# Patient Record
Sex: MALE | Race: BLACK OR AFRICAN AMERICAN | NOT HISPANIC OR LATINO | Employment: OTHER | URBAN - METROPOLITAN AREA
[De-identification: names, ages, dates, MRNs, and addresses within clinical notes are randomized per-mention and may not be internally consistent; named-entity substitution may affect disease eponyms.]

---

## 2017-08-15 ENCOUNTER — TELEPHONE (OUTPATIENT)
Dept: FAMILY MEDICINE | Facility: CLINIC | Age: 62
End: 2017-08-15

## 2017-08-15 NOTE — TELEPHONE ENCOUNTER
----- Message from Yassine Castañeda sent at 8/15/2017 10:38 AM CDT -----  Contact: Pt's wife, Terrance Crowe  Good morning,     Pt's wife is requesting referrals for and Oncologist and Podiatrist at [facility] for the patient.  Best number to call is [phone].    Thanks,     Yassine

## 2017-09-25 ENCOUNTER — OFFICE VISIT (OUTPATIENT)
Dept: FAMILY MEDICINE | Facility: CLINIC | Age: 62
End: 2017-09-25
Payer: MEDICARE

## 2017-09-25 VITALS
DIASTOLIC BLOOD PRESSURE: 80 MMHG | OXYGEN SATURATION: 97 % | SYSTOLIC BLOOD PRESSURE: 118 MMHG | HEART RATE: 73 BPM | HEIGHT: 68 IN | BODY MASS INDEX: 28.1 KG/M2 | WEIGHT: 185.44 LBS

## 2017-09-25 DIAGNOSIS — C34.91 MALIGNANT NEOPLASM OF RIGHT LUNG, UNSPECIFIED PART OF LUNG: ICD-10-CM

## 2017-09-25 DIAGNOSIS — Z79.4 TYPE 2 DIABETES MELLITUS WITHOUT COMPLICATION, WITH LONG-TERM CURRENT USE OF INSULIN: ICD-10-CM

## 2017-09-25 DIAGNOSIS — Z11.59 NEED FOR HEPATITIS C SCREENING TEST: ICD-10-CM

## 2017-09-25 DIAGNOSIS — Z12.11 SCREENING FOR MALIGNANT NEOPLASM OF COLON: ICD-10-CM

## 2017-09-25 DIAGNOSIS — Z00.00 ENCOUNTER FOR PREVENTIVE HEALTH EXAMINATION: Primary | ICD-10-CM

## 2017-09-25 DIAGNOSIS — F41.9 ANXIETY: ICD-10-CM

## 2017-09-25 DIAGNOSIS — D69.6 THROMBOCYTOPENIA: ICD-10-CM

## 2017-09-25 DIAGNOSIS — E11.9 TYPE 2 DIABETES MELLITUS WITHOUT COMPLICATION, WITH LONG-TERM CURRENT USE OF INSULIN: ICD-10-CM

## 2017-09-25 DIAGNOSIS — J44.9 CHRONIC OBSTRUCTIVE PULMONARY DISEASE, UNSPECIFIED COPD TYPE: ICD-10-CM

## 2017-09-25 PROCEDURE — 99999 PR PBB SHADOW E&M-EST. PATIENT-LVL IV: CPT | Mod: PBBFAC,,, | Performed by: NURSE PRACTITIONER

## 2017-09-25 PROCEDURE — G0439 PPPS, SUBSEQ VISIT: HCPCS | Mod: S$GLB,,, | Performed by: NURSE PRACTITIONER

## 2017-09-25 NOTE — PROGRESS NOTES
"Hernan Engel presented for a  Medicare AWV and comprehensive Health Risk Assessment today. The following components were reviewed and updated:    · Medical history  · Family History  · Social history  · Allergies and Current Medications  · Health Risk Assessment  · Health Maintenance  · Care Team     ** See Completed Assessments for Annual Wellness Visit within the encounter summary.**       The following assessments were completed:  · Living Situation  · CAGE  · Depression Screening  · Timed Get Up and Go  · Whisper Test  · Cognitive Function Screening  · Nutrition Screening  · ADL Screening  · PAQ Screening    Vitals:    09/25/17 1115   BP: 118/80   Pulse: 73   SpO2: 97%   Weight: 84.1 kg (185 lb 6.5 oz)   Height: 5' 8" (1.727 m)     Body mass index is 28.19 kg/m².  Physical Exam   Constitutional: He is oriented to person, place, and time.   Cardiovascular: Normal rate, regular rhythm and normal heart sounds.    Pulses:       Dorsalis pedis pulses are 2+ on the right side, and 2+ on the left side.   Pulmonary/Chest: Effort normal and breath sounds normal.   Musculoskeletal: Normal range of motion.        Right foot: There is normal range of motion and no deformity.        Left foot: There is normal range of motion and no deformity.   Feet:   Right Foot:   Protective Sensation: 8 sites tested. 8 sites sensed.   Skin Integrity: Negative for ulcer, blister, skin breakdown, erythema, warmth, callus or dry skin.   Left Foot:   Protective Sensation: 8 sites tested. 8 sites sensed.   Skin Integrity: Negative for ulcer, blister, skin breakdown, erythema, warmth, callus or dry skin.   Neurological: He is alert and oriented to person, place, and time.   Skin: Skin is warm. Capillary refill takes 2 to 3 seconds.   Vitals reviewed.        Diagnoses and health risks identified today and associated recommendations/orders:    1. Encounter for preventive health examination  Education provided about preventive health examinations " and procedures; addressed and discussed patient's health concerns. Additionally, reviewed medical record for risk factors and documented the results during this encounter.    2. Chronic obstructive pulmonary disease, unspecified COPD type  Advised of continued compliance with directives, diet, and lifestyle modifications as recommended.    3. Malignant neoplasm of right lung, unspecified part of lung  Stable, he has an appointment with his PCP Friday, Sept 29, 2017.  Additionally, he reports having a chest CT while hospitalized at [facility]; he completed a release of information for us to obtain the results.     4. Type 2 diabetes mellitus without complication, with long-term current use of insulin  Education provided about diabetes, management of blood glucose with diet and activities, monitoring for worsening effects of diabetes.  Reviewed most recent Ha1c and informed patient of complications associated with uncontrolled diabetes.   - Diabetic Eye Screening Photo; Future  - Hemoglobin A1c; Future    5. Thrombocytopenia  Stable, needs updated labs.  He has a scheduled visit with PCP.     6. Anxiety  Stable, managed with clonazepam and stress relieving activities. Continue as advised regarding dietary and lifestyle modifications.     7. Screening for malignant neoplasm of colon  - Case request GI: COLONOSCOPY    8. Need for hepatitis C screening test  - Hepatitis C antibody; Future    Reviewed health maintenance with patient, educated about recommended examinations, procedures (labs & images), and immunizations.     Provided Hernan with a 5-10 year written screening schedule and personal prevention plan. Recommendations were developed using the USPSTF age appropriate recommendations. Education, counseling, and referrals were provided as needed. After Visit Summary printed and given to patient which includes a list of additional screenings\tests needed.    Return in about 1 year (around 9/25/2018) for risk  assessment .    Ricky Patricio Jr, NP

## 2017-09-25 NOTE — PATIENT INSTRUCTIONS
Counseling and Referral of Other Preventative  (Italic type indicates deductible and co-insurance are waived)    Patient Name: Hernan Engel  Today's Date: 9/25/2017      SERVICE LIMITATIONS RECOMMENDATION    Vaccines    · Pneumococcal (once after 65)    · Influenza (annually)    · Hepatitis B (if medium/high risk)    · Prevnar 13      Hepatitis B medium/high risk factors:       - End-stage renal disease       - Hemophiliacs who received Factor VII or         IX concentrates       - Clients of institutions for the mentally             retarded       - Persons who live in the same house as          a HepB carrier       - Homosexual men       - Illicit injectable drug abusers     Pneumococcal: discussed with patient     Influenza: Recommended to patient.        Hepatitis B: will follow up with PCP.      Prevnar 13: discussed with patient      Prostate cancer screening (annually to age 75)     Prostate specific antigen (PSA) Shared decision making with Provider. Sometimes a co-pay may be required if the patient decides to have this test. The USPSTF no longer recommends prostate cancer screening routinely in medicine:     Colorectal cancer screening (to age 75)    · Fecal occult blood test (annual)  · Flexible sigmoidoscopy (5y)  · Screening colonoscopy (10y)  · Barium enema   Recommended to patient.       Diabetes self-management training (no USPSTF recommendations)  Requires referral by treating physician for patient with diabetes or renal disease. 10 hours of initial DSMT sessions of no less than 30 minutes each in a continuous 12-month period. 2 hours of follow-up DSMT in subsequent years.  discussed with patient      Glaucoma screening (no USPSTF recommendation)  Diabetes mellitus, family history   , age 50 or over    American, age 65 or over  Recommend follow up with eye care professional regularly    Medical nutrition therapy for diabetes or renal disease (no recommended schedule)   Requires referral by treating physician for patient with diabetes or renal disease or kidney transplant within the past 3 years.  Can be provided in same year as diabetes self-management training (DSMT), and CMS recommends medical nutrition therapy take place after DSMT. Up to 3 hours for initial year and 2 hours in subsequent years.  discussed with patient      Cardiovascular screening blood tests (every 5 years)  · Fasting lipid panel  Order as a panel if possible  ordered     Diabetes screening tests (at least every 3 years, Medicare covers annually or at 6-month intervals for prediabetic patients)  · Fasting blood sugar (FBS) or glucose tolerance test (GTT)  Patient must be diagnosed with one of the following:       - Hypertension       - Dyslipidemia       - Obesity (BMI 30kg/m2)       - Previous elevated impaired FBS or GTT       ... or any two of the following:       - Overweight (BMI 25 but <30)       - Family history of diabetes       - Age 65 or older       - History of gestational diabetes or birth of baby weighing more than 9 pounds  ordered     Abdominal aortic aneurysm screening (once)  · Sonogram   Limited to patients who meet one of the following criteria:       - Men who are 65-75 years old and have smoked more than 100 cigarette in their lifetime       - Anyone with a family history of abdominal aortic aneurysm       - Anyone recommended for screening by the USPSTF  per PCP and patient with clinical risk factors     HIV screening (annually for increased risk patients)  · HIV-1 and HIV-2 by EIA, or MELITON, rapid antibody test or oral mucosa transudate  Patients must be at increased risk for HIV infection per USPSTF guidelines or pregnant. Tests covered annually for patient at increased risk or as requested by the patient. Pregnant patients may receive up to 3 tests during pregnancy.  no clinical risk factors      Smoking cessation counseling (up to 8 sessions per year)  Patients must be asymptomatic  of tobacco-related conditions to receive as a preventative service.  Non-smoker    Subsequent annual wellness visit  At least 12 months since last AWV  Return in one year     The following information is provided to all patients.  This information is to help you find resources for any of the problems found today that may be affecting your health:                Living healthy guide: www.[facility].[place].[facility]      Understanding Diabetes: www.diabetes.org      Eating healthy: www.cdc.gov/healthyweight      CDC home safety checklist: www.cdc.gov/steadi/patient.html      Agency on Aging: www.goea.[place].[facility]      Alcoholics anonymous (AA): www.aa.org      Physical Activity: www.ady.nih.gov/rf7njky      Tobacco use: www.quitwithusla.org

## 2017-09-26 ENCOUNTER — LAB VISIT (OUTPATIENT)
Dept: LAB | Facility: HOSPITAL | Age: 62
End: 2017-09-26
Attending: NURSE PRACTITIONER
Payer: MEDICARE

## 2017-09-26 DIAGNOSIS — E11.9 TYPE 2 DIABETES MELLITUS WITHOUT COMPLICATION, WITH LONG-TERM CURRENT USE OF INSULIN: ICD-10-CM

## 2017-09-26 DIAGNOSIS — Z79.4 TYPE 2 DIABETES MELLITUS WITHOUT COMPLICATION, WITH LONG-TERM CURRENT USE OF INSULIN: ICD-10-CM

## 2017-09-26 LAB
ESTIMATED AVG GLUCOSE: 100 MG/DL
HBA1C MFR BLD HPLC: 5.1 %

## 2017-09-26 PROCEDURE — 36415 COLL VENOUS BLD VENIPUNCTURE: CPT | Mod: PO

## 2017-09-26 PROCEDURE — 83036 HEMOGLOBIN GLYCOSYLATED A1C: CPT

## 2017-09-29 ENCOUNTER — OFFICE VISIT (OUTPATIENT)
Dept: FAMILY MEDICINE | Facility: CLINIC | Age: 62
End: 2017-09-29
Attending: FAMILY MEDICINE
Payer: MEDICARE

## 2017-09-29 ENCOUNTER — LAB VISIT (OUTPATIENT)
Dept: LAB | Facility: HOSPITAL | Age: 62
End: 2017-09-29
Attending: FAMILY MEDICINE
Payer: MEDICARE

## 2017-09-29 VITALS
BODY MASS INDEX: 28.03 KG/M2 | HEART RATE: 55 BPM | DIASTOLIC BLOOD PRESSURE: 77 MMHG | SYSTOLIC BLOOD PRESSURE: 123 MMHG | WEIGHT: 184.94 LBS | HEIGHT: 68 IN | OXYGEN SATURATION: 95 %

## 2017-09-29 DIAGNOSIS — Z00.00 ROUTINE GENERAL MEDICAL EXAMINATION AT A HEALTH CARE FACILITY: ICD-10-CM

## 2017-09-29 DIAGNOSIS — Z79.4 TYPE 2 DIABETES MELLITUS WITHOUT COMPLICATION, WITH LONG-TERM CURRENT USE OF INSULIN: ICD-10-CM

## 2017-09-29 DIAGNOSIS — C34.91 MALIGNANT NEOPLASM OF RIGHT LUNG, UNSPECIFIED PART OF LUNG: ICD-10-CM

## 2017-09-29 DIAGNOSIS — Z11.59 NEED FOR HEPATITIS C SCREENING TEST: ICD-10-CM

## 2017-09-29 DIAGNOSIS — D69.6 THROMBOCYTOPENIA: ICD-10-CM

## 2017-09-29 DIAGNOSIS — E11.9 TYPE 2 DIABETES MELLITUS WITHOUT COMPLICATION, WITH LONG-TERM CURRENT USE OF INSULIN: ICD-10-CM

## 2017-09-29 DIAGNOSIS — Z00.00 ROUTINE GENERAL MEDICAL EXAMINATION AT A HEALTH CARE FACILITY: Primary | ICD-10-CM

## 2017-09-29 DIAGNOSIS — Z12.5 SPECIAL SCREENING FOR MALIGNANT NEOPLASM OF PROSTATE: ICD-10-CM

## 2017-09-29 DIAGNOSIS — Z12.11 SCREEN FOR COLON CANCER: ICD-10-CM

## 2017-09-29 DIAGNOSIS — Z23 IMMUNIZATION DUE: ICD-10-CM

## 2017-09-29 DIAGNOSIS — J44.9 CHRONIC OBSTRUCTIVE PULMONARY DISEASE, UNSPECIFIED COPD TYPE: ICD-10-CM

## 2017-09-29 LAB
ALBUMIN SERPL BCP-MCNC: 4.3 G/DL
ALP SERPL-CCNC: 91 U/L
ALT SERPL W/O P-5'-P-CCNC: 22 U/L
ANION GAP SERPL CALC-SCNC: 8 MMOL/L
AST SERPL-CCNC: 19 U/L
BASOPHILS # BLD AUTO: 0.03 K/UL
BASOPHILS NFR BLD: 0.7 %
BILIRUB SERPL-MCNC: 1.5 MG/DL
BUN SERPL-MCNC: 10 MG/DL
CALCIUM SERPL-MCNC: 10.1 MG/DL
CHLORIDE SERPL-SCNC: 106 MMOL/L
CHOLEST SERPL-MCNC: 186 MG/DL
CHOLEST/HDLC SERPL: 3.7 {RATIO}
CO2 SERPL-SCNC: 27 MMOL/L
COMPLEXED PSA SERPL-MCNC: 0.89 NG/ML
CREAT SERPL-MCNC: 1 MG/DL
DIFFERENTIAL METHOD: NORMAL
EOSINOPHIL # BLD AUTO: 0.1 K/UL
EOSINOPHIL NFR BLD: 1.7 %
ERYTHROCYTE [DISTWIDTH] IN BLOOD BY AUTOMATED COUNT: 13.2 %
EST. GFR  (AFRICAN AMERICAN): >60 ML/MIN/1.73 M^2
EST. GFR  (NON AFRICAN AMERICAN): >60 ML/MIN/1.73 M^2
GLUCOSE SERPL-MCNC: 119 MG/DL
HCT VFR BLD AUTO: 42.6 %
HDLC SERPL-MCNC: 50 MG/DL
HDLC SERPL: 26.9 %
HGB BLD-MCNC: 14.5 G/DL
LDLC SERPL CALC-MCNC: 119 MG/DL
LYMPHOCYTES # BLD AUTO: 1.9 K/UL
LYMPHOCYTES NFR BLD: 46 %
MCH RBC QN AUTO: 28.3 PG
MCHC RBC AUTO-ENTMCNC: 34 G/DL
MCV RBC AUTO: 83 FL
MONOCYTES # BLD AUTO: 0.4 K/UL
MONOCYTES NFR BLD: 8.8 %
NEUTROPHILS # BLD AUTO: 1.8 K/UL
NEUTROPHILS NFR BLD: 42.8 %
NONHDLC SERPL-MCNC: 136 MG/DL
PLATELET # BLD AUTO: 229 K/UL
PMV BLD AUTO: 9.5 FL
POTASSIUM SERPL-SCNC: 4.8 MMOL/L
PROT SERPL-MCNC: 8 G/DL
RBC # BLD AUTO: 5.13 M/UL
SODIUM SERPL-SCNC: 141 MMOL/L
TRIGL SERPL-MCNC: 85 MG/DL
WBC # BLD AUTO: 4.09 K/UL

## 2017-09-29 PROCEDURE — 99386 PREV VISIT NEW AGE 40-64: CPT | Mod: S$GLB,,, | Performed by: FAMILY MEDICINE

## 2017-09-29 PROCEDURE — 36415 COLL VENOUS BLD VENIPUNCTURE: CPT

## 2017-09-29 PROCEDURE — 85025 COMPLETE CBC W/AUTO DIFF WBC: CPT

## 2017-09-29 PROCEDURE — 80053 COMPREHEN METABOLIC PANEL: CPT

## 2017-09-29 PROCEDURE — 99999 PR PBB SHADOW E&M-EST. PATIENT-LVL IV: CPT | Mod: PBBFAC,,, | Performed by: FAMILY MEDICINE

## 2017-09-29 PROCEDURE — 84153 ASSAY OF PSA TOTAL: CPT

## 2017-09-29 PROCEDURE — 86803 HEPATITIS C AB TEST: CPT

## 2017-09-29 PROCEDURE — 80061 LIPID PANEL: CPT

## 2017-09-29 NOTE — PROGRESS NOTES
Subjective:       Patient ID: Hernan Engel is a 62 y.o. male.    Chief Complaint: Annual Exam    62 yr old pleasant black male with right lung cancer now is remission, DM II, thrombocytopenia, presents today for his annual wellness check and lab work. No new complaints today.    Lung cancer right - had treatment and it is in remission - he followed [facility] all through this time - he is due for CT chest for surveillance - no complaints       DM II - controlled - diet control and very sparingly he takes insulin when he thinks his sugar is little high -   HGBA1C                   5.1                 09/26/2017          - not on ACE/ARB  Foot n eye exam due      History as below - reviewed         Diabetes   He presents for his follow-up diabetic visit. He has type 2 diabetes mellitus. His disease course has been stable. There are no hypoglycemic associated symptoms. Pertinent negatives for hypoglycemia include no dizziness, nervousness/anxiousness or speech difficulty. There are no diabetic associated symptoms. Pertinent negatives for diabetes include no chest pain, no polyuria and no weakness. There are no hypoglycemic complications. Symptoms are stable. There are no diabetic complications. Risk factors for coronary artery disease include diabetes mellitus and male sex. Current diabetic treatment includes diet. He is compliant with treatment all of the time. He is following a diabetic diet. Meal planning includes avoidance of concentrated sweets. He rarely participates in exercise. There is no change in his home blood glucose trend. An ACE inhibitor/angiotensin II receptor blocker is not being taken. He does not see a podiatrist.Eye exam is not current.     Review of Systems   Constitutional: Negative.  Negative for activity change, diaphoresis and unexpected weight change.   HENT: Negative.  Negative for congestion, ear pain, mouth sores, rhinorrhea and voice change.    Eyes: Negative.  Negative for pain, discharge  and visual disturbance.   Respiratory: Negative.  Negative for apnea, cough and wheezing.    Cardiovascular: Negative.  Negative for chest pain and palpitations.   Gastrointestinal: Negative.  Negative for abdominal distention, anal bleeding, diarrhea and vomiting.   Endocrine: Negative.  Negative for cold intolerance and polyuria.   Genitourinary: Negative.  Negative for decreased urine volume, difficulty urinating, discharge, frequency and scrotal swelling.   Musculoskeletal: Negative.  Negative for back pain, myalgias and neck stiffness.   Skin: Negative.  Negative for color change and rash.   Allergic/Immunologic: Negative.  Negative for environmental allergies and immunocompromised state.   Neurological: Negative.  Negative for dizziness, speech difficulty, weakness and light-headedness.   Hematological: Negative.    Psychiatric/Behavioral: Negative.  Negative for agitation, dysphoric mood and suicidal ideas. The patient is not nervous/anxious.        Active Ambulatory Problems     Diagnosis Date Noted    ED (erectile dysfunction) 01/16/2014    Colon polyp 01/16/2014    Family history of colon cancer 01/16/2014    Abnormal CT scan of lung 01/21/2014    Lung mass 02/25/2014    Primary lung adenocarcinoma 03/17/2014    Status post lobectomy of lung 04/15/2014    Anxiety 05/13/2014    Depression 05/13/2014    COPD (chronic obstructive pulmonary disease) 05/13/2014    Lung cancer 07/16/2014    Hyperkalemia 09/01/2014    DMII (diabetes mellitus, type 2) 09/01/2014    Normocytic anemia 09/03/2014    Thrombocytopenia 09/04/2014     Resolved Ambulatory Problems     Diagnosis Date Noted    Well adult health check 01/16/2014    Chronic cough 01/16/2014    Melena 01/16/2014    Prostate cancer screening 01/16/2014    Hematochezia 01/16/2014    Rectal bleed 01/20/2014    DKA (diabetic ketoacidoses) 09/01/2014    DE (acute kidney injury) 09/01/2014    Troponin I above reference range 09/01/2014     Head trauma 09/01/2014     Past Medical History:   Diagnosis Date    Anemia     Anxiety     COPD (chronic obstructive pulmonary disease)     Depression     Disorder of kidney and ureter     Hearing loss in left ear 34 years    Lung cancer     T2DM (type 2 diabetes mellitus) 2014    Type 2 diabetes mellitus      Past Surgical History:   Procedure Laterality Date    COLONOSCOPY      LUNG REMOVAL, PARTIAL       Family History   Problem Relation Age of Onset    Cancer Father      colon cancer    Colon cancer Father     Colon cancer Sister     Cancer Sister      colon or lung cancer      Social History     Social History    Marital status:      Spouse name: N/A    Number of children: N/A    Years of education: N/A     Occupational History     [employer]     Social History Main Topics    Smoking status: Former Smoker     Packs/day: 1.00     Years: 30.00     Quit date: 1/15/2014    Smokeless tobacco: Never Used    Alcohol use 3.4 oz/week     4 Cans of beer, 2 Standard drinks or equivalent per week      Comment: socially    Drug use: No    Sexual activity: Not Currently     Partners: Female     Birth control/ protection: None     Other Topics Concern    Not on file     Social History Narrative    No narrative on file     Review of patient's allergies indicates:   Allergen Reactions    Adhesive Itching, Rash and Other (See Comments)     Blister     Current Outpatient Prescriptions on File Prior to Visit   Medication Sig Dispense Refill    clonazepam (KLONOPIN) 0.5 MG tablet Take 0.5 tablets (0.25 mg total) by mouth daily as needed for Anxiety. 10 tablet 0    insulin NPH-insulin regular, 70/30, (NOVOLIN 70/30) 100 unit/mL (70-30) injection Inject 25U into skin AM. Inject 15U into skin PM. 1 vial 0    oxycodone (ROXICODONE) 5 MG immediate release tablet Take 1 tablet (5 mg total) by mouth every 4 (four) hours as needed. 61 tablet 0    polyethylene glycol (GOLYTELY,NULYTELY)  236-22.74-6.74 gram suspension       sildenafil (VIAGRA) 100 MG tablet Take 1 tablet (100 mg total) by mouth daily as needed for Erectile Dysfunction. 10 tablet 12    tiotropium (SPIRIVA) 18 mcg inhalation capsule Inhale 18 mcg into the lungs once daily.      zolpidem (AMBIEN) 5 MG Tab Take 1 tablet (5 mg total) by mouth nightly as needed. 30 tablet 0    citalopram (CELEXA) 10 MG tablet Take 1 tablet (10 mg total) by mouth once daily. 30 tablet 2    fluticasone-salmeterol 500-50 mcg/dose (ADVAIR DISKUS) 500-50 mcg/dose DsDv diskus inhaler Inhale 1 puff into the lungs 2 (two) times daily. 60 each 2    metformin (GLUCOPHAGE) 500 MG tablet Take 1 tablet (500 mg total) by mouth 2 (two) times daily with meals. 60 tablet 11     No current facility-administered medications on file prior to visit.        Objective:       Vitals:    09/29/17 0817   BP: 123/77   Pulse: (!) 55       Physical Exam   Constitutional: He is oriented to person, place, and time. He appears well-developed and well-nourished.   HENT:   Head: Normocephalic and atraumatic.   Right Ear: External ear normal.   Left Ear: External ear normal.   Nose: Nose normal.   Mouth/Throat: Oropharynx is clear and moist. No oropharyngeal exudate.   Eyes: Conjunctivae and EOM are normal. Pupils are equal, round, and reactive to light. Right eye exhibits no discharge. Left eye exhibits no discharge. No scleral icterus.   Neck: Normal range of motion. Neck supple. No JVD present. No tracheal deviation present. No thyromegaly present.   Cardiovascular: Normal rate, regular rhythm, normal heart sounds and intact distal pulses.  Exam reveals no gallop and no friction rub.    No murmur heard.  Pulmonary/Chest: Effort normal and breath sounds normal. No stridor. No respiratory distress. He has no wheezes. He has no rales. He exhibits no tenderness.   Abdominal: Soft. Bowel sounds are normal. He exhibits no distension and no mass. There is no tenderness. There is no  rebound and no guarding. No hernia.   Musculoskeletal: Normal range of motion. He exhibits no edema or tenderness.   Lymphadenopathy:     He has no cervical adenopathy.   Neurological: He is alert and oriented to person, place, and time. He has normal reflexes. He displays normal reflexes. No cranial nerve deficit. He exhibits normal muscle tone. Coordination normal.   Skin: Skin is warm and dry. No rash noted. No erythema. No pallor.   Psychiatric: He has a normal mood and affect. His behavior is normal. Judgment and thought content normal.       Assessment:       1. Routine general medical examination at a health care facility    2. Type 2 diabetes mellitus without complication, with long-term current use of insulin    3. Malignant neoplasm of right lung, unspecified part of lung    4. Thrombocytopenia    5. Chronic obstructive pulmonary disease, unspecified COPD type    6. Screen for colon cancer    7. Need for hepatitis C screening test    8. Immunization due    9. Special screening for malignant neoplasm of prostate        Plan:       Hernan was seen today for annual exam.    Diagnoses and all orders for this visit:    Routine general medical examination at a health care facility  -     CBC auto differential; Future  -     Comprehensive metabolic panel; Future  -     Lipid panel; Future    Type 2 diabetes mellitus without complication, with long-term current use of insulin  -     CBC auto differential; Future  -     Comprehensive metabolic panel; Future  -     Lipid panel; Future  -     Microalbumin/creatinine urine ratio; Future  -     Urinalysis; Future  -     Ambulatory referral to Optometry  -     Ambulatory referral to Podiatry    Malignant neoplasm of right lung, unspecified part of lung  -     CT Chest Without Contrast; Future    Thrombocytopenia  -     CBC auto differential; Future    Chronic obstructive pulmonary disease, unspecified COPD type    Screen for colon cancer  -     Case request GI:  COLONOSCOPY    Need for hepatitis C screening test  -     Hepatitis C antibody; Future    Immunization due    Special screening for malignant neoplasm of prostate  -     PSA, Screening; Future      Wellness check  -normal exam  -labs    COPD  -stable    Lung cancer remission  -CT for surveillance    Spent adequate time in obtaining history and explaining differentials    40 minutes spent during this visit of which greater than 50% devoted to face-face counseling and coordination of care regarding diagnosis and management plan    Return in about 6 months (around 3/29/2018), or if symptoms worsen or fail to improve.

## 2017-10-02 ENCOUNTER — HOSPITAL ENCOUNTER (OUTPATIENT)
Dept: RADIOLOGY | Facility: HOSPITAL | Age: 62
Discharge: HOME | End: 2017-10-02
Attending: FAMILY MEDICINE
Payer: MEDICARE

## 2017-10-02 ENCOUNTER — TELEPHONE (OUTPATIENT)
Dept: FAMILY MEDICINE | Facility: CLINIC | Age: 62
End: 2017-10-02

## 2017-10-02 DIAGNOSIS — C34.91 MALIGNANT NEOPLASM OF RIGHT LUNG, UNSPECIFIED PART OF LUNG: ICD-10-CM

## 2017-10-02 LAB — HCV AB SERPL QL IA: NEGATIVE

## 2017-10-02 PROCEDURE — 71250 CT THORAX DX C-: CPT | Mod: TC

## 2017-10-02 PROCEDURE — 71250 CT THORAX DX C-: CPT | Mod: 26,,, | Performed by: RADIOLOGY

## 2017-10-03 ENCOUNTER — TELEPHONE (OUTPATIENT)
Dept: FAMILY MEDICINE | Facility: CLINIC | Age: 62
End: 2017-10-03

## 2017-10-03 NOTE — TELEPHONE ENCOUNTER
----- Message from Katia Mendez sent at 10/2/2017  4:55 PM CDT -----  Contact: [phone]/self  Patient called in returning your call. Please advise.

## 2017-10-03 NOTE — TELEPHONE ENCOUNTER
----- Message from Vicky Dover sent at 10/3/2017  1:52 PM CDT -----  Contact: [phone]/ self   Patient called in returning your call. Please advise

## 2017-10-04 ENCOUNTER — TELEPHONE (OUTPATIENT)
Dept: FAMILY MEDICINE | Facility: CLINIC | Age: 62
End: 2017-10-04

## 2017-10-04 NOTE — TELEPHONE ENCOUNTER
----- Message from Christina Orosco sent at 10/4/2017  4:00 PM CDT -----  Contact: [phone]/ Ms Toro   Patient called in returning your call. Please advise.

## 2017-10-04 NOTE — TELEPHONE ENCOUNTER
----- Message from Lillian Briscoe sent at 10/4/2017  1:28 PM CDT -----  Contact: Ms Engel  Patient returning missed call to nurse   Patient need test results.   Please call Ms Engel at  [phone].

## 2017-10-11 ENCOUNTER — OFFICE VISIT (OUTPATIENT)
Dept: PODIATRY | Facility: CLINIC | Age: 62
End: 2017-10-11
Attending: FAMILY MEDICINE
Payer: MEDICARE

## 2017-10-11 VITALS
WEIGHT: 184 LBS | HEART RATE: 69 BPM | BODY MASS INDEX: 27.89 KG/M2 | HEIGHT: 68 IN | SYSTOLIC BLOOD PRESSURE: 108 MMHG | DIASTOLIC BLOOD PRESSURE: 72 MMHG

## 2017-10-11 DIAGNOSIS — E11.9 ENCOUNTER FOR COMPREHENSIVE DIABETIC FOOT EXAMINATION, TYPE 2 DIABETES MELLITUS: Primary | ICD-10-CM

## 2017-10-11 PROCEDURE — 99999 PR PBB SHADOW E&M-EST. PATIENT-LVL III: CPT | Mod: PBBFAC,,, | Performed by: PODIATRIST

## 2017-10-11 PROCEDURE — 99499 UNLISTED E&M SERVICE: CPT | Mod: S$GLB,,, | Performed by: PODIATRIST

## 2017-10-11 PROCEDURE — 99202 OFFICE O/P NEW SF 15 MIN: CPT | Mod: S$GLB,,, | Performed by: PODIATRIST

## 2017-10-11 RX ORDER — GABAPENTIN 300 MG/1
300 CAPSULE ORAL
COMMUNITY
Start: 2014-10-21 | End: 2017-12-06

## 2017-10-11 RX ORDER — CALCIUM CITRATE/VITAMIN D3 200MG-6.25
TABLET ORAL
COMMUNITY
Start: 2017-08-10 | End: 2019-02-01 | Stop reason: SDUPTHER

## 2017-10-11 RX ORDER — SYRINGE,SAFETY WITH NEEDLE,1ML 25GX1"
1 SYRINGE (EA) MISCELLANEOUS
COMMUNITY
Start: 2014-10-14 | End: 2023-09-28

## 2017-10-11 RX ORDER — ALBUTEROL SULFATE 90 UG/1
2 AEROSOL, METERED RESPIRATORY (INHALATION)
COMMUNITY
Start: 2015-02-24 | End: 2017-12-06 | Stop reason: SDUPTHER

## 2017-10-18 NOTE — PROGRESS NOTES
Subjective:      Patient ID: Hernan Engel is a 62 y.o. male.    Chief Complaint: Diabetic Foot Exam    Hernan is a 62 y.o. male who presents to the clinic upon referral from Dr. Patel  for evaluation and treatment of diabetic feet. Hernan has a past medical history of Anemia; Anxiety; COPD (chronic obstructive pulmonary disease); Depression; Disorder of kidney and ureter; Hearing loss in left ear (34 years); Lung cancer; T2DM (type 2 diabetes mellitus) (2014); and Type 2 diabetes mellitus. Patient relates no major problem with feet. Denies history of lower extremity wounds, infections and/or injury.    PCP: Jose Patel MD        Hemoglobin A1C   Date Value Ref Range Status   09/26/2017 5.1 4.0 - 5.6 % Final     Comment:     According to ADA guidelines, hemoglobin A1c <7.0% represents  optimal control in non-pregnant diabetic patients. Different  metrics may apply to specific patient populations.   Standards of Medical Care in Diabetes-2016.  For the purpose of screening for the presence of diabetes:  <5.7%     Consistent with the absence of diabetes  5.7-6.4%  Consistent with increasing risk for diabetes   (prediabetes)  >or=6.5%  Consistent with diabetes  Currently, no consensus exists for use of hemoglobin A1c  for diagnosis of diabetes for children.  This Hemoglobin A1c assay has significant interference with fetal   hemoglobin   (HbF). The results are invalid for patients with abnormal amounts of   HbF,   including those with known Hereditary Persistence   of Fetal Hemoglobin. Heterozygous hemoglobin variants (HbAS, HbAC,   HbAD, HbAE, HbA2) do not significantly interfere with this assay;   however, presence of multiple variants in a sample may impact the %   interference.     09/02/2014 14.3 (H) 4.5 - 6.2 % Final   09/01/2014 14.4 (H) 4.5 - 6.2 % Final           Review of Systems   Constitution: Negative for chills, fever, weakness, malaise/fatigue and night sweats.   Cardiovascular: Negative for chest pain,  leg swelling, orthopnea and palpitations.   Respiratory: Negative for cough, shortness of breath and wheezing.    Skin: Negative for color change, itching, poor wound healing and rash.   Musculoskeletal: Negative for arthritis, gout, joint pain, joint swelling, muscle weakness and myalgias.   Gastrointestinal: Negative for abdominal pain, constipation and nausea.   Neurological: Positive for paresthesias. Negative for disturbances in coordination, dizziness, focal weakness, numbness and tremors.           Objective:      Physical Exam   Constitutional: He is oriented to person, place, and time. Vital signs are normal. He appears well-developed. He is cooperative. No distress.   Cardiovascular: Intact distal pulses.    Pulses:       Dorsalis pedis pulses are 2+ on the right side, and 2+ on the left side.        Posterior tibial pulses are 1+ on the right side, and 1+ on the left side.   Musculoskeletal:        Right ankle: Normal.        Left ankle: Normal.        Right foot: There is normal range of motion, no bony tenderness, normal capillary refill, no crepitus and no deformity.        Left foot: There is normal range of motion, no bony tenderness, normal capillary refill, no crepitus and no deformity.   Feet:   Right Foot:   Protective Sensation: 10 sites tested. 10 sites sensed.   Skin Integrity: Negative for ulcer, blister, skin breakdown or erythema.   Left Foot:   Protective Sensation: 10 sites tested. 10 sites sensed.   Skin Integrity: Negative for ulcer, blister, skin breakdown or erythema.   Neurological: He is alert and oriented to person, place, and time. He has normal strength. No sensory deficit. He exhibits normal muscle tone. Gait normal.   Reflex Scores:       Achilles reflexes are 2+ on the right side and 2+ on the left side.  Negative Tinels sign and Alexandria's click, bilat.   Skin: Skin is intact. Capillary refill takes 2 to 3 seconds. No abrasion, no ecchymosis, no lesion and no rash noted. No  erythema. Nails show no clubbing.                  Assessment:       Encounter Diagnosis   Name Primary?    Encounter for comprehensive diabetic foot examination, type 2 diabetes mellitus Yes         Plan:       Hernan was seen today for diabetic foot exam.    Diagnoses and all orders for this visit:    Encounter for comprehensive diabetic foot examination, type 2 diabetes mellitus      I counseled the patient on his conditions, their implications and medical management.        - Shoe inspection. Diabetic Foot Education. Patient reminded of the importance of good nutrition and blood sugar control to help prevent podiatric complications of diabetes. Patient instructed on proper foot hygeine. We discussed wearing proper shoe gear, daily foot inspections, never walking without protective shoe gear.

## 2017-11-09 ENCOUNTER — TELEPHONE (OUTPATIENT)
Dept: GASTROENTEROLOGY | Facility: CLINIC | Age: 62
End: 2017-11-09

## 2017-11-09 NOTE — TELEPHONE ENCOUNTER
Referral was sent from Dr. Patel to schedule an Colonoscopy, left an voicemail for patient to call the office back in regards to scheduling procedure.

## 2017-12-06 ENCOUNTER — OFFICE VISIT (OUTPATIENT)
Dept: FAMILY MEDICINE | Facility: CLINIC | Age: 62
End: 2017-12-06
Attending: FAMILY MEDICINE
Payer: MEDICARE

## 2017-12-06 VITALS
BODY MASS INDEX: 28.83 KG/M2 | HEART RATE: 68 BPM | OXYGEN SATURATION: 96 % | WEIGHT: 190.25 LBS | DIASTOLIC BLOOD PRESSURE: 65 MMHG | SYSTOLIC BLOOD PRESSURE: 100 MMHG | HEIGHT: 68 IN

## 2017-12-06 DIAGNOSIS — E78.5 DYSLIPIDEMIA ASSOCIATED WITH TYPE 2 DIABETES MELLITUS: ICD-10-CM

## 2017-12-06 DIAGNOSIS — E11.9 TYPE 2 DIABETES MELLITUS WITHOUT COMPLICATION, WITH LONG-TERM CURRENT USE OF INSULIN: Primary | ICD-10-CM

## 2017-12-06 DIAGNOSIS — F32.A DEPRESSION, UNSPECIFIED DEPRESSION TYPE: ICD-10-CM

## 2017-12-06 DIAGNOSIS — K21.9 GASTROESOPHAGEAL REFLUX DISEASE, ESOPHAGITIS PRESENCE NOT SPECIFIED: ICD-10-CM

## 2017-12-06 DIAGNOSIS — J44.9 CHRONIC OBSTRUCTIVE PULMONARY DISEASE, UNSPECIFIED COPD TYPE: ICD-10-CM

## 2017-12-06 DIAGNOSIS — I70.0 AORTIC ATHEROSCLEROSIS: ICD-10-CM

## 2017-12-06 DIAGNOSIS — C34.91 MALIGNANT NEOPLASM OF RIGHT LUNG, UNSPECIFIED PART OF LUNG: ICD-10-CM

## 2017-12-06 DIAGNOSIS — F41.9 ANXIETY: ICD-10-CM

## 2017-12-06 DIAGNOSIS — Z79.4 TYPE 2 DIABETES MELLITUS WITHOUT COMPLICATION, WITH LONG-TERM CURRENT USE OF INSULIN: Primary | ICD-10-CM

## 2017-12-06 DIAGNOSIS — C34.91 PRIMARY ADENOCARCINOMA OF RIGHT LUNG: ICD-10-CM

## 2017-12-06 DIAGNOSIS — Z12.11 COLON CANCER SCREENING: ICD-10-CM

## 2017-12-06 DIAGNOSIS — G47.00 INSOMNIA, UNSPECIFIED TYPE: ICD-10-CM

## 2017-12-06 DIAGNOSIS — J43.1 PANLOBULAR EMPHYSEMA: ICD-10-CM

## 2017-12-06 DIAGNOSIS — E11.69 DYSLIPIDEMIA ASSOCIATED WITH TYPE 2 DIABETES MELLITUS: ICD-10-CM

## 2017-12-06 PROCEDURE — 99214 OFFICE O/P EST MOD 30 MIN: CPT | Mod: S$GLB,,, | Performed by: FAMILY MEDICINE

## 2017-12-06 PROCEDURE — 99499 UNLISTED E&M SERVICE: CPT | Mod: S$GLB,,, | Performed by: FAMILY MEDICINE

## 2017-12-06 PROCEDURE — 99999 PR PBB SHADOW E&M-EST. PATIENT-LVL III: CPT | Mod: PBBFAC,,, | Performed by: FAMILY MEDICINE

## 2017-12-06 RX ORDER — FLUTICASONE PROPIONATE AND SALMETEROL 500; 50 UG/1; UG/1
1 POWDER RESPIRATORY (INHALATION) 2 TIMES DAILY
Qty: 180 EACH | Refills: 3 | Status: SHIPPED | OUTPATIENT
Start: 2017-12-06 | End: 2019-10-04 | Stop reason: SDUPTHER

## 2017-12-06 RX ORDER — TIOTROPIUM BROMIDE 18 UG/1
18 CAPSULE ORAL; RESPIRATORY (INHALATION) DAILY
Qty: 90 CAPSULE | Refills: 10 | Status: SHIPPED | OUTPATIENT
Start: 2017-12-06 | End: 2020-03-02

## 2017-12-06 RX ORDER — TRAZODONE HYDROCHLORIDE 50 MG/1
50 TABLET ORAL NIGHTLY
Qty: 90 TABLET | Refills: 3 | Status: SHIPPED | OUTPATIENT
Start: 2017-12-06 | End: 2018-03-06

## 2017-12-06 RX ORDER — ATORVASTATIN CALCIUM 10 MG/1
10 TABLET, FILM COATED ORAL DAILY
Qty: 90 TABLET | Refills: 3 | Status: SHIPPED | OUTPATIENT
Start: 2017-12-06 | End: 2019-03-06 | Stop reason: SDUPTHER

## 2017-12-06 RX ORDER — ALBUTEROL SULFATE 90 UG/1
2 AEROSOL, METERED RESPIRATORY (INHALATION) EVERY 6 HOURS PRN
Qty: 18 G | Refills: 10 | Status: SHIPPED | OUTPATIENT
Start: 2017-12-06 | End: 2019-02-01 | Stop reason: SDUPTHER

## 2017-12-06 RX ORDER — CITALOPRAM 10 MG/1
10 TABLET ORAL DAILY
Qty: 90 TABLET | Refills: 3 | Status: SHIPPED | OUTPATIENT
Start: 2017-12-06 | End: 2018-03-06

## 2017-12-06 NOTE — PROGRESS NOTES
Subjective:       Patient ID: Hernan Engel is a 62 y.o. male.    Chief Complaint: Results (CT ) and Diabetic Eye Exam    62 yr old pleasant black male with right lung cancer now is remission, HLD, DM II,COPD,  thrombocytopenia, presents today for his regular check and lab work review. No new complaints today.    Lung cancer right - had treatment and it is in remission - he followed [facility] all through this time - he is due for CT chest for surveillance - no complaints       DM II - controlled - diet control - HGBA1C                   5.1                 09/26/2017                - not on ACE/ARB  Foot n eye exam UTD      HLD - diet control - LDL not at goal -       Depression/anxiety - uncontrolled - need med refills      Insomnia - uncontrolled - want to try something else since ambien not helping       COPD - uncontrolled - due for med refills -           History as below - reviewed         Diabetes   He presents for his follow-up diabetic visit. He has type 2 diabetes mellitus. His disease course has been stable. There are no hypoglycemic associated symptoms. Pertinent negatives for hypoglycemia include no dizziness, nervousness/anxiousness or speech difficulty. There are no diabetic associated symptoms. Pertinent negatives for diabetes include no chest pain, no polyuria and no weakness. There are no hypoglycemic complications. Symptoms are stable. There are no diabetic complications. Risk factors for coronary artery disease include diabetes mellitus and male sex. Current diabetic treatment includes diet. He is compliant with treatment all of the time. He is following a diabetic diet. Meal planning includes avoidance of concentrated sweets. He rarely participates in exercise. There is no change in his home blood glucose trend. An ACE inhibitor/angiotensin II receptor blocker is not being taken. He does not see a podiatrist.Eye exam is not current.   Hyperlipidemia   Pertinent negatives include no chest pain or  myalgias.     Review of Systems   Constitutional: Negative.  Negative for activity change, diaphoresis and unexpected weight change.   HENT: Negative.  Negative for congestion, ear pain, mouth sores, rhinorrhea and voice change.    Eyes: Negative.  Negative for pain, discharge and visual disturbance.   Respiratory: Negative.  Negative for apnea, cough and wheezing.    Cardiovascular: Negative.  Negative for chest pain and palpitations.   Gastrointestinal: Negative.  Negative for abdominal distention, anal bleeding, diarrhea and vomiting.   Endocrine: Negative.  Negative for cold intolerance and polyuria.   Genitourinary: Negative.  Negative for decreased urine volume, difficulty urinating, discharge, frequency and scrotal swelling.   Musculoskeletal: Negative.  Negative for back pain, myalgias and neck stiffness.   Skin: Negative.  Negative for color change and rash.   Allergic/Immunologic: Negative.  Negative for environmental allergies and immunocompromised state.   Neurological: Negative.  Negative for dizziness, speech difficulty, weakness and light-headedness.   Hematological: Negative.    Psychiatric/Behavioral: Negative.  Negative for agitation, dysphoric mood and suicidal ideas. The patient is not nervous/anxious.        PMH/PSH/FH/SH/MED/ALLERGY reviewed    Objective:       Vitals:    12/06/17 0819   BP: 100/65   Pulse: 68       Physical Exam   Constitutional: He is oriented to person, place, and time. He appears well-developed and well-nourished.   HENT:   Head: Normocephalic and atraumatic.   Right Ear: External ear normal.   Left Ear: External ear normal.   Nose: Nose normal.   Mouth/Throat: Oropharynx is clear and moist. No oropharyngeal exudate.   Eyes: Conjunctivae and EOM are normal. Pupils are equal, round, and reactive to light. Right eye exhibits no discharge. Left eye exhibits no discharge. No scleral icterus.   Neck: Normal range of motion. Neck supple. No JVD present. No tracheal deviation  present. No thyromegaly present.   Cardiovascular: Normal rate, regular rhythm, normal heart sounds and intact distal pulses.  Exam reveals no gallop and no friction rub.    No murmur heard.  Pulmonary/Chest: Effort normal and breath sounds normal. No stridor. No respiratory distress. He has no wheezes. He has no rales. He exhibits no tenderness.   Abdominal: Soft. Bowel sounds are normal. He exhibits no distension and no mass. There is no tenderness. There is no rebound and no guarding. No hernia.   Musculoskeletal: Normal range of motion. He exhibits no edema or tenderness.   Lymphadenopathy:     He has no cervical adenopathy.   Neurological: He is alert and oriented to person, place, and time. He has normal reflexes. He displays normal reflexes. No cranial nerve deficit. He exhibits normal muscle tone. Coordination normal.   Skin: Skin is warm and dry. No rash noted. No erythema. No pallor.   Psychiatric: He has a normal mood and affect. His behavior is normal. Judgment and thought content normal.       Assessment:       1. Type 2 diabetes mellitus without complication, with long-term current use of insulin    2. Depression, unspecified depression type    3. Anxiety    4. Chronic obstructive pulmonary disease, unspecified COPD type    5. Primary adenocarcinoma of right lung    6. Malignant neoplasm of right lung, unspecified part of lung    7. Dyslipidemia associated with type 2 diabetes mellitus    8. Panlobular emphysema    9. Insomnia, unspecified type    10. Colon cancer screening    11. Gastroesophageal reflux disease, esophagitis presence not specified        Plan:       Hernan was seen today for results and diabetic eye exam.    Diagnoses and all orders for this visit:    Type 2 diabetes mellitus without complication, with long-term current use of insulin  -     Comprehensive metabolic panel; Future  -     CBC auto differential; Future  -     Lipid panel; Future  -     Hemoglobin A1c; Future    Depression,  unspecified depression type  -     citalopram (CELEXA) 10 MG tablet; Take 1 tablet (10 mg total) by mouth once daily.    Anxiety  -     citalopram (CELEXA) 10 MG tablet; Take 1 tablet (10 mg total) by mouth once daily.    Chronic obstructive pulmonary disease, unspecified COPD type    Primary adenocarcinoma of right lung    Malignant neoplasm of right lung, unspecified part of lung    Dyslipidemia associated with type 2 diabetes mellitus  -     atorvastatin (LIPITOR) 10 MG tablet; Take 1 tablet (10 mg total) by mouth once daily.  -     Comprehensive metabolic panel; Future  -     CBC auto differential; Future  -     Lipid panel; Future    Panlobular emphysema  -     albuterol 90 mcg/actuation inhaler; Inhale 2 puffs into the lungs every 6 (six) hours as needed for Wheezing.  -     fluticasone-salmeterol 500-50 mcg/dose (ADVAIR DISKUS) 500-50 mcg/dose DsDv diskus inhaler; Inhale 1 puff into the lungs 2 (two) times daily.  -     tiotropium (SPIRIVA) 18 mcg inhalation capsule; Inhale 1 capsule (18 mcg total) into the lungs once daily.    Insomnia, unspecified type  -     traZODone (DESYREL) 50 MG tablet; Take 1 tablet (50 mg total) by mouth every evening.    Colon cancer screening  -     Fecal Immunochemical Test (iFOBT); Future  -     Case request GI: COLONOSCOPY    Gastroesophageal reflux disease, esophagitis presence not specified  -     H. pylori antigen, stool; Future    Aortic atherosclerosis      COPD  -stable  -refilled meds    GERD  -H Pylori    DM II  -controlled    HLD  -not control  -start statin    Insomnia  -uncontrolled  -trial of trazodone    Anxiety/depression  restarted meds  -stable        Lung cancer remission  -CT for surveillance    Spent adequate time in obtaining history and explaining differentials    40 minutes spent during this visit of which greater than 50% devoted to face-face counseling and coordination of care regarding diagnosis and management plan    Return in about 3 months (around  3/6/2018), or if symptoms worsen or fail to improve.

## 2017-12-13 ENCOUNTER — LAB VISIT (OUTPATIENT)
Dept: LAB | Facility: HOSPITAL | Age: 62
End: 2017-12-13
Attending: FAMILY MEDICINE
Payer: MEDICARE

## 2017-12-13 DIAGNOSIS — Z12.11 COLON CANCER SCREENING: ICD-10-CM

## 2017-12-13 DIAGNOSIS — K21.9 GASTROESOPHAGEAL REFLUX DISEASE, ESOPHAGITIS PRESENCE NOT SPECIFIED: ICD-10-CM

## 2017-12-13 PROCEDURE — 82274 ASSAY TEST FOR BLOOD FECAL: CPT

## 2017-12-15 LAB — HEMOCCULT STL QL IA: NEGATIVE

## 2017-12-19 ENCOUNTER — OFFICE VISIT (OUTPATIENT)
Dept: OPTOMETRY | Facility: CLINIC | Age: 62
End: 2017-12-19
Payer: MEDICARE

## 2017-12-19 DIAGNOSIS — H52.7 REFRACTIVE ERROR: ICD-10-CM

## 2017-12-19 DIAGNOSIS — H25.13 NUCLEAR SCLEROSIS OF BOTH EYES: ICD-10-CM

## 2017-12-19 DIAGNOSIS — E11.9 TYPE 2 DIABETES MELLITUS WITHOUT RETINOPATHY: Primary | ICD-10-CM

## 2017-12-19 PROCEDURE — 99499 UNLISTED E&M SERVICE: CPT | Mod: S$GLB,,, | Performed by: OPTOMETRIST

## 2017-12-19 PROCEDURE — 92015 DETERMINE REFRACTIVE STATE: CPT | Mod: S$GLB,,, | Performed by: OPTOMETRIST

## 2017-12-19 PROCEDURE — 92004 COMPRE OPH EXAM NEW PT 1/>: CPT | Mod: S$GLB,,, | Performed by: OPTOMETRIST

## 2017-12-19 PROCEDURE — 99999 PR PBB SHADOW E&M-EST. PATIENT-LVL II: CPT | Mod: PBBFAC,,, | Performed by: OPTOMETRIST

## 2017-12-19 NOTE — PROGRESS NOTES
Subjective:       Patient ID: Hernan Engel is a 62 y.o. male      Chief Complaint   Patient presents with    Diabetic Eye Exam      this AM     History of Present Illness    Dls:  ? Yrs     Pt here for diabetic eye exam.   Pt c/o blurry vision at distance and near ou off/on. Pt does not wear any   glasses. Pt states no tearing no itching no burning no pain no ha's no   floaters.    Eye meds:  otc gtts ou prn    Hemoglobin A1C       Date                     Value               Ref Range           Status                09/26/2017               5.1                 4.0 - 5.6 %         Final                  09/02/2014               14.3 (H)            4.5 - 6.2 %         Final                 09/01/2014               14.4 (H)            4.5 - 6.2 %         Final            ----------     Assessment/Plan:     1. Type 2 diabetes mellitus without retinopathy  No diabetic retinopathy. Educated patient on importance of good blood sugar control, compliance with meds, and follow up care with PCP. Return in 1 year for dilated eye exam, sooner PRN.    2. Nuclear sclerosis of both eyes  Educated pt on presence of cataracts and effects on vision. No surgery at this time. Recheck in one year.    3. Refractive error  Educated patient on refractive error and discussed lens options. Can use OTC readers if preferred. Dispensed updated spectacle Rx. Educated about adaptation period to new specs.    Eyeglass Final Rx     Eyeglass Final Rx       Sphere Cylinder Axis Add    Right -0.25 +0.25 175 +2.50    Left [place]ena +0.25 130 +2.50    Type:  Bifocal    Expiration Date:  12/20/2018                  Return in about 1 year (around 12/19/2018) for Diabetic Eye Exam.

## 2017-12-19 NOTE — LETTER
December 19, 2017      Jose Patel MD  [address]  [address]  [place] [address]           [facility]  [address]  [place] [address]  Phone: [phone]  Fax: [phone]          Patient: Hernan Engel   MR Number: [id number]   YOB: 1955   Date of Visit: 12/19/2017       Dear Dr. Jose Patel:    Thank you for referring Hernan Engel to me for evaluation. Attached you will find relevant portions of my assessment and plan of care.    If you have questions, please do not hesitate to call me. I look forward to following Hernan Engel along with you.    Sincerely,    Tiffanie Francis, OD    Enclosure  CC:  No Recipients    If you would like to receive this communication electronically, please contact [email or web address] or [phone] to request more information on [employer] Link access.    For providers and/or their staff who would like to refer a patient to [facility], please contact us through our one-stop-shop provider referral line, [facility], at [phone].    If you feel you have received this communication in error or would no longer like to receive these types of communications, please e-mail [email or web address]

## 2017-12-29 ENCOUNTER — TELEPHONE (OUTPATIENT)
Dept: GASTROENTEROLOGY | Facility: CLINIC | Age: 62
End: 2017-12-29

## 2018-01-02 ENCOUNTER — TELEPHONE (OUTPATIENT)
Dept: GASTROENTEROLOGY | Facility: CLINIC | Age: 63
End: 2018-01-02

## 2018-03-02 ENCOUNTER — LAB VISIT (OUTPATIENT)
Dept: LAB | Facility: HOSPITAL | Age: 63
End: 2018-03-02
Attending: FAMILY MEDICINE
Payer: MEDICARE

## 2018-03-02 DIAGNOSIS — Z79.4 TYPE 2 DIABETES MELLITUS WITHOUT COMPLICATION, WITH LONG-TERM CURRENT USE OF INSULIN: ICD-10-CM

## 2018-03-02 DIAGNOSIS — E78.5 DYSLIPIDEMIA ASSOCIATED WITH TYPE 2 DIABETES MELLITUS: ICD-10-CM

## 2018-03-02 DIAGNOSIS — E11.9 TYPE 2 DIABETES MELLITUS WITHOUT COMPLICATION, WITH LONG-TERM CURRENT USE OF INSULIN: ICD-10-CM

## 2018-03-02 DIAGNOSIS — E11.69 DYSLIPIDEMIA ASSOCIATED WITH TYPE 2 DIABETES MELLITUS: ICD-10-CM

## 2018-03-02 LAB
ALBUMIN SERPL BCP-MCNC: 4.3 G/DL
ALP SERPL-CCNC: 82 U/L
ALT SERPL W/O P-5'-P-CCNC: 25 U/L
ANION GAP SERPL CALC-SCNC: 5 MMOL/L
AST SERPL-CCNC: 17 U/L
BASOPHILS # BLD AUTO: 0.03 K/UL
BASOPHILS NFR BLD: 0.7 %
BILIRUB SERPL-MCNC: 1.1 MG/DL
BUN SERPL-MCNC: 14 MG/DL
CALCIUM SERPL-MCNC: 9.6 MG/DL
CHLORIDE SERPL-SCNC: 105 MMOL/L
CHOLEST SERPL-MCNC: 144 MG/DL
CHOLEST/HDLC SERPL: 3.4 {RATIO}
CO2 SERPL-SCNC: 28 MMOL/L
CREAT SERPL-MCNC: 1 MG/DL
DIFFERENTIAL METHOD: ABNORMAL
EOSINOPHIL # BLD AUTO: 0.1 K/UL
EOSINOPHIL NFR BLD: 2.9 %
ERYTHROCYTE [DISTWIDTH] IN BLOOD BY AUTOMATED COUNT: 13.1 %
EST. GFR  (AFRICAN AMERICAN): >60 ML/MIN/1.73 M^2
EST. GFR  (NON AFRICAN AMERICAN): >60 ML/MIN/1.73 M^2
ESTIMATED AVG GLUCOSE: 97 MG/DL
GLUCOSE SERPL-MCNC: 100 MG/DL
HBA1C MFR BLD HPLC: 5 %
HCT VFR BLD AUTO: 38.7 %
HDLC SERPL-MCNC: 42 MG/DL
HDLC SERPL: 29.2 %
HGB BLD-MCNC: 13.1 G/DL
LDLC SERPL CALC-MCNC: 87.4 MG/DL
LYMPHOCYTES # BLD AUTO: 1.9 K/UL
LYMPHOCYTES NFR BLD: 42.5 %
MCH RBC QN AUTO: 28.1 PG
MCHC RBC AUTO-ENTMCNC: 33.9 G/DL
MCV RBC AUTO: 83 FL
MONOCYTES # BLD AUTO: 0.3 K/UL
MONOCYTES NFR BLD: 7.7 %
NEUTROPHILS # BLD AUTO: 2 K/UL
NEUTROPHILS NFR BLD: 46.2 %
NONHDLC SERPL-MCNC: 102 MG/DL
PLATELET # BLD AUTO: 253 K/UL
PMV BLD AUTO: 9.3 FL
POTASSIUM SERPL-SCNC: 3.8 MMOL/L
PROT SERPL-MCNC: 7.3 G/DL
RBC # BLD AUTO: 4.67 M/UL
SODIUM SERPL-SCNC: 138 MMOL/L
TRIGL SERPL-MCNC: 73 MG/DL
WBC # BLD AUTO: 4.42 K/UL

## 2018-03-02 PROCEDURE — 80053 COMPREHEN METABOLIC PANEL: CPT

## 2018-03-02 PROCEDURE — 83036 HEMOGLOBIN GLYCOSYLATED A1C: CPT

## 2018-03-02 PROCEDURE — 36415 COLL VENOUS BLD VENIPUNCTURE: CPT

## 2018-03-02 PROCEDURE — 85025 COMPLETE CBC W/AUTO DIFF WBC: CPT

## 2018-03-02 PROCEDURE — 80061 LIPID PANEL: CPT

## 2018-03-06 ENCOUNTER — OFFICE VISIT (OUTPATIENT)
Dept: FAMILY MEDICINE | Facility: CLINIC | Age: 63
End: 2018-03-06
Attending: FAMILY MEDICINE
Payer: MEDICARE

## 2018-03-06 ENCOUNTER — TELEPHONE (OUTPATIENT)
Dept: FAMILY MEDICINE | Facility: CLINIC | Age: 63
End: 2018-03-06

## 2018-03-06 ENCOUNTER — HOSPITAL ENCOUNTER (OUTPATIENT)
Dept: RADIOLOGY | Facility: HOSPITAL | Age: 63
Discharge: HOME | End: 2018-03-06
Attending: FAMILY MEDICINE
Payer: MEDICARE

## 2018-03-06 ENCOUNTER — DOCUMENTATION ONLY (OUTPATIENT)
Dept: FAMILY MEDICINE | Facility: CLINIC | Age: 63
End: 2018-03-06

## 2018-03-06 VITALS
WEIGHT: 184.94 LBS | TEMPERATURE: 98 F | HEART RATE: 66 BPM | OXYGEN SATURATION: 98 % | SYSTOLIC BLOOD PRESSURE: 111 MMHG | BODY MASS INDEX: 28.03 KG/M2 | DIASTOLIC BLOOD PRESSURE: 67 MMHG | HEIGHT: 68 IN

## 2018-03-06 DIAGNOSIS — K21.9 GASTROESOPHAGEAL REFLUX DISEASE, ESOPHAGITIS PRESENCE NOT SPECIFIED: ICD-10-CM

## 2018-03-06 DIAGNOSIS — E11.69 DYSLIPIDEMIA ASSOCIATED WITH TYPE 2 DIABETES MELLITUS: ICD-10-CM

## 2018-03-06 DIAGNOSIS — C34.91 MALIGNANT NEOPLASM OF RIGHT LUNG, UNSPECIFIED PART OF LUNG: ICD-10-CM

## 2018-03-06 DIAGNOSIS — E11.9 TYPE 2 DIABETES MELLITUS WITHOUT COMPLICATION, WITH LONG-TERM CURRENT USE OF INSULIN: ICD-10-CM

## 2018-03-06 DIAGNOSIS — J44.9 CHRONIC OBSTRUCTIVE PULMONARY DISEASE, UNSPECIFIED COPD TYPE: ICD-10-CM

## 2018-03-06 DIAGNOSIS — F33.9 MAJOR DEPRESSION, RECURRENT, CHRONIC: Primary | ICD-10-CM

## 2018-03-06 DIAGNOSIS — J20.8 ACUTE BACTERIAL BRONCHITIS: ICD-10-CM

## 2018-03-06 DIAGNOSIS — B96.89 ACUTE BACTERIAL BRONCHITIS: ICD-10-CM

## 2018-03-06 DIAGNOSIS — C34.91 PRIMARY ADENOCARCINOMA OF RIGHT LUNG: ICD-10-CM

## 2018-03-06 DIAGNOSIS — E78.5 DYSLIPIDEMIA ASSOCIATED WITH TYPE 2 DIABETES MELLITUS: ICD-10-CM

## 2018-03-06 DIAGNOSIS — Z90.2 STATUS POST LOBECTOMY OF LUNG: ICD-10-CM

## 2018-03-06 DIAGNOSIS — Z79.4 TYPE 2 DIABETES MELLITUS WITHOUT COMPLICATION, WITH LONG-TERM CURRENT USE OF INSULIN: ICD-10-CM

## 2018-03-06 DIAGNOSIS — F41.9 ANXIETY: ICD-10-CM

## 2018-03-06 DIAGNOSIS — I70.0 AORTIC ATHEROSCLEROSIS: ICD-10-CM

## 2018-03-06 PROCEDURE — 99214 OFFICE O/P EST MOD 30 MIN: CPT | Mod: S$GLB,,, | Performed by: FAMILY MEDICINE

## 2018-03-06 PROCEDURE — 71046 X-RAY EXAM CHEST 2 VIEWS: CPT | Mod: 26,,, | Performed by: RADIOLOGY

## 2018-03-06 PROCEDURE — 99499 UNLISTED E&M SERVICE: CPT | Mod: S$GLB,,, | Performed by: FAMILY MEDICINE

## 2018-03-06 PROCEDURE — 99999 PR PBB SHADOW E&M-EST. PATIENT-LVL III: CPT | Mod: PBBFAC,,, | Performed by: FAMILY MEDICINE

## 2018-03-06 PROCEDURE — 71046 X-RAY EXAM CHEST 2 VIEWS: CPT | Mod: TC,FY

## 2018-03-06 RX ORDER — VENLAFAXINE HYDROCHLORIDE 37.5 MG/1
37.5 CAPSULE, EXTENDED RELEASE ORAL DAILY
Qty: 30 CAPSULE | Refills: 2 | Status: SHIPPED | OUTPATIENT
Start: 2018-03-06 | End: 2018-03-06 | Stop reason: SDUPTHER

## 2018-03-06 RX ORDER — QUETIAPINE FUMARATE 50 MG/1
50 TABLET, FILM COATED ORAL NIGHTLY
Qty: 30 TABLET | Refills: 2 | Status: SHIPPED | OUTPATIENT
Start: 2018-03-06 | End: 2018-03-06 | Stop reason: SDUPTHER

## 2018-03-06 RX ORDER — VENLAFAXINE HYDROCHLORIDE 37.5 MG/1
37.5 CAPSULE, EXTENDED RELEASE ORAL DAILY
Qty: 30 CAPSULE | Refills: 2 | Status: SHIPPED | OUTPATIENT
Start: 2018-03-06 | End: 2019-04-11

## 2018-03-06 RX ORDER — DOXYCYCLINE 100 MG/1
100 CAPSULE ORAL 2 TIMES DAILY
Qty: 20 CAPSULE | Refills: 0 | Status: SHIPPED | OUTPATIENT
Start: 2018-03-06 | End: 2018-07-12

## 2018-03-06 RX ORDER — QUETIAPINE FUMARATE 50 MG/1
50 TABLET, FILM COATED ORAL NIGHTLY
Qty: 30 TABLET | Refills: 2 | Status: SHIPPED | OUTPATIENT
Start: 2018-03-06 | End: 2019-03-06

## 2018-03-06 RX ORDER — DOXYCYCLINE 100 MG/1
100 CAPSULE ORAL 2 TIMES DAILY
Qty: 20 CAPSULE | Refills: 0 | Status: SHIPPED | OUTPATIENT
Start: 2018-03-06 | End: 2018-03-06 | Stop reason: SDUPTHER

## 2018-03-06 NOTE — TELEPHONE ENCOUNTER
----- Message from Lillian Briscoe sent at 3/6/2018 10:31 AM CST -----  Contact: Ms Engel / [phone]  Patient was seen by doctor today new prescription was called into [facility] pharmacy in [place].  Patient states requested that prescription be called into [facility] in [place] on [address].

## 2018-03-06 NOTE — PROGRESS NOTES
Subjective:       Patient ID: Hernan Engel is a 62 y.o. male.    Chief Complaint: Cough; Wheezing; Results; and Medication Problem (Celexa)    62 yr old pleasant black male with right lung cancer now is remission, HLD, DM II,COPD,  thrombocytopenia, presents today for his regular check and lab work review. No new complaints today.    Lung cancer right - had treatment and it is in remission - he followed [facility] all through this time - he is due for CT chest for surveillance - no complaints       DM II - controlled - diet control - A1C                   5.0                 03/02/2018                        - not on ACE/ARB  Foot n eye exam UTD      HLD - diet control - LDLCALC                  87.4                03/02/2018                Depression/anxiety - uncontrolled - on celexa and trazodone - no SI/HI      Insomnia - uncontrolled - want to try something else since ambien not helping       COPD - uncontrolled - wheezing and mild cough x 4-5 weeks. Mild SOB and no chest pain       GERD - worried about H Pylori and want to check the status          History as below - reviewed         Diabetes   He presents for his follow-up diabetic visit. He has type 2 diabetes mellitus. His disease course has been stable. There are no hypoglycemic associated symptoms. Pertinent negatives for hypoglycemia include no dizziness, nervousness/anxiousness or speech difficulty. There are no diabetic associated symptoms. Pertinent negatives for diabetes include no chest pain, no polyuria and no weakness. There are no hypoglycemic complications. Symptoms are stable. There are no diabetic complications. Risk factors for coronary artery disease include diabetes mellitus and male sex. Current diabetic treatment includes diet. He is compliant with treatment all of the time. He is following a diabetic diet. Meal planning includes avoidance of concentrated sweets. He rarely participates in exercise. There is no change in his home blood glucose  trend. An ACE inhibitor/angiotensin II receptor blocker is not being taken. He does not see a podiatrist.Eye exam is not current.   Hyperlipidemia   Pertinent negatives include no chest pain or myalgias.     Review of Systems   Constitutional: Negative.  Negative for activity change, diaphoresis and unexpected weight change.   HENT: Negative.  Negative for congestion, ear pain, mouth sores, rhinorrhea and voice change.    Eyes: Negative.  Negative for pain, discharge and visual disturbance.   Respiratory: Positive for cough and wheezing. Negative for apnea.    Cardiovascular: Negative.  Negative for chest pain and palpitations.   Gastrointestinal: Negative.  Negative for abdominal distention, anal bleeding, diarrhea and vomiting.   Endocrine: Negative.  Negative for cold intolerance and polyuria.   Genitourinary: Negative.  Negative for decreased urine volume, difficulty urinating, discharge, frequency and scrotal swelling.   Musculoskeletal: Negative.  Negative for back pain, myalgias and neck stiffness.   Skin: Negative.  Negative for color change and rash.   Allergic/Immunologic: Negative.  Negative for environmental allergies and immunocompromised state.   Neurological: Negative.  Negative for dizziness, speech difficulty, weakness and light-headedness.   Hematological: Negative.    Psychiatric/Behavioral: Negative.  Negative for agitation, dysphoric mood and suicidal ideas. The patient is not nervous/anxious.        PMH/PSH/FH/SH/MED/ALLERGY reviewed    Objective:       Vitals:    03/06/18 0805   BP: 111/67   Pulse: 66   Temp: 98.2 °F (36.8 °C)       Physical Exam   Constitutional: He is oriented to person, place, and time. He appears well-developed and well-nourished.   HENT:   Head: Normocephalic and atraumatic.   Right Ear: External ear normal.   Left Ear: External ear normal.   Nose: Nose normal.   Mouth/Throat: Oropharynx is clear and moist. No oropharyngeal exudate.   Eyes: Conjunctivae and EOM are  normal. Pupils are equal, round, and reactive to light. Right eye exhibits no discharge. Left eye exhibits no discharge. No scleral icterus.   Neck: Normal range of motion. Neck supple. No JVD present. No tracheal deviation present. No thyromegaly present.   Cardiovascular: Normal rate, regular rhythm, normal heart sounds and intact distal pulses.  Exam reveals no gallop and no friction rub.    No murmur heard.  Pulmonary/Chest: Effort normal. No stridor. No respiratory distress. He has wheezes (B/L wheeze). He has no rales. He exhibits no tenderness.   Abdominal: Soft. Bowel sounds are normal. He exhibits no distension and no mass. There is no tenderness. There is no rebound and no guarding. No hernia.   Musculoskeletal: Normal range of motion. He exhibits no edema or tenderness.   Lymphadenopathy:     He has no cervical adenopathy.   Neurological: He is alert and oriented to person, place, and time. He has normal reflexes. He displays normal reflexes. No cranial nerve deficit. He exhibits normal muscle tone. Coordination normal.   Skin: Skin is warm and dry. No rash noted. No erythema. No pallor.   Psychiatric: He has a normal mood and affect. His behavior is normal. Judgment and thought content normal.       Assessment:       1. Major depression, recurrent, chronic    2. Primary adenocarcinoma of right lung    3. Status post lobectomy of lung    4. Malignant neoplasm of right lung, unspecified part of lung    5. Type 2 diabetes mellitus without complication, with long-term current use of insulin    6. Dyslipidemia associated with type 2 diabetes mellitus    7. Chronic obstructive pulmonary disease, unspecified COPD type    8. Aortic atherosclerosis    9. Anxiety    10. Gastroesophageal reflux disease, esophagitis presence not specified    11. Acute bacterial bronchitis        Plan:       Hernan was seen today for cough, wheezing, results and medication problem.    Diagnoses and all orders for this visit:    Major  depression, recurrent, chronic  -     Discontinue: QUEtiapine (SEROQUEL) 50 MG tablet; Take 1 tablet (50 mg total) by mouth every evening.  -     Discontinue: venlafaxine (EFFEXOR-XR) 37.5 MG 24 hr capsule; Take 1 capsule (37.5 mg total) by mouth once daily.  -     venlafaxine (EFFEXOR-XR) 37.5 MG 24 hr capsule; Take 1 capsule (37.5 mg total) by mouth once daily.  -     QUEtiapine (SEROQUEL) 50 MG tablet; Take 1 tablet (50 mg total) by mouth every evening.    Primary adenocarcinoma of right lung    Status post lobectomy of lung    Malignant neoplasm of right lung, unspecified part of lung    Type 2 diabetes mellitus without complication, with long-term current use of insulin    Dyslipidemia associated with type 2 diabetes mellitus    Chronic obstructive pulmonary disease, unspecified COPD type    Aortic atherosclerosis    Anxiety    Gastroesophageal reflux disease, esophagitis presence not specified  -     H. pylori antigen, stool; Future    Acute bacterial bronchitis  -     X-Ray Chest PA And Lateral; Future  -     Discontinue: doxycycline (MONODOX) 100 MG capsule; Take 1 capsule (100 mg total) by mouth 2 (two) times daily.  -     doxycycline (MONODOX) 100 MG capsule; Take 1 capsule (100 mg total) by mouth 2 (two) times daily.      COPD  -stable  -refilled meds    GERD  -H Pylori    DM II  -controlled    HLD  -not control  -start statin    Insomnia  -uncontrolled  -trial of seroquel    Anxiety/depression  -uncontrolled  -Dc Celexa and start Effexor    Acute bacterial bronchitis  -doxy x 10 days    Lung cancer remission  -CT for surveillance normal    Spent adequate time in obtaining history and explaining differentials    40 minutes spent during this visit of which greater than 50% devoted to face-face counseling and coordination of care regarding diagnosis and management plan    Follow-up in about 4 weeks (around 4/3/2018), or if symptoms worsen or fail to improve.

## 2018-03-13 ENCOUNTER — TELEPHONE (OUTPATIENT)
Dept: FAMILY MEDICINE | Facility: CLINIC | Age: 63
End: 2018-03-13

## 2018-03-13 DIAGNOSIS — A04.8 H. PYLORI INFECTION: Primary | ICD-10-CM

## 2018-03-13 RX ORDER — OMEPRAZOLE 20 MG/1
20 CAPSULE, DELAYED RELEASE ORAL 2 TIMES DAILY
Qty: 30 CAPSULE | Refills: 0 | Status: SHIPPED | OUTPATIENT
Start: 2018-03-13 | End: 2020-07-16 | Stop reason: SDUPTHER

## 2018-03-13 RX ORDER — CLARITHROMYCIN 500 MG/1
500 TABLET, FILM COATED ORAL EVERY 12 HOURS
Qty: 28 TABLET | Refills: 0 | Status: SHIPPED | OUTPATIENT
Start: 2018-03-13 | End: 2018-03-27

## 2018-03-13 RX ORDER — AMOXICILLIN 500 MG/1
1000 TABLET, FILM COATED ORAL EVERY 12 HOURS
Qty: 48 TABLET | Refills: 0 | Status: SHIPPED | OUTPATIENT
Start: 2018-03-13 | End: 2018-07-12

## 2018-03-13 NOTE — TELEPHONE ENCOUNTER
Please call - lab showed positive for H Pylori - need treatment so pills sent to pharmacy - take as directed - 14 day treatment

## 2018-03-14 RX ORDER — METRONIDAZOLE 500 MG/1
500 TABLET ORAL EVERY 8 HOURS
Qty: 42 TABLET | Refills: 0 | Status: SHIPPED | OUTPATIENT
Start: 2018-03-14 | End: 2018-03-28

## 2018-03-14 NOTE — TELEPHONE ENCOUNTER
Spoke to pharmacist and stated the pt's Biaxin has a drug interaction with his Atorvastatin, Celexa, Advair and Seroquel. Pls advise.

## 2018-03-14 NOTE — TELEPHONE ENCOUNTER
----- Message from Bismark Mckeon sent at 3/14/2018  9:36 AM CDT -----  Contact: Marybel from [facility]/[phone]  Pharmacy called to speak with your office about a drug interaction.  Please call and advise.    clarithromycin (BIAXIN) 500 MG tablet

## 2018-03-15 ENCOUNTER — TELEPHONE (OUTPATIENT)
Dept: FAMILY MEDICINE | Facility: CLINIC | Age: 63
End: 2018-03-15

## 2018-03-15 NOTE — TELEPHONE ENCOUNTER
----- Message from Luly Mayra sent at 3/14/2018  5:38 PM CDT -----  Contact: self, [phone]  Patient requests to speak with you regarding the stitches in his foot that need to come off. Please advise.

## 2018-05-29 ENCOUNTER — PES CALL (OUTPATIENT)
Dept: ADMINISTRATIVE | Facility: CLINIC | Age: 63
End: 2018-05-29

## 2018-06-05 DIAGNOSIS — F33.9 MAJOR DEPRESSION, RECURRENT, CHRONIC: ICD-10-CM

## 2018-06-05 RX ORDER — VENLAFAXINE HYDROCHLORIDE 37.5 MG/1
CAPSULE, EXTENDED RELEASE ORAL
Qty: 30 CAPSULE | Refills: 0 | Status: SHIPPED | OUTPATIENT
Start: 2018-06-05 | End: 2018-07-12

## 2018-06-05 RX ORDER — QUETIAPINE FUMARATE 50 MG/1
TABLET, FILM COATED ORAL
Qty: 30 TABLET | Refills: 0 | Status: SHIPPED | OUTPATIENT
Start: 2018-06-05 | End: 2018-07-12

## 2018-07-05 ENCOUNTER — PES CALL (OUTPATIENT)
Dept: ADMINISTRATIVE | Facility: CLINIC | Age: 63
End: 2018-07-05

## 2018-07-12 ENCOUNTER — OFFICE VISIT (OUTPATIENT)
Dept: FAMILY MEDICINE | Facility: CLINIC | Age: 63
End: 2018-07-12
Attending: FAMILY MEDICINE
Payer: MEDICARE

## 2018-07-12 VITALS
HEART RATE: 67 BPM | HEIGHT: 68 IN | BODY MASS INDEX: 28.17 KG/M2 | OXYGEN SATURATION: 98 % | SYSTOLIC BLOOD PRESSURE: 111 MMHG | WEIGHT: 185.88 LBS | TEMPERATURE: 99 F | DIASTOLIC BLOOD PRESSURE: 65 MMHG

## 2018-07-12 DIAGNOSIS — I70.0 AORTIC ATHEROSCLEROSIS: ICD-10-CM

## 2018-07-12 DIAGNOSIS — E11.69 DYSLIPIDEMIA ASSOCIATED WITH TYPE 2 DIABETES MELLITUS: ICD-10-CM

## 2018-07-12 DIAGNOSIS — E78.5 DYSLIPIDEMIA ASSOCIATED WITH TYPE 2 DIABETES MELLITUS: ICD-10-CM

## 2018-07-12 DIAGNOSIS — Z12.5 ENCOUNTER FOR SCREENING FOR MALIGNANT NEOPLASM OF PROSTATE: ICD-10-CM

## 2018-07-12 DIAGNOSIS — J44.9 CHRONIC OBSTRUCTIVE PULMONARY DISEASE, UNSPECIFIED COPD TYPE: ICD-10-CM

## 2018-07-12 DIAGNOSIS — C34.91 MALIGNANT NEOPLASM OF RIGHT LUNG, UNSPECIFIED PART OF LUNG: ICD-10-CM

## 2018-07-12 DIAGNOSIS — E11.9 TYPE 2 DIABETES MELLITUS WITHOUT COMPLICATION, WITH LONG-TERM CURRENT USE OF INSULIN: Primary | ICD-10-CM

## 2018-07-12 DIAGNOSIS — Z79.4 TYPE 2 DIABETES MELLITUS WITHOUT COMPLICATION, WITH LONG-TERM CURRENT USE OF INSULIN: Primary | ICD-10-CM

## 2018-07-12 DIAGNOSIS — C34.91 PRIMARY ADENOCARCINOMA OF RIGHT LUNG: ICD-10-CM

## 2018-07-12 DIAGNOSIS — F33.9 MAJOR DEPRESSION, RECURRENT, CHRONIC: ICD-10-CM

## 2018-07-12 PROCEDURE — 99999 PR PBB SHADOW E&M-EST. PATIENT-LVL III: CPT | Mod: PBBFAC,,, | Performed by: FAMILY MEDICINE

## 2018-07-12 PROCEDURE — 3044F HG A1C LEVEL LT 7.0%: CPT | Mod: CPTII,S$GLB,, | Performed by: FAMILY MEDICINE

## 2018-07-12 PROCEDURE — 99214 OFFICE O/P EST MOD 30 MIN: CPT | Mod: S$GLB,,, | Performed by: FAMILY MEDICINE

## 2018-07-12 PROCEDURE — 99499 UNLISTED E&M SERVICE: CPT | Mod: S$GLB,,, | Performed by: FAMILY MEDICINE

## 2018-07-12 PROCEDURE — 3008F BODY MASS INDEX DOCD: CPT | Mod: CPTII,S$GLB,, | Performed by: FAMILY MEDICINE

## 2018-07-12 NOTE — PROGRESS NOTES
Subjective:       Patient ID: Hernan Engel is a 62 y.o. male.    Chief Complaint: Follow-up (3 month follow up) and Medication Refill (requesting diabetic kit)    62 yr old pleasant black male with right lung cancer now is remission, HLD, DM II,COPD,  thrombocytopenia, presents today for his regular check and lab work review. No new complaints today.    Lung cancer right - had treatment and it is in remission - he followed [facility] all through this time - he is due for CT chest for surveillance - no complaints       DM II - controlled - diet control - A1C                   5.0                 03/02/2018                        - not on ACE/ARB  Foot n eye exam UTD      HLD - diet control - LDLCALC                  87.4                03/02/2018                Depression/anxiety - controlled -- no SI/HI      Insomnia - uncontrolled - want to try something else since ambien not helping       COPD - controlled -       GERD - worried about H Pylori and want to check the status          History as below - reviewed         Diabetes   He presents for his follow-up diabetic visit. He has type 2 diabetes mellitus. His disease course has been stable. There are no hypoglycemic associated symptoms. Pertinent negatives for hypoglycemia include no dizziness, nervousness/anxiousness or speech difficulty. There are no diabetic associated symptoms. Pertinent negatives for diabetes include no chest pain, no polyuria and no weakness. There are no hypoglycemic complications. Symptoms are stable. There are no diabetic complications. Risk factors for coronary artery disease include diabetes mellitus and male sex. Current diabetic treatment includes diet. He is compliant with treatment all of the time. He is following a diabetic diet. Meal planning includes avoidance of concentrated sweets. He rarely participates in exercise. There is no change in his home blood glucose trend. An ACE inhibitor/angiotensin II receptor blocker is not being  taken. He does not see a podiatrist.Eye exam is not current.   Hyperlipidemia   Pertinent negatives include no chest pain or myalgias.     Review of Systems   Constitutional: Negative.  Negative for activity change, diaphoresis and unexpected weight change.   HENT: Negative.  Negative for congestion, ear pain, mouth sores, rhinorrhea and voice change.    Eyes: Negative.  Negative for pain, discharge and visual disturbance.   Respiratory: Negative.  Negative for apnea, cough and wheezing.    Cardiovascular: Negative.  Negative for chest pain and palpitations.   Gastrointestinal: Negative.  Negative for abdominal distention, anal bleeding, diarrhea and vomiting.   Endocrine: Negative.  Negative for cold intolerance and polyuria.   Genitourinary: Negative.  Negative for decreased urine volume, difficulty urinating, discharge, frequency and scrotal swelling.   Musculoskeletal: Negative.  Negative for back pain, myalgias and neck stiffness.   Skin: Negative.  Negative for color change and rash.   Allergic/Immunologic: Negative.  Negative for environmental allergies and immunocompromised state.   Neurological: Negative.  Negative for dizziness, speech difficulty, weakness and light-headedness.   Hematological: Negative.    Psychiatric/Behavioral: Negative.  Negative for agitation, dysphoric mood and suicidal ideas. The patient is not nervous/anxious.        PMH/PSH/FH/SH/MED/ALLERGY reviewed    Objective:       Vitals:    07/12/18 1336   BP: 111/65   Pulse: 67   Temp: 98.6 °F (37 °C)       Physical Exam   Constitutional: He is oriented to person, place, and time. He appears well-developed and well-nourished.   HENT:   Head: Normocephalic and atraumatic.   Right Ear: External ear normal.   Left Ear: External ear normal.   Nose: Nose normal.   Mouth/Throat: Oropharynx is clear and moist. No oropharyngeal exudate.   Eyes: Conjunctivae and EOM are normal. Pupils are equal, round, and reactive to light. Right eye exhibits no  discharge. Left eye exhibits no discharge. No scleral icterus.   Neck: Normal range of motion. Neck supple. No JVD present. No tracheal deviation present. No thyromegaly present.   Cardiovascular: Normal rate, regular rhythm, normal heart sounds and intact distal pulses.  Exam reveals no gallop and no friction rub.    No murmur heard.  Pulmonary/Chest: Effort normal and breath sounds normal. No stridor. No respiratory distress. He has no wheezes. He has no rales. He exhibits no tenderness.   Abdominal: Soft. Bowel sounds are normal. He exhibits no distension and no mass. There is no tenderness. There is no rebound and no guarding. No hernia.   Musculoskeletal: Normal range of motion. He exhibits no edema or tenderness.   Lymphadenopathy:     He has no cervical adenopathy.   Neurological: He is alert and oriented to person, place, and time. He has normal reflexes. He displays normal reflexes. No cranial nerve deficit. He exhibits normal muscle tone. Coordination normal.   Skin: Skin is warm and dry. No rash noted. No erythema. No pallor.   Psychiatric: He has a normal mood and affect. His behavior is normal. Judgment and thought content normal.       Assessment:       1. Type 2 diabetes mellitus without complication, with long-term current use of insulin    2. Major depression, recurrent, chronic    3. Primary adenocarcinoma of right lung    4. Malignant neoplasm of right lung, unspecified part of lung    5. Dyslipidemia associated with type 2 diabetes mellitus    6. Chronic obstructive pulmonary disease, unspecified COPD type    7. Aortic atherosclerosis    8. Encounter for screening for malignant neoplasm of prostate        Plan:       Hernan was seen today for follow-up and medication refill.    Diagnoses and all orders for this visit:    Type 2 diabetes mellitus without complication, with long-term current use of insulin  -     Comprehensive metabolic panel; Future  -     Lipid panel; Future  -     Hemoglobin A1c;  Future  -     Urinalysis; Future  -     Microalbumin/creatinine urine ratio; Future    Major depression, recurrent, chronic    Primary adenocarcinoma of right lung  -     X-Ray Chest PA And Lateral; Future    Malignant neoplasm of right lung, unspecified part of lung  -     X-Ray Chest PA And Lateral; Future    Dyslipidemia associated with type 2 diabetes mellitus  -     Comprehensive metabolic panel; Future  -     Lipid panel; Future    Chronic obstructive pulmonary disease, unspecified COPD type  -     X-Ray Chest PA And Lateral; Future    Aortic atherosclerosis    Encounter for screening for malignant neoplasm of prostate  -     PSA, Screening; Future       COPD  -stable  -refilled meds    GERD  -H Pylori    DM II  -controlled    HLD  -controlled  -continue statin    Insomnia  -controlled    Anxiety/depression  -controlled      Lung cancer remission  -cxr for surveillance normal    Spent adequate time in obtaining history and explaining differentials    40 minutes spent during this visit of which greater than 50% devoted to face-face counseling and coordination of care regarding diagnosis and management plan    Follow-up in about 3 months (around 10/12/2018), or if symptoms worsen or fail to improve.

## 2018-07-18 DIAGNOSIS — F33.9 MAJOR DEPRESSION, RECURRENT, CHRONIC: ICD-10-CM

## 2018-07-19 RX ORDER — QUETIAPINE FUMARATE 50 MG/1
TABLET, FILM COATED ORAL
Qty: 30 TABLET | Refills: 0 | Status: SHIPPED | OUTPATIENT
Start: 2018-07-19 | End: 2019-04-11

## 2018-07-19 RX ORDER — VENLAFAXINE HYDROCHLORIDE 37.5 MG/1
CAPSULE, EXTENDED RELEASE ORAL
Qty: 30 CAPSULE | Refills: 0 | Status: SHIPPED | OUTPATIENT
Start: 2018-07-19 | End: 2019-02-01 | Stop reason: SDUPTHER

## 2018-10-08 ENCOUNTER — TELEPHONE (OUTPATIENT)
Dept: FAMILY MEDICINE | Facility: CLINIC | Age: 63
End: 2018-10-08

## 2018-10-08 ENCOUNTER — HOSPITAL ENCOUNTER (OUTPATIENT)
Dept: RADIOLOGY | Facility: HOSPITAL | Age: 63
Discharge: HOME | End: 2018-10-08
Attending: FAMILY MEDICINE
Payer: MEDICARE

## 2018-10-08 DIAGNOSIS — C34.91 PRIMARY ADENOCARCINOMA OF RIGHT LUNG: ICD-10-CM

## 2018-10-08 DIAGNOSIS — C34.91 MALIGNANT NEOPLASM OF RIGHT LUNG, UNSPECIFIED PART OF LUNG: ICD-10-CM

## 2018-10-08 DIAGNOSIS — J44.9 CHRONIC OBSTRUCTIVE PULMONARY DISEASE, UNSPECIFIED COPD TYPE: ICD-10-CM

## 2018-10-08 PROCEDURE — 71046 X-RAY EXAM CHEST 2 VIEWS: CPT | Mod: TC,FY

## 2018-10-08 PROCEDURE — 71046 X-RAY EXAM CHEST 2 VIEWS: CPT | Mod: 26,,, | Performed by: RADIOLOGY

## 2018-10-11 ENCOUNTER — OFFICE VISIT (OUTPATIENT)
Dept: FAMILY MEDICINE | Facility: CLINIC | Age: 63
End: 2018-10-11
Attending: FAMILY MEDICINE
Payer: MEDICARE

## 2018-10-11 VITALS
DIASTOLIC BLOOD PRESSURE: 72 MMHG | HEART RATE: 66 BPM | WEIGHT: 180.56 LBS | SYSTOLIC BLOOD PRESSURE: 115 MMHG | BODY MASS INDEX: 27.36 KG/M2 | HEIGHT: 68 IN | OXYGEN SATURATION: 97 %

## 2018-10-11 DIAGNOSIS — I70.0 AORTIC ATHEROSCLEROSIS: ICD-10-CM

## 2018-10-11 DIAGNOSIS — J44.9 CHRONIC OBSTRUCTIVE PULMONARY DISEASE, UNSPECIFIED COPD TYPE: ICD-10-CM

## 2018-10-11 DIAGNOSIS — F33.9 MAJOR DEPRESSION, RECURRENT, CHRONIC: ICD-10-CM

## 2018-10-11 DIAGNOSIS — E11.9 TYPE 2 DIABETES MELLITUS WITHOUT COMPLICATION, WITH LONG-TERM CURRENT USE OF INSULIN: Primary | ICD-10-CM

## 2018-10-11 DIAGNOSIS — C34.91 MALIGNANT NEOPLASM OF RIGHT LUNG, UNSPECIFIED PART OF LUNG: ICD-10-CM

## 2018-10-11 DIAGNOSIS — E11.69 DYSLIPIDEMIA ASSOCIATED WITH TYPE 2 DIABETES MELLITUS: ICD-10-CM

## 2018-10-11 DIAGNOSIS — Z79.4 TYPE 2 DIABETES MELLITUS WITHOUT COMPLICATION, WITH LONG-TERM CURRENT USE OF INSULIN: Primary | ICD-10-CM

## 2018-10-11 DIAGNOSIS — C34.91 PRIMARY ADENOCARCINOMA OF RIGHT LUNG: ICD-10-CM

## 2018-10-11 DIAGNOSIS — E78.5 DYSLIPIDEMIA ASSOCIATED WITH TYPE 2 DIABETES MELLITUS: ICD-10-CM

## 2018-10-11 DIAGNOSIS — Z23 IMMUNIZATION DUE: ICD-10-CM

## 2018-10-11 PROCEDURE — 99499 UNLISTED E&M SERVICE: CPT | Mod: S$GLB,,, | Performed by: FAMILY MEDICINE

## 2018-10-11 PROCEDURE — 3008F BODY MASS INDEX DOCD: CPT | Mod: CPTII,,, | Performed by: FAMILY MEDICINE

## 2018-10-11 PROCEDURE — 99999 PR PBB SHADOW E&M-EST. PATIENT-LVL III: CPT | Mod: PBBFAC,,, | Performed by: FAMILY MEDICINE

## 2018-10-11 PROCEDURE — 99213 OFFICE O/P EST LOW 20 MIN: CPT | Mod: PBBFAC,PO,25 | Performed by: FAMILY MEDICINE

## 2018-10-11 PROCEDURE — 90686 IIV4 VACC NO PRSV 0.5 ML IM: CPT | Mod: PBBFAC,PO

## 2018-10-11 PROCEDURE — 99214 OFFICE O/P EST MOD 30 MIN: CPT | Mod: 25,S$PBB,, | Performed by: FAMILY MEDICINE

## 2018-10-11 PROCEDURE — 3044F HG A1C LEVEL LT 7.0%: CPT | Mod: CPTII,,, | Performed by: FAMILY MEDICINE

## 2018-10-11 NOTE — PROGRESS NOTES
Subjective:       Patient ID: Hernan Engel is a 63 y.o. male.    Chief Complaint: Follow-up (3 month)    63 yr old pleasant black male with right lung cancer now is remission, HLD, DM II,COPD,  thrombocytopenia, presents today for his regular check and lab work review. No new complaints today.    Lung cancer right - had treatment and it is in remission - he followed [facility] all through this time - he is due for CT chest for surveillance - no complaints       DM II - controlled - diet control - A1C                   4.8                 10/08/2018                    - not on ACE/ARB  Foot n eye exam UTD      HLD - diet control - LDLCALC                  97.2                10/08/2018                        Depression/anxiety - controlled -- no SI/HI      Insomnia - uncontrolled - want to try something else since ambien not helping       COPD - controlled -       GERD - worried about H Pylori and want to check the status          History as below - reviewed         Diabetes   He presents for his follow-up diabetic visit. He has type 2 diabetes mellitus. His disease course has been stable. There are no hypoglycemic associated symptoms. Pertinent negatives for hypoglycemia include no dizziness, nervousness/anxiousness or speech difficulty. There are no diabetic associated symptoms. Pertinent negatives for diabetes include no chest pain, no polyuria and no weakness. There are no hypoglycemic complications. Symptoms are stable. There are no diabetic complications. Risk factors for coronary artery disease include diabetes mellitus and male sex. Current diabetic treatment includes diet. He is compliant with treatment all of the time. He is following a diabetic diet. Meal planning includes avoidance of concentrated sweets. He rarely participates in exercise. There is no change in his home blood glucose trend. An ACE inhibitor/angiotensin II receptor blocker is not being taken. He does not see a podiatrist.Eye exam is not  current.   Hyperlipidemia   Pertinent negatives include no chest pain or myalgias.     Review of Systems   Constitutional: Negative.  Negative for activity change, diaphoresis and unexpected weight change.   HENT: Negative.  Negative for congestion, ear pain, mouth sores, rhinorrhea and voice change.    Eyes: Negative.  Negative for pain, discharge and visual disturbance.   Respiratory: Negative.  Negative for apnea, cough and wheezing.    Cardiovascular: Negative.  Negative for chest pain and palpitations.   Gastrointestinal: Negative.  Negative for abdominal distention, anal bleeding, diarrhea and vomiting.   Endocrine: Negative.  Negative for cold intolerance and polyuria.   Genitourinary: Negative.  Negative for decreased urine volume, difficulty urinating, discharge, frequency and scrotal swelling.   Musculoskeletal: Negative.  Negative for back pain, myalgias and neck stiffness.   Skin: Negative.  Negative for color change and rash.   Allergic/Immunologic: Negative.  Negative for environmental allergies and immunocompromised state.   Neurological: Negative.  Negative for dizziness, speech difficulty, weakness and light-headedness.   Hematological: Negative.    Psychiatric/Behavioral: Negative.  Negative for agitation, dysphoric mood and suicidal ideas. The patient is not nervous/anxious.        PMH/PSH/FH/SH/MED/ALLERGY reviewed    Objective:       Vitals:    10/11/18 0850   BP: 115/72   Pulse: 66       Physical Exam   Constitutional: He is oriented to person, place, and time. He appears well-developed and well-nourished.   HENT:   Head: Normocephalic and atraumatic.   Right Ear: External ear normal.   Left Ear: External ear normal.   Nose: Nose normal.   Mouth/Throat: Oropharynx is clear and moist. No oropharyngeal exudate.   Eyes: Conjunctivae and EOM are normal. Pupils are equal, round, and reactive to light. Right eye exhibits no discharge. Left eye exhibits no discharge. No scleral icterus.   Neck: Normal  range of motion. Neck supple. No JVD present. No tracheal deviation present. No thyromegaly present.   Cardiovascular: Normal rate, regular rhythm, normal heart sounds and intact distal pulses. Exam reveals no gallop and no friction rub.   No murmur heard.  Pulmonary/Chest: Effort normal and breath sounds normal. No stridor. No respiratory distress. He has no wheezes. He has no rales. He exhibits no tenderness.   Abdominal: Soft. Bowel sounds are normal. He exhibits no distension and no mass. There is no tenderness. There is no rebound and no guarding. No hernia.   Musculoskeletal: Normal range of motion. He exhibits no edema or tenderness.   Lymphadenopathy:     He has no cervical adenopathy.   Neurological: He is alert and oriented to person, place, and time. He has normal reflexes. He displays normal reflexes. No cranial nerve deficit. He exhibits normal muscle tone. Coordination normal.   Skin: Skin is warm and dry. No rash noted. No erythema. No pallor.   Psychiatric: He has a normal mood and affect. His behavior is normal. Judgment and thought content normal.       Assessment:       1. Type 2 diabetes mellitus without complication, with long-term current use of insulin    2. Chronic obstructive pulmonary disease, unspecified COPD type    3. Major depression, recurrent, chronic    4. Malignant neoplasm of right lung, unspecified part of lung    5. Primary adenocarcinoma of right lung    6. Dyslipidemia associated with type 2 diabetes mellitus    7. Aortic atherosclerosis    8. Immunization due        Plan:       Hernan was seen today for follow-up.    Diagnoses and all orders for this visit:    Type 2 diabetes mellitus without complication, with long-term current use of insulin    Chronic obstructive pulmonary disease, unspecified COPD type    Major depression, recurrent, chronic    Malignant neoplasm of right lung, unspecified part of lung    Primary adenocarcinoma of right lung    Dyslipidemia associated with  type 2 diabetes mellitus    Aortic atherosclerosis    Immunization due  -     Influenza - Quadrivalent (3 years & older) (PF)     COPD  -stable  -refilled meds    GERD  -H Pylori    DM II  -controlled    HLD  -controlled  -continue statin    Insomnia  -controlled    Anxiety/depression  -controlled      Lung cancer remission  -cxr for surveillance normal    Spent adequate time in obtaining history and explaining differentials    40 minutes spent during this visit of which greater than 50% devoted to face-face counseling and coordination of care regarding diagnosis and management plan    Follow-up in about 6 months (around 4/11/2019), or if symptoms worsen or fail to improve.

## 2018-12-06 ENCOUNTER — OFFICE VISIT (OUTPATIENT)
Dept: URGENT CARE | Facility: CLINIC | Age: 63
End: 2018-12-06
Payer: MEDICARE

## 2018-12-06 VITALS
SYSTOLIC BLOOD PRESSURE: 107 MMHG | BODY MASS INDEX: 27.28 KG/M2 | TEMPERATURE: 98 F | WEIGHT: 180 LBS | HEART RATE: 85 BPM | HEIGHT: 68 IN | OXYGEN SATURATION: 98 % | DIASTOLIC BLOOD PRESSURE: 65 MMHG | RESPIRATION RATE: 16 BRPM

## 2018-12-06 DIAGNOSIS — J44.0 COPD WITH ACUTE LOWER RESPIRATORY INFECTION: Primary | ICD-10-CM

## 2018-12-06 DIAGNOSIS — E11.9 DIET-CONTROLLED DIABETES MELLITUS: ICD-10-CM

## 2018-12-06 DIAGNOSIS — R06.2 WHEEZE: ICD-10-CM

## 2018-12-06 PROCEDURE — 94640 AIRWAY INHALATION TREATMENT: CPT | Mod: 59,S$GLB,, | Performed by: SURGERY

## 2018-12-06 PROCEDURE — 3008F BODY MASS INDEX DOCD: CPT | Mod: CPTII,S$GLB,, | Performed by: SURGERY

## 2018-12-06 PROCEDURE — 71046 X-RAY EXAM CHEST 2 VIEWS: CPT | Mod: S$GLB,,, | Performed by: RADIOLOGY

## 2018-12-06 PROCEDURE — 96372 THER/PROPH/DIAG INJ SC/IM: CPT | Mod: 59,S$GLB,, | Performed by: SURGERY

## 2018-12-06 PROCEDURE — 99214 OFFICE O/P EST MOD 30 MIN: CPT | Mod: 25,S$GLB,, | Performed by: SURGERY

## 2018-12-06 PROCEDURE — 3044F HG A1C LEVEL LT 7.0%: CPT | Mod: CPTII,S$GLB,, | Performed by: SURGERY

## 2018-12-06 RX ORDER — NEBULIZER AND COMPRESSOR
1 EACH MISCELLANEOUS EVERY 6 HOURS PRN
Qty: 1 EACH | Refills: 0 | Status: SHIPPED | OUTPATIENT
Start: 2018-12-06 | End: 2019-12-06

## 2018-12-06 RX ORDER — IPRATROPIUM BROMIDE 42 UG/1
2 SPRAY, METERED NASAL 4 TIMES DAILY
Qty: 15 ML | Refills: 0 | Status: SHIPPED | OUTPATIENT
Start: 2018-12-06 | End: 2021-02-08 | Stop reason: SDUPTHER

## 2018-12-06 RX ORDER — PROMETHAZINE HYDROCHLORIDE AND CODEINE PHOSPHATE 6.25; 1 MG/5ML; MG/5ML
5 SOLUTION ORAL EVERY 4 HOURS PRN
Qty: 240 ML | Refills: 0 | Status: SHIPPED | OUTPATIENT
Start: 2018-12-06 | End: 2018-12-11

## 2018-12-06 RX ORDER — ALBUTEROL SULFATE 0.83 MG/ML
2.5 SOLUTION RESPIRATORY (INHALATION) ONCE
Status: COMPLETED | OUTPATIENT
Start: 2018-12-06 | End: 2018-12-06

## 2018-12-06 RX ORDER — PREDNISONE 20 MG/1
40 TABLET ORAL DAILY
Qty: 10 TABLET | Refills: 0 | Status: SHIPPED | OUTPATIENT
Start: 2018-12-07 | End: 2018-12-12

## 2018-12-06 RX ORDER — ALBUTEROL SULFATE 0.83 MG/ML
2.5 SOLUTION RESPIRATORY (INHALATION) EVERY 6 HOURS PRN
Qty: 1 BOX | Refills: 0 | Status: SHIPPED | OUTPATIENT
Start: 2018-12-06 | End: 2019-12-06

## 2018-12-06 RX ORDER — IPRATROPIUM BROMIDE 0.5 MG/2.5ML
0.5 SOLUTION RESPIRATORY (INHALATION) ONCE
Status: COMPLETED | OUTPATIENT
Start: 2018-12-06 | End: 2018-12-06

## 2018-12-06 RX ORDER — BENZONATATE 200 MG/1
200 CAPSULE ORAL 3 TIMES DAILY PRN
Qty: 21 CAPSULE | Refills: 0 | Status: SHIPPED | OUTPATIENT
Start: 2018-12-06 | End: 2018-12-13

## 2018-12-06 RX ORDER — AMOXICILLIN AND CLAVULANATE POTASSIUM 875; 125 MG/1; MG/1
1 TABLET, FILM COATED ORAL 2 TIMES DAILY
Qty: 14 TABLET | Refills: 0 | Status: SHIPPED | OUTPATIENT
Start: 2018-12-06 | End: 2018-12-13

## 2018-12-06 RX ADMIN — IPRATROPIUM BROMIDE 0.5 MG: 0.5 SOLUTION RESPIRATORY (INHALATION) at 01:12

## 2018-12-06 RX ADMIN — ALBUTEROL SULFATE 2.5 MG: 0.83 SOLUTION RESPIRATORY (INHALATION) at 01:12

## 2018-12-06 NOTE — PROGRESS NOTES
"Subjective:       Patient ID: Hernan Engel is a 63 y.o. male.    Vitals:  height is 5' 8" (1.727 m) and weight is 81.6 kg (180 lb). His temperature is 97.9 °F (36.6 °C). His blood pressure is 107/65 and his pulse is 85. His respiration is 16 and oxygen saturation is 98%.     Chief Complaint: Cough and Generalized Body Aches    Cough   This is a new problem. The current episode started in the past 7 days. The problem has been gradually worsening. The problem occurs constantly. The cough is productive of sputum. Associated symptoms include shortness of breath and wheezing. Pertinent negatives include no chills, ear pain, eye redness, fever, hemoptysis, myalgias, rash or sore throat. The symptoms are aggravated by lying down. He has tried steroid inhaler for the symptoms. The treatment provided mild relief.       Constitution: Negative for chills, sweating, fatigue and fever.   HENT: Positive for congestion. Negative for ear pain, sinus pain, sinus pressure, sore throat and voice change.         Runny nose   Neck: Negative for painful lymph nodes.   Eyes: Negative for eye redness.   Respiratory: Positive for cough, shortness of breath and wheezing. Negative for chest tightness, sputum production, bloody sputum, COPD, stridor and asthma.    Gastrointestinal: Negative for nausea and vomiting.   Musculoskeletal: Negative for muscle ache.   Skin: Negative for rash.   Allergic/Immunologic: Negative for seasonal allergies and asthma.   Hematologic/Lymphatic: Negative for swollen lymph nodes.       Objective:      Physical Exam   Constitutional: He is oriented to person, place, and time. He appears well-developed and well-nourished. He is cooperative.  Non-toxic appearance. He does not appear ill. No distress.   HENT:   Head: Normocephalic and atraumatic.   Right Ear: Hearing, tympanic membrane, external ear and ear canal normal.   Left Ear: Hearing, tympanic membrane, external ear and ear canal normal.   Nose: Nose normal. " No mucosal edema, rhinorrhea or nasal deformity. No epistaxis. Right sinus exhibits no maxillary sinus tenderness and no frontal sinus tenderness. Left sinus exhibits no maxillary sinus tenderness and no frontal sinus tenderness.   Mouth/Throat: Uvula is midline, oropharynx is clear and moist and mucous membranes are normal. No trismus in the jaw. Normal dentition. No uvula swelling. No posterior oropharyngeal erythema.   Eyes: Conjunctivae and lids are normal. No scleral icterus.   Sclera clear bilat   Neck: Trachea normal, full passive range of motion without pain and phonation normal. Neck supple.   Cardiovascular: Normal rate, regular rhythm, normal heart sounds, intact distal pulses and normal pulses.   Pulmonary/Chest: Effort normal. No respiratory distress. He has wheezes in the right upper field, the right middle field, the right lower field, the left upper field, the left middle field and the left lower field.   Improved breath sounds, less wheezing and improved excursions after nebulizer   Abdominal: Soft. Normal appearance and bowel sounds are normal. He exhibits no distension. There is no tenderness.   Musculoskeletal: Normal range of motion. He exhibits no edema or deformity.   Neurological: He is alert and oriented to person, place, and time. He exhibits normal muscle tone. Coordination normal.   Skin: Skin is warm, dry and intact. He is not diaphoretic. No pallor.   Psychiatric: He has a normal mood and affect. His speech is normal and behavior is normal. Judgment and thought content normal. Cognition and memory are normal.   Nursing note and vitals reviewed.      Assessment:       1. COPD with acute lower respiratory infection    2. Wheeze    3. Diet-controlled diabetes mellitus        Plan:         COPD with acute lower respiratory infection    Wheeze  -     ipratropium 0.02 % nebulizer solution 0.5 mg  -     albuterol nebulizer solution 2.5 mg  -     X-Ray Chest PA And Lateral; Future; Expected  date: 12/06/2018  -     methylPREDNISolone sod suc(PF) injection 125 mg    Diet-controlled diabetes mellitus    Other orders  -     nebulizer and compressor Ana; 1 Units by Misc.(Non-Drug; Combo Route) route every 6 (six) hours as needed.  Dispense: 1 each; Refill: 0  -     albuterol (PROVENTIL) 2.5 mg /3 mL (0.083 %) nebulizer solution; Take 3 mLs (2.5 mg total) by nebulization every 6 (six) hours as needed for Wheezing. Rescue  Dispense: 1 Box; Refill: 0    X-ray Chest Pa And Lateral    Result Date: 12/6/2018  EXAMINATION: XR CHEST PA AND LATERAL CLINICAL HISTORY: Wheezing TECHNIQUE: PA and lateral views of the chest were performed. COMPARISON: 10/8/2018 FINDINGS: The cardiomediastinal contour is within normal limits.  There is increased interstitial opacities in the right lateral lung base, likely scarring, unchanged.  No lung consolidation.  Mild right apical pleuroparenchymal thickening/scarring, unchanged.  No pneumothorax.  No pleural effusions.     No acute findings. Electronically signed by: Ismael Wang MD Date:    12/06/2018 Time:    14:03    Patient Instructions     Bronchitis with Wheezing (Viral or Bacterial: Adult)    Bronchitis is an infection of the air passages. It often occurs during a cold and is usually caused by a virus. Symptoms include cough with mucus (phlegm) and low-grade fever. This illness is contagious during the first few days and is spread through the air by coughing and sneezing, or by direct contact (touching the sick person and then touching your own eyes, nose, or mouth).  If there is a lot of inflammation, air flow is restricted. The air passages may also go into spasm, especially if you have asthma. This causes wheezing and difficulty breathing even in people who do not have asthma.  Bronchitis usually lasts 7 to 14 days. The wheezing should improve with treatment during the first week. An inhaler is often prescribed to relax the air passages and stop wheezing. Antibiotics will  be prescribed if your doctor thinks there is also a secondary bacterial infection.  Home care  · If symptoms are severe, rest at home for the first 2 to 3 days. When you go back to your usual activities, don't let yourself get too tired.  · Do not smoke. Also avoid being exposed to secondhand smoke.  · You may use over-the-counter medicine to control fever or pain, unless another medicine was prescribed. Note: If you have chronic liver or kidney disease or have ever had a stomach ulcer or gastrointestinal bleeding, talk with your healthcare provider before using these medicines. Also talk to your provider if you are taking medicine to prevent blood clots.) Aspirin should never be given to anyone younger than 18 years of age who is ill with a viral infection or fever. It may cause severe liver or brain damage.  · Your appetite may be poor, so a light diet is fine. Avoid dehydration by drinking 6 to 8 glasses of fluids per day (such as water, soft drinks, sports drinks, juices, tea, or soup). Extra fluids will help loosen secretions in the nose and lungs.  · Over-the-counter cough, cold, and sore-throat medicines will not shorten the length of the illness, but they may be helpful to reduce symptoms. (Note: Do not use decongestants if you have high blood pressure.)  · If you were given an inhaler, use it exactly as directed. If you need to use it more often than prescribed, your condition may be worsening. If this happens, contact your healthcare provider.  · If prescribed, finish all antibiotic medicine, even if you are feeling better after only a few days.  Follow-up care  Follow up with your healthcare provider, or as advised. If you had an X-ray or ECG (electrocardiogram), a specialist will review it. You will be notified of any new findings that may affect your care.  Note: If you are age 65 or older, or if you have a chronic lung disease or condition that affects your immune system, or you smoke, talk to your  healthcare provider about having a pneumococcal vaccinations and a yearly influenza vaccination (flu shot).  When to seek medical advice  Call your healthcare provider right away if any of these occur:  · Fever of 100.4°F (38°C) or higher  · Coughing up increasing amounts of colored sputum  · Weakness, drowsiness, headache, facial pain, ear pain, or a stiff neck  Call 911, or get immediate medical care  Contact emergency services right away if any of these occur.  · Coughing up blood  · Worsening weakness, drowsiness, headache, or stiff neck  · Increased wheezing not helped with medication, shortness of breath, or pain with breathing  Date Last Reviewed: 9/13/2015  © 5631-4556 [employer]. [address] [place] [address], [place] [address]. All rights reserved. This information is not intended as a substitute for professional medical care. Always follow your healthcare professional's instructions.

## 2018-12-06 NOTE — PATIENT INSTRUCTIONS
Bronchitis with Wheezing (Viral or Bacterial: Adult)    Bronchitis is an infection of the air passages. It often occurs during a cold and is usually caused by a virus. Symptoms include cough with mucus (phlegm) and low-grade fever. This illness is contagious during the first few days and is spread through the air by coughing and sneezing, or by direct contact (touching the sick person and then touching your own eyes, nose, or mouth).  If there is a lot of inflammation, air flow is restricted. The air passages may also go into spasm, especially if you have asthma. This causes wheezing and difficulty breathing even in people who do not have asthma.  Bronchitis usually lasts 7 to 14 days. The wheezing should improve with treatment during the first week. An inhaler is often prescribed to relax the air passages and stop wheezing. Antibiotics will be prescribed if your doctor thinks there is also a secondary bacterial infection.  Home care  · If symptoms are severe, rest at home for the first 2 to 3 days. When you go back to your usual activities, don't let yourself get too tired.  · Do not smoke. Also avoid being exposed to secondhand smoke.  · You may use over-the-counter medicine to control fever or pain, unless another medicine was prescribed. Note: If you have chronic liver or kidney disease or have ever had a stomach ulcer or gastrointestinal bleeding, talk with your healthcare provider before using these medicines. Also talk to your provider if you are taking medicine to prevent blood clots.) Aspirin should never be given to anyone younger than 18 years of age who is ill with a viral infection or fever. It may cause severe liver or brain damage.  · Your appetite may be poor, so a light diet is fine. Avoid dehydration by drinking 6 to 8 glasses of fluids per day (such as water, soft drinks, sports drinks, juices, tea, or soup). Extra fluids will help loosen secretions in the nose and lungs.  · Over-the-counter  cough, cold, and sore-throat medicines will not shorten the length of the illness, but they may be helpful to reduce symptoms. (Note: Do not use decongestants if you have high blood pressure.)  · If you were given an inhaler, use it exactly as directed. If you need to use it more often than prescribed, your condition may be worsening. If this happens, contact your healthcare provider.  · If prescribed, finish all antibiotic medicine, even if you are feeling better after only a few days.  Follow-up care  Follow up with your healthcare provider, or as advised. If you had an X-ray or ECG (electrocardiogram), a specialist will review it. You will be notified of any new findings that may affect your care.  Note: If you are age 65 or older, or if you have a chronic lung disease or condition that affects your immune system, or you smoke, talk to your healthcare provider about having a pneumococcal vaccinations and a yearly influenza vaccination (flu shot).  When to seek medical advice  Call your healthcare provider right away if any of these occur:  · Fever of 100.4°F (38°C) or higher  · Coughing up increasing amounts of colored sputum  · Weakness, drowsiness, headache, facial pain, ear pain, or a stiff neck  Call 911, or get immediate medical care  Contact emergency services right away if any of these occur.  · Coughing up blood  · Worsening weakness, drowsiness, headache, or stiff neck  · Increased wheezing not helped with medication, shortness of breath, or pain with breathing  Date Last Reviewed: 9/13/2015  © 2621-2689 [employer]. [address] [place] [address], [place] [address]. All rights reserved. This information is not intended as a substitute for professional medical care. Always follow your healthcare professional's instructions.

## 2018-12-07 ENCOUNTER — TELEPHONE (OUTPATIENT)
Dept: FAMILY MEDICINE | Facility: CLINIC | Age: 63
End: 2018-12-07

## 2018-12-07 NOTE — TELEPHONE ENCOUNTER
----- Message from Nicholas Thomas sent at 12/7/2018 11:10 AM CST -----  Contact: [phone]  Patient advised he need a prior auth for a nebulizer machine and a Dr. Gil Rendon. Please call.

## 2018-12-07 NOTE — TELEPHONE ENCOUNTER
Spoke to pt's wife and stated that pt was seen at Urgent Care and was given a Nebulizer. Insurance states that nebulizer needs a PA. Informed pt that the prescribing doctor has to do the PA. Pt also request a Ho belt that he seen on TV for back pain. Pt was informed that he needed a follow-up appt with Dr. Patel. Pt scheduled an appt with Dr. Patel.

## 2018-12-13 ENCOUNTER — OFFICE VISIT (OUTPATIENT)
Dept: FAMILY MEDICINE | Facility: CLINIC | Age: 63
End: 2018-12-13
Payer: MEDICARE

## 2018-12-13 VITALS
HEIGHT: 68 IN | SYSTOLIC BLOOD PRESSURE: 116 MMHG | HEART RATE: 86 BPM | OXYGEN SATURATION: 97 % | DIASTOLIC BLOOD PRESSURE: 80 MMHG | BODY MASS INDEX: 27.54 KG/M2 | WEIGHT: 181.69 LBS

## 2018-12-13 DIAGNOSIS — E78.5 DYSLIPIDEMIA ASSOCIATED WITH TYPE 2 DIABETES MELLITUS: ICD-10-CM

## 2018-12-13 DIAGNOSIS — E11.69 DYSLIPIDEMIA ASSOCIATED WITH TYPE 2 DIABETES MELLITUS: ICD-10-CM

## 2018-12-13 DIAGNOSIS — C34.91 PRIMARY ADENOCARCINOMA OF RIGHT LUNG: ICD-10-CM

## 2018-12-13 DIAGNOSIS — J44.9 CHRONIC OBSTRUCTIVE PULMONARY DISEASE, UNSPECIFIED COPD TYPE: ICD-10-CM

## 2018-12-13 DIAGNOSIS — Z00.00 ENCOUNTER FOR PREVENTIVE HEALTH EXAMINATION: Primary | ICD-10-CM

## 2018-12-13 DIAGNOSIS — F33.9 MAJOR DEPRESSION, RECURRENT, CHRONIC: ICD-10-CM

## 2018-12-13 DIAGNOSIS — C34.91 MALIGNANT NEOPLASM OF RIGHT LUNG, UNSPECIFIED PART OF LUNG: ICD-10-CM

## 2018-12-13 DIAGNOSIS — Z79.4 TYPE 2 DIABETES MELLITUS WITHOUT COMPLICATION, WITH LONG-TERM CURRENT USE OF INSULIN: ICD-10-CM

## 2018-12-13 DIAGNOSIS — I70.0 AORTIC ATHEROSCLEROSIS: ICD-10-CM

## 2018-12-13 DIAGNOSIS — E11.9 TYPE 2 DIABETES MELLITUS WITHOUT COMPLICATION, WITH LONG-TERM CURRENT USE OF INSULIN: ICD-10-CM

## 2018-12-13 PROCEDURE — 99999 PR PBB SHADOW E&M-EST. PATIENT-LVL V: CPT | Mod: PBBFAC,,, | Performed by: NURSE PRACTITIONER

## 2018-12-13 PROCEDURE — G0439 PPPS, SUBSEQ VISIT: HCPCS | Mod: S$GLB,,, | Performed by: NURSE PRACTITIONER

## 2018-12-13 PROCEDURE — 99499 UNLISTED E&M SERVICE: CPT | Mod: S$GLB,,, | Performed by: NURSE PRACTITIONER

## 2018-12-13 PROCEDURE — 3044F HG A1C LEVEL LT 7.0%: CPT | Mod: CPTII,S$GLB,, | Performed by: NURSE PRACTITIONER

## 2018-12-13 NOTE — PATIENT INSTRUCTIONS
Counseling and Referral of Other Preventative  (Italic type indicates deductible and co-insurance are waived)    Patient Name: Hernan Engel  Today's Date: 12/13/2018    Health Maintenance       Date Due Completion Date    High Dose Statin 09/21/1976 ---    Fecal Occult Blood Test (FOBT)/FitKit 12/13/2018 12/13/2017    Eye Exam 12/19/2018 12/19/2017    Override on 12/6/2017: Done    Pneumococcal Vaccine (Highest Risk) (2 of 3 - PCV13) 02/01/2019 (Originally 10/21/2015) 10/21/2014    Hemoglobin A1c 04/08/2019 10/8/2018    Lipid Panel 10/08/2019 10/8/2018    Urine Microalbumin 10/08/2019 10/8/2018    Foot Exam 10/11/2019 10/11/2018 (Done)    Override on 10/11/2018: Done    Override on 9/25/2017: Done    TETANUS VACCINE 12/01/2024 12/1/2014 (Done)    Override on 12/1/2014: Done        No orders of the defined types were placed in this encounter.    The following information is provided to all patients.  This information is to help you find resources for any of the problems found today that may be affecting your health:                Living healthy guide: www.[facility].[place].gov      Understanding Diabetes: www.diabetes.org      Eating healthy: www.cdc.gov/healthyweight      CDC home safety checklist: www.cdc.gov/steadi/patient.html      Agency on Aging: www.goea.[place].[facility]      Alcoholics anonymous (AA): www.aa.org      Physical Activity: www.ady.nih.gov/ho3mcjx      Tobacco use: www.quitwithusla.org

## 2018-12-14 NOTE — PROGRESS NOTES
"Hernan Engel presented for a  Medicare AWV and comprehensive Health Risk Assessment today. The following components were reviewed and updated:    · Medical history  · Family History  · Social history  · Allergies and Current Medications  · Health Risk Assessment  · Health Maintenance  · Care Team     ** See Completed Assessments for Annual Wellness Visit within the encounter summary.**       The following assessments were completed:  · Living Situation  · CAGE  · Depression Screening  · Timed Get Up and Go  · Whisper Test  · Cognitive Function Screening  ·   ·   · Nutrition Screening  · ADL Screening  · PAQ Screening    Vitals:    12/13/18 1126   BP: 116/80   BP Location: Left arm   Patient Position: Sitting   BP Method: Large (Manual)   Pulse: 86   SpO2: 97%   Weight: 82.4 kg (181 lb 10.5 oz)   Height: 5' 8" (1.727 m)     Body mass index is 27.62 kg/m².  Physical Exam   Constitutional: He is oriented to person, place, and time.   Cardiovascular: Normal rate, regular rhythm and normal heart sounds.   Pulmonary/Chest: No stridor. No respiratory distress. He has wheezes. He has no rales. He exhibits no tenderness.   Musculoskeletal: Normal range of motion.   Neurological: He is alert and oriented to person, place, and time.   Skin: Skin is warm. Capillary refill takes less than 2 seconds.   Psychiatric: He has a normal mood and affect. His behavior is normal. Thought content normal.   Vitals reviewed.        Diagnoses and health risks identified today and associated recommendations/orders:    1. Encounter for preventive health examination  Education provided about preventive health examinations and procedures; addressed and discussed patient's health concerns. Additionally, reviewed medical record for risk factors and documented the results during this encounter.    2. Chronic obstructive pulmonary disease, unspecified COPD type  Stable, managed with [place] [facility]. Continue as advised regarding dietary and lifestyle " modifications.     3. Malignant neoplasm of right lung, unspecified part of lung  Education provided, reviewed medical history associated with diagnosis.     4. Aortic atherosclerosis  Stable, asymptomatic; monitor.     5. Primary adenocarcinoma of right lung  Education provided, reviewed medical history associated with diagnosis.     6. Dyslipidemia associated with type 2 diabetes mellitus  Education provided about diabetes, management of blood glucose with diet and activities, monitoring for worsening effects of diabetes.  Reviewed most recent Ha1c and informed patient of complications associated with uncontrolled diabetes.     7. Type 2 diabetes mellitus without complication, with long-term current use of insulin  Education provided about diabetes, management of blood glucose with diet and activities, monitoring for worsening effects of diabetes.  Reviewed most recent Ha1c and informed patient of complications associated with uncontrolled diabetes.   - Ambulatory referral to Optometry    8. Major depression, recurrent, chronic   Denies homicidal or suicidal ideation, we discussed family and social dynamics, stress relieving activities. Continue as advised.     Reviewed health maintenance, educated about recommended examinations, procedures (labs & images), and immunizations.     Provided Hernan with a 5-10 year written screening schedule and personal prevention plan. Recommendations were developed using the USPSTF age appropriate recommendations. Education, counseling, and referrals were provided as needed. After Visit Summary printed and given to patient which includes a list of additional screenings\tests needed.    Follow-up in about 1 year (around 12/13/2019) for assessment.    Ricky Patricio Jr NP

## 2019-01-08 ENCOUNTER — TELEPHONE (OUTPATIENT)
Dept: FAMILY MEDICINE | Facility: CLINIC | Age: 64
End: 2019-01-08

## 2019-01-08 NOTE — TELEPHONE ENCOUNTER
Left a detailed message informing him that today's appt has been cancelled and need to be rescheduled.

## 2019-02-01 ENCOUNTER — OFFICE VISIT (OUTPATIENT)
Dept: FAMILY MEDICINE | Facility: CLINIC | Age: 64
End: 2019-02-01
Attending: FAMILY MEDICINE
Payer: MEDICARE

## 2019-02-01 VITALS
HEIGHT: 67 IN | OXYGEN SATURATION: 96 % | HEART RATE: 68 BPM | SYSTOLIC BLOOD PRESSURE: 112 MMHG | BODY MASS INDEX: 28.89 KG/M2 | WEIGHT: 184.06 LBS | DIASTOLIC BLOOD PRESSURE: 66 MMHG

## 2019-02-01 DIAGNOSIS — G89.29 CHRONIC BILATERAL LOW BACK PAIN WITHOUT SCIATICA: Primary | ICD-10-CM

## 2019-02-01 DIAGNOSIS — I70.0 AORTIC ATHEROSCLEROSIS: ICD-10-CM

## 2019-02-01 DIAGNOSIS — E11.9 TYPE 2 DIABETES MELLITUS WITHOUT COMPLICATION, WITH LONG-TERM CURRENT USE OF INSULIN: ICD-10-CM

## 2019-02-01 DIAGNOSIS — M54.50 CHRONIC BILATERAL LOW BACK PAIN WITHOUT SCIATICA: Primary | ICD-10-CM

## 2019-02-01 DIAGNOSIS — C34.91 PRIMARY ADENOCARCINOMA OF RIGHT LUNG: ICD-10-CM

## 2019-02-01 DIAGNOSIS — Z12.11 ENCOUNTER FOR FIT (FECAL IMMUNOCHEMICAL TEST) SCREENING: ICD-10-CM

## 2019-02-01 DIAGNOSIS — F33.9 MAJOR DEPRESSION, RECURRENT, CHRONIC: ICD-10-CM

## 2019-02-01 DIAGNOSIS — E11.69 DYSLIPIDEMIA ASSOCIATED WITH TYPE 2 DIABETES MELLITUS: ICD-10-CM

## 2019-02-01 DIAGNOSIS — Z79.4 TYPE 2 DIABETES MELLITUS WITHOUT COMPLICATION, WITH LONG-TERM CURRENT USE OF INSULIN: ICD-10-CM

## 2019-02-01 DIAGNOSIS — J43.1 PANLOBULAR EMPHYSEMA: ICD-10-CM

## 2019-02-01 DIAGNOSIS — J44.9 CHRONIC OBSTRUCTIVE PULMONARY DISEASE, UNSPECIFIED COPD TYPE: ICD-10-CM

## 2019-02-01 DIAGNOSIS — E78.5 DYSLIPIDEMIA ASSOCIATED WITH TYPE 2 DIABETES MELLITUS: ICD-10-CM

## 2019-02-01 DIAGNOSIS — N52.9 ERECTILE DYSFUNCTION, UNSPECIFIED ERECTILE DYSFUNCTION TYPE: ICD-10-CM

## 2019-02-01 PROCEDURE — 3008F BODY MASS INDEX DOCD: CPT | Mod: CPTII,S$GLB,, | Performed by: FAMILY MEDICINE

## 2019-02-01 PROCEDURE — 3044F HG A1C LEVEL LT 7.0%: CPT | Mod: CPTII,S$GLB,, | Performed by: FAMILY MEDICINE

## 2019-02-01 PROCEDURE — 99999 PR PBB SHADOW E&M-EST. PATIENT-LVL IV: CPT | Mod: PBBFAC,,, | Performed by: FAMILY MEDICINE

## 2019-02-01 PROCEDURE — 99214 PR OFFICE/OUTPT VISIT, EST, LEVL IV, 30-39 MIN: ICD-10-PCS | Mod: S$GLB,,, | Performed by: FAMILY MEDICINE

## 2019-02-01 PROCEDURE — 3008F PR BODY MASS INDEX (BMI) DOCUMENTED: ICD-10-PCS | Mod: CPTII,S$GLB,, | Performed by: FAMILY MEDICINE

## 2019-02-01 PROCEDURE — 99214 OFFICE O/P EST MOD 30 MIN: CPT | Mod: S$GLB,,, | Performed by: FAMILY MEDICINE

## 2019-02-01 PROCEDURE — 3044F PR MOST RECENT HEMOGLOBIN A1C LEVEL <7.0%: ICD-10-PCS | Mod: CPTII,S$GLB,, | Performed by: FAMILY MEDICINE

## 2019-02-01 PROCEDURE — 99999 PR PBB SHADOW E&M-EST. PATIENT-LVL IV: ICD-10-PCS | Mod: PBBFAC,,, | Performed by: FAMILY MEDICINE

## 2019-02-01 PROCEDURE — 99499 UNLISTED E&M SERVICE: CPT | Mod: S$GLB,,, | Performed by: FAMILY MEDICINE

## 2019-02-01 PROCEDURE — 99499 RISK ADDL DX/OHS AUDIT: ICD-10-PCS | Mod: S$GLB,,, | Performed by: FAMILY MEDICINE

## 2019-02-01 RX ORDER — ALBUTEROL SULFATE 90 UG/1
2 AEROSOL, METERED RESPIRATORY (INHALATION) EVERY 6 HOURS PRN
Qty: 18 G | Refills: 10 | Status: SHIPPED | OUTPATIENT
Start: 2019-02-01 | End: 2019-07-11 | Stop reason: SDUPTHER

## 2019-02-01 RX ORDER — CALCIUM CITRATE/VITAMIN D3 200MG-6.25
TABLET ORAL
Qty: 100 EACH | Refills: 3 | Status: SHIPPED | OUTPATIENT
Start: 2019-02-01 | End: 2019-06-13 | Stop reason: SDUPTHER
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
## 2019-02-05 ENCOUNTER — LAB VISIT (OUTPATIENT)
Dept: LAB | Facility: HOSPITAL | Age: 64
End: 2019-02-05
Attending: FAMILY MEDICINE
Payer: MEDICARE

## 2019-02-05 DIAGNOSIS — Z12.11 ENCOUNTER FOR FIT (FECAL IMMUNOCHEMICAL TEST) SCREENING: ICD-10-CM

## 2019-02-05 PROCEDURE — 82274 ASSAY TEST FOR BLOOD FECAL: CPT

## 2019-02-06 ENCOUNTER — TELEPHONE (OUTPATIENT)
Dept: FAMILY MEDICINE | Facility: CLINIC | Age: 64
End: 2019-02-06

## 2019-02-06 LAB — HEMOCCULT STL QL IA: NEGATIVE

## 2019-02-19 ENCOUNTER — IMMUNIZATION (OUTPATIENT)
Dept: PHARMACY | Facility: CLINIC | Age: 64
End: 2019-02-19
Payer: MEDICARE

## 2019-02-20 ENCOUNTER — OFFICE VISIT (OUTPATIENT)
Dept: UROLOGY | Facility: CLINIC | Age: 64
End: 2019-02-20
Payer: MEDICARE

## 2019-02-20 VITALS
SYSTOLIC BLOOD PRESSURE: 105 MMHG | BODY MASS INDEX: 28.72 KG/M2 | HEART RATE: 70 BPM | DIASTOLIC BLOOD PRESSURE: 73 MMHG | WEIGHT: 183 LBS | HEIGHT: 67 IN

## 2019-02-20 DIAGNOSIS — N40.1 BPH WITH URINARY OBSTRUCTION: ICD-10-CM

## 2019-02-20 DIAGNOSIS — N52.01 ERECTILE DYSFUNCTION DUE TO ARTERIAL INSUFFICIENCY: Primary | ICD-10-CM

## 2019-02-20 DIAGNOSIS — E78.5 DYSLIPIDEMIA ASSOCIATED WITH TYPE 2 DIABETES MELLITUS: ICD-10-CM

## 2019-02-20 DIAGNOSIS — N13.8 BPH WITH URINARY OBSTRUCTION: ICD-10-CM

## 2019-02-20 DIAGNOSIS — E11.69 TYPE 2 DIABETES MELLITUS WITH OTHER SPECIFIED COMPLICATION, WITHOUT LONG-TERM CURRENT USE OF INSULIN: ICD-10-CM

## 2019-02-20 DIAGNOSIS — E11.69 DYSLIPIDEMIA ASSOCIATED WITH TYPE 2 DIABETES MELLITUS: ICD-10-CM

## 2019-02-20 PROBLEM — E11.9 TYPE 2 DIABETES MELLITUS, WITH LONG-TERM CURRENT USE OF INSULIN: Status: ACTIVE | Noted: 2019-02-20

## 2019-02-20 PROBLEM — Z79.4 TYPE 2 DIABETES MELLITUS, WITH LONG-TERM CURRENT USE OF INSULIN: Status: ACTIVE | Noted: 2019-02-20

## 2019-02-20 PROCEDURE — 3008F PR BODY MASS INDEX (BMI) DOCUMENTED: ICD-10-PCS | Mod: CPTII,S$GLB,, | Performed by: UROLOGY

## 2019-02-20 PROCEDURE — 99204 OFFICE O/P NEW MOD 45 MIN: CPT | Mod: S$GLB,,, | Performed by: UROLOGY

## 2019-02-20 PROCEDURE — 3044F HG A1C LEVEL LT 7.0%: CPT | Mod: CPTII,S$GLB,, | Performed by: UROLOGY

## 2019-02-20 PROCEDURE — 99204 PR OFFICE/OUTPT VISIT, NEW, LEVL IV, 45-59 MIN: ICD-10-PCS | Mod: S$GLB,,, | Performed by: UROLOGY

## 2019-02-20 PROCEDURE — 99999 PR PBB SHADOW E&M-EST. PATIENT-LVL III: CPT | Mod: PBBFAC,,, | Performed by: UROLOGY

## 2019-02-20 PROCEDURE — 3044F PR MOST RECENT HEMOGLOBIN A1C LEVEL <7.0%: ICD-10-PCS | Mod: CPTII,S$GLB,, | Performed by: UROLOGY

## 2019-02-20 PROCEDURE — 99499 RISK ADDL DX/OHS AUDIT: ICD-10-PCS | Mod: S$GLB,,, | Performed by: UROLOGY

## 2019-02-20 PROCEDURE — 99999 PR PBB SHADOW E&M-EST. PATIENT-LVL III: ICD-10-PCS | Mod: PBBFAC,,, | Performed by: UROLOGY

## 2019-02-20 PROCEDURE — 3008F BODY MASS INDEX DOCD: CPT | Mod: CPTII,S$GLB,, | Performed by: UROLOGY

## 2019-02-20 PROCEDURE — 99499 UNLISTED E&M SERVICE: CPT | Mod: S$GLB,,, | Performed by: UROLOGY

## 2019-02-20 RX ORDER — SILDENAFIL 100 MG/1
100 TABLET, FILM COATED ORAL DAILY PRN
Qty: 10 TABLET | Refills: 11 | Status: SHIPPED | OUTPATIENT
Start: 2019-02-20 | End: 2019-05-21 | Stop reason: SDUPTHER

## 2019-02-20 NOTE — PATIENT INSTRUCTIONS
Oral Medications for Erectile Dysfunction  Prescription oral medications can be used for ED. They often work well. But, like all medications, they can have side effects. Also, they cant be used if a man has certain health problems or takes certain other medications. Talk with your doctor about oral ED medication. Ask whether it is right for you.  Types of Oral ED Medications  There are three types of prescription oral ED medications. Each one increases blood flow to the penis. When the penis is stimulated, an erection results. The three types are:  · Sildenafil citrate (Viagra)  · Tadalafil (Cialis)  · Vardenafil HCl (Levitra)  What Oral ED Medications Dont Do  There are some things ED medications cant do.  · They dont cure the cause of your ED. Without the medication, youll still have trouble getting an erection.  · They cant produce an erection without sexual stimulation.  · They wont increase sexual desire. They also wont solve any other sexual issues. Psychological, emotional, or relationship issues will not be fixed.  Taking Oral ED Medications Safely  · Do not combine ED medications with other treatments unless your doctor tells you to.  · Dont take ED medications more often or in larger doses than prescribed.  · Tell your doctor your health history. Mention all medications you take. This includes over-the-counter drugs, supplements, and herbs.  · Ask your doctor about whether you can drink alcohol while taking ED medication.  Possible Side Effects of Oral ED Medications  · Headache  · Facial flushing  · Runny or stuffy nose  · Indigestion  · Distortion of your color vision for a short time  · Sudden vision loss or hearing loss (rare)  Risks of Oral ED Medications   · Do not take ED medications if you take medications containing nitrates. The combination may drop your blood pressure to a dangerous level. Nitrates include nitroglycerin (a drug for angina). They are also in poppers, an inhaled  recreational drug. If youre not sure whether youre taking nitrates, ask your doctor or pharmacist.  · Medications called alpha-blockers can interact with ED medications. They can cause a sudden drop in blood pressure. Alpha-blockers are a common treatment for prostate problems. They also treat high blood pressure. Be sure your doctor knows if you take these medications.  · If youve had a heart attack or have heart disease and you have not had sex for a while, talk to your doctor. Having sex again can put extra strain on your heart. Your doctor can confirm that your heart is healthy enough for sex.  · It is rare, but some men taking ED medications have had sudden vision loss. This may be more likely if other health problems are present. These include high blood pressure and diabetes. Ask your doctor if you are at risk for this type of vision loss.  · In rare cases, an erection may last too long. This can badly damage the blood vessels in your penis. If an erection lasts longer than 4 hours, go to the emergency room right away.       © 3493-3220 [employer], [address] [place] [address], [place] [address]. All rights reserved. This information is not intended as a substitute for professional medical care. Always follow your healthcare professional's instructions.

## 2019-02-20 NOTE — PROGRESS NOTES
CHIEF COMPLAINT:    Mr. Engel is a 63 y.o. male presenting for a consultation at the request of Dr. Patel. Patient presents with ED.    PRESENTING ILLNESS:    Hernan Engel is a 63 y.o. male who c/o ED.  This has been present for > 1 year.  He has a history of lung cancer.  He's never tried adequate dosages of a PDE-5i.  He's tried and failed ICI.    He has nocturia x 1-2.  No hematuria.  No dysuria.  He's pleased with how he voids.    REVIEW OF SYSTEMS:    Hernan Engel denies headache, blurred vision, fever, nausea, vomiting, chills, abdominal pain, bleeding per rectum, cough, SOB, recent loss of consciousness, recent mental status changes, seizures, dizziness, or upper or lower extremity weakness.    MAIDA  1. 1  2. 0  3. 1  4. 2  5. 0      PATIENT HISTORY:    Past Medical History:   Diagnosis Date    Anemia     Anxiety     COPD (chronic obstructive pulmonary disease)     Depression     Disorder of kidney and ureter     Family history of colon cancer 1/16/2014    Hearing loss in left ear 34 years    Lung cancer     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    T2DM (type 2 diabetes mellitus) 2014    DKA 9/2014    Type 2 diabetes mellitus        Past Surgical History:   Procedure Laterality Date    BIOPSY, WITH CT GUIDANCE N/A 2/25/2014    Performed by Juan José Parker [facility] at [facility] OR 2ND FLR    BRONCHOSCOPY-FIBEROPTIC N/A 3/27/2014    Performed by [facility] at [facility] OR 2ND FLR    COLONOSCOPY      COLONOSCOPY N/A 6/4/2014    Performed by [facility] at [facility]    ESOPHAGOGASTRODUODENOSCOPY (EGD) N/A 6/4/2014    Performed by [facility]ida [facility] at [facility] END[facility]    LUNG REMOVAL, PARTIAL      VATS (VIDEO-ASSISTED THORACOSCOPIC SURGERY) Right 3/27/2014    Performed by Jensen [facility]endez MD at [facility] OR 2ND FLR       Family History   Problem Relation Age of Onset    Cancer Father         colon cancer    Colon cancer Father     Cataracts Father     Colon cancer Sister      Cancer Sister         colon or lung cancer     No Known Problems Mother     No Known Problems Brother     No Known Problems Sister     No Known Problems Sister     No Known Problems Maternal Aunt     No Known Problems Maternal Uncle     No Known Problems Paternal Aunt     No Known Problems Paternal Uncle     No Known Problems Maternal Grandmother     No Known Problems Maternal Grandfather     No Known Problems Paternal Grandmother     No Known Problems Paternal Grandfather     Amblyopia Neg Hx     Blindness Neg Hx     Diabetes Neg Hx     Glaucoma Neg Hx     Hypertension Neg Hx     Macular degeneration Neg Hx     Retinal detachment Neg Hx     Strabismus Neg Hx     Stroke Neg Hx     Thyroid disease Neg Hx        Social History     Socioeconomic History    Marital status:      Spouse name: Not on file    Number of children: Not on file    Years of education: Not on file    Highest education level: Not on file   Social Needs    Financial resource strain: Not on file    Food insecurity - worry: Not on file    Food insecurity - inability: Not on file    Transportation needs - medical: Not on file    Transportation needs - non-medical: Not on file   Occupational History     Employer: [employer]   Tobacco Use    Smoking status: Former Smoker     Packs/day: 1.00     Years: 30.00     Pack years: 30.00     Last attempt to quit: 1/15/2014     Years since quittin.1    Smokeless tobacco: Never Used   Substance and Sexual Activity    Alcohol use: Yes     Alcohol/week: 3.4 oz     Types: 4 Cans of beer, 2 Standard drinks or equivalent per week     Comment: socially    Drug use: No    Sexual activity: Not Currently     Partners: Female     Birth control/protection: None   Other Topics Concern    Not on file   Social History Narrative    Not on file       Allergies:  Adhesive    Medications:    Current Outpatient Medications:     albuterol (PROVENTIL) 2.5 mg /3 mL (0.083 %)  nebulizer solution, Take 3 mLs (2.5 mg total) by nebulization every 6 (six) hours as needed for Wheezing. Rescue, Disp: 1 Box, Rfl: 0    albuterol (PROVENTIL/VENTOLIN HFA) 90 mcg/actuation inhaler, Inhale 2 puffs into the lungs every 6 (six) hours as needed for Wheezing., Disp: 18 g, Rfl: 10    atorvastatin (LIPITOR) 10 MG tablet, Take 1 tablet (10 mg total) by mouth once daily., Disp: 90 tablet, Rfl: 3    fluticasone-salmeterol 500-50 mcg/dose (ADVAIR DISKUS) 500-50 mcg/dose DsDv diskus inhaler, Inhale 1 puff into the lungs 2 (two) times daily., Disp: 180 each, Rfl: 3    insulin syringe-needle U-100 1 mL 31 gauge x 5/16 Syrg, 1 each., Disp: , Rfl:     ipratropium (ATROVENT) 42 mcg (0.06 %) nasal spray, 2 sprays by Nasal route 4 (four) times daily., Disp: 15 mL, Rfl: 0    nebulizer and compressor Ana, 1 Units by Misc.(Non-Drug; Combo Route) route every 6 (six) hours as needed., Disp: 1 each, Rfl: 0    omeprazole (PRILOSEC) 20 MG capsule, Take 1 capsule (20 mg total) by mouth 2 (two) times daily., Disp: 30 capsule, Rfl: 0    pneumoc 13-vince conj-dip cr,PF, (PREVNAR 13, PF,) 0.5 mL Syrg, Inject 0.5 mLs into the muscle once. for 1 dose, Disp: 0.5 mL, Rfl: 0    QUEtiapine (SEROQUEL) 50 MG tablet, Take 1 tablet (50 mg total) by mouth every evening., Disp: 30 tablet, Rfl: 2    QUEtiapine (SEROQUEL) 50 MG tablet, TAKE 1 TABLET(50 MG) BY MOUTH EVERY EVENING, Disp: 30 tablet, Rfl: 0    sildenafil (VIAGRA) 100 MG tablet, Take 1 tablet (100 mg total) by mouth daily as needed for Erectile Dysfunction., Disp: 10 tablet, Rfl: 12    tiotropium (SPIRIVA) 18 mcg inhalation capsule, Inhale 1 capsule (18 mcg total) into the lungs once daily., Disp: 90 capsule, Rfl: 10    TRUE METRIX GLUCOSE TEST STRIP Strp, Check sugar once daily, Disp: 100 each, Rfl: 3    venlafaxine (EFFEXOR-XR) 37.5 MG 24 hr capsule, Take 1 capsule (37.5 mg total) by mouth once daily., Disp: 30 capsule, Rfl: 2    zolpidem (AMBIEN) 5 MG Tab, Take 1  tablet (5 mg total) by mouth nightly as needed., Disp: 30 tablet, Rfl: 0    PHYSICAL EXAMINATION:    The patient generally appears in good health, is appropriately interactive, and is in no apparent distress.     Eyes: anicteric sclerae, moist conjunctivae; no lid-lag; PERRLA     HENT: Atraumatic; oropharynx clear with moist mucous membranes and no mucosal ulcerations;normal hard and soft palate.  No evidence of lymphadenopathy.    Neck: Trachea midline.  No thyromegaly.    Musculoskeletal: No abnormal gait.    Skin: No lesions.    Mental: Cooperative with normal affect.  Is oriented to time, place, and person.    Neuro: Grossly intact.    Chest: Normal inspiratory effort.   No accessory muscles.  No audible wheezes.  Respirations symmetric on inspiration and expiration.    Heart: Regular rhythm.      Abdomen:  Soft, non-tender. No masses or organomegaly. Bladder is not palpable. No evidence of flank discomfort. No evidence of inguinal hernia.    Genitourinary: The penis is circumcised with no evidence of plaques or induration. The urethral meatus is normal. The testes, epididymides, and cord structures are normal in size and contour bilaterally. The scrotum is normal in size and contour.    Normal anal sphincter tone. No rectal mass.    The prostate is 30 g. Normal landmarks. Lateral sulci. Median furrow intact.  No nodularity or induration. Seminal vesicles are normal.    Extremities: No clubbing, cyanosis, or edema      LABS:      Lab Results   Component Value Date    PSA 0.67 10/08/2018    PSA 0.89 09/29/2017    PSA 0.51 01/16/2014       IMPRESSION:    Encounter Diagnoses   Name Primary?    Erectile dysfunction due to arterial insufficiency Yes    BPH with urinary obstruction     Dyslipidemia associated with type 2 diabetes mellitus     Type 2 diabetes mellitus with other specified complication, with long-term current use of insulin    Hyperlipidemia, controlled  DM, controlled      PLAN:    1. Discussed  options for his ED.  He'd like Viagra. Side effects discussed.  A new Rx was given  2. Will observe his LUTS as they don't bother him.  3. RTC 3 months to see an SHARRI.    Copy to: Amanda

## 2019-02-20 NOTE — LETTER
February 20, 2019      Jose Patel MD  [address]  [address]  [facility] [address]           [facility] - Urology 4th Floor  [address]  [place] [address]  Phone: [phone]          Patient: Hernan Engel   MR Number: [id number]   YOB: 1955   Date of Visit: 2/20/2019       Dear Dr. Jose Patel:    Thank you for referring Hernan Engel to me for evaluation. Attached you will find relevant portions of my assessment and plan of care.    If you have questions, please do not hesitate to call me. I look forward to following Hernan Engel along with you.    Sincerely,    Zan Gordon MD    Enclosure  CC:  No Recipients    If you would like to receive this communication electronically, please contact [email or web address] or [phone] to request more information on [facility] access.    For providers and/or their staff who would like to refer a patient to [facility], please contact us through our one-stop-shop provider referral line, [facility] [place], at [phone].    If you feel you have received this communication in error or would no longer like to receive these types of communications, please e-mail [email or web address]

## 2019-02-22 DIAGNOSIS — E11.9 TYPE 2 DIABETES MELLITUS WITHOUT COMPLICATION, UNSPECIFIED WHETHER LONG TERM INSULIN USE: ICD-10-CM

## 2019-03-04 ENCOUNTER — OFFICE VISIT (OUTPATIENT)
Dept: URGENT CARE | Facility: CLINIC | Age: 64
End: 2019-03-04
Payer: MEDICARE

## 2019-03-04 VITALS
RESPIRATION RATE: 18 BRPM | DIASTOLIC BLOOD PRESSURE: 74 MMHG | HEIGHT: 67 IN | OXYGEN SATURATION: 98 % | TEMPERATURE: 98 F | WEIGHT: 185 LBS | SYSTOLIC BLOOD PRESSURE: 117 MMHG | HEART RATE: 77 BPM | BODY MASS INDEX: 29.03 KG/M2

## 2019-03-04 DIAGNOSIS — R05.9 COUGH: ICD-10-CM

## 2019-03-04 DIAGNOSIS — J44.0 COPD WITH ACUTE LOWER RESPIRATORY INFECTION: Primary | ICD-10-CM

## 2019-03-04 PROCEDURE — 94640 PR INHAL RX, AIRWAY OBST/DX SPUTUM INDUCT: ICD-10-PCS | Mod: 59,S$GLB,, | Performed by: PHYSICIAN ASSISTANT

## 2019-03-04 PROCEDURE — 99214 OFFICE O/P EST MOD 30 MIN: CPT | Mod: 25,S$GLB,, | Performed by: PHYSICIAN ASSISTANT

## 2019-03-04 PROCEDURE — 99214 PR OFFICE/OUTPT VISIT, EST, LEVL IV, 30-39 MIN: ICD-10-PCS | Mod: 25,S$GLB,, | Performed by: PHYSICIAN ASSISTANT

## 2019-03-04 PROCEDURE — 71046 XR CHEST PA AND LATERAL: ICD-10-PCS | Mod: S$GLB,,, | Performed by: RADIOLOGY

## 2019-03-04 PROCEDURE — 94640 AIRWAY INHALATION TREATMENT: CPT | Mod: 59,S$GLB,, | Performed by: PHYSICIAN ASSISTANT

## 2019-03-04 PROCEDURE — 71046 X-RAY EXAM CHEST 2 VIEWS: CPT | Mod: S$GLB,,, | Performed by: RADIOLOGY

## 2019-03-04 PROCEDURE — 3008F BODY MASS INDEX DOCD: CPT | Mod: CPTII,S$GLB,, | Performed by: PHYSICIAN ASSISTANT

## 2019-03-04 PROCEDURE — 3008F PR BODY MASS INDEX (BMI) DOCUMENTED: ICD-10-PCS | Mod: CPTII,S$GLB,, | Performed by: PHYSICIAN ASSISTANT

## 2019-03-04 PROCEDURE — 96372 THER/PROPH/DIAG INJ SC/IM: CPT | Mod: 59,S$GLB,, | Performed by: PHYSICIAN ASSISTANT

## 2019-03-04 PROCEDURE — 96372 PR INJECTION,THERAP/PROPH/DIAG2ST, IM OR SUBCUT: ICD-10-PCS | Mod: 59,S$GLB,, | Performed by: PHYSICIAN ASSISTANT

## 2019-03-04 RX ORDER — ALBUTEROL SULFATE 0.83 MG/ML
2.5 SOLUTION RESPIRATORY (INHALATION)
Status: COMPLETED | OUTPATIENT
Start: 2019-03-04 | End: 2019-03-04

## 2019-03-04 RX ORDER — IPRATROPIUM BROMIDE 0.5 MG/2.5ML
0.5 SOLUTION RESPIRATORY (INHALATION)
Status: COMPLETED | OUTPATIENT
Start: 2019-03-04 | End: 2019-03-04

## 2019-03-04 RX ADMIN — ALBUTEROL SULFATE 2.5 MG: 0.83 SOLUTION RESPIRATORY (INHALATION) at 01:03

## 2019-03-04 RX ADMIN — IPRATROPIUM BROMIDE 0.5 MG: 0.5 SOLUTION RESPIRATORY (INHALATION) at 01:03

## 2019-03-04 NOTE — PATIENT INSTRUCTIONS
COPD Flare    You have had a flare-up of your COPD.  COPD, or chronic obstructive pulmonary disease, is a common lung disease. It causes your airways to become irritated and narrower. This makes it harder for you to breathe. Emphysema and chronic bronchitis are both types of COPD. This is a chronic condition, which means you always have it. Sometimes it gets worse. When this happens, it is called a flare-up.  Symptoms of COPD  People with COPD may have symptoms most of the time. In a flare-up, your symptoms get worse. These symptoms may mean you are having a flare-up:  · Shortness of breath, shallow or rapid breathing, or wheezing that gets worse  · Lung infection  · Cough that gets worse  · More mucus, thicker mucus or mucus of a different color  · Tiredness, decreased energy, or trouble doing your usual activities  · Fever  · Chest tightness  · Your symptoms dont get better even when you use your usual medicines, inhalers, and nebulizer  · Trouble talking  · You feel confused  Causes of flare-ups  Unfortunately, a flare-up can happen even though you did everything right, and you followed your doctors instructions. Some causes of flare-ups are:  · Smoking or secondhand smoke  · Colds, the flu, or respiratory infections  · Air pollution  · Sudden change in the weather  · Dust, irritating chemicals, or strong fumes  · Not taking your medicines as prescribed  Home care  Here are some things you can do at home to treat a flare-up:  · Try not to panic. This makes it harder to breathe, and keeps you from doing the right things.  · Dont smoke or be around others who are smoking.  · Try to drink more fluids than usual during a flare-up, unless your doctor has told you not to because of heart and kidney problems. More fluids can help loosen the mucus.  · Use your inhalers and nebulizer, if you have one, as you have been told to.  · If you were given antibiotics, take them until they are used up or your doctor tells you  to stop. Its important to finish the antibiotics, even though you feel better. This will make sure the infection has cleared.  · If you were given prednisone or another steroid, finish it even if you feel better.  Preventing a flare-up  Even though flare-ups happen, the best way to treat one is to prevent it before it starts. Here are some pointers:  · Dont smoke or be around others who are smoking.  · Take your medicines as you have been told.  · Talk with your doctor about getting a flu shot every year. Also find out if you need a pneumonia shot.  · If there is a weather advisory warning to stay indoors, try to stay inside when possible.  · Try to eat healthy and get plenty of sleep.  · Try to avoid things that usually set you off, like dust, chemical fumes, hairsprays, or strong perfumes.  Follow-up care  Follow up with your healthcare provider, or as advised.  If a culture was done, you will be told if your treatment needs to be changed. You can call as directed for the results.  If X-rays were done, you will be notified of any new findings that may affect your care.  Call 911  Call 911 if any of these occur:  · You have trouble breathing  · You feel confused or its difficult to wake you up  · You faint or lose consciousness  · You have a rapid heart rate  · You have new pain in your chest, arm, shoulder, neck or upper back  When to seek medical advice  Call your healthcare provider right away if any of these occur:  · Wheezing or shortness of breath gets worse  · You need to use your inhalers more often than usual without relief  · Fever of 100.4°F (38ºC) or higher, or as directed by your healthcare provider  · Coughing up lots of dark-colored or bloody mucus (sputum)  · Chest pain with each breath  · You do not start to get better within 24 hours  · Swelling of your ankles gets worse  · Dizziness or weakness  Date Last Reviewed: 9/1/2016  © 1800-4297 [employer]. [address] [facility],  [place] [address]. All rights reserved. This information is not intended as a substitute for professional medical care. Always follow your healthcare professional's instructions.

## 2019-03-04 NOTE — PROGRESS NOTES
"Subjective:       Patient ID: Hernan Engel is a 63 y.o. male.    Vitals:  height is 5' 7" (1.702 m) and weight is 83.9 kg (185 lb). His temperature is 97.7 °F (36.5 °C). His blood pressure is 117/74 and his pulse is 77. His respiration is 18 and oxygen saturation is 98%.     Chief Complaint: Wheezing    Patient is here due to wheezing, cough productive, chest discomfort      Wheezing    The current episode started in the past 7 days. Associated symptoms include coughing, a sore throat and sputum production. Pertinent negatives include no abdominal pain, chest pain, chills, diarrhea, ear pain, fever, neck pain, rash or vomiting. He has tried OTC cough suppressant for the symptoms. The treatment provided no relief.       Constitution: Negative for chills, sweating and fever.   HENT: Positive for congestion and sore throat. Negative for ear pain.    Neck: Negative for neck pain and neck stiffness.   Cardiovascular: Negative for chest pain and palpitations.   Eyes: Negative for eye pain and eye redness.   Respiratory: Positive for cough, sputum production and wheezing.    Gastrointestinal: Negative for abdominal pain, nausea, vomiting and diarrhea.   Musculoskeletal: Positive for muscle ache. Negative for trauma.   Skin: Negative for color change and rash.       Objective:      Physical Exam   Constitutional: He is oriented to person, place, and time. He appears well-developed and well-nourished. He is cooperative.  Non-toxic appearance. He does not appear ill. No distress.   HENT:   Head: Normocephalic and atraumatic.   Right Ear: Hearing, tympanic membrane, external ear and ear canal normal.   Left Ear: Hearing, tympanic membrane, external ear and ear canal normal.   Nose: Nose normal. No mucosal edema, rhinorrhea or nasal deformity. No epistaxis. Right sinus exhibits no maxillary sinus tenderness and no frontal sinus tenderness. Left sinus exhibits no maxillary sinus tenderness and no frontal sinus tenderness. "   Mouth/Throat: Uvula is midline, oropharynx is clear and moist and mucous membranes are normal. No trismus in the jaw. Normal dentition. No uvula swelling. No posterior oropharyngeal erythema.   Eyes: Conjunctivae and lids are normal. No scleral icterus.   Sclera clear bilat   Neck: Trachea normal, full passive range of motion without pain and phonation normal. Neck supple.   Cardiovascular: Normal rate, regular rhythm, normal heart sounds, intact distal pulses and normal pulses.   Pulmonary/Chest: Effort normal. No respiratory distress. He has wheezes in the right upper field, the right middle field, the right lower field, the left upper field, the left middle field and the left lower field.   Abdominal: Soft. Normal appearance and bowel sounds are normal. He exhibits no distension. There is no tenderness.   Musculoskeletal: Normal range of motion. He exhibits no edema or deformity.   Neurological: He is alert and oriented to person, place, and time. He exhibits normal muscle tone. Coordination normal.   Skin: Skin is warm, dry and intact. He is not diaphoretic. No pallor.   Psychiatric: He has a normal mood and affect. His speech is normal and behavior is normal. Judgment and thought content normal. Cognition and memory are normal.   Nursing note and vitals reviewed.        X-ray Chest Pa And Lateral    Result Date: 3/4/2019  EXAMINATION: XR CHEST PA AND LATERAL CLINICAL HISTORY: productive cough; Cough TECHNIQUE: PA and lateral views of the chest were performed. COMPARISON: 12/06/2018. FINDINGS: The heart is not enlarged.  Superior mediastinal structures are unremarkable.  Pulmonary vasculature is within normal limits.  There is parenchymal scarring within the right lung as before.  There is no evidence for pneumothorax or pleural effusions.  Bony structures appear intact.     Chronic parenchymal scarring within the right lung, stable. Electronically signed by: Bryan oTrres MD Date:    03/04/2019  Time:    13:22    Assessment:       1. COPD with acute lower respiratory infection    2. Cough        Plan:       Following nebulizer treatment patient's wheezing resolved.  He is afebrile with no signs of pneumonia.  He will be discharged in stable condition and was informed to follow up with his primary care doctor should his cough and wheezing persist or he develops fevers.      COPD with acute lower respiratory infection  -     methylPREDNISolone sod suc(PF) injection 125 mg    Cough  -     X-Ray Chest PA And Lateral; Future; Expected date: 03/04/2019  -     albuterol nebulizer solution 2.5 mg  -     ipratropium 0.02 % nebulizer solution 0.5 mg      Patient Instructions     COPD Flare    You have had a flare-up of your COPD.  COPD, or chronic obstructive pulmonary disease, is a common lung disease. It causes your airways to become irritated and narrower. This makes it harder for you to breathe. Emphysema and chronic bronchitis are both types of COPD. This is a chronic condition, which means you always have it. Sometimes it gets worse. When this happens, it is called a flare-up.  Symptoms of COPD  People with COPD may have symptoms most of the time. In a flare-up, your symptoms get worse. These symptoms may mean you are having a flare-up:  · Shortness of breath, shallow or rapid breathing, or wheezing that gets worse  · Lung infection  · Cough that gets worse  · More mucus, thicker mucus or mucus of a different color  · Tiredness, decreased energy, or trouble doing your usual activities  · Fever  · Chest tightness  · Your symptoms dont get better even when you use your usual medicines, inhalers, and nebulizer  · Trouble talking  · You feel confused  Causes of flare-ups  Unfortunately, a flare-up can happen even though you did everything right, and you followed your doctors instructions. Some causes of flare-ups are:  · Smoking or secondhand smoke  · Colds, the flu, or respiratory infections  · Air  pollution  · Sudden change in the weather  · Dust, irritating chemicals, or strong fumes  · Not taking your medicines as prescribed  Home care  Here are some things you can do at home to treat a flare-up:  · Try not to panic. This makes it harder to breathe, and keeps you from doing the right things.  · Dont smoke or be around others who are smoking.  · Try to drink more fluids than usual during a flare-up, unless your doctor has told you not to because of heart and kidney problems. More fluids can help loosen the mucus.  · Use your inhalers and nebulizer, if you have one, as you have been told to.  · If you were given antibiotics, take them until they are used up or your doctor tells you to stop. Its important to finish the antibiotics, even though you feel better. This will make sure the infection has cleared.  · If you were given prednisone or another steroid, finish it even if you feel better.  Preventing a flare-up  Even though flare-ups happen, the best way to treat one is to prevent it before it starts. Here are some pointers:  · Dont smoke or be around others who are smoking.  · Take your medicines as you have been told.  · Talk with your doctor about getting a flu shot every year. Also find out if you need a pneumonia shot.  · If there is a weather advisory warning to stay indoors, try to stay inside when possible.  · Try to eat healthy and get plenty of sleep.  · Try to avoid things that usually set you off, like dust, chemical fumes, hairsprays, or strong perfumes.  Follow-up care  Follow up with your healthcare provider, or as advised.  If a culture was done, you will be told if your treatment needs to be changed. You can call as directed for the results.  If X-rays were done, you will be notified of any new findings that may affect your care.  Call 911  Call 911 if any of these occur:  · You have trouble breathing  · You feel confused or its difficult to wake you up  · You faint or lose  consciousness  · You have a rapid heart rate  · You have new pain in your chest, arm, shoulder, neck or upper back  When to seek medical advice  Call your healthcare provider right away if any of these occur:  · Wheezing or shortness of breath gets worse  · You need to use your inhalers more often than usual without relief  · Fever of 100.4°F (38ºC) or higher, or as directed by your healthcare provider  · Coughing up lots of dark-colored or bloody mucus (sputum)  · Chest pain with each breath  · You do not start to get better within 24 hours  · Swelling of your ankles gets worse  · Dizziness or weakness  Date Last Reviewed: 9/1/2016  © 2795-4402 [employer]. [address] [place] [address], [place] [address]. All rights reserved. This information is not intended as a substitute for professional medical care. Always follow your healthcare professional's instructions.

## 2019-03-06 ENCOUNTER — TELEPHONE (OUTPATIENT)
Dept: FAMILY MEDICINE | Facility: CLINIC | Age: 64
End: 2019-03-06

## 2019-03-06 DIAGNOSIS — Z79.4 TYPE 2 DIABETES MELLITUS WITHOUT COMPLICATION, WITH LONG-TERM CURRENT USE OF INSULIN: Primary | ICD-10-CM

## 2019-03-06 DIAGNOSIS — E78.5 DYSLIPIDEMIA ASSOCIATED WITH TYPE 2 DIABETES MELLITUS: ICD-10-CM

## 2019-03-06 DIAGNOSIS — E11.69 DYSLIPIDEMIA ASSOCIATED WITH TYPE 2 DIABETES MELLITUS: ICD-10-CM

## 2019-03-06 DIAGNOSIS — E11.9 TYPE 2 DIABETES MELLITUS WITHOUT COMPLICATION, WITH LONG-TERM CURRENT USE OF INSULIN: Primary | ICD-10-CM

## 2019-03-06 RX ORDER — ATORVASTATIN CALCIUM 10 MG/1
10 TABLET, FILM COATED ORAL DAILY
Qty: 90 TABLET | Refills: 3 | Status: SHIPPED | OUTPATIENT
Start: 2019-03-06 | End: 2020-01-30 | Stop reason: SDUPTHER

## 2019-03-06 RX ORDER — METFORMIN HYDROCHLORIDE 500 MG/1
500 TABLET, EXTENDED RELEASE ORAL
Qty: 90 TABLET | Refills: 3 | Status: SHIPPED | OUTPATIENT
Start: 2019-03-06 | End: 2020-06-10

## 2019-03-06 NOTE — TELEPHONE ENCOUNTER
----- Message from Irma George MA sent at 3/6/2019  2:25 PM CST -----      ----- Message -----  From: Ada Garcia  Sent: 3/6/2019   2:21 PM  To: Amanda Garcia Staff    Patient's wife, Terrance, called.   No. [phone]     Patient's blood sugar was 183 today.   Please call.

## 2019-03-08 ENCOUNTER — TELEPHONE (OUTPATIENT)
Dept: FAMILY MEDICINE | Facility: CLINIC | Age: 64
End: 2019-03-08

## 2019-03-08 NOTE — TELEPHONE ENCOUNTER
----- Message from Kely Lorenz sent at 3/8/2019 11:40 AM CST -----  Contact: Wife  Wife is returning a call to Staff.    She can be reached at [phone], or [phone].    Thank you.

## 2019-03-15 ENCOUNTER — TELEPHONE (OUTPATIENT)
Dept: FAMILY MEDICINE | Facility: CLINIC | Age: 64
End: 2019-03-15

## 2019-03-15 DIAGNOSIS — Z12.11 COLON CANCER SCREENING: Primary | ICD-10-CM

## 2019-03-18 ENCOUNTER — TELEPHONE (OUTPATIENT)
Dept: FAMILY MEDICINE | Facility: CLINIC | Age: 64
End: 2019-03-18

## 2019-03-18 RX ORDER — VALACYCLOVIR HYDROCHLORIDE 1 G/1
2000 TABLET, FILM COATED ORAL 2 TIMES DAILY
Qty: 4 TABLET | Refills: 0 | Status: SHIPPED | OUTPATIENT
Start: 2019-03-18 | End: 2020-06-10

## 2019-03-18 NOTE — TELEPHONE ENCOUNTER
Pt states that he has cold sores and requesting Valtrex medication that was prescribed to his wife. Pls advise.

## 2019-03-18 NOTE — TELEPHONE ENCOUNTER
----- Message from Yesi Santiago sent at 3/18/2019  9:14 AM CDT -----  Contact: [phone]/self  Patient requesting to have a medication called in for cold sores. He is requesting the same medication that was prescribed for his wife. Perla Altamirano. Please advise.

## 2019-04-11 ENCOUNTER — LAB VISIT (OUTPATIENT)
Dept: LAB | Facility: HOSPITAL | Age: 64
End: 2019-04-11
Attending: FAMILY MEDICINE
Payer: MEDICARE

## 2019-04-11 ENCOUNTER — OFFICE VISIT (OUTPATIENT)
Dept: FAMILY MEDICINE | Facility: CLINIC | Age: 64
End: 2019-04-11
Attending: FAMILY MEDICINE
Payer: MEDICARE

## 2019-04-11 VITALS
DIASTOLIC BLOOD PRESSURE: 86 MMHG | HEART RATE: 60 BPM | OXYGEN SATURATION: 99 % | HEIGHT: 67 IN | WEIGHT: 185.44 LBS | SYSTOLIC BLOOD PRESSURE: 128 MMHG | BODY MASS INDEX: 29.1 KG/M2

## 2019-04-11 DIAGNOSIS — R30.0 DYSURIA: ICD-10-CM

## 2019-04-11 DIAGNOSIS — E78.5 DYSLIPIDEMIA ASSOCIATED WITH TYPE 2 DIABETES MELLITUS: ICD-10-CM

## 2019-04-11 DIAGNOSIS — Z11.3 SCREEN FOR STD (SEXUALLY TRANSMITTED DISEASE): ICD-10-CM

## 2019-04-11 DIAGNOSIS — E11.69 DYSLIPIDEMIA ASSOCIATED WITH TYPE 2 DIABETES MELLITUS: ICD-10-CM

## 2019-04-11 DIAGNOSIS — C34.91 MALIGNANT NEOPLASM OF RIGHT LUNG, UNSPECIFIED PART OF LUNG: ICD-10-CM

## 2019-04-11 DIAGNOSIS — Z11.4 ENCOUNTER FOR SCREENING FOR HIV: ICD-10-CM

## 2019-04-11 DIAGNOSIS — I70.0 AORTIC ATHEROSCLEROSIS: ICD-10-CM

## 2019-04-11 DIAGNOSIS — J44.9 CHRONIC OBSTRUCTIVE PULMONARY DISEASE, UNSPECIFIED COPD TYPE: ICD-10-CM

## 2019-04-11 DIAGNOSIS — E11.9 TYPE 2 DIABETES MELLITUS WITHOUT COMPLICATION, WITH LONG-TERM CURRENT USE OF INSULIN: Primary | ICD-10-CM

## 2019-04-11 DIAGNOSIS — Z79.4 TYPE 2 DIABETES MELLITUS WITHOUT COMPLICATION, WITH LONG-TERM CURRENT USE OF INSULIN: Primary | ICD-10-CM

## 2019-04-11 DIAGNOSIS — C34.91 PRIMARY ADENOCARCINOMA OF RIGHT LUNG: ICD-10-CM

## 2019-04-11 PROBLEM — F33.9 MAJOR DEPRESSION, RECURRENT, CHRONIC: Status: RESOLVED | Noted: 2018-03-06 | Resolved: 2019-04-11

## 2019-04-11 LAB
BILIRUB UR QL STRIP: NEGATIVE
CLARITY UR: CLEAR
COLOR UR: YELLOW
GLUCOSE UR QL STRIP: NEGATIVE
HGB UR QL STRIP: NEGATIVE
KETONES UR QL STRIP: NEGATIVE
LEUKOCYTE ESTERASE UR QL STRIP: NEGATIVE
NITRITE UR QL STRIP: NEGATIVE
PH UR STRIP: 7 [PH] (ref 5–8)
PROT UR QL STRIP: NEGATIVE
SP GR UR STRIP: <=1.005 (ref 1–1.03)
URN SPEC COLLECT METH UR: ABNORMAL
UROBILINOGEN UR STRIP-ACNC: NEGATIVE EU/DL

## 2019-04-11 PROCEDURE — 3008F PR BODY MASS INDEX (BMI) DOCUMENTED: ICD-10-PCS | Mod: CPTII,S$GLB,, | Performed by: FAMILY MEDICINE

## 2019-04-11 PROCEDURE — 3044F HG A1C LEVEL LT 7.0%: CPT | Mod: CPTII,S$GLB,, | Performed by: FAMILY MEDICINE

## 2019-04-11 PROCEDURE — 3008F BODY MASS INDEX DOCD: CPT | Mod: CPTII,S$GLB,, | Performed by: FAMILY MEDICINE

## 2019-04-11 PROCEDURE — 99499 RISK ADDL DX/OHS AUDIT: ICD-10-PCS | Mod: S$GLB,,, | Performed by: FAMILY MEDICINE

## 2019-04-11 PROCEDURE — 87491 CHLMYD TRACH DNA AMP PROBE: CPT

## 2019-04-11 PROCEDURE — 81003 URINALYSIS AUTO W/O SCOPE: CPT

## 2019-04-11 PROCEDURE — 99214 OFFICE O/P EST MOD 30 MIN: CPT | Mod: S$GLB,,, | Performed by: FAMILY MEDICINE

## 2019-04-11 PROCEDURE — 99999 PR PBB SHADOW E&M-EST. PATIENT-LVL IV: CPT | Mod: PBBFAC,,, | Performed by: FAMILY MEDICINE

## 2019-04-11 PROCEDURE — 99499 UNLISTED E&M SERVICE: CPT | Mod: S$GLB,,, | Performed by: FAMILY MEDICINE

## 2019-04-11 PROCEDURE — 3044F PR MOST RECENT HEMOGLOBIN A1C LEVEL <7.0%: ICD-10-PCS | Mod: CPTII,S$GLB,, | Performed by: FAMILY MEDICINE

## 2019-04-11 PROCEDURE — 99214 PR OFFICE/OUTPT VISIT, EST, LEVL IV, 30-39 MIN: ICD-10-PCS | Mod: S$GLB,,, | Performed by: FAMILY MEDICINE

## 2019-04-11 PROCEDURE — 99999 PR PBB SHADOW E&M-EST. PATIENT-LVL IV: ICD-10-PCS | Mod: PBBFAC,,, | Performed by: FAMILY MEDICINE

## 2019-04-11 NOTE — PROGRESS NOTES
Subjective:       Patient ID: Hernan Engel is a 63 y.o. male.    Chief Complaint: Follow-up    63 yr old pleasant black male with right lung cancer now is remission, HLD, DM II,COPD,  thrombocytopenia, presents today for his regular check and lab work review. No complaints today..    Lung cancer right - had treatment and it is in remission - he followed [facility] all through this time - he is due for CT chest for surveillance - no complaints       DM II - controlled - diet control - A1C                   4.8                 10/08/2018                    - not on ACE/ARB  Foot n eye exam UTD      HLD - diet control - LDLCALC                  97.2                10/08/2018                        Depression/anxiety - controlled -- no SI/HI      Insomnia - uncontrolled - want to try something else since ambien not helping       COPD - controlled -       GERD - worried about H Pylori and want to check the status          History as below - reviewed       Diabetes   He presents for his follow-up diabetic visit. He has type 2 diabetes mellitus. His disease course has been stable. There are no hypoglycemic associated symptoms. Pertinent negatives for hypoglycemia include no dizziness, nervousness/anxiousness or speech difficulty. There are no diabetic associated symptoms. Pertinent negatives for diabetes include no chest pain, no polyuria and no weakness. There are no hypoglycemic complications. Symptoms are stable. There are no diabetic complications. Risk factors for coronary artery disease include diabetes mellitus and male sex. Current diabetic treatment includes diet. He is compliant with treatment all of the time. He is following a diabetic diet. Meal planning includes avoidance of concentrated sweets. He rarely participates in exercise. There is no change in his home blood glucose trend. An ACE inhibitor/angiotensin II receptor blocker is not being taken. He does not see a podiatrist.Eye exam is not current.    Hyperlipidemia   Associated symptoms include myalgias. Pertinent negatives include no chest pain.     Review of Systems   Constitutional: Negative.  Negative for activity change, diaphoresis and unexpected weight change.   HENT: Negative.  Negative for congestion, ear pain, mouth sores, rhinorrhea and voice change.    Eyes: Negative.  Negative for pain, discharge and visual disturbance.   Respiratory: Negative.  Negative for apnea, cough and wheezing.    Cardiovascular: Negative.  Negative for chest pain and palpitations.   Gastrointestinal: Negative.  Negative for abdominal distention, anal bleeding, diarrhea and vomiting.   Endocrine: Negative.  Negative for cold intolerance and polyuria.   Genitourinary: Negative.  Negative for decreased urine volume, difficulty urinating, discharge, frequency and scrotal swelling.   Musculoskeletal: Positive for myalgias. Negative for back pain and neck stiffness.   Skin: Negative.  Negative for color change and rash.   Allergic/Immunologic: Negative.  Negative for environmental allergies and immunocompromised state.   Neurological: Negative.  Negative for dizziness, speech difficulty, weakness and light-headedness.   Hematological: Negative.    Psychiatric/Behavioral: Negative.  Negative for agitation, dysphoric mood and suicidal ideas. The patient is not nervous/anxious.        PMH/PSH/FH/SH/MED/ALLERGY reviewed    Objective:       Vitals:    04/11/19 0815   BP: 128/86   Pulse: 60       Physical Exam   Constitutional: He is oriented to person, place, and time. He appears well-developed and well-nourished.   HENT:   Head: Normocephalic and atraumatic.   Right Ear: External ear normal.   Left Ear: External ear normal.   Nose: Nose normal.   Mouth/Throat: Oropharynx is clear and moist. No oropharyngeal exudate.   Eyes: Pupils are equal, round, and reactive to light. Conjunctivae and EOM are normal. Right eye exhibits no discharge. Left eye exhibits no discharge. No scleral  icterus.   Neck: Normal range of motion. Neck supple. No JVD present. No tracheal deviation present. No thyromegaly present.   Cardiovascular: Normal rate, regular rhythm, normal heart sounds and intact distal pulses. Exam reveals no gallop and no friction rub.   No murmur heard.  Pulmonary/Chest: Effort normal and breath sounds normal. No stridor. No respiratory distress. He has no wheezes. He has no rales. He exhibits no tenderness.   Abdominal: Soft. Bowel sounds are normal. He exhibits no distension and no mass. There is no tenderness. There is no rebound and no guarding. No hernia.   Musculoskeletal: Normal range of motion. He exhibits no edema or tenderness.   Lymphadenopathy:     He has no cervical adenopathy.   Neurological: He is alert and oriented to person, place, and time. He has normal reflexes. He displays normal reflexes. No cranial nerve deficit. He exhibits normal muscle tone. Coordination normal.   Skin: Skin is warm and dry. No rash noted. No erythema. No pallor.   Psychiatric: He has a normal mood and affect. His behavior is normal. Judgment and thought content normal.       Assessment:       1. Type 2 diabetes mellitus without complication, with long-term current use of insulin    2. Chronic obstructive pulmonary disease, unspecified COPD type    3. Dyslipidemia associated with type 2 diabetes mellitus    4. Aortic atherosclerosis    5. Primary adenocarcinoma of right lung    6. Malignant neoplasm of right lung, unspecified part of lung    7. Screen for STD (sexually transmitted disease)    8. Encounter for screening for HIV     9. Dysuria         Plan:           Hernan was seen today for follow-up.    Diagnoses and all orders for this visit:    Type 2 diabetes mellitus without complication, with long-term current use of insulin  -     Comprehensive metabolic panel; Future  -     Hemoglobin A1c; Future    Chronic obstructive pulmonary disease, unspecified COPD type    Dyslipidemia associated with  type 2 diabetes mellitus  -     Comprehensive metabolic panel; Future    Aortic atherosclerosis    Primary adenocarcinoma of right lung    Malignant neoplasm of right lung, unspecified part of lung    Screen for STD (sexually transmitted disease)  -     HIV 1/2 Ag/Ab (4th Gen); Future  -     HEPATITIS PANEL, ACUTE; Future  -     RPR; Future  -     Urinalysis; Future  -     C. trachomatis/N. gonorrhoeae by AMP DNA; Future  -     Herpes simplex type 1 & 2 IgM,Herpes IgM; Future    Encounter for screening for HIV   -     HIV 1/2 Ag/Ab (4th Gen); Future    Dysuria   -     C. trachomatis/N. gonorrhoeae by AMP DNA; Future      COPD  -stable  -refilled meds    GERD  -H Pylori    DM II  -controlled    HLD  -controlled  -continue statin    Insomnia  -controlled    Anxiety/depression  -controlled      Lung cancer remission  -cxr for surveillance normal    Spent adequate time in obtaining history and explaining differentials    40 minutes spent during this visit of which greater than 50% devoted to face-face counseling and coordination of care regarding diagnosis and management plan    RTC 3 months or prn

## 2019-04-12 LAB
C TRACH DNA SPEC QL NAA+PROBE: NOT DETECTED
N GONORRHOEA DNA SPEC QL NAA+PROBE: NOT DETECTED

## 2019-04-12 NOTE — TELEPHONE ENCOUNTER
----- Message from Ada Garcia sent at 10/4/2017  8:18 AM CDT -----  Patient's wife, Terrance, returned your called.  [phone]    
Have made several attempts to return pt's call, but is unable to get through.   
0 = independent

## 2019-05-21 ENCOUNTER — OFFICE VISIT (OUTPATIENT)
Dept: UROLOGY | Facility: CLINIC | Age: 64
End: 2019-05-21
Payer: MEDICARE

## 2019-05-21 VITALS
DIASTOLIC BLOOD PRESSURE: 68 MMHG | WEIGHT: 188.25 LBS | HEART RATE: 60 BPM | BODY MASS INDEX: 29.55 KG/M2 | HEIGHT: 67 IN | SYSTOLIC BLOOD PRESSURE: 145 MMHG

## 2019-05-21 DIAGNOSIS — N52.1 TYPE 2 DIABETES MELLITUS WITH CIRCULATORY DISORDER CAUSING ERECTILE DYSFUNCTION: ICD-10-CM

## 2019-05-21 DIAGNOSIS — N13.8 BPH WITH URINARY OBSTRUCTION: Primary | ICD-10-CM

## 2019-05-21 DIAGNOSIS — E11.59 TYPE 2 DIABETES MELLITUS WITH CIRCULATORY DISORDER CAUSING ERECTILE DYSFUNCTION: ICD-10-CM

## 2019-05-21 DIAGNOSIS — N40.1 BPH WITH URINARY OBSTRUCTION: Primary | ICD-10-CM

## 2019-05-21 PROCEDURE — 99999 PR PBB SHADOW E&M-EST. PATIENT-LVL IV: CPT | Mod: PBBFAC,,, | Performed by: NURSE PRACTITIONER

## 2019-05-21 PROCEDURE — 3008F BODY MASS INDEX DOCD: CPT | Mod: CPTII,S$GLB,, | Performed by: NURSE PRACTITIONER

## 2019-05-21 PROCEDURE — 99214 OFFICE O/P EST MOD 30 MIN: CPT | Mod: S$GLB,,, | Performed by: NURSE PRACTITIONER

## 2019-05-21 PROCEDURE — 99214 PR OFFICE/OUTPT VISIT, EST, LEVL IV, 30-39 MIN: ICD-10-PCS | Mod: S$GLB,,, | Performed by: NURSE PRACTITIONER

## 2019-05-21 PROCEDURE — 3044F PR MOST RECENT HEMOGLOBIN A1C LEVEL <7.0%: ICD-10-PCS | Mod: CPTII,S$GLB,, | Performed by: NURSE PRACTITIONER

## 2019-05-21 PROCEDURE — 3044F HG A1C LEVEL LT 7.0%: CPT | Mod: CPTII,S$GLB,, | Performed by: NURSE PRACTITIONER

## 2019-05-21 PROCEDURE — 99999 PR PBB SHADOW E&M-EST. PATIENT-LVL IV: ICD-10-PCS | Mod: PBBFAC,,, | Performed by: NURSE PRACTITIONER

## 2019-05-21 PROCEDURE — 3008F PR BODY MASS INDEX (BMI) DOCUMENTED: ICD-10-PCS | Mod: CPTII,S$GLB,, | Performed by: NURSE PRACTITIONER

## 2019-05-21 RX ORDER — SILDENAFIL 100 MG/1
100 TABLET, FILM COATED ORAL DAILY PRN
Qty: 10 TABLET | Refills: 11 | Status: SHIPPED | OUTPATIENT
Start: 2019-05-21 | End: 2019-10-04 | Stop reason: SDUPTHER

## 2019-05-21 NOTE — PROGRESS NOTES
Subjective:       Patient ID: Hernan Engel is a 63 y.o. male.    Chief Complaint: Follow-up    Hernan Engel is a 63 y.o. Diabetic male who c/o ED.    He also has a history of lung cancer.    His most recent Hgb A1c was 4.8.    He was last seen in clinic with Dr. Gordon on 02/20/2019 for ED.  This has been present for > 1 year.    He's never tried adequate dosages of a PDE-5i.    He's tried and failed ICI while living in [place]. States will not try again.    Here today for 3 month f/u. He was given Rx for Generic Viagria 100mg.  Today he states working great for him.     Overall he is doing well.                              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 OR [address] [place] [address]  3/27/2014: BRONCHOSCOPY-FIBEROPTIC; N/A      Comment:  Performed by Jensen Mendez MD at [facility] OR [address] [place] [address]  No date: COLONOSCOPY  6/4/2014: COLONOSCOPY; N/A      Comment:  Performed by Zaida De La Cruz MD at [facility] ENDO  6/4/2014: ESOPHAGOGASTRODUODENOSCOPY (EGD); N/A      Comment:  Performed by Zaida De La Cruz MD at [facility] ENDO  No date: LUNG REMOVAL, PARTIAL  3/27/2014: VATS (VIDEO-ASSISTED THORACOSCOPIC SURGERY); Right      Comment:  Performed by Jensen Mendez MD at [facility] OR [address] [place] [address]    Review of patient's family history indicates:  Problem: Cancer      Relation: Father          Age of Onset: (Not Specified)          Comment: colon cancer  Problem: Colon  cancer      Relation: Father          Age of Onset: (Not Specified)  Problem: Cataracts      Relation: Father          Age of Onset: (Not Specified)  Problem: Colon cancer      Relation: Sister          Age of Onset: (Not Specified)  Problem: Cancer      Relation: Sister          Age of Onset: (Not Specified)          Comment: colon or lung cancer   Problem: No Known Problems      Relation: Mother          Age of Onset: (Not Specified)  Problem: No Known Problems      Relation: Brother          Age of Onset: (Not Specified)  Problem: No Known Problems      Relation: Sister          Age of Onset: (Not Specified)  Problem: No Known Problems      Relation: Sister          Age of Onset: (Not Specified)  Problem: No Known Problems      Relation: Maternal Aunt          Age of Onset: (Not Specified)  Problem: No Known Problems      Relation: Maternal Uncle          Age of Onset: (Not Specified)  Problem: No Known Problems      Relation: Paternal Aunt          Age of Onset: (Not Specified)  Problem: No Known Problems      Relation: Paternal Uncle          Age of Onset: (Not Specified)  Problem: No Known Problems      Relation: Maternal Grandmother          Age of Onset: (Not Specified)  Problem: No Known Problems      Relation: Maternal Grandfather          Age of Onset: (Not Specified)  Problem: No Known Problems      Relation: Paternal Grandmother          Age of Onset: (Not Specified)  Problem: No Known Problems      Relation: Paternal Grandfather          Age of Onset: (Not Specified)  Problem: Amblyopia      Relation: Neg Hx          Age of Onset: (Not Specified)  Problem: Blindness      Relation: Neg Hx          Age of Onset: (Not Specified)  Problem: Diabetes      Relation: Neg Hx          Age of Onset: (Not Specified)  Problem: Glaucoma      Relation: Neg Hx          Age of Onset: (Not Specified)  Problem: Hypertension      Relation: Neg Hx          Age of Onset: (Not Specified)  Problem: Macular degeneration       Relation: Neg Hx          Age of Onset: (Not Specified)  Problem: Retinal detachment      Relation: Neg Hx          Age of Onset: (Not Specified)  Problem: Strabismus      Relation: Neg Hx          Age of Onset: (Not Specified)  Problem: Stroke      Relation: Neg Hx          Age of Onset: (Not Specified)  Problem: Thyroid disease      Relation: Neg Hx          Age of Onset: (Not Specified)      Social History    Socioeconomic History      Marital status:       Spouse name: Not on file      Number of children: Not on file      Years of education: Not on file      Highest education level: Not on file    Occupational History        Employer: [employer]    Social Needs      Financial resource strain: Not on file      Food insecurity:        Worry: Not on file        Inability: Not on file      Transportation needs:        Medical: Not on file        Non-medical: Not on file    Tobacco Use      Smoking status: Former Smoker        Packs/day: 1.00        Years: 30.00        Pack years: 30        Quit date: 1/15/2014        Years since quittin.3      Smokeless tobacco: Never Used    Substance and Sexual Activity      Alcohol use: Yes        Alcohol/week: 3.4 oz        Types: 4 Cans of beer, 2 Standard drinks or equivalent per week        Comment: socially      Drug use: No      Sexual activity: Not Currently        Partners: Female        Birth control/protection: None    Lifestyle      Physical activity:        Days per week: Not on file        Minutes per session: Not on file      Stress: Not on file    Relationships      Social connections:        Talks on phone: Not on file        Gets together: Not on file        Attends Jew service: Not on file        Active member of club or organization: Not on file        Attends meetings of clubs or organizations: Not on file        Relationship status: Not on file    Other Topics      Concerns:        Not on file    Social History Narrative      Not on  file      Allergies:  Adhesive    Medications:  Current Outpatient Medications:   albuterol (PROVENTIL) 2.5 mg /3 mL (0.083 %) nebulizer solution, Take 3 mLs (2.5 mg total) by nebulization every 6 (six) hours as needed for Wheezing. Rescue, Disp: 1 Box, Rfl: 0  albuterol (PROVENTIL/VENTOLIN HFA) 90 mcg/actuation inhaler, Inhale 2 puffs into the lungs every 6 (six) hours as needed for Wheezing., Disp: 18 g, Rfl: 10  atorvastatin (LIPITOR) 10 MG tablet, Take 1 tablet (10 mg total) by mouth once daily., Disp: 90 tablet, Rfl: 3  fluticasone-salmeterol 500-50 mcg/dose (ADVAIR DISKUS) 500-50 mcg/dose DsDv diskus inhaler, Inhale 1 puff into the lungs 2 (two) times daily., Disp: 180 each, Rfl: 3  insulin syringe-needle U-100 1 mL 31 gauge x 5/16 Syrg, 1 each., Disp: , Rfl:    ipratropium (ATROVENT) 42 mcg (0.06 %) nasal spray, 2 sprays by Nasal route 4 (four) times daily., Disp: 15 mL, Rfl: 0  metFORMIN (GLUCOPHAGE-XR) 500 MG 24 hr tablet, Take 1 tablet (500 mg total) by mouth daily with breakfast., Disp: 90 tablet, Rfl: 3  nebulizer and compressor Ana, 1 Units by Misc.(Non-Drug; Combo Route) route every 6 (six) hours as needed., Disp: 1 each, Rfl: 0  omeprazole (PRILOSEC) 20 MG capsule, Take 1 capsule (20 mg total) by mouth 2 (two) times daily., Disp: 30 capsule, Rfl: 0  sildenafil (VIAGRA) 100 MG tablet, Take 1 tablet (100 mg total) by mouth daily as needed for Erectile Dysfunction., Disp: 10 tablet, Rfl: 11  tiotropium (SPIRIVA) 18 mcg inhalation capsule, Inhale 1 capsule (18 mcg total) into the lungs once daily., Disp: 90 capsule, Rfl: 10  TRUE METRIX GLUCOSE TEST STRIP Strp, Check sugar once daily, Disp: 100 each, Rfl: 3  valACYclovir (VALTREX) 1000 MG tablet, Take 2 tablets (2,000 mg total) by mouth 2 (two) times daily. for 1 day, Disp: 4 tablet, Rfl: 0  zolpidem (AMBIEN) 5 MG Tab, Take 1 tablet (5 mg total) by mouth nightly as needed., Disp: 30 tablet, Rfl: 0        Review of Systems   Constitutional:  Negative for activity change, appetite change, chills and fever.   HENT: Negative for facial swelling and trouble swallowing.    Eyes: Negative for visual disturbance.   Respiratory: Negative for chest tightness and shortness of breath.    Cardiovascular: Negative for chest pain and palpitations.   Gastrointestinal: Negative.  Negative for abdominal pain, constipation, diarrhea, nausea and vomiting.   Genitourinary: Negative for difficulty urinating, dysuria, flank pain, hematuria, penile pain, penile swelling, scrotal swelling and testicular pain.        He has nocturia x 1-2.     He's pleased with how he voids.     Musculoskeletal: Negative for back pain, gait problem, myalgias and neck stiffness.   Skin: Negative for rash.   Neurological: Negative for dizziness and speech difficulty.   Hematological: Does not bruise/bleed easily.   Psychiatric/Behavioral: Negative for behavioral problems.       Objective:      Physical Exam   Nursing note and vitals reviewed.  Constitutional: He is oriented to person, place, and time. Vital signs are normal. He appears well-developed and well-nourished. He is active and cooperative.  Non-toxic appearance. He does not have a sickly appearance.   HENT:   Head: Normocephalic and atraumatic.   Right Ear: External ear normal.   Left Ear: External ear normal.   Nose: Nose normal.   Mouth/Throat: Mucous membranes are normal.   Eyes: Conjunctivae and lids are normal. No scleral icterus.   Neck: Trachea normal, normal range of motion and full passive range of motion without pain. Neck supple. No JVD present. No tracheal deviation present.   Cardiovascular: Normal rate, S1 normal and S2 normal.    Pulmonary/Chest: Effort normal. No respiratory distress. He exhibits no tenderness.   Abdominal: Soft. Normal appearance and bowel sounds are normal. There is no hepatosplenomegaly. There is no tenderness. There is no CVA tenderness.   Genitourinary: Testes normal and penis normal. No penile  tenderness.   Musculoskeletal: Normal range of motion.   Neurological: He is alert and oriented to person, place, and time. He has normal strength.   Skin: Skin is warm, dry and intact.     Psychiatric: He has a normal mood and affect. His behavior is normal. Judgment and thought content normal.       Assessment:       1. BPH with urinary obstruction    2. Type 2 diabetes mellitus with circulatory disorder causing erectile dysfunction        Plan:         I spent 25 minutes with the patient of which more than half was spent in direct consultation with the patient in regards to our treatment and plan.    Education and recommendations of today's plan of care including home remedies.  We discussed his ED and contributory factors.  Discussed continuing the viagra since effective;   Can take L- Arginine 1000mg daily; discussed amino acid.  Continue good diet and exercise.  RTC one year PSA.

## 2019-05-21 NOTE — PATIENT INSTRUCTIONS
L- Arginine 1000mg daily (Nature' Bounty; green bottle)      BPH (Enlarged Prostate)  The prostate is a gland at the base of the bladder. As some men get older, the prostate may get bigger in size. This problem is called benign prostatic hyperplasia (BPH). BPH puts pressure on the urethra. This is the tube that carries urine from the bladder to the penis. It may interfere with the flow of urine. It may also keep the bladder from emptying fully.    Symptoms of BPH include trouble starting urination and feeling as though the bladder isnt emptying all the way. It also includes a weak urine stream, dribbling and leaking of urine, and frequent and urgent urination (especially at night). BPH can increase the risk of urinary infections. It can also block off urine flow completely. If this occurs, a thin tube (catheter) may be passed into the bladder to help drain urine.  If symptoms are mild, no treatment may be needed right now. If symptoms are more severe, treatment is likely needed. The goal of treatment is to improve urine flow and reduce symptoms. Treatments can include medicine and procedures. Your healthcare provider will discuss treatment options with you as needed.  Home care  The following guidelines will help you care for yourself at home:  · Urinate as soon as you feel the urge. Don't try to hold your urine.  · Don't limit your fluid intake during the day. Drink 6 to 8 glasses of water or liquids a day. This prevents bacteria from building up in the bladder.  · Avoid drinking fluids after dinner to help reduce urination during the night.  · Avoid medicines that can worsen your symptoms. These include certain cold and allergy medicines and antidepressants. Diuretics used for high blood pressure can also worsen symptoms. Talk to your doctor about the medicines you take. Other choices may work better for you.  Prostate cancer screening  BPH does not increase the risk of prostate cancer. But because prostate  cancer is a common cancer in men, screening is sometimes recommended. This may help detect the cancer in its early stages when treatment is most effective. Factors that can increase the risk of prostate cancer include being -American or having a father or brother who had prostate cancer. A high-fat diet may also increase the risk of prostate cancer. Talk to your healthcare provider to see whether you should be screened for prostate cancer.  Follow-up care  Follow up with your healthcare provider, or as advised  To learn more, go to:  · National Kidney & Urologic Diseases Information Clearinghouse  kidney.niddk.nih.gov, 599.528.8479  When to seek medical advice  Call your healthcare provider right away if any of these occur:  · Fever of 100.4°F (38.0°C) or higher, or as advised  · Unable to pass urine for 8 hours  · Increasing pressure or pain in your bladder (lower abdomen)  · Blood in the urine  · Increasing low back pain, not related to injury  · Symptoms of urinary infection (increased urge to urinate, burning when passing urine, foul-smelling urine)  Date Last Reviewed: 7/1/2016 © 2000-2017 [employer]. [address] [place] [address]. All rights reserved. This information is not intended as a substitute for professional medical care. Always follow your healthcare professional's instructions.        Understanding Erectile Dysfunction    Erectile dysfunction (ED) is a problem getting an erection firm enough or keeping it long enough for intercourse. The problem can happen to any man at any age. But health problems that can lead to ED become more common as a man ages. Up to half of men over age 40 experience ED at some point.  Causes of ED  ED can have many causes. Most are physical. Some are emotional issues. Often, a combination of causes is involved. Causes of ED may include:  · Medical conditions such as diabetes or depression  · Smoking tobacco or marijuana  · Drinking too much  alcohol  · Side effects of medications  · Injury to nerves or blood vessels  · Emotional issues such as stress or relationship problems  ED can be treated  Prescription medications for ED are available. They help many men who try them. Depending upon the cause of the ED, though, medications may not be enough. In these cases, other treatment options are available. These include erectile aids and surgery. Your health care provider can tell you more about the treatment that is right for you. And new treatments for ED are being studied. No matter what the treatment you decide on, stay in touch with your doctor. If your symptoms persist, he or she may be able to adjust your current treatment or try something new.  Date Last Reviewed: 1/1/2017  © 0861-0892 [employer]. [address] [place] [address], [place] [address]. All rights reserved. This information is not intended as a substitute for professional medical care. Always follow your healthcare professional's instructions.

## 2019-05-28 ENCOUNTER — OFFICE VISIT (OUTPATIENT)
Dept: URGENT CARE | Facility: CLINIC | Age: 64
End: 2019-05-28
Payer: MEDICARE

## 2019-05-28 ENCOUNTER — TELEPHONE (OUTPATIENT)
Dept: FAMILY MEDICINE | Facility: CLINIC | Age: 64
End: 2019-05-28

## 2019-05-28 VITALS
SYSTOLIC BLOOD PRESSURE: 143 MMHG | HEART RATE: 64 BPM | OXYGEN SATURATION: 96 % | WEIGHT: 187 LBS | DIASTOLIC BLOOD PRESSURE: 82 MMHG | HEIGHT: 67 IN | TEMPERATURE: 98 F | BODY MASS INDEX: 29.35 KG/M2

## 2019-05-28 DIAGNOSIS — L30.9 DERMATITIS: Primary | ICD-10-CM

## 2019-05-28 PROCEDURE — 99499 UNLISTED E&M SERVICE: CPT | Mod: S$GLB,,, | Performed by: PHYSICIAN ASSISTANT

## 2019-05-28 PROCEDURE — 3008F BODY MASS INDEX DOCD: CPT | Mod: CPTII,S$GLB,, | Performed by: PHYSICIAN ASSISTANT

## 2019-05-28 PROCEDURE — 99214 PR OFFICE/OUTPT VISIT, EST, LEVL IV, 30-39 MIN: ICD-10-PCS | Mod: S$GLB,,, | Performed by: PHYSICIAN ASSISTANT

## 2019-05-28 PROCEDURE — 99214 OFFICE O/P EST MOD 30 MIN: CPT | Mod: S$GLB,,, | Performed by: PHYSICIAN ASSISTANT

## 2019-05-28 PROCEDURE — 3008F PR BODY MASS INDEX (BMI) DOCUMENTED: ICD-10-PCS | Mod: CPTII,S$GLB,, | Performed by: PHYSICIAN ASSISTANT

## 2019-05-28 PROCEDURE — 99499 RISK ADDL DX/OHS AUDIT: ICD-10-PCS | Mod: S$GLB,,, | Performed by: PHYSICIAN ASSISTANT

## 2019-05-28 RX ORDER — HYDROCORTISONE 25 MG/ML
LOTION TOPICAL 2 TIMES DAILY
Qty: 59 ML | Refills: 0 | Status: SHIPPED | OUTPATIENT
Start: 2019-05-28 | End: 2023-09-28

## 2019-05-28 RX ORDER — METHYLPREDNISOLONE 4 MG/1
4 TABLET ORAL DAILY
Qty: 1 PACKAGE | Refills: 0 | Status: SHIPPED | OUTPATIENT
Start: 2019-05-28 | End: 2019-06-03

## 2019-05-28 NOTE — TELEPHONE ENCOUNTER
----- Message from Madai Naylor sent at 5/28/2019  1:10 PM CDT -----  Contact: wife/Scottie  [phone]  Patient wife requesting to speak with you concerning the patient having a head rash from the garza. Patient wife said she thinks it's a fungus  Please call back to assist at [phone]

## 2019-05-28 NOTE — TELEPHONE ENCOUNTER
Spoke to pt and states that he was told by his garza that he has fungus in his hair. Pt wanted to see Dr. Patel today, but was told that his schedule was booked. Pt will go to an Urgent Care clinic.

## 2019-05-28 NOTE — PROGRESS NOTES
"Subjective:       Patient ID: Hernan Engel is a 63 y.o. male.    Vitals:  height is 5' 7" (1.702 m) and weight is 84.8 kg (187 lb). His temperature is 97.9 °F (36.6 °C). His blood pressure is 143/82 (abnormal) and his pulse is 64. His oxygen saturation is 96%.     Chief Complaint: Rash    Pt had haircut 4 days ago and when he left he started itching all over his body.  States that he took a shower immediately afterwards but the [employer] shop was extremely dirty and he hasn't been able to stop itching. His relative states that the last time she went to the same [employer]za shop, she had a similar reaction and was given medicine and it went away.    Rash   This is a new problem. The current episode started in the past 7 days. The problem has been gradually worsening since onset. Location: entire body. The rash is characterized by itchiness. Pertinent negatives include no cough, fever or sore throat. Past treatments include antihistamine, anti-itch cream and topical steroids. The treatment provided no relief.       Constitution: Negative for chills and fever.   HENT: Negative for facial swelling and sore throat.    Neck: Negative for painful lymph nodes.   Eyes: Negative for eye itching and eyelid swelling.   Respiratory: Negative for cough.    Musculoskeletal: Negative for joint pain and joint swelling.   Skin: Positive for rash and erythema. Negative for color change, pale, wound, abrasion, laceration, lesion, skin thickening/induration, puncture wound, abscess, avulsion and hives.   Allergic/Immunologic: Negative for environmental allergies, immunocompromised state and hives.   Hematologic/Lymphatic: Negative for swollen lymph nodes.       Objective:      Physical Exam   Constitutional: He is oriented to person, place, and time. He appears well-developed and well-nourished.   HENT:   Head: Normocephalic and atraumatic. Head is without abrasion, without contusion and without laceration.   Right Ear: External ear normal. "   Left Ear: External ear normal.   Nose: Nose normal.   Mouth/Throat: Oropharynx is clear and moist.   Eyes: Pupils are equal, round, and reactive to light. Conjunctivae, EOM and lids are normal.   Neck: Trachea normal, full passive range of motion without pain and phonation normal. Neck supple.   Cardiovascular: Normal rate, regular rhythm and normal heart sounds.   Pulmonary/Chest: Effort normal and breath sounds normal. No stridor. No respiratory distress.   Musculoskeletal: Normal range of motion.   Neurological: He is alert and oriented to person, place, and time.   Skin: Skin is warm, dry and intact. Capillary refill takes less than 2 seconds. Rash noted. No abrasion, no bruising, no burn, no ecchymosis, no laceration and no lesion noted. There is erythema.   Psychiatric: He has a normal mood and affect. His speech is normal and behavior is normal. Judgment and thought content normal. Cognition and memory are normal.   Nursing note and vitals reviewed.      Assessment:       1. Dermatitis        Plan:         Dermatitis  -     methylPREDNISolone (MEDROL DOSEPACK) 4 mg tablet; Take 1 tablet (4 mg total) by mouth once daily. use as directed  (take 5 then 4 then 3 then 2 then 1) for 6 days  Dispense: 1 Package; Refill: 0  -     hydrocortisone 2.5 % lotion; Apply topically 2 (two) times daily.  Dispense: 59 mL; Refill: 0      Patient Instructions     General Discharge Instructions   If you were prescribed a narcotic or controlled medication, do not drive or operate heavy equipment or machinery while taking these medications.  If you were prescribed antibiotics, please take them to completion.  You must understand that you've received an Urgent Care treatment only and that you may be released before all your medical problems are known or treated. You, the patient, will arrange for follow up care as instructed.  Follow up with your PCP or specialty clinic as directed in the next 1-2 weeks if not improved or as  needed.  You can call [phone] to schedule an appointment with the appropriate provider.  If your condition worsens we recommend that you receive another evaluation at the emergency room immediately or contact your primary medical clinics after hours call service to discuss your concerns.  Please return here or go to the Emergency Department for any concerns or worsening of condition.      Understanding Contact Dermatitis     A cool, moist compress can help reduce itching.     Contact dermatitis is a common type of skin rash. Its caused by something that touches the skin and makes it irritated and inflamed. It can occur on skin on any part of the body, such as the face, neck, hands, arms, and legs. Contact dermatitis is not spread from person to person.  Often, the reaction of contact dermatitis occurs 1 to 2 days after contact with the offending agent.  How to say it  TIMBO-tact [id number]   What causes contact dermatitis?  Its caused by something that irritates the skin, or that creates an allergic reaction on the skin. People can get contact dermatitis from many kinds of things. These include:  · Plant oils in poison ivy, oak, and sumac  · Chemicals in household , solvents, and glue  · Chemicals in makeup, soap, laundry detergent, perfume, acne cream, and hair products  · Certain medicines, such as neomycin, bacitracin, benzocaine, and thimerosal  · Metals such as nickel, found in some jewelry and watch bands   · The sticky material on the back of bandages and tape (adhesive)  · Things that can cause tiny breaks in the skin, such as wood, fiberglass, metal tools, and plant thorns  · Rubber latex in surgical gloves and other medical supplies  Dermatitis can also be caused by the skin being damp for long periods of time. This can happen from washing your hands too often, or working with wet materials.  Symptoms of contact dermatitis  Symptoms can include skin that  is:  · Blistered  · Burning  · Cracked  · Dry  · Itchy  · Painful  · Red  · Rough, thickened, and leathery  · Swollen  · Warm  The blisters may ooze fluid and form crusts.  Treatment for contact dermatitis  Treatment is done to help relieve itching and reduce inflammation. The rash should go away in a few days to a few weeks. Treatments include:  · Cool, moist compress. Use a clean damp cloth. Put it on the area for 20 to 30 minutes, 5 to 6 times a day for the first 3 days.  · Steroid cream or ointment. You can apply this medicine several times a day on clean skin.  · Oral corticosteroid. Your healthcare provider may prescribe this medicine if you have severe skin symptoms on a large part of your body.  Your healthcare provider may give you a steroid injection instead of pills.  · Oral antihistamine. This medicine can help reduce itching.  · Colloidal oatmeal bath. Soaking in water with colloidal oatmeal can help soothe skin.  · Plain cream, lotion, or ointment. Cream, lotion, or ointment without medicine can help to soothe and protect your skin.  Living with contact dermatitis  Talk with your healthcare provider about what may have caused your contact dermatitis. Patch testing may help you figure out what caused the rash so you can avoid further contact with it. Once you learn what caused your rash, make sure to avoid that substance. If your skin comes into contact with it again, make sure to wash your skin right away. If you cant avoid the substance, wear gloves or other protective clothing before you touch it. Or use a cream, lotion, or ointment to protect your skin.  When to call your healthcare provider  Call your healthcare provider right away if you have any of these:  · Fever of 100.4°F (38°C) or higher, or as directed  · Symptoms that dont get better, or get worse  · New symptoms   Date Last Reviewed: 5/1/2016  © 0297-2635 [employer]. [address] [place] [address], [place] [address]. All rights  "reserved. This information is not intended as a substitute for professional medical care. Always follow your healthcare professional's instructions.      Contact Dermatitis  Contact dermatitis is a skin rash caused by something that touches the skin and makes it irritated and inflamed. Your skin may be red, swollen, dry, and may be cracked. Blisters may form and ooze. The rash will itch.  Contact dermatitis can form on the face and neck, backs of hands, forearms, genitals, and lower legs.  People can get contact dermatitis from lots of sources. These include:  · Plants such as poison ivy, oak, or sumac  · Chemicals in hair dyes and rinses, soaps, solvents, waxes, fingernail polish, and deodorants   · Jewelry or watchbands made of nickel  Contact dermatitis is not passed from person to person.  Talk with your healthcare provider about what may have caused the rash. A type of allergy testing called "patch testing" may be used to discover what you are allergic to. You will need to avoid the source of your rash in the future to prevent it from coming back.  Treatment is done to relieve itching and prevent the rash from coming back. The rash should go away in a few days to a few weeks.  Home care  Your healthcare provider may prescribe medicine to relieve swelling and itching. Follow all instructions when using these medicines.  General care:  · Avoid anything that heats up your skin, such as hot showers or baths, or direct sunlight. This can make itching worse.  · Apply cold compresses to soothe your sores to help relieve your symptoms. Do this for 30 minutes 3 to 4 times a day. You can make a cold compress by soaking a cloth in cold water. Squeeze out excess water. You can add colloidal oatmeal to the water to help reduce itching. For severe itching in a small area, apply an ice pack wrapped in a thin towel. Do this for 20 minutes 3 to 4 times a day.  · You can also try wet dressings. One way to do this is to wear a " wet piece of clothing under a dry one. Wear a damp shirt under a dry shirt if your upper body is affected. This can relieve itching and prevent you from scratching the affected area.  · You can also help relieve large areas of itching by taking a lukewarm bath with colloidal oatmeal added to the water.  · Use hydrocortisone cream for redness and irritation, unless another medicine was prescribed. You can also use benzocaine anesthetic cream or spray. Calamine lotion can also relieve mild symptoms.  · Use oral diphenhydramine to help reduce itching. You can buy this antihistamine at drug and grocery stores. It can make you sleepy, so use lower doses during the daytime. Or you can use loratadine. This is an antihistamine that will not make you sleepy. Do not use diphenhydramine if you have glaucoma or have trouble urinating due to an enlarged prostate.  · If a plant causes your rash, make sure to wash your skin and the clothes you were wearing when you came into contact with the plant. This is to wash away the plant oils that gave you the rash and prevent more or worse symptoms.  · Stay away from the substance or object that causes your symptoms. If you cant avoid it, wear gloves or some other type of protection.  Follow-up care  Follow up with your healthcare provider, or as advised.  When to seek medical advice  Call your healthcare provider right away if any of these occur:  · Spreading of the rash to other parts of your body  · Severe swelling of your face, eyelids, mouth, throat or tongue  · Trouble urinating due to swelling in the genital area  · Fever of 100.4°F (38°C) or higher  · Redness or swelling that gets worse  · Pain that gets worse  · Foul-smelling fluid leaking from the skin  · Yellow-brown crusts on the open blisters  Date Last Reviewed: 9/1/2016  © 3843-6318 [employer]. [address] [place] [address], [place] [address]. All rights reserved. This information is not intended as a substitute  for professional medical care. Always follow your healthcare professional's instructions.

## 2019-05-28 NOTE — PATIENT INSTRUCTIONS
General Discharge Instructions   If you were prescribed a narcotic or controlled medication, do not drive or operate heavy equipment or machinery while taking these medications.  If you were prescribed antibiotics, please take them to completion.  You must understand that you've received an Urgent Care treatment only and that you may be released before all your medical problems are known or treated. You, the patient, will arrange for follow up care as instructed.  Follow up with your PCP or specialty clinic as directed in the next 1-2 weeks if not improved or as needed.  You can call [phone] to schedule an appointment with the appropriate provider.  If your condition worsens we recommend that you receive another evaluation at the emergency room immediately or contact your primary medical clinics after hours call service to discuss your concerns.  Please return here or go to the Emergency Department for any concerns or worsening of condition.      Understanding Contact Dermatitis     A cool, moist compress can help reduce itching.     Contact dermatitis is a common type of skin rash. Its caused by something that touches the skin and makes it irritated and inflamed. It can occur on skin on any part of the body, such as the face, neck, hands, arms, and legs. Contact dermatitis is not spread from person to person.  Often, the reaction of contact dermatitis occurs 1 to 2 days after contact with the offending agent.  How to say it  TIMBO-tact [id number]-[id number]-[id number]   What causes contact dermatitis?  Its caused by something that irritates the skin, or that creates an allergic reaction on the skin. People can get contact dermatitis from many kinds of things. These include:  · Plant oils in poison ivy, oak, and sumac  · Chemicals in household , solvents, and glue  · Chemicals in makeup, soap, laundry detergent, perfume, acne cream, and hair products  · Certain medicines, such as neomycin, bacitracin, benzocaine, and  thimerosal  · Metals such as nickel, found in some jewelry and watch bands   · The sticky material on the back of bandages and tape (adhesive)  · Things that can cause tiny breaks in the skin, such as wood, fiberglass, metal tools, and plant thorns  · Rubber latex in surgical gloves and other medical supplies  Dermatitis can also be caused by the skin being damp for long periods of time. This can happen from washing your hands too often, or working with wet materials.  Symptoms of contact dermatitis  Symptoms can include skin that is:  · Blistered  · Burning  · Cracked  · Dry  · Itchy  · Painful  · Red  · Rough, thickened, and leathery  · Swollen  · Warm  The blisters may ooze fluid and form crusts.  Treatment for contact dermatitis  Treatment is done to help relieve itching and reduce inflammation. The rash should go away in a few days to a few weeks. Treatments include:  · Cool, moist compress. Use a clean damp cloth. Put it on the area for 20 to 30 minutes, 5 to 6 times a day for the first 3 days.  · Steroid cream or ointment. You can apply this medicine several times a day on clean skin.  · Oral corticosteroid. Your healthcare provider may prescribe this medicine if you have severe skin symptoms on a large part of your body.  Your healthcare provider may give you a steroid injection instead of pills.  · Oral antihistamine. This medicine can help reduce itching.  · Colloidal oatmeal bath. Soaking in water with colloidal oatmeal can help soothe skin.  · Plain cream, lotion, or ointment. Cream, lotion, or ointment without medicine can help to soothe and protect your skin.  Living with contact dermatitis  Talk with your healthcare provider about what may have caused your contact dermatitis. Patch testing may help you figure out what caused the rash so you can avoid further contact with it. Once you learn what caused your rash, make sure to avoid that substance. If your skin comes into contact with it again, make  "sure to wash your skin right away. If you cant avoid the substance, wear gloves or other protective clothing before you touch it. Or use a cream, lotion, or ointment to protect your skin.  When to call your healthcare provider  Call your healthcare provider right away if you have any of these:  · Fever of 100.4°F (38°C) or higher, or as directed  · Symptoms that dont get better, or get worse  · New symptoms   Date Last Reviewed: 5/1/2016  © 4066-8080 [employer]. [address] [place], [place] [id number]. All rights reserved. This information is not intended as a substitute for professional medical care. Always follow your healthcare professional's instructions.      Contact Dermatitis  Contact dermatitis is a skin rash caused by something that touches the skin and makes it irritated and inflamed. Your skin may be red, swollen, dry, and may be cracked. Blisters may form and ooze. The rash will itch.  Contact dermatitis can form on the face and neck, backs of hands, forearms, genitals, and lower legs.  People can get contact dermatitis from lots of sources. These include:  · Plants such as poison ivy, oak, or sumac  · Chemicals in hair dyes and rinses, soaps, solvents, waxes, fingernail polish, and deodorants   · Jewelry or watchbands made of nickel  Contact dermatitis is not passed from person to person.  Talk with your healthcare provider about what may have caused the rash. A type of allergy testing called "patch testing" may be used to discover what you are allergic to. You will need to avoid the source of your rash in the future to prevent it from coming back.  Treatment is done to relieve itching and prevent the rash from coming back. The rash should go away in a few days to a few weeks.  Home care  Your healthcare provider may prescribe medicine to relieve swelling and itching. Follow all instructions when using these medicines.  General care:  · Avoid anything that heats up your skin, such as " hot showers or baths, or direct sunlight. This can make itching worse.  · Apply cold compresses to soothe your sores to help relieve your symptoms. Do this for 30 minutes 3 to 4 times a day. You can make a cold compress by soaking a cloth in cold water. Squeeze out excess water. You can add colloidal oatmeal to the water to help reduce itching. For severe itching in a small area, apply an ice pack wrapped in a thin towel. Do this for 20 minutes 3 to 4 times a day.  · You can also try wet dressings. One way to do this is to wear a wet piece of clothing under a dry one. Wear a damp shirt under a dry shirt if your upper body is affected. This can relieve itching and prevent you from scratching the affected area.  · You can also help relieve large areas of itching by taking a lukewarm bath with colloidal oatmeal added to the water.  · Use hydrocortisone cream for redness and irritation, unless another medicine was prescribed. You can also use benzocaine anesthetic cream or spray. Calamine lotion can also relieve mild symptoms.  · Use oral diphenhydramine to help reduce itching. You can buy this antihistamine at drug and grocery stores. It can make you sleepy, so use lower doses during the daytime. Or you can use loratadine. This is an antihistamine that will not make you sleepy. Do not use diphenhydramine if you have glaucoma or have trouble urinating due to an enlarged prostate.  · If a plant causes your rash, make sure to wash your skin and the clothes you were wearing when you came into contact with the plant. This is to wash away the plant oils that gave you the rash and prevent more or worse symptoms.  · Stay away from the substance or object that causes your symptoms. If you cant avoid it, wear gloves or some other type of protection.  Follow-up care  Follow up with your healthcare provider, or as advised.  When to seek medical advice  Call your healthcare provider right away if any of these occur:  · Spreading  of the rash to other parts of your body  · Severe swelling of your face, eyelids, mouth, throat or tongue  · Trouble urinating due to swelling in the genital area  · Fever of 100.4°F (38°C) or higher  · Redness or swelling that gets worse  · Pain that gets worse  · Foul-smelling fluid leaking from the skin  · Yellow-brown crusts on the open blisters  Date Last Reviewed: 9/1/2016  © 5366-4733 [employer]. [address] [place] [address]. All rights reserved. This information is not intended as a substitute for professional medical care. Always follow your healthcare professional's instructions.

## 2019-06-04 ENCOUNTER — TELEPHONE (OUTPATIENT)
Dept: FAMILY MEDICINE | Facility: CLINIC | Age: 64
End: 2019-06-04

## 2019-06-04 NOTE — TELEPHONE ENCOUNTER
----- Message from Neema Thomas sent at 6/4/2019 11:10 AM CDT -----  Contact: Valeria Calling from [employer] [phone] ext [phone] Fax [phone]  [employer] is calling to get orders for patients diabetic supplies and meter for One touch ultra and supplies and also please fax a medical necessity form an last recent chart notes.

## 2019-06-13 DIAGNOSIS — Z79.4 TYPE 2 DIABETES MELLITUS WITHOUT COMPLICATION, WITH LONG-TERM CURRENT USE OF INSULIN: ICD-10-CM

## 2019-06-13 DIAGNOSIS — E11.9 TYPE 2 DIABETES MELLITUS WITHOUT COMPLICATION, WITH LONG-TERM CURRENT USE OF INSULIN: ICD-10-CM

## 2019-06-13 RX ORDER — CALCIUM CITRATE/VITAMIN D3 200MG-6.25
TABLET ORAL
Qty: 100 EACH | Refills: 1 | Status: SHIPPED | OUTPATIENT
Start: 2019-06-13 | End: 2021-02-08 | Stop reason: SDUPTHER

## 2019-06-13 RX ORDER — LANCETS
1 EACH MISCELLANEOUS DAILY
Qty: 100 EACH | Refills: 1 | Status: SHIPPED | OUTPATIENT
Start: 2019-06-13 | End: 2021-02-08 | Stop reason: SDUPTHER

## 2019-06-13 RX ORDER — INSULIN PUMP SYRINGE, 3 ML
EACH MISCELLANEOUS
Qty: 1 EACH | Refills: 0 | Status: SHIPPED | OUTPATIENT
Start: 2019-06-13 | End: 2023-10-02 | Stop reason: SDUPTHER

## 2019-06-13 NOTE — TELEPHONE ENCOUNTER
----- Message from Earlene FRACISCOWilbert Campos sent at 6/13/2019  4:22 PM CDT -----  Contact: Marcial [email or web address]/ [email or web address] [phone]  Marcial is requesting one touch ultra glucometer and supplies order for pt. You can fax the order with office notes.Fax: [phone] Please advise

## 2019-07-11 ENCOUNTER — TELEPHONE (OUTPATIENT)
Dept: FAMILY MEDICINE | Facility: CLINIC | Age: 64
End: 2019-07-11

## 2019-07-11 ENCOUNTER — OFFICE VISIT (OUTPATIENT)
Dept: FAMILY MEDICINE | Facility: CLINIC | Age: 64
End: 2019-07-11
Attending: FAMILY MEDICINE
Payer: MEDICARE

## 2019-07-11 ENCOUNTER — HOSPITAL ENCOUNTER (OUTPATIENT)
Dept: RADIOLOGY | Facility: HOSPITAL | Age: 64
Discharge: HOME | End: 2019-07-11
Attending: FAMILY MEDICINE
Payer: MEDICARE

## 2019-07-11 VITALS
HEIGHT: 67 IN | HEART RATE: 66 BPM | OXYGEN SATURATION: 98 % | SYSTOLIC BLOOD PRESSURE: 124 MMHG | DIASTOLIC BLOOD PRESSURE: 74 MMHG | BODY MASS INDEX: 29.27 KG/M2 | WEIGHT: 186.5 LBS

## 2019-07-11 DIAGNOSIS — N40.1 BPH WITH URINARY OBSTRUCTION: ICD-10-CM

## 2019-07-11 DIAGNOSIS — E78.5 DYSLIPIDEMIA ASSOCIATED WITH TYPE 2 DIABETES MELLITUS: ICD-10-CM

## 2019-07-11 DIAGNOSIS — J44.9 CHRONIC OBSTRUCTIVE PULMONARY DISEASE, UNSPECIFIED COPD TYPE: ICD-10-CM

## 2019-07-11 DIAGNOSIS — C34.91 MALIGNANT NEOPLASM OF RIGHT LUNG, UNSPECIFIED PART OF LUNG: ICD-10-CM

## 2019-07-11 DIAGNOSIS — E11.69 DYSLIPIDEMIA ASSOCIATED WITH TYPE 2 DIABETES MELLITUS: ICD-10-CM

## 2019-07-11 DIAGNOSIS — I70.0 AORTIC ATHEROSCLEROSIS: ICD-10-CM

## 2019-07-11 DIAGNOSIS — J43.1 PANLOBULAR EMPHYSEMA: ICD-10-CM

## 2019-07-11 DIAGNOSIS — Z79.4 TYPE 2 DIABETES MELLITUS WITHOUT COMPLICATION, WITH LONG-TERM CURRENT USE OF INSULIN: Primary | ICD-10-CM

## 2019-07-11 DIAGNOSIS — C34.91 PRIMARY ADENOCARCINOMA OF RIGHT LUNG: ICD-10-CM

## 2019-07-11 DIAGNOSIS — E11.9 TYPE 2 DIABETES MELLITUS WITHOUT COMPLICATION, WITH LONG-TERM CURRENT USE OF INSULIN: Primary | ICD-10-CM

## 2019-07-11 DIAGNOSIS — E11.9 TYPE 2 DIABETES MELLITUS WITHOUT COMPLICATION, WITHOUT LONG-TERM CURRENT USE OF INSULIN: ICD-10-CM

## 2019-07-11 DIAGNOSIS — N13.8 BPH WITH URINARY OBSTRUCTION: ICD-10-CM

## 2019-07-11 DIAGNOSIS — F41.9 ANXIETY: ICD-10-CM

## 2019-07-11 PROCEDURE — 99214 OFFICE O/P EST MOD 30 MIN: CPT | Mod: S$GLB,,, | Performed by: FAMILY MEDICINE

## 2019-07-11 PROCEDURE — 3044F HG A1C LEVEL LT 7.0%: CPT | Mod: CPTII,S$GLB,, | Performed by: FAMILY MEDICINE

## 2019-07-11 PROCEDURE — 99499 RISK ADDL DX/OHS AUDIT: ICD-10-PCS | Mod: S$GLB,,, | Performed by: FAMILY MEDICINE

## 2019-07-11 PROCEDURE — 71046 X-RAY EXAM CHEST 2 VIEWS: CPT | Mod: 26,,, | Performed by: RADIOLOGY

## 2019-07-11 PROCEDURE — 99499 UNLISTED E&M SERVICE: CPT | Mod: S$GLB,,, | Performed by: FAMILY MEDICINE

## 2019-07-11 PROCEDURE — 71046 XR CHEST PA AND LATERAL: ICD-10-PCS | Mod: 26,,, | Performed by: RADIOLOGY

## 2019-07-11 PROCEDURE — 99999 PR PBB SHADOW E&M-EST. PATIENT-LVL III: CPT | Mod: PBBFAC,,, | Performed by: FAMILY MEDICINE

## 2019-07-11 PROCEDURE — 3008F PR BODY MASS INDEX (BMI) DOCUMENTED: ICD-10-PCS | Mod: CPTII,S$GLB,, | Performed by: FAMILY MEDICINE

## 2019-07-11 PROCEDURE — 3044F PR MOST RECENT HEMOGLOBIN A1C LEVEL <7.0%: ICD-10-PCS | Mod: CPTII,S$GLB,, | Performed by: FAMILY MEDICINE

## 2019-07-11 PROCEDURE — 99214 PR OFFICE/OUTPT VISIT, EST, LEVL IV, 30-39 MIN: ICD-10-PCS | Mod: S$GLB,,, | Performed by: FAMILY MEDICINE

## 2019-07-11 PROCEDURE — 3008F BODY MASS INDEX DOCD: CPT | Mod: CPTII,S$GLB,, | Performed by: FAMILY MEDICINE

## 2019-07-11 PROCEDURE — 99999 PR PBB SHADOW E&M-EST. PATIENT-LVL III: ICD-10-PCS | Mod: PBBFAC,,, | Performed by: FAMILY MEDICINE

## 2019-07-11 PROCEDURE — 71046 X-RAY EXAM CHEST 2 VIEWS: CPT | Mod: TC,FY

## 2019-07-11 RX ORDER — ALBUTEROL SULFATE 90 UG/1
2 AEROSOL, METERED RESPIRATORY (INHALATION) EVERY 6 HOURS PRN
Qty: 18 G | Refills: 10 | Status: SHIPPED | OUTPATIENT
Start: 2019-07-11 | End: 2020-01-07 | Stop reason: SDUPTHER

## 2019-07-11 NOTE — PROGRESS NOTES
Subjective:       Patient ID: Hernan Engel is a 63 y.o. male.    Chief Complaint: No chief complaint on file.    63 yr old pleasant black male with right lung cancer now is remission, HLD, DM II,COPD,  thrombocytopenia, presents today for his regular check and lab work review. No complaints today..    Lung cancer right - had treatment and it is in remission - he followed [facility] all through this time - he is due for CT chest for surveillance - no complaints       DM II - controlled - diet control -A1C                   5.3                 04/11/2019                             - not on ACE/ARB  Foot n eye exam UTD      HLD - diet control - LDLCALC                  97.2                10/08/2018                        Depression/anxiety - controlled -- no SI/HI      Insomnia - uncontrolled - want to try something else since ambien not helping       COPD - controlled -       GERD - worried about H Pylori and want to check the status          History as below - reviewed       Diabetes   He presents for his follow-up diabetic visit. He has type 2 diabetes mellitus. His disease course has been stable. There are no hypoglycemic associated symptoms. Pertinent negatives for hypoglycemia include no dizziness, nervousness/anxiousness or speech difficulty. There are no diabetic associated symptoms. Pertinent negatives for diabetes include no chest pain, no polyuria and no weakness. There are no hypoglycemic complications. Symptoms are stable. There are no diabetic complications. Risk factors for coronary artery disease include diabetes mellitus and male sex. Current diabetic treatment includes diet. He is compliant with treatment all of the time. He is following a diabetic diet. Meal planning includes avoidance of concentrated sweets. He rarely participates in exercise. There is no change in his home blood glucose trend. An ACE inhibitor/angiotensin II receptor blocker is not being taken. He does not see a podiatrist.Eye exam is  not current.   Hyperlipidemia   Associated symptoms include myalgias. Pertinent negatives include no chest pain.     Review of Systems   Constitutional: Negative.  Negative for activity change, diaphoresis and unexpected weight change.   HENT: Negative.  Negative for congestion, ear pain, mouth sores, rhinorrhea and voice change.    Eyes: Negative.  Negative for pain, discharge and visual disturbance.   Respiratory: Negative.  Negative for apnea, cough and wheezing.    Cardiovascular: Negative.  Negative for chest pain and palpitations.   Gastrointestinal: Negative.  Negative for abdominal distention, anal bleeding, diarrhea and vomiting.   Endocrine: Negative.  Negative for cold intolerance and polyuria.   Genitourinary: Negative.  Negative for decreased urine volume, difficulty urinating, discharge, frequency and scrotal swelling.   Musculoskeletal: Positive for myalgias. Negative for back pain and neck stiffness.   Skin: Negative.  Negative for color change and rash.   Allergic/Immunologic: Negative.  Negative for environmental allergies and immunocompromised state.   Neurological: Negative.  Negative for dizziness, speech difficulty, weakness and light-headedness.   Hematological: Negative.    Psychiatric/Behavioral: Negative.  Negative for agitation, dysphoric mood and suicidal ideas. The patient is not nervous/anxious.        PMH/PSH/FH/SH/MED/ALLERGY reviewed    Objective:       Vitals:    07/11/19 0842   BP: 124/74   Pulse: 66       Physical Exam   Constitutional: He is oriented to person, place, and time. He appears well-developed and well-nourished.   HENT:   Head: Normocephalic and atraumatic.   Right Ear: External ear normal.   Left Ear: External ear normal.   Nose: Nose normal.   Mouth/Throat: Oropharynx is clear and moist. No oropharyngeal exudate.   Eyes: Pupils are equal, round, and reactive to light. Conjunctivae and EOM are normal. Right eye exhibits no discharge. Left eye exhibits no discharge. No  scleral icterus.   Neck: Normal range of motion. Neck supple. No JVD present. No tracheal deviation present. No thyromegaly present.   Cardiovascular: Normal rate, regular rhythm, normal heart sounds and intact distal pulses. Exam reveals no gallop and no friction rub.   No murmur heard.  Pulmonary/Chest: Effort normal and breath sounds normal. No stridor. No respiratory distress. He has no wheezes. He has no rales. He exhibits no tenderness.   Abdominal: Soft. Bowel sounds are normal. He exhibits no distension and no mass. There is no tenderness. There is no rebound and no guarding. No hernia.   Musculoskeletal: Normal range of motion. He exhibits no edema or tenderness.   Lymphadenopathy:     He has no cervical adenopathy.   Neurological: He is alert and oriented to person, place, and time. He has normal reflexes. He displays normal reflexes. No cranial nerve deficit. He exhibits normal muscle tone. Coordination normal.   Skin: Skin is warm and dry. No rash noted. No erythema. No pallor.   Psychiatric: He has a normal mood and affect. His behavior is normal. Judgment and thought content normal.       Assessment:       1. Type 2 diabetes mellitus without complication, with long-term current use of insulin    2. Panlobular emphysema    3. Anxiety    4. Aortic atherosclerosis    5. BPH with urinary obstruction    6. Chronic obstructive pulmonary disease, unspecified COPD type    7. Dyslipidemia associated with type 2 diabetes mellitus    8. Malignant neoplasm of right lung, unspecified part of lung    9. Type 2 diabetes mellitus without complication, without long-term current use of insulin    10. Primary adenocarcinoma of right lung        Plan:           Diagnoses and all orders for this visit:    Type 2 diabetes mellitus without complication, with long-term current use of insulin  -     CBC auto differential; Future  -     Comprehensive metabolic panel; Future  -     Hemoglobin A1c; Future  -     Urinalysis;  Future  -     Microalbumin/creatinine urine ratio; Future    Panlobular emphysema  -     albuterol (PROVENTIL/VENTOLIN HFA) 90 mcg/actuation inhaler; Inhale 2 puffs into the lungs every 6 (six) hours as needed for Wheezing.  -     X-Ray Chest PA And Lateral; Future    Anxiety    Aortic atherosclerosis    BPH with urinary obstruction    Chronic obstructive pulmonary disease, unspecified COPD type  -     X-Ray Chest PA And Lateral; Future    Dyslipidemia associated with type 2 diabetes mellitus  -     CBC auto differential; Future  -     Comprehensive metabolic panel; Future    Malignant neoplasm of right lung, unspecified part of lung    Type 2 diabetes mellitus without complication, without long-term current use of insulin  -     CBC auto differential; Future  -     Comprehensive metabolic panel; Future  -     Hemoglobin A1c; Future  -     Urinalysis; Future  -     Microalbumin/creatinine urine ratio; Future    Primary adenocarcinoma of right lung  -     X-Ray Chest PA And Lateral; Future      COPD  -stable  -refilled meds    GERD  -H Pylori    DM II  -controlled    HLD  -controlled  -continue statin    Insomnia  -controlled    Anxiety/depression  -controlled      Lung cancer remission  -cxr for surveillance normal    Spent adequate time in obtaining history and explaining differentials    40 minutes spent during this visit of which greater than 50% devoted to face-face counseling and coordination of care regarding diagnosis and management plan    RTC 3 months or prn

## 2019-09-20 ENCOUNTER — PATIENT OUTREACH (OUTPATIENT)
Dept: ADMINISTRATIVE | Facility: HOSPITAL | Age: 64
End: 2019-09-20

## 2019-09-30 ENCOUNTER — PATIENT OUTREACH (OUTPATIENT)
Dept: ADMINISTRATIVE | Facility: OTHER | Age: 64
End: 2019-09-30

## 2019-09-30 DIAGNOSIS — Z13.220 SCREENING CHOLESTEROL LEVEL: Primary | ICD-10-CM

## 2019-10-02 ENCOUNTER — CLINICAL SUPPORT (OUTPATIENT)
Dept: AUDIOLOGY | Facility: CLINIC | Age: 64
End: 2019-10-02
Payer: MEDICARE

## 2019-10-02 ENCOUNTER — OFFICE VISIT (OUTPATIENT)
Dept: OTOLARYNGOLOGY | Facility: CLINIC | Age: 64
End: 2019-10-02
Payer: MEDICARE

## 2019-10-02 VITALS — BODY MASS INDEX: 29.27 KG/M2 | HEIGHT: 67 IN | WEIGHT: 186.5 LBS

## 2019-10-02 DIAGNOSIS — H90.3 SENSORINEURAL HEARING LOSS (SNHL) OF BOTH EARS: Primary | ICD-10-CM

## 2019-10-02 DIAGNOSIS — H90.3 ASYMMETRIC SNHL (SENSORINEURAL HEARING LOSS): Primary | ICD-10-CM

## 2019-10-02 PROCEDURE — 92567 TYMPANOMETRY: CPT | Mod: S$GLB,,, | Performed by: AUDIOLOGIST

## 2019-10-02 PROCEDURE — 99204 OFFICE O/P NEW MOD 45 MIN: CPT | Mod: S$GLB,,, | Performed by: OTOLARYNGOLOGY

## 2019-10-02 PROCEDURE — 3008F BODY MASS INDEX DOCD: CPT | Mod: CPTII,S$GLB,, | Performed by: OTOLARYNGOLOGY

## 2019-10-02 PROCEDURE — 92557 PR COMPREHENSIVE HEARING TEST: ICD-10-PCS | Mod: S$GLB,,, | Performed by: AUDIOLOGIST

## 2019-10-02 PROCEDURE — 3008F PR BODY MASS INDEX (BMI) DOCUMENTED: ICD-10-PCS | Mod: CPTII,S$GLB,, | Performed by: OTOLARYNGOLOGY

## 2019-10-02 PROCEDURE — 99999 PR PBB SHADOW E&M-EST. PATIENT-LVL III: CPT | Mod: PBBFAC,,, | Performed by: OTOLARYNGOLOGY

## 2019-10-02 PROCEDURE — 99204 PR OFFICE/OUTPT VISIT, NEW, LEVL IV, 45-59 MIN: ICD-10-PCS | Mod: S$GLB,,, | Performed by: OTOLARYNGOLOGY

## 2019-10-02 PROCEDURE — 92557 COMPREHENSIVE HEARING TEST: CPT | Mod: S$GLB,,, | Performed by: AUDIOLOGIST

## 2019-10-02 PROCEDURE — 99999 PR PBB SHADOW E&M-EST. PATIENT-LVL III: ICD-10-PCS | Mod: PBBFAC,,, | Performed by: OTOLARYNGOLOGY

## 2019-10-02 PROCEDURE — 92567 PR TYMPA2METRY: ICD-10-PCS | Mod: S$GLB,,, | Performed by: AUDIOLOGIST

## 2019-10-02 NOTE — PROGRESS NOTES
Subjective:       Patient ID: Hernan Engel is a 64 y.o. male.    Chief Complaint: Hearing Loss     HPI     Hernan Engel is a 64 y.o. male presents for evaluation hearing loss.  Patient had gone to a [facility] for hearing aids and was told he needed to see a physician due to his asymmetric hearing loss.  The patient is unaware of an asymmetry in his hearing loss but reports significant hearing loss present for several years.  He has a history of noise exposure working as a .  Denies history of ear infections vertigo or prior ear procedures.    Review of Systems   Constitutional: Negative for chills, fever and unexpected weight change.   HENT: Negative for sore throat and trouble swallowing.    Eyes: Negative for pain and visual disturbance.   Respiratory: Negative for apnea and shortness of breath.    Cardiovascular: Negative for chest pain and palpitations.   Gastrointestinal: Negative for abdominal pain and nausea.   Endocrine: Negative for cold intolerance and heat intolerance.   Musculoskeletal: Negative for joint swelling and neck stiffness.   Skin: Negative for color change and rash.   Neurological: Negative for facial asymmetry and headaches.   Hematological: Negative for adenopathy. Does not bruise/bleed easily.   Psychiatric/Behavioral: Negative for agitation. The patient is not nervous/anxious.        Objective:      Physical Exam   Constitutional: He is oriented to person, place, and time. He appears well-developed and well-nourished. No distress.   HENT:   Head: Normocephalic and atraumatic.   Right Ear: Tympanic membrane, external ear and ear canal normal.   Left Ear: Tympanic membrane, external ear and ear canal normal.   Nose: Nose normal.   Mouth/Throat: Uvula is midline, oropharynx is clear and moist and mucous membranes are normal.   Thompson: Midline  Rinne: AC > BC @ 512Hz   Eyes: Pupils are equal, round, and reactive to light. Conjunctivae and EOM are normal.   Neck: Normal range of  motion. No tracheal deviation present. No thyromegaly present.   Cardiovascular: Normal rate and regular rhythm.   Pulmonary/Chest: Effort normal. No respiratory distress.   Musculoskeletal: Normal range of motion.   Lymphadenopathy:        Head (right side): No submental, no submandibular and no tonsillar adenopathy present.        Head (left side): No submental, no submandibular and no tonsillar adenopathy present.     He has no cervical adenopathy.   Neurological: He is alert and oriented to person, place, and time.   Psychiatric: He has a normal mood and affect. His behavior is normal.   Nursing note and vitals reviewed.      Data:      Audiogram tracings independently reviewed and discussed with patient moderate SNHL AD, AS with severe SNHL,  Poor WRS AU     Assessment:       1. Hearing loss, unspecified hearing loss type, unspecified laterality    2. Asymmetrical left sensorineural hearing loss        Plan:     Recommend MRI IAC to rule out AN     recommend continue use of hearing aids, if no improvement after several months of use may consider CI evaluation

## 2019-10-02 NOTE — PROGRESS NOTES
Audiologic Evaluation    Hernan Engel was seen on the above date for a hearing evaluation. Hernan Engel reports asymmetric hearing loss (worse in the left ear).  Hernan Engel has a history of work related noise exposure.    Tympanometry indicated normal middle ear pressure, tympanic membrane compliance and ear canal volume (type A tympanogram) in each ear.     Audiometric testing via insert ear phones indicated mild to moderately-severe sensorineural hearing loss for 250-8000 Hz in the right ear and a moderately-severe to profound sensorineural hearing loss for 250-6000 Hz in the left ear. Pure tone average and speech recognition threshold were in good agreement. Poor to very poor speech discrimination ability was demonstrated in quiet when novel words were presented at an amplified level in the right and left ears, respectively.      Recommendations:  1) Otologic consultation.  2) Annual audiometric testing to monitor hearing sensitivity.  3) Hearing loss consultation pending medical clearance.

## 2019-10-04 ENCOUNTER — OFFICE VISIT (OUTPATIENT)
Dept: FAMILY MEDICINE | Facility: CLINIC | Age: 64
End: 2019-10-04
Attending: FAMILY MEDICINE
Payer: MEDICARE

## 2019-10-04 VITALS
HEART RATE: 67 BPM | SYSTOLIC BLOOD PRESSURE: 126 MMHG | HEIGHT: 67 IN | DIASTOLIC BLOOD PRESSURE: 74 MMHG | BODY MASS INDEX: 29.38 KG/M2 | WEIGHT: 187.19 LBS | OXYGEN SATURATION: 98 %

## 2019-10-04 DIAGNOSIS — E11.59 TYPE 2 DIABETES MELLITUS WITH CIRCULATORY DISORDER CAUSING ERECTILE DYSFUNCTION: ICD-10-CM

## 2019-10-04 DIAGNOSIS — Z23 IMMUNIZATION DUE: ICD-10-CM

## 2019-10-04 DIAGNOSIS — N40.1 BPH WITH URINARY OBSTRUCTION: ICD-10-CM

## 2019-10-04 DIAGNOSIS — C34.91 MALIGNANT NEOPLASM OF RIGHT LUNG, UNSPECIFIED PART OF LUNG: ICD-10-CM

## 2019-10-04 DIAGNOSIS — Z79.4 TYPE 2 DIABETES MELLITUS WITHOUT COMPLICATION, WITH LONG-TERM CURRENT USE OF INSULIN: Primary | ICD-10-CM

## 2019-10-04 DIAGNOSIS — E11.69 DYSLIPIDEMIA ASSOCIATED WITH TYPE 2 DIABETES MELLITUS: ICD-10-CM

## 2019-10-04 DIAGNOSIS — F41.9 ANXIETY: ICD-10-CM

## 2019-10-04 DIAGNOSIS — I70.0 AORTIC ATHEROSCLEROSIS: ICD-10-CM

## 2019-10-04 DIAGNOSIS — J43.1 PANLOBULAR EMPHYSEMA: ICD-10-CM

## 2019-10-04 DIAGNOSIS — Z12.11 SCREEN FOR COLON CANCER: ICD-10-CM

## 2019-10-04 DIAGNOSIS — C34.91 PRIMARY ADENOCARCINOMA OF RIGHT LUNG: ICD-10-CM

## 2019-10-04 DIAGNOSIS — E11.9 TYPE 2 DIABETES MELLITUS WITHOUT COMPLICATION, WITHOUT LONG-TERM CURRENT USE OF INSULIN: ICD-10-CM

## 2019-10-04 DIAGNOSIS — E11.9 TYPE 2 DIABETES MELLITUS WITHOUT COMPLICATION, WITH LONG-TERM CURRENT USE OF INSULIN: Primary | ICD-10-CM

## 2019-10-04 DIAGNOSIS — Z12.5 ENCOUNTER FOR SCREENING FOR MALIGNANT NEOPLASM OF PROSTATE: ICD-10-CM

## 2019-10-04 DIAGNOSIS — E78.5 DYSLIPIDEMIA ASSOCIATED WITH TYPE 2 DIABETES MELLITUS: ICD-10-CM

## 2019-10-04 DIAGNOSIS — N13.8 BPH WITH URINARY OBSTRUCTION: ICD-10-CM

## 2019-10-04 DIAGNOSIS — N52.1 TYPE 2 DIABETES MELLITUS WITH CIRCULATORY DISORDER CAUSING ERECTILE DYSFUNCTION: ICD-10-CM

## 2019-10-04 DIAGNOSIS — J44.9 CHRONIC OBSTRUCTIVE PULMONARY DISEASE, UNSPECIFIED COPD TYPE: ICD-10-CM

## 2019-10-04 PROCEDURE — 90686 FLU VACCINE (QUAD) GREATER THAN OR EQUAL TO 3YO PRESERVATIVE FREE IM: ICD-10-PCS | Mod: S$GLB,,, | Performed by: FAMILY MEDICINE

## 2019-10-04 PROCEDURE — G0008 FLU VACCINE (QUAD) GREATER THAN OR EQUAL TO 3YO PRESERVATIVE FREE IM: ICD-10-PCS | Mod: S$GLB,,, | Performed by: FAMILY MEDICINE

## 2019-10-04 PROCEDURE — 3008F BODY MASS INDEX DOCD: CPT | Mod: CPTII,S$GLB,, | Performed by: FAMILY MEDICINE

## 2019-10-04 PROCEDURE — 3044F HG A1C LEVEL LT 7.0%: CPT | Mod: CPTII,S$GLB,, | Performed by: FAMILY MEDICINE

## 2019-10-04 PROCEDURE — 90686 IIV4 VACC NO PRSV 0.5 ML IM: CPT | Mod: S$GLB,,, | Performed by: FAMILY MEDICINE

## 2019-10-04 PROCEDURE — 99214 PR OFFICE/OUTPT VISIT, EST, LEVL IV, 30-39 MIN: ICD-10-PCS | Mod: 25,S$GLB,, | Performed by: FAMILY MEDICINE

## 2019-10-04 PROCEDURE — 99214 OFFICE O/P EST MOD 30 MIN: CPT | Mod: 25,S$GLB,, | Performed by: FAMILY MEDICINE

## 2019-10-04 PROCEDURE — 3044F PR MOST RECENT HEMOGLOBIN A1C LEVEL <7.0%: ICD-10-PCS | Mod: CPTII,S$GLB,, | Performed by: FAMILY MEDICINE

## 2019-10-04 PROCEDURE — 99499 RISK ADDL DX/OHS AUDIT: ICD-10-PCS | Mod: S$GLB,,, | Performed by: FAMILY MEDICINE

## 2019-10-04 PROCEDURE — 99999 PR PBB SHADOW E&M-EST. PATIENT-LVL III: CPT | Mod: PBBFAC,,, | Performed by: FAMILY MEDICINE

## 2019-10-04 PROCEDURE — 99999 PR PBB SHADOW E&M-EST. PATIENT-LVL III: ICD-10-PCS | Mod: PBBFAC,,, | Performed by: FAMILY MEDICINE

## 2019-10-04 PROCEDURE — 99499 UNLISTED E&M SERVICE: CPT | Mod: S$GLB,,, | Performed by: FAMILY MEDICINE

## 2019-10-04 PROCEDURE — 3008F PR BODY MASS INDEX (BMI) DOCUMENTED: ICD-10-PCS | Mod: CPTII,S$GLB,, | Performed by: FAMILY MEDICINE

## 2019-10-04 PROCEDURE — G0008 ADMIN INFLUENZA VIRUS VAC: HCPCS | Mod: S$GLB,,, | Performed by: FAMILY MEDICINE

## 2019-10-04 RX ORDER — FLUTICASONE PROPIONATE AND SALMETEROL 500; 50 UG/1; UG/1
1 POWDER RESPIRATORY (INHALATION) 2 TIMES DAILY
Qty: 180 EACH | Refills: 3 | Status: SHIPPED | OUTPATIENT
Start: 2019-10-04 | End: 2020-01-07 | Stop reason: SDUPTHER

## 2019-10-04 RX ORDER — SILDENAFIL 100 MG/1
100 TABLET, FILM COATED ORAL DAILY PRN
Qty: 10 TABLET | Refills: 11 | Status: SHIPPED | OUTPATIENT
Start: 2019-10-04 | End: 2020-07-16 | Stop reason: SDUPTHER

## 2019-10-04 NOTE — PROGRESS NOTES
Subjective:       Patient ID: Hernan Engel is a 64 y.o. male.    Chief Complaint: Flu Vaccine and Fatigue    64 yr old pleasant black male with right lung cancer now is remission, HLD, DM II,COPD,  thrombocytopenia, presents today for his regular check and lab work review. No complaints today..    Lung cancer right - had treatment and it is in remission - he followed [facility] all through this time - he is due for CT chest for surveillance - no complaints       DM II - controlled - diet control -A1C                   5.3                 04/11/2019                             - not on ACE/ARB  Foot n eye exam UTD      HLD - diet control - LDLCALC                  97.2                10/08/2018                        Depression/anxiety - controlled -- no SI/HI      Insomnia - uncontrolled - want to try something else since ambien not helping       COPD - controlled -       GERD - worried about H Pylori and want to check the status          History as below - reviewed       Diabetes   He presents for his follow-up diabetic visit. He has type 2 diabetes mellitus. His disease course has been stable. There are no hypoglycemic associated symptoms. Pertinent negatives for hypoglycemia include no dizziness, nervousness/anxiousness or speech difficulty. There are no diabetic associated symptoms. Pertinent negatives for diabetes include no chest pain, no polyuria and no weakness. There are no hypoglycemic complications. Symptoms are stable. There are no diabetic complications. Risk factors for coronary artery disease include diabetes mellitus and male sex. Current diabetic treatment includes diet. He is compliant with treatment all of the time. He is following a diabetic diet. Meal planning includes avoidance of concentrated sweets. He rarely participates in exercise. There is no change in his home blood glucose trend. An ACE inhibitor/angiotensin II receptor blocker is not being taken. He does not see a podiatrist.Eye exam is not  current.   Hyperlipidemia   Associated symptoms include myalgias. Pertinent negatives include no chest pain.     Review of Systems   Constitutional: Negative.  Negative for activity change, diaphoresis and unexpected weight change.   HENT: Negative.  Negative for congestion, ear pain, mouth sores, rhinorrhea and voice change.    Eyes: Negative.  Negative for pain, discharge and visual disturbance.   Respiratory: Negative.  Negative for apnea, cough and wheezing.    Cardiovascular: Negative.  Negative for chest pain and palpitations.   Gastrointestinal: Negative.  Negative for abdominal distention, anal bleeding, diarrhea and vomiting.   Endocrine: Negative.  Negative for cold intolerance and polyuria.   Genitourinary: Negative.  Negative for decreased urine volume, difficulty urinating, discharge, frequency and scrotal swelling.   Musculoskeletal: Positive for myalgias. Negative for back pain and neck stiffness.   Skin: Negative.  Negative for color change and rash.   Allergic/Immunologic: Negative.  Negative for environmental allergies and immunocompromised state.   Neurological: Negative.  Negative for dizziness, speech difficulty, weakness and light-headedness.   Hematological: Negative.    Psychiatric/Behavioral: Negative.  Negative for agitation, dysphoric mood and suicidal ideas. The patient is not nervous/anxious.        PMH/PSH/FH/SH/MED/ALLERGY reviewed]  Objective:       Vitals:    10/04/19 0957   BP: 126/74   Pulse: 67       Physical Exam   Constitutional: He is oriented to person, place, and time. He appears well-developed and well-nourished.   HENT:   Head: Normocephalic and atraumatic.   Right Ear: External ear normal.   Left Ear: External ear normal.   Nose: Nose normal.   Mouth/Throat: Oropharynx is clear and moist. No oropharyngeal exudate.   Eyes: Pupils are equal, round, and reactive to light. Conjunctivae and EOM are normal. Right eye exhibits no discharge. Left eye exhibits no discharge. No  scleral icterus.   Neck: Normal range of motion. Neck supple. No JVD present. No tracheal deviation present. No thyromegaly present.   Cardiovascular: Normal rate, regular rhythm, normal heart sounds and intact distal pulses. Exam reveals no gallop and no friction rub.   No murmur heard.  Pulmonary/Chest: Effort normal and breath sounds normal. No stridor. No respiratory distress. He has no wheezes. He has no rales. He exhibits no tenderness.   Abdominal: Soft. Bowel sounds are normal. He exhibits no distension and no mass. There is no tenderness. There is no rebound and no guarding. No hernia.   Musculoskeletal: Normal range of motion. He exhibits no edema or tenderness.   Lymphadenopathy:     He has no cervical adenopathy.   Neurological: He is alert and oriented to person, place, and time. He has normal reflexes. He displays normal reflexes. No cranial nerve deficit. He exhibits normal muscle tone. Coordination normal.   Skin: Skin is warm and dry. No rash noted. No erythema. No pallor.   Psychiatric: He has a normal mood and affect. His behavior is normal. Judgment and thought content normal.       Assessment:       1. Type 2 diabetes mellitus without complication, with long-term current use of insulin    2. Panlobular emphysema    3. BPH with urinary obstruction    4. Chronic obstructive pulmonary disease, unspecified COPD type    5. Dyslipidemia associated with type 2 diabetes mellitus    6. Anxiety    7. Aortic atherosclerosis    8. Malignant neoplasm of right lung, unspecified part of lung    9. Primary adenocarcinoma of right lung    10. Type 2 diabetes mellitus without complication, without long-term current use of insulin    11. Screen for colon cancer    12. Immunization due    13. Encounter for screening for malignant neoplasm of prostate    14. Type 2 diabetes mellitus with circulatory disorder causing erectile dysfunction        Plan:           Hernan was seen today for flu vaccine and  fatigue.    Diagnoses and all orders for this visit:    Type 2 diabetes mellitus without complication, with long-term current use of insulin  -     Lipid panel; Future  -     Hemoglobin A1c; Future  -     Comprehensive metabolic panel; Future    Panlobular emphysema  -     fluticasone-salmeterol diskus inhaler 500-50 mcg; Inhale 1 puff into the lungs 2 (two) times daily.    BPH with urinary obstruction    Chronic obstructive pulmonary disease, unspecified COPD type    Dyslipidemia associated with type 2 diabetes mellitus  -     Lipid panel; Future  -     Comprehensive metabolic panel; Future    Anxiety    Aortic atherosclerosis    Malignant neoplasm of right lung, unspecified part of lung    Primary adenocarcinoma of right lung    Type 2 diabetes mellitus without complication, without long-term current use of insulin  -     Lipid panel; Future  -     Hemoglobin A1c; Future  -     Comprehensive metabolic panel; Future    Screen for colon cancer  -     Case request GI: COLONOSCOPY    Immunization due  -     Influenza - Quadrivalent (6 months+) (PF)    Encounter for screening for malignant neoplasm of prostate  -     PSA, Screening; Future    Type 2 diabetes mellitus with circulatory disorder causing erectile dysfunction  -     sildenafil (VIAGRA) 100 MG tablet; Take 1 tablet (100 mg total) by mouth daily as needed for Erectile Dysfunction.         COPD  -stable  -refilled meds    GERD  -H Pylori    DM II  -controlled    HLD  -controlled  -continue statin    Insomnia  -controlled    Anxiety/depression  -controlled      Lung cancer remission  -cxr for surveillance normal    Spent adequate time in obtaining history and explaining differentials    40 minutes spent during this visit of which greater than 50% devoted to face-face counseling and coordination of care regarding diagnosis and management plan    RTC 3 months or prn

## 2019-10-05 ENCOUNTER — LAB VISIT (OUTPATIENT)
Dept: LAB | Facility: HOSPITAL | Age: 64
End: 2019-10-05
Attending: FAMILY MEDICINE
Payer: MEDICARE

## 2019-10-05 DIAGNOSIS — E11.9 TYPE 2 DIABETES MELLITUS WITHOUT COMPLICATION, WITHOUT LONG-TERM CURRENT USE OF INSULIN: ICD-10-CM

## 2019-10-05 DIAGNOSIS — E11.9 TYPE 2 DIABETES MELLITUS WITHOUT COMPLICATION, WITH LONG-TERM CURRENT USE OF INSULIN: ICD-10-CM

## 2019-10-05 DIAGNOSIS — E78.5 DYSLIPIDEMIA ASSOCIATED WITH TYPE 2 DIABETES MELLITUS: ICD-10-CM

## 2019-10-05 DIAGNOSIS — Z79.4 TYPE 2 DIABETES MELLITUS WITHOUT COMPLICATION, WITH LONG-TERM CURRENT USE OF INSULIN: ICD-10-CM

## 2019-10-05 DIAGNOSIS — E11.69 DYSLIPIDEMIA ASSOCIATED WITH TYPE 2 DIABETES MELLITUS: ICD-10-CM

## 2019-10-05 DIAGNOSIS — Z12.5 ENCOUNTER FOR SCREENING FOR MALIGNANT NEOPLASM OF PROSTATE: ICD-10-CM

## 2019-10-05 LAB
ALBUMIN SERPL BCP-MCNC: 4.6 G/DL (ref 3.5–5.2)
ALP SERPL-CCNC: 93 U/L (ref 55–135)
ALT SERPL W/O P-5'-P-CCNC: 21 U/L (ref 10–44)
ANION GAP SERPL CALC-SCNC: 10 MMOL/L (ref 8–16)
AST SERPL-CCNC: 19 U/L (ref 10–40)
BILIRUB SERPL-MCNC: 1.5 MG/DL (ref 0.1–1)
BUN SERPL-MCNC: 11 MG/DL (ref 8–23)
CALCIUM SERPL-MCNC: 10.3 MG/DL (ref 8.7–10.5)
CHLORIDE SERPL-SCNC: 105 MMOL/L (ref 95–110)
CHOLEST SERPL-MCNC: 199 MG/DL (ref 120–199)
CHOLEST/HDLC SERPL: 3.2 {RATIO} (ref 2–5)
CO2 SERPL-SCNC: 27 MMOL/L (ref 23–29)
COMPLEXED PSA SERPL-MCNC: 0.78 NG/ML (ref 0–4)
CREAT SERPL-MCNC: 1.1 MG/DL (ref 0.5–1.4)
EST. GFR  (AFRICAN AMERICAN): >60 ML/MIN/1.73 M^2
EST. GFR  (NON AFRICAN AMERICAN): >60 ML/MIN/1.73 M^2
ESTIMATED AVG GLUCOSE: 108 MG/DL (ref 68–131)
GLUCOSE SERPL-MCNC: 115 MG/DL (ref 70–110)
HBA1C MFR BLD HPLC: 5.4 % (ref 4–5.6)
HDLC SERPL-MCNC: 62 MG/DL (ref 40–75)
HDLC SERPL: 31.2 % (ref 20–50)
LDLC SERPL CALC-MCNC: 123 MG/DL (ref 63–159)
NONHDLC SERPL-MCNC: 137 MG/DL
POTASSIUM SERPL-SCNC: 4.4 MMOL/L (ref 3.5–5.1)
PROT SERPL-MCNC: 7.9 G/DL (ref 6–8.4)
SODIUM SERPL-SCNC: 142 MMOL/L (ref 136–145)
TRIGL SERPL-MCNC: 70 MG/DL (ref 30–150)

## 2019-10-05 PROCEDURE — 83036 HEMOGLOBIN GLYCOSYLATED A1C: CPT

## 2019-10-05 PROCEDURE — 80061 LIPID PANEL: CPT

## 2019-10-05 PROCEDURE — 36415 COLL VENOUS BLD VENIPUNCTURE: CPT

## 2019-10-05 PROCEDURE — 80053 COMPREHEN METABOLIC PANEL: CPT

## 2019-10-05 PROCEDURE — 84153 ASSAY OF PSA TOTAL: CPT

## 2019-10-08 ENCOUNTER — HOSPITAL ENCOUNTER (OUTPATIENT)
Dept: RADIOLOGY | Facility: HOSPITAL | Age: 64
Discharge: HOME | End: 2019-10-08
Attending: OTOLARYNGOLOGY
Payer: MEDICARE

## 2019-10-08 PROCEDURE — 70553 MRI BRAIN STEM W/O & W/DYE: CPT | Mod: TC

## 2019-10-08 PROCEDURE — 70553 MRI BRAIN STEM W/O & W/DYE: CPT | Mod: 26,,, | Performed by: RADIOLOGY

## 2019-10-08 PROCEDURE — 70553 MRI IAC/TEMPORAL BONES W W/O CONTRAST: ICD-10-PCS | Mod: 26,,, | Performed by: RADIOLOGY

## 2019-10-08 PROCEDURE — A9585 GADOBUTROL INJECTION: HCPCS | Performed by: OTOLARYNGOLOGY

## 2019-10-08 PROCEDURE — 25500020 PHARM REV CODE 255: Performed by: OTOLARYNGOLOGY

## 2019-10-08 RX ORDER — GADOBUTROL 604.72 MG/ML
9 INJECTION INTRAVENOUS
Status: COMPLETED | OUTPATIENT
Start: 2019-10-08 | End: 2019-10-08

## 2019-10-08 RX ADMIN — GADOBUTROL 9 ML: 604.72 INJECTION INTRAVENOUS at 03:10

## 2019-10-09 ENCOUNTER — TELEPHONE (OUTPATIENT)
Dept: FAMILY MEDICINE | Facility: CLINIC | Age: 64
End: 2019-10-09

## 2019-10-09 NOTE — TELEPHONE ENCOUNTER
----- Message from Monika Elmore sent at 10/9/2019  2:31 PM CDT -----  Contact: EVANGELINA MICHELLE [[id number]] [phone]    Patient is returning a missed call regarding paperwork he needs filled out.

## 2019-10-09 NOTE — TELEPHONE ENCOUNTER
----- Message from Jesika Salvador sent at 10/9/2019  1:43 PM CDT -----  Contact: Pt came in  Pt came in to drop off paperwork for Dr Patel to complete. The envelope he left is in the Family Practice inbox at the . Verified his number is [phone] for questions or when the paperwork is completed. Thanks!

## 2019-10-09 NOTE — TELEPHONE ENCOUNTER
----- Message from Kely Lorenz sent at 10/9/2019  3:20 PM CDT -----  Contact: Self  Pt is returning a call to Staff regarding the status of his disability paperwork.    He can be reached at [phone].    Thank you.

## 2019-10-10 ENCOUNTER — TELEPHONE (OUTPATIENT)
Dept: FAMILY MEDICINE | Facility: CLINIC | Age: 64
End: 2019-10-10

## 2019-10-10 NOTE — TELEPHONE ENCOUNTER
Spoke to pt's wife, Terrance and states that pt's paperwork from [employer] is for Total and Permanent Disability. 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 Pt coming  paperwork today.

## 2019-10-10 NOTE — TELEPHONE ENCOUNTER
----- Message from Kely Lorenz sent at 10/10/2019  3:13 PM CDT -----  Contact: Wife  Wife is calling to speak with Staff to find out if his papers are ready for .  She can be reached at [phone].    Thank you.

## 2019-10-10 NOTE — TELEPHONE ENCOUNTER
----- Message from Yesi Santiago sent at 10/10/2019  2:11 PM CDT -----  Contact: wife Darshana [phone]  Patient's spouse calling in regarding insurance paperwork. Please call and advise.

## 2019-10-14 ENCOUNTER — TELEPHONE (OUTPATIENT)
Dept: FAMILY MEDICINE | Facility: CLINIC | Age: 64
End: 2019-10-14

## 2019-10-14 NOTE — TELEPHONE ENCOUNTER
Informed pt's wife, Terrance and pt that as per [facility] Policy. Dr. Patel can not write long term disablility papers. Pt need to see a disability doctor from the insurance company. Pt's wife and pt stated fussing again regarding the paperwork and requested to speak to a supervisor. Both patient and pt's wife was told to hold on so they can speak to a supervisor, but call was hung up.

## 2019-10-14 NOTE — TELEPHONE ENCOUNTER
PLEASE INFORM PT - AS PER [facility] POLICY - I CANNOT WRITE LONG TERM DISABILITY PAPERS - HE NEED TO SEE A DISABILITY DOC FROM INSURANCE

## 2019-10-14 NOTE — TELEPHONE ENCOUNTER
----- Message from Oneyda Alvarado sent at 10/11/2019  2:45 PM CDT -----  Contact: Patient and his wife came into the office.   Patient would like for Dr. Patel to fill out disability form paper work. Patient stated that he went to [facility] and that they told him that this paper work has to be filled out by  because he is the primary care physician. Patient would like to be contacted in reference to this matter. Contact number is [phone] and [phone] per patient. I gave paper work to Nina.

## 2019-10-14 NOTE — TELEPHONE ENCOUNTER
----- Message from Oneyda Alvarado sent at 10/11/2019  2:45 PM CDT -----  Contact: Patient and his wife came into the office.   Patient would like for Dr. Patel to fill out disability form paper work. 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 Patient would like to be contacted in reference to this matter. Contact number is [phone] and [phone] per patient. I gave paper work to Nina.

## 2019-10-18 ENCOUNTER — TELEPHONE (OUTPATIENT)
Dept: GASTROENTEROLOGY | Facility: CLINIC | Age: 64
End: 2019-10-18

## 2019-10-22 DIAGNOSIS — Z12.11 SCREENING FOR COLON CANCER: Primary | ICD-10-CM

## 2019-10-22 RX ORDER — POLYETHYLENE GLYCOL 3350, SODIUM SULFATE ANHYDROUS, SODIUM BICARBONATE, SODIUM CHLORIDE, POTASSIUM CHLORIDE 236; 22.74; 6.74; 5.86; 2.97 G/4L; G/4L; G/4L; G/4L; G/4L
4 POWDER, FOR SOLUTION ORAL ONCE
Qty: 4000 ML | Refills: 0 | Status: SHIPPED | OUTPATIENT
Start: 2019-10-22 | End: 2019-10-22

## 2020-01-07 ENCOUNTER — OFFICE VISIT (OUTPATIENT)
Dept: FAMILY MEDICINE | Facility: CLINIC | Age: 65
End: 2020-01-07
Attending: FAMILY MEDICINE
Payer: MEDICARE

## 2020-01-07 ENCOUNTER — HOSPITAL ENCOUNTER (OUTPATIENT)
Dept: RADIOLOGY | Facility: HOSPITAL | Age: 65
Discharge: HOME | End: 2020-01-07
Attending: FAMILY MEDICINE
Payer: MEDICARE

## 2020-01-07 VITALS
OXYGEN SATURATION: 97 % | HEART RATE: 63 BPM | BODY MASS INDEX: 30.83 KG/M2 | HEIGHT: 67 IN | SYSTOLIC BLOOD PRESSURE: 130 MMHG | DIASTOLIC BLOOD PRESSURE: 80 MMHG | WEIGHT: 196.44 LBS

## 2020-01-07 DIAGNOSIS — E78.5 DYSLIPIDEMIA ASSOCIATED WITH TYPE 2 DIABETES MELLITUS: ICD-10-CM

## 2020-01-07 DIAGNOSIS — J43.1 PANLOBULAR EMPHYSEMA: ICD-10-CM

## 2020-01-07 DIAGNOSIS — E11.9 TYPE 2 DIABETES MELLITUS WITHOUT COMPLICATION, WITHOUT LONG-TERM CURRENT USE OF INSULIN: ICD-10-CM

## 2020-01-07 DIAGNOSIS — F41.9 ANXIETY: ICD-10-CM

## 2020-01-07 DIAGNOSIS — I70.0 AORTIC ATHEROSCLEROSIS: ICD-10-CM

## 2020-01-07 DIAGNOSIS — E11.9 TYPE 2 DIABETES MELLITUS WITHOUT COMPLICATION, WITH LONG-TERM CURRENT USE OF INSULIN: Primary | ICD-10-CM

## 2020-01-07 DIAGNOSIS — C34.91 PRIMARY ADENOCARCINOMA OF RIGHT LUNG: ICD-10-CM

## 2020-01-07 DIAGNOSIS — Z79.4 TYPE 2 DIABETES MELLITUS WITHOUT COMPLICATION, WITH LONG-TERM CURRENT USE OF INSULIN: Primary | ICD-10-CM

## 2020-01-07 DIAGNOSIS — Z12.11 SCREEN FOR COLON CANCER: ICD-10-CM

## 2020-01-07 DIAGNOSIS — E11.69 DYSLIPIDEMIA ASSOCIATED WITH TYPE 2 DIABETES MELLITUS: ICD-10-CM

## 2020-01-07 PROCEDURE — 99999 PR PBB SHADOW E&M-EST. PATIENT-LVL III: CPT | Mod: PBBFAC,HCNC,, | Performed by: FAMILY MEDICINE

## 2020-01-07 PROCEDURE — 3044F PR MOST RECENT HEMOGLOBIN A1C LEVEL <7.0%: ICD-10-PCS | Mod: HCNC,CPTII,S$GLB, | Performed by: FAMILY MEDICINE

## 2020-01-07 PROCEDURE — 71046 X-RAY EXAM CHEST 2 VIEWS: CPT | Mod: TC,HCNC,FY

## 2020-01-07 PROCEDURE — 99499 RISK ADDL DX/OHS AUDIT: ICD-10-PCS | Mod: HCNC,S$GLB,, | Performed by: FAMILY MEDICINE

## 2020-01-07 PROCEDURE — 3008F PR BODY MASS INDEX (BMI) DOCUMENTED: ICD-10-PCS | Mod: HCNC,CPTII,S$GLB, | Performed by: FAMILY MEDICINE

## 2020-01-07 PROCEDURE — 71046 XR CHEST PA AND LATERAL: ICD-10-PCS | Mod: 26,HCNC,, | Performed by: RADIOLOGY

## 2020-01-07 PROCEDURE — 71046 X-RAY EXAM CHEST 2 VIEWS: CPT | Mod: 26,HCNC,, | Performed by: RADIOLOGY

## 2020-01-07 PROCEDURE — 99499 UNLISTED E&M SERVICE: CPT | Mod: HCNC,S$GLB,, | Performed by: FAMILY MEDICINE

## 2020-01-07 PROCEDURE — 99214 PR OFFICE/OUTPT VISIT, EST, LEVL IV, 30-39 MIN: ICD-10-PCS | Mod: HCNC,S$GLB,, | Performed by: FAMILY MEDICINE

## 2020-01-07 PROCEDURE — 3044F HG A1C LEVEL LT 7.0%: CPT | Mod: HCNC,CPTII,S$GLB, | Performed by: FAMILY MEDICINE

## 2020-01-07 PROCEDURE — 99999 PR PBB SHADOW E&M-EST. PATIENT-LVL III: ICD-10-PCS | Mod: PBBFAC,HCNC,, | Performed by: FAMILY MEDICINE

## 2020-01-07 PROCEDURE — 99214 OFFICE O/P EST MOD 30 MIN: CPT | Mod: HCNC,S$GLB,, | Performed by: FAMILY MEDICINE

## 2020-01-07 PROCEDURE — 3008F BODY MASS INDEX DOCD: CPT | Mod: HCNC,CPTII,S$GLB, | Performed by: FAMILY MEDICINE

## 2020-01-07 RX ORDER — FLUTICASONE PROPIONATE AND SALMETEROL 500; 50 UG/1; UG/1
1 POWDER RESPIRATORY (INHALATION) 2 TIMES DAILY
Qty: 180 EACH | Refills: 3 | Status: SHIPPED | OUTPATIENT
Start: 2020-01-07 | End: 2020-07-16 | Stop reason: SDUPTHER

## 2020-01-07 RX ORDER — ALBUTEROL SULFATE 90 UG/1
2 AEROSOL, METERED RESPIRATORY (INHALATION) EVERY 6 HOURS PRN
Qty: 18 G | Refills: 10 | Status: SHIPPED | OUTPATIENT
Start: 2020-01-07 | End: 2020-07-16 | Stop reason: SDUPTHER

## 2020-01-10 ENCOUNTER — TELEPHONE (OUTPATIENT)
Dept: FAMILY MEDICINE | Facility: CLINIC | Age: 65
End: 2020-01-10

## 2020-01-10 NOTE — TELEPHONE ENCOUNTER
----- Message from Oneyda Alvarado sent at 1/10/2020  1:08 PM CST -----  Contact: Patient came into office  Patient would like to be notified in reference to his labs and Chest X-Ray results.

## 2020-01-10 NOTE — TELEPHONE ENCOUNTER
----- Message from Madai Naylor sent at 1/10/2020 12:38 PM CST -----  Contact: [phone]/self  Type:  Test Results    Who Called: patient   Name of Test (Lab/Mammo/Etc): labs and xray  Date of Test: 1-7-2020  Ordering Provider: Dr. Patel  Would the patient rather a call back or a response via [facility]? Callback   Best Call Back Number: [phone]  Additional Information:  none

## 2020-01-10 NOTE — TELEPHONE ENCOUNTER
Left msg for CB to inform pt that his labs and x ray r fine - sugar test is elevated compared to last time - watch sugar intake

## 2020-01-30 ENCOUNTER — OFFICE VISIT (OUTPATIENT)
Dept: FAMILY MEDICINE | Facility: CLINIC | Age: 65
End: 2020-01-30
Payer: MEDICARE

## 2020-01-30 VITALS
HEART RATE: 76 BPM | TEMPERATURE: 98 F | HEIGHT: 67 IN | BODY MASS INDEX: 30.24 KG/M2 | DIASTOLIC BLOOD PRESSURE: 80 MMHG | WEIGHT: 192.69 LBS | OXYGEN SATURATION: 96 % | SYSTOLIC BLOOD PRESSURE: 138 MMHG

## 2020-01-30 DIAGNOSIS — Z86.010 HISTORY OF COLONIC POLYPS: ICD-10-CM

## 2020-01-30 DIAGNOSIS — J44.9 CHRONIC OBSTRUCTIVE PULMONARY DISEASE, UNSPECIFIED COPD TYPE: ICD-10-CM

## 2020-01-30 DIAGNOSIS — I70.0 AORTIC ATHEROSCLEROSIS: ICD-10-CM

## 2020-01-30 DIAGNOSIS — E11.59 TYPE 2 DIABETES MELLITUS WITH CIRCULATORY DISORDER CAUSING ERECTILE DYSFUNCTION: ICD-10-CM

## 2020-01-30 DIAGNOSIS — E55.9 VITAMIN D DEFICIENCY DISEASE: ICD-10-CM

## 2020-01-30 DIAGNOSIS — N40.1 BPH WITH URINARY OBSTRUCTION: ICD-10-CM

## 2020-01-30 DIAGNOSIS — Z80.0 FAMILY HISTORY OF COLON CANCER: ICD-10-CM

## 2020-01-30 DIAGNOSIS — R91.1 LUNG NODULE: ICD-10-CM

## 2020-01-30 DIAGNOSIS — E78.5 DYSLIPIDEMIA ASSOCIATED WITH TYPE 2 DIABETES MELLITUS: ICD-10-CM

## 2020-01-30 DIAGNOSIS — Z85.118 HISTORY OF LUNG CANCER: ICD-10-CM

## 2020-01-30 DIAGNOSIS — J43.1 PANLOBULAR EMPHYSEMA: ICD-10-CM

## 2020-01-30 DIAGNOSIS — Z00.00 ROUTINE MEDICAL EXAM: Primary | ICD-10-CM

## 2020-01-30 DIAGNOSIS — E11.9 TYPE 2 DIABETES MELLITUS WITHOUT COMPLICATION, WITHOUT LONG-TERM CURRENT USE OF INSULIN: ICD-10-CM

## 2020-01-30 DIAGNOSIS — E11.69 DYSLIPIDEMIA ASSOCIATED WITH TYPE 2 DIABETES MELLITUS: ICD-10-CM

## 2020-01-30 DIAGNOSIS — N13.8 BPH WITH URINARY OBSTRUCTION: ICD-10-CM

## 2020-01-30 DIAGNOSIS — D64.9 NORMOCYTIC ANEMIA: ICD-10-CM

## 2020-01-30 DIAGNOSIS — N52.1 TYPE 2 DIABETES MELLITUS WITH CIRCULATORY DISORDER CAUSING ERECTILE DYSFUNCTION: ICD-10-CM

## 2020-01-30 PROCEDURE — 3008F BODY MASS INDEX DOCD: CPT | Mod: HCNC,CPTII,S$GLB, | Performed by: INTERNAL MEDICINE

## 2020-01-30 PROCEDURE — 99999 PR PBB SHADOW E&M-EST. PATIENT-LVL IV: ICD-10-PCS | Mod: PBBFAC,HCNC,, | Performed by: INTERNAL MEDICINE

## 2020-01-30 PROCEDURE — 3044F HG A1C LEVEL LT 7.0%: CPT | Mod: HCNC,CPTII,S$GLB, | Performed by: INTERNAL MEDICINE

## 2020-01-30 PROCEDURE — 99499 UNLISTED E&M SERVICE: CPT | Mod: HCNC,S$GLB,, | Performed by: INTERNAL MEDICINE

## 2020-01-30 PROCEDURE — 99214 OFFICE O/P EST MOD 30 MIN: CPT | Mod: 25,HCNC,S$GLB, | Performed by: INTERNAL MEDICINE

## 2020-01-30 PROCEDURE — 99396 PREV VISIT EST AGE 40-64: CPT | Mod: HCNC,S$GLB,, | Performed by: INTERNAL MEDICINE

## 2020-01-30 PROCEDURE — 3044F PR MOST RECENT HEMOGLOBIN A1C LEVEL <7.0%: ICD-10-PCS | Mod: HCNC,CPTII,S$GLB, | Performed by: INTERNAL MEDICINE

## 2020-01-30 PROCEDURE — 99999 PR PBB SHADOW E&M-EST. PATIENT-LVL IV: CPT | Mod: PBBFAC,HCNC,, | Performed by: INTERNAL MEDICINE

## 2020-01-30 PROCEDURE — 99499 RISK ADDL DX/OHS AUDIT: ICD-10-PCS | Mod: HCNC,S$GLB,, | Performed by: INTERNAL MEDICINE

## 2020-01-30 PROCEDURE — 3008F PR BODY MASS INDEX (BMI) DOCUMENTED: ICD-10-PCS | Mod: HCNC,CPTII,S$GLB, | Performed by: INTERNAL MEDICINE

## 2020-01-30 PROCEDURE — 99396 PR PREVENTIVE VISIT,EST,40-64: ICD-10-PCS | Mod: HCNC,S$GLB,, | Performed by: INTERNAL MEDICINE

## 2020-01-30 PROCEDURE — 99214 PR OFFICE/OUTPT VISIT, EST, LEVL IV, 30-39 MIN: ICD-10-PCS | Mod: 25,HCNC,S$GLB, | Performed by: INTERNAL MEDICINE

## 2020-01-30 RX ORDER — ATORVASTATIN CALCIUM 10 MG/1
10 TABLET, FILM COATED ORAL DAILY
Qty: 90 TABLET | Refills: 3 | Status: SHIPPED | OUTPATIENT
Start: 2020-01-30 | End: 2020-07-16 | Stop reason: SDUPTHER

## 2020-01-31 ENCOUNTER — TELEPHONE (OUTPATIENT)
Dept: HEMATOLOGY/ONCOLOGY | Facility: CLINIC | Age: 65
End: 2020-01-31

## 2020-02-05 ENCOUNTER — TELEPHONE (OUTPATIENT)
Dept: FAMILY MEDICINE | Facility: CLINIC | Age: 65
End: 2020-02-05

## 2020-02-12 ENCOUNTER — TELEPHONE (OUTPATIENT)
Dept: GASTROENTEROLOGY | Facility: CLINIC | Age: 65
End: 2020-02-12

## 2020-02-12 DIAGNOSIS — Z12.11 SPECIAL SCREENING FOR MALIGNANT NEOPLASMS, COLON: Primary | ICD-10-CM

## 2020-02-12 RX ORDER — POLYETHYLENE GLYCOL 3350, SODIUM SULFATE ANHYDROUS, SODIUM BICARBONATE, SODIUM CHLORIDE, POTASSIUM CHLORIDE 236; 22.74; 6.74; 5.86; 2.97 G/4L; G/4L; G/4L; G/4L; G/4L
4 POWDER, FOR SOLUTION ORAL ONCE
Qty: 4000 ML | Refills: 0 | Status: SHIPPED | OUTPATIENT
Start: 2020-02-12 | End: 2020-02-12

## 2020-02-12 NOTE — TELEPHONE ENCOUNTER
Referral was sent from Dr. Blakely to get pt scheduled for a Screening Colonoscopy.Pt stated that he would like to have the procedure done at [facility]. Order moved to [facility] depo.

## 2020-02-18 ENCOUNTER — TELEPHONE (OUTPATIENT)
Dept: FAMILY MEDICINE | Facility: CLINIC | Age: 65
End: 2020-02-18

## 2020-02-18 ENCOUNTER — TELEPHONE (OUTPATIENT)
Dept: HEMATOLOGY/ONCOLOGY | Facility: CLINIC | Age: 65
End: 2020-02-18

## 2020-02-18 NOTE — TELEPHONE ENCOUNTER
Spoke with patient's wife.  They are already scheduled with Dr. Grider.  They will keep the appointment date and location that is scheduled.  ----- Message from Ruma Calderon RN sent at 2/18/2020 11:44 AM CST -----  Contact: patient wife      ----- Message -----  From: Caro Mckeon  Sent: 2/18/2020  11:30 AM CST  To: , #    [phone]  Or  [phone]-please call above patient at either number have cancer being referred to the department by primary care doctor waiting on a call back thanks.

## 2020-02-18 NOTE — TELEPHONE ENCOUNTER
----- Message from Randy Uriostegui, Patient Care Assistant sent at 2/18/2020 11:38 AM CST -----  Contact: Terrance Engel (Spouse)  Name of Who is Calling:   Terrance Engel (Spouse)    What is the request in detail: Requesting a call back in regards of trying to schedule for cancer doctor at the main campus. Please contact to further discuss and advise      Can the clinic reply by [facility]: No    What Number to Call Back if not in [facility]:  [phone]

## 2020-03-02 ENCOUNTER — OFFICE VISIT (OUTPATIENT)
Dept: HEMATOLOGY/ONCOLOGY | Facility: CLINIC | Age: 65
End: 2020-03-02
Payer: MEDICARE

## 2020-03-02 VITALS
DIASTOLIC BLOOD PRESSURE: 74 MMHG | HEIGHT: 69 IN | SYSTOLIC BLOOD PRESSURE: 118 MMHG | OXYGEN SATURATION: 97 % | BODY MASS INDEX: 27.79 KG/M2 | HEART RATE: 75 BPM | TEMPERATURE: 98 F | WEIGHT: 187.63 LBS

## 2020-03-02 DIAGNOSIS — E11.9 TYPE 2 DIABETES MELLITUS WITHOUT COMPLICATION, WITH LONG-TERM CURRENT USE OF INSULIN: ICD-10-CM

## 2020-03-02 DIAGNOSIS — D64.9 NORMOCYTIC ANEMIA: ICD-10-CM

## 2020-03-02 DIAGNOSIS — Z79.4 TYPE 2 DIABETES MELLITUS WITHOUT COMPLICATION, WITH LONG-TERM CURRENT USE OF INSULIN: ICD-10-CM

## 2020-03-02 DIAGNOSIS — J44.9 CHRONIC OBSTRUCTIVE PULMONARY DISEASE, UNSPECIFIED COPD TYPE: ICD-10-CM

## 2020-03-02 DIAGNOSIS — Z85.118 HISTORY OF LUNG CANCER: Primary | ICD-10-CM

## 2020-03-02 DIAGNOSIS — R91.1 PULMONARY NODULE: ICD-10-CM

## 2020-03-02 DIAGNOSIS — N13.8 BPH WITH URINARY OBSTRUCTION: ICD-10-CM

## 2020-03-02 DIAGNOSIS — I70.0 AORTIC ATHEROSCLEROSIS: ICD-10-CM

## 2020-03-02 DIAGNOSIS — N40.1 BPH WITH URINARY OBSTRUCTION: ICD-10-CM

## 2020-03-02 PROCEDURE — 99204 OFFICE O/P NEW MOD 45 MIN: CPT | Mod: HCNC,S$GLB,, | Performed by: INTERNAL MEDICINE

## 2020-03-02 PROCEDURE — 99999 PR PBB SHADOW E&M-EST. PATIENT-LVL III: ICD-10-PCS | Mod: PBBFAC,HCNC,, | Performed by: INTERNAL MEDICINE

## 2020-03-02 PROCEDURE — 3044F HG A1C LEVEL LT 7.0%: CPT | Mod: HCNC,CPTII,S$GLB, | Performed by: INTERNAL MEDICINE

## 2020-03-02 PROCEDURE — 99204 PR OFFICE/OUTPT VISIT, NEW, LEVL IV, 45-59 MIN: ICD-10-PCS | Mod: HCNC,S$GLB,, | Performed by: INTERNAL MEDICINE

## 2020-03-02 PROCEDURE — 3008F PR BODY MASS INDEX (BMI) DOCUMENTED: ICD-10-PCS | Mod: HCNC,CPTII,S$GLB, | Performed by: INTERNAL MEDICINE

## 2020-03-02 PROCEDURE — 3044F PR MOST RECENT HEMOGLOBIN A1C LEVEL <7.0%: ICD-10-PCS | Mod: HCNC,CPTII,S$GLB, | Performed by: INTERNAL MEDICINE

## 2020-03-02 PROCEDURE — 99999 PR PBB SHADOW E&M-EST. PATIENT-LVL III: CPT | Mod: PBBFAC,HCNC,, | Performed by: INTERNAL MEDICINE

## 2020-03-02 PROCEDURE — 3008F BODY MASS INDEX DOCD: CPT | Mod: HCNC,CPTII,S$GLB, | Performed by: INTERNAL MEDICINE

## 2020-03-02 NOTE — PROGRESS NOTES
PATIENT: Hernan Engel  MRN: [id number]  DATE: 3/2/2020      Diagnosis:   1. History of lung cancer    2. Chronic obstructive pulmonary disease, unspecified COPD type    3. Type 2 diabetes mellitus without complication, with long-term current use of insulin    4. Normocytic anemia    5. Aortic atherosclerosis    6. BPH with urinary obstruction    7. Pulmonary nodule        Chief Complaint: Consult      Oncologic History:      Oncologic History 1/16/14  CXR   1/21/14 CT of the chest   2/25/14 IR biopsy  3/11/14 PET/CT  3/20/14 MRI brain  3/24/14 PFTs  3/27/14 RUL lobectomy    Oncologic Treatment 3/27/14 RUL lobectomy    Pathology 2/25/14 IR biopsy RUL mass - adenocarcinoma  3/27/14 RUL lobectomy - adenocarcinoma 3.8cm; no pleural invasion or extension and negative LNs at the level 4; level 10 alpha, beta and gamma; and level 11 alpha and beta          Subjective:    Initial History: Mr. Engel is a 64 y.o. male with DMII, normocytic anemia, ED, depression , COPD, BPH, aortic atherosclerosis, anxiety who presents to establish care for history of lung cancer.  The patient initially underwent a CXR on 1/16/14 under the care of his PCP Dr. Patel. CXR showed a right upper lung measuring approximately 7 x 4 cm in size.  CT of the chest done on 1/21/14 showed a cavitary right upper lobe lung mass measuring 5.2 cm with spiculated margins and thick walls.  He underwent a biopsy via IR on 2/25/14 showing adenocarcinoma.  PET/Ct was done on 3/11/14 showing hypermetabolic cavitary mass in the right upper lobe consistent with malignancy and no definite evidence for locoregional or distant metastatic spread.  MRI brain on 3/20/14 showed no evidence of CNS disease.  PFTs done on 3/24/14 showed mild obstruction.  The patient underwent partial right upper lobectomy on 3/27/14 under the care of Dr. Jenesn Mendez.  Pathology form the surgery showed a mass measuring 3.8cm; no pleural invasion or extension and negative LNs at the  level 4; level 10 alpha, beta and gamma; and level 11 alpha and beta.  The patient was then followed by Dr Blevins and then transitioned care to [facility].  He was found to have a nodule in the RML on CT chest 7/14/14 measuring 0.6cm.  The nodule was followed with periodic CTs of the chest with last one done on 10/02/17 showing no concern for recurrent or metastatic disease but did show bilateral hilar calcifications.  The patient was most [id number] followed by Dr. Ty Orta a fellow at [facility] on 2/20/17 who planned for CT scans of the chest every 6-12 months.  Currently the patient states he has occasional SOB with exertion but denies other symptoms.  The patient denies CP, abdominal pain, N/V, constipation, diarrhea.  The patient denies fever, chills, night sweats, weight loss, new lumps or bumps, easy bruising or bleeding.  He smoked 1PPD for 30 years and quit 1/15/14.  He has a sister and father with colon cancer.    Past Medical History:   Past Medical History:   Diagnosis Date    Anxiety     Aortic atherosclerosis 12/6/2017    BPH with urinary obstruction 2/20/2019    Colon polyp 1/16/2014    COPD (chronic obstructive pulmonary disease)     Depression     Disorder of kidney and ureter     ED (erectile dysfunction) 1/16/2014    Family history of colon cancer 1/16/2014    Hearing loss in left ear 34 years    Lung cancer     Normocytic anemia 9/3/2014    Seizure 2005    single event - not controlled by medication    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    T2DM (type 2 diabetes mellitus) 2014    DKA 9/2014       Past Surgical HIstory:   Past Surgical History:   Procedure Laterality Date    COLONOSCOPY      LUNG REMOVAL, PARTIAL         Family History:   Family History   Problem Relation Age of Onset    Cancer Father         colon cancer    Colon cancer Father     Cataracts Father     Colon cancer Sister     Cancer Sister         colon or lung cancer     No Known Problems Mother      No Known Problems Brother     No Known Problems Sister     No Known Problems Sister     No Known Problems Maternal Aunt     No Known Problems Maternal Uncle     No Known Problems Paternal Aunt     No Known Problems Paternal Uncle     No Known Problems Maternal Grandmother     No Known Problems Maternal Grandfather     No Known Problems Paternal Grandmother     No Known Problems Paternal Grandfather     Amblyopia Neg Hx     Blindness Neg Hx     Diabetes Neg Hx     Glaucoma Neg Hx     Hypertension Neg Hx     Macular degeneration Neg Hx     Retinal detachment Neg Hx     Strabismus Neg Hx     Stroke Neg Hx     Thyroid disease Neg Hx        Social History:  reports that he quit smoking about 6 years ago. He has a 30.00 pack-year smoking history. He has never used smokeless tobacco. He reports that he drinks about 5.7 standard drinks of alcohol per week. He reports that he does not use drugs.    Allergies:  Review of patient's allergies indicates:   Allergen Reactions    Adhesive Itching, Rash, Other (See Comments) and Dermatitis     Blister       Medications:  Current Outpatient Medications   Medication Sig Dispense Refill    albuterol (PROVENTIL/VENTOLIN HFA) 90 mcg/actuation inhaler Inhale 2 puffs into the lungs every 6 (six) hours as needed for Wheezing. 18 g 10    atorvastatin (LIPITOR) 10 MG tablet Take 1 tablet (10 mg total) by mouth once daily. 90 tablet 3    blood-glucose meter kit Use as instructed 1 each 0    fluticasone-salmeterol diskus inhaler 500-50 mcg Inhale 1 puff into the lungs 2 (two) times daily. 180 each 3    hydrocortisone 2.5 % lotion Apply topically 2 (two) times daily. 59 mL 0    insulin syringe-needle U-100 1 mL 31 gauge x 5/16 Syrg 1 each.      ipratropium (ATROVENT) 42 mcg (0.06 %) nasal spray 2 sprays by Nasal route 4 (four) times daily. 15 mL 0    metFORMIN (GLUCOPHAGE-XR) 500 MG 24 hr tablet Take 1 tablet (500 mg total) by mouth daily with breakfast. 90  "tablet 3    omeprazole (PRILOSEC) 20 MG capsule Take 1 capsule (20 mg total) by mouth 2 (two) times daily. (Patient taking differently: Take 20 mg by mouth 2 (two) times daily. ) 30 capsule 0    sildenafil (VIAGRA) 100 MG tablet Take 1 tablet (100 mg total) by mouth daily as needed for Erectile Dysfunction. 10 tablet 11    TRUE METRIX GLUCOSE TEST STRIP Strp Check sugar once daily 100 each 1    valACYclovir (VALTREX) 1000 MG tablet Take 2 tablets (2,000 mg total) by mouth 2 (two) times daily. for 1 day 4 tablet 0    lancets Misc 1 lancet by Misc.(Non-Drug; Combo Route) route once daily. 100 each 1     No current facility-administered medications for this visit.        Review of Systems   Constitutional: Negative for appetite change, chills, fatigue, fever and unexpected weight change.   HENT: Negative for sore throat and trouble swallowing.    Eyes: Negative for photophobia and visual disturbance.   Respiratory: Positive for shortness of breath (with exertion). Negative for cough and chest tightness.    Cardiovascular: Negative for chest pain, palpitations and leg swelling.   Gastrointestinal: Negative for abdominal pain, constipation, diarrhea, nausea and vomiting.   Endocrine: Negative for cold intolerance and heat intolerance.   Genitourinary: Negative for difficulty urinating, dysuria and hematuria.   Musculoskeletal: Negative for arthralgias, back pain and myalgias.   Skin: Negative for color change and rash.   Neurological: Negative for dizziness, light-headedness, numbness and headaches.   Hematological: Negative for adenopathy. Does not bruise/bleed easily.       ECOG Performance Status: 1   Objective:      Vitals:   Vitals:    03/02/20 0846   BP: 118/74   BP Location: Left arm   Patient Position: Sitting   BP Method: Medium (Automatic)   Pulse: 75   Temp: 98 °F (36.7 °C)   TempSrc: Oral   SpO2: 97%   Weight: 85.1 kg (187 lb 9.8 oz)   Height: 5' 9" (1.753 m)       Physical Exam   Constitutional: He is " oriented to person, place, and time. He appears well-developed and well-nourished. No distress.   HENT:   Head: Normocephalic and atraumatic.   Eyes: EOM are normal. Right eye exhibits no discharge. Left eye exhibits no discharge. No scleral icterus.   Cardiovascular: Normal rate, regular rhythm, normal heart sounds and intact distal pulses. Exam reveals no gallop and no friction rub.   No murmur heard.  Pulmonary/Chest: Effort normal and breath sounds normal. No respiratory distress. He has no wheezes. He has no rales. He exhibits no tenderness.   Abdominal: Soft. Bowel sounds are normal. He exhibits no distension and no mass. There is no tenderness. There is no rebound.   Musculoskeletal: Normal range of motion. He exhibits no edema or tenderness.   Lymphadenopathy:     He has no cervical adenopathy.     He has no axillary adenopathy.        Right: No supraclavicular adenopathy present.        Left: No supraclavicular adenopathy present.   Neurological: He is alert and oriented to person, place, and time. Coordination normal.   Skin: Skin is warm and dry. No rash noted. He is not diaphoretic. No erythema.   Psychiatric: He has a normal mood and affect. His behavior is normal.       Laboratory Data:  No visits with results within 1 Week(s) from this visit.   Latest known visit with results is:   Lab Visit on 01/07/2020   Component Date Value Ref Range Status    Sodium 01/07/2020 138  136 - 145 mmol/L Final    Potassium 01/07/2020 3.9  3.5 - 5.1 mmol/L Final    Chloride 01/07/2020 103  95 - 110 mmol/L Final    CO2 01/07/2020 25  23 - 29 mmol/L Final    Glucose 01/07/2020 110  70 - 110 mg/dL Final    BUN, Bld 01/07/2020 12  8 - 23 mg/dL Final    Creatinine 01/07/2020 1.0  0.5 - 1.4 mg/dL Final    Calcium 01/07/2020 9.8  8.7 - 10.5 mg/dL Final    Total Protein 01/07/2020 7.4  6.0 - 8.4 g/dL Final    Albumin 01/07/2020 4.5  3.5 - 5.2 g/dL Final    Total Bilirubin 01/07/2020 1.4* 0.1 - 1.0 mg/dL Final     Comment: For infants and newborns, interpretation of results should be based  on gestational age, weight and in agreement with clinical  observations.  Premature Infant recommended reference ranges:  Up to 24 hours.............<8.0 mg/dL  Up to 48 hours............<12.0 mg/dL  3-5 days..................<15.0 mg/dL  6-29 days.................<15.0 mg/dL      Alkaline Phosphatase 01/07/2020 90  55 - 135 U/L Final    AST 01/07/2020 25  10 - 40 U/L Final    ALT 01/07/2020 33  10 - 44 U/L Final    Anion Gap 01/07/2020 10  8 - 16 mmol/L Final    eGFR if African American 01/07/2020 >60  >60 mL/min/1.73 m^2 Final    eGFR if non African American 01/07/2020 >60  >60 mL/min/1.73 m^2 Final    Comment: Calculation used to obtain the estimated glomerular filtration  rate (eGFR) is the CKD-EPI equation.       Hemoglobin A1C 01/07/2020 6.4* 4.0 - 5.6 % Final    Comment: ADA Screening Guidelines:  5.7-6.4%  Consistent with prediabetes  >or=6.5%  Consistent with diabetes  High levels of fetal hemoglobin interfere with the HbA1C  assay. Heterozygous hemoglobin variants (HbS, HgC, etc)do  not significantly interfere with this assay.   However, presence of multiple variants may affect accuracy.      Estimated Avg Glucose 01/07/2020 137* 68 - 131 mg/dL Final    Cholesterol 01/07/2020 194  120 - 199 mg/dL Final    Comment: The National Cholesterol Education Program (NCEP) has set the  following guidelines (reference ranges) for Cholesterol:  Optimal.....................<200 mg/dL  Borderline High.............200-239 mg/dL  High........................> or = 240 mg/dL      Triglycerides 01/07/2020 101  30 - 150 mg/dL Final    Comment: The National Cholesterol Education Program (NCEP) has set the  following guidelines (reference values) for triglycerides:  Normal......................<150 mg/dL  Borderline High.............150-199 mg/dL  High........................200-499 mg/dL      HDL 01/07/2020 59  40 - 75 mg/dL Final     Comment: The National Cholesterol Education Program (NCEP) has set the  following guidelines (reference values) for HDL Cholesterol:  Low...............<40 mg/dL  Optimal...........>60 mg/dL      LDL Cholesterol 01/07/2020 114.8  63.0 - 159.0 mg/dL Final    Comment: The National Cholesterol Education Program (NCEP) has set the  following guidelines (reference values) for LDL Cholesterol:  Optimal.......................<130 mg/dL  Borderline High...............130-159 mg/dL  High..........................160-189 mg/dL  Very High.....................>190 mg/dL      Hdl/Cholesterol Ratio 01/07/2020 30.4  20.0 - 50.0 % Final    Total Cholesterol/HDL Ratio 01/07/2020 3.3  2.0 - 5.0 Final    Non-HDL Cholesterol 01/07/2020 135  mg/dL Final    Comment: Risk category and Non-HDL cholesterol goals:  Coronary heart disease (CHD)or equivalent (10-year risk of CHD >20%):  Non-HDL cholesterol goal     <130 mg/dL  Two or more CHD risk factors and 10-year risk of CHD <= 20%:  Non-HDL cholesterol goal     <160 mg/dL  0 to 1 CHD risk factor:  Non-HDL cholesterol goal     <190 mg/dL           Imaging: CT Chest 10/02/17      CT scan of chest without contrast, comparison chest x-ray and CT chest 2014.  History lung cancer, right upper lobe resection.    Few tiny cysts within liver largest right lateral anterior dome at 2:51 0.9 CM.  Few Tiny calcification posterior spleen.  Portion of upper left mid renal cyst is included.  Scattered calcified plaque aorta great vessels.    Bilateral hilar calcifications, largest left mid anterior 1 CM.    Thyroid gland normal.  Mild spondylosis dorsal spine.  No hilar mediastinal mass or adenopathy.      Tracheobronchial tree normal.  Hyperinflation remaining right lung with linear scar formation, centrilobular emphysema.  Very tiny calcific densities medial basal segment right lung.  Tiny calcification anterior segment left upper lobe.  No active infiltrate or effusion.      Assessment:       1.  History of lung cancer    2. Chronic obstructive pulmonary disease, unspecified COPD type    3. Type 2 diabetes mellitus without complication, with long-term current use of insulin    4. Normocytic anemia    5. Aortic atherosclerosis    6. BPH with urinary obstruction    7. Pulmonary nodule           Plan:     History of Lung Cancer - The patient has a history of adenocarcinoma of the lung stage stage 1B s/p RUL lobectomy 3/27/14  -Will continue yearly surveillance with CT Chest without contrast   -Pt due for repeat CT  -Will repeat CT on 3/04/20    Pulmonary Nodule - PT with a previous nodules seen on CT of the chest  -Nodules not noted on most recent CT 10/02/17  -Will repeat CT chest    COPD - pt with SOB from underlying COPD  -Pt taking daily fluticasone-salmeterol, and as needed albuterol  -Management per PCP    DMII - last A1c 6.4 on 1/07/20  -management per PCP    Anemia - pt likely with anemia of chronic kidney disease  -Will monitor    Aortic Atherosclerosis - pt on lpitor  Management per PCP    BPH - PSA not elevated on last check  -management per PCP    Advance Care Planning     Power of   I initiated the process of advance care planning today and explained the importance of this process to the patient.  I introduced the concept of advance directives to the patient, as well. Then the patient received detailed information about the importance of designating a Health Care Power of  (HCPOA). He was also instructed to communicate with this person about their wishes for future healthcare, should he become sick and lose decision-making capacity. The patient has not previously appointed a HCPOA. After our discussion, the patient has decided to complete a HCPOA and has appointed his wife Terrance Engel [phone].          Follow Up - 3/09/20 with repeat CT chest 3/04/20    Iker Grider MD  Hematology and Oncology  [facility]  Office:[phone]  Fax: [phone]

## 2020-03-02 NOTE — LETTER
March 2, 2020      Javon Blakely MD  [address]  [place] [address]           [facility]  [address] [facility] [address]   [place] [address]  Phone: [phone]          Patient: Hernan Engel   MR Number: [id number]   YOB: 1955   Date of Visit: 3/2/2020       Dear Dr. Javon Blakely:    Thank you for referring Hernan Engel to me for evaluation. Attached you will find relevant portions of my assessment and plan of care.    If you have questions, please do not hesitate to call me. I look forward to following Hernan Engel along with you.    Sincerely,    Iker Grider MD    Enclosure  CC:  No Recipients    If you would like to receive this communication electronically, please contact [email or web address] or [phone] to request more information on [employer] Link access.    For providers and/or their staff who would like to refer a patient to [facility], please contact us through our one-stop-shop provider referral line, Shalom Padilla, at [phone].    If you feel you have received this communication in error or would no longer like to receive these types of communications, please e-mail [email or web address]

## 2020-03-04 ENCOUNTER — HOSPITAL ENCOUNTER (OUTPATIENT)
Dept: RADIOLOGY | Facility: HOSPITAL | Age: 65
Discharge: HOME | End: 2020-03-04
Attending: INTERNAL MEDICINE
Payer: MEDICARE

## 2020-03-04 DIAGNOSIS — Z85.118 HISTORY OF LUNG CANCER: ICD-10-CM

## 2020-03-04 PROCEDURE — 71250 CT CHEST WITHOUT CONTRAST: ICD-10-PCS | Mod: 26,HCNC,, | Performed by: RADIOLOGY

## 2020-03-04 PROCEDURE — 71250 CT THORAX DX C-: CPT | Mod: 26,HCNC,, | Performed by: RADIOLOGY

## 2020-03-04 PROCEDURE — 71250 CT THORAX DX C-: CPT | Mod: TC,HCNC

## 2020-03-09 ENCOUNTER — OFFICE VISIT (OUTPATIENT)
Dept: HEMATOLOGY/ONCOLOGY | Facility: CLINIC | Age: 65
End: 2020-03-09
Payer: MEDICARE

## 2020-03-09 VITALS
SYSTOLIC BLOOD PRESSURE: 119 MMHG | OXYGEN SATURATION: 97 % | TEMPERATURE: 98 F | BODY MASS INDEX: 27.75 KG/M2 | HEART RATE: 81 BPM | WEIGHT: 187.38 LBS | DIASTOLIC BLOOD PRESSURE: 58 MMHG | HEIGHT: 69 IN

## 2020-03-09 DIAGNOSIS — Z85.118 HISTORY OF LUNG CANCER: Primary | ICD-10-CM

## 2020-03-09 DIAGNOSIS — E11.9 TYPE 2 DIABETES MELLITUS WITHOUT COMPLICATION, WITH LONG-TERM CURRENT USE OF INSULIN: ICD-10-CM

## 2020-03-09 DIAGNOSIS — N13.8 BPH WITH URINARY OBSTRUCTION: ICD-10-CM

## 2020-03-09 DIAGNOSIS — R91.1 PULMONARY NODULE: ICD-10-CM

## 2020-03-09 DIAGNOSIS — J44.9 CHRONIC OBSTRUCTIVE PULMONARY DISEASE, UNSPECIFIED COPD TYPE: ICD-10-CM

## 2020-03-09 DIAGNOSIS — N40.1 BPH WITH URINARY OBSTRUCTION: ICD-10-CM

## 2020-03-09 DIAGNOSIS — Z79.4 TYPE 2 DIABETES MELLITUS WITHOUT COMPLICATION, WITH LONG-TERM CURRENT USE OF INSULIN: ICD-10-CM

## 2020-03-09 DIAGNOSIS — D64.9 NORMOCYTIC ANEMIA: ICD-10-CM

## 2020-03-09 PROCEDURE — 3044F HG A1C LEVEL LT 7.0%: CPT | Mod: HCNC,CPTII,S$GLB, | Performed by: INTERNAL MEDICINE

## 2020-03-09 PROCEDURE — 99213 PR OFFICE/OUTPT VISIT, EST, LEVL III, 20-29 MIN: ICD-10-PCS | Mod: HCNC,S$GLB,, | Performed by: INTERNAL MEDICINE

## 2020-03-09 PROCEDURE — 99999 PR PBB SHADOW E&M-EST. PATIENT-LVL IV: ICD-10-PCS | Mod: PBBFAC,HCNC,, | Performed by: INTERNAL MEDICINE

## 2020-03-09 PROCEDURE — 99999 PR PBB SHADOW E&M-EST. PATIENT-LVL IV: CPT | Mod: PBBFAC,HCNC,, | Performed by: INTERNAL MEDICINE

## 2020-03-09 PROCEDURE — 99213 OFFICE O/P EST LOW 20 MIN: CPT | Mod: HCNC,S$GLB,, | Performed by: INTERNAL MEDICINE

## 2020-03-09 PROCEDURE — 3008F BODY MASS INDEX DOCD: CPT | Mod: HCNC,CPTII,S$GLB, | Performed by: INTERNAL MEDICINE

## 2020-03-09 PROCEDURE — 3008F PR BODY MASS INDEX (BMI) DOCUMENTED: ICD-10-PCS | Mod: HCNC,CPTII,S$GLB, | Performed by: INTERNAL MEDICINE

## 2020-03-09 PROCEDURE — 3044F PR MOST RECENT HEMOGLOBIN A1C LEVEL <7.0%: ICD-10-PCS | Mod: HCNC,CPTII,S$GLB, | Performed by: INTERNAL MEDICINE

## 2020-03-09 NOTE — PROGRESS NOTES
PATIENT: Hernan Engel  MRN: [id number]  DATE: 3/9/2020      Diagnosis:   1. History of lung cancer    2. Pulmonary nodule    3. Chronic obstructive pulmonary disease, unspecified COPD type    4. Type 2 diabetes mellitus without complication, with long-term current use of insulin    5. Normocytic anemia    6. BPH with urinary obstruction        Chief Complaint: Follow-up      Oncologic History:      Oncologic History 1/16/14  CXR   1/21/14 CT of the chest   2/25/14 IR biopsy  3/11/14 PET/CT  3/20/14 MRI brain  3/24/14 PFTs  3/27/14 RUL lobectomy    Oncologic Treatment 3/27/14 RUL lobectomy    Pathology 2/25/14 IR biopsy RUL mass - adenocarcinoma  3/27/14 RUL lobectomy - adenocarcinoma 3.8cm; no pleural invasion or extension and negative LNs at the level 4; level 10 alpha, beta and gamma; and level 11 alpha and beta      Oncologic History: The patient initially underwent a CXR on 1/16/14 under the care of his PCP Dr. Patel. CXR showed a right upper lung measuring approximately 7 x 4 cm in size.  CT of the chest done on 1/21/14 showed a cavitary right upper lobe lung mass measuring 5.2 cm with spiculated margins and thick walls.  He underwent a biopsy via IR on 2/25/14 showing adenocarcinoma.  PET/Ct was done on 3/11/14 showing hypermetabolic cavitary mass in the right upper lobe consistent with malignancy and no definite evidence for locoregional or distant metastatic spread.  MRI brain on 3/20/14 showed no evidence of CNS disease.  PFTs done on 3/24/14 showed mild obstruction.  The patient underwent partial right upper lobectomy on 3/27/14 under the care of Dr. Jensen Mendez.  Pathology form the surgery showed a mass measuring 3.8cm; no pleural invasion or extension and negative LNs at the level 4; level 10 alpha, beta and gamma; and level 11 alpha and beta.  The patient was then followed by Dr Blevins and then transitioned care to [facility].  He was found to have a nodule in the RML on CT chest 7/14/14 measuring  0.6cm.  The nodule was followed with periodic CTs of the chest with last one done on 10/02/17 showing no concern for recurrent or metastatic disease but did show bilateral hilar calcifications.  The patient was most recently followed by Dr. Ty Orta a fellow at [facility] on 2/20/17 who planned for CT scans of the chest every 6-12 months.    Subjective:    Interval History: Mr. Engel is a 64 y.o. male with DMII, normocytic anemia, ED, depression , COPD, BPH, aortic atherosclerosis, anxiety who presents for follow up for history of lung cancer.  Since the last clinic visit the patient underwent a CT of the chest on 3/04/20 showing postsurgical changes of right upper lobectomy with scattered curvilinear opacities throughout the right lung field that are not significantly changed a focal small ill-defined patchy ground-glass airspace opacity in the superior lingula.  Otherwise, no new or enlarging suspicious pulmonary nodular opacities appreciated.  Multiple stable subcentimeter ground-glass nodules seen in the anteromedial left upper lobe, anterior left upper lobe, pleural based ground-glass nodule in the medial segment of the right middle lobe, ground-glass nodule in the anteroinferior left upper lobe, three adjacent 3 mm left oblique fissural based ground-glass nodules, ground-glass nodule in the lateral basal segment of the left lower lobe, 3 mm ground-glass nodule in the posterior basal segment of the right lower lobe, 3 mm ground-glass nodule in the lateral basal segment of the left lower lobe.  The patient denies CP, SOB, abdominal pain, N/V, constipation, diarrhea. The patient denies fever, chills, night sweats, weight loss, new lumps or bumps, easy bruising or bleeding.    Past Medical History:   Past Medical History:   Diagnosis Date    Anxiety     Aortic atherosclerosis 12/6/2017    BPH with urinary obstruction 2/20/2019    Colon polyp 1/16/2014    COPD (chronic obstructive pulmonary  disease)     Depression     Disorder of kidney and ureter     ED (erectile dysfunction) 1/16/2014    Family history of colon cancer 1/16/2014    Hearing loss in left ear 34 years    Lung cancer     Normocytic anemia 9/3/2014    Seizure 2005    single event - not controlled by medication    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    T2DM (type 2 diabetes mellitus) 2014    DKA 9/2014       Past Surgical HIstory:   Past Surgical History:   Procedure Laterality Date    COLONOSCOPY      LUNG REMOVAL, PARTIAL         Family History:   Family History   Problem Relation Age of Onset    Cancer Father         colon cancer    Colon cancer Father     Cataracts Father     Colon cancer Sister     Cancer Sister         colon or lung cancer     No Known Problems Mother     No Known Problems Brother     No Known Problems Sister     No Known Problems Sister     No Known Problems Maternal Aunt     No Known Problems Maternal Uncle     No Known Problems Paternal Aunt     No Known Problems Paternal Uncle     No Known Problems Maternal Grandmother     No Known Problems Maternal Grandfather     No Known Problems Paternal Grandmother     No Known Problems Paternal Grandfather     Amblyopia Neg Hx     Blindness Neg Hx     Diabetes Neg Hx     Glaucoma Neg Hx     Hypertension Neg Hx     Macular degeneration Neg Hx     Retinal detachment Neg Hx     Strabismus Neg Hx     Stroke Neg Hx     Thyroid disease Neg Hx        Social History:  reports that he quit smoking about 6 years ago. He has a 30.00 pack-year smoking history. He has never used smokeless tobacco. He reports that he drinks about 5.7 standard drinks of alcohol per week. He reports that he does not use drugs.    Allergies:  Review of patient's allergies indicates:   Allergen Reactions    Adhesive Itching, Rash, Other (See Comments) and Dermatitis     Blister       Medications:  Current Outpatient Medications   Medication Sig Dispense Refill     albuterol (PROVENTIL/VENTOLIN HFA) 90 mcg/actuation inhaler Inhale 2 puffs into the lungs every 6 (six) hours as needed for Wheezing. 18 g 10    atorvastatin (LIPITOR) 10 MG tablet Take 1 tablet (10 mg total) by mouth once daily. 90 tablet 3    blood-glucose meter kit Use as instructed 1 each 0    fluticasone-salmeterol diskus inhaler 500-50 mcg Inhale 1 puff into the lungs 2 (two) times daily. 180 each 3    hydrocortisone 2.5 % lotion Apply topically 2 (two) times daily. 59 mL 0    insulin syringe-needle U-100 1 mL 31 gauge x 5/16 Syrg 1 each.      ipratropium (ATROVENT) 42 mcg (0.06 %) nasal spray 2 sprays by Nasal route 4 (four) times daily. 15 mL 0    lancets Misc 1 lancet by Misc.(Non-Drug; Combo Route) route once daily. 100 each 1    sildenafil (VIAGRA) 100 MG tablet Take 1 tablet (100 mg total) by mouth daily as needed for Erectile Dysfunction. 10 tablet 11    TRUE METRIX GLUCOSE TEST STRIP Strp Check sugar once daily 100 each 1    metFORMIN (GLUCOPHAGE-XR) 500 MG 24 hr tablet Take 1 tablet (500 mg total) by mouth daily with breakfast. 90 tablet 3    omeprazole (PRILOSEC) 20 MG capsule Take 1 capsule (20 mg total) by mouth 2 (two) times daily. (Patient taking differently: Take 20 mg by mouth 2 (two) times daily. ) 30 capsule 0    valACYclovir (VALTREX) 1000 MG tablet Take 2 tablets (2,000 mg total) by mouth 2 (two) times daily. for 1 day 4 tablet 0     No current facility-administered medications for this visit.        Review of Systems   Constitutional: Negative for chills, diaphoresis, fatigue, fever and unexpected weight change.   HENT: Negative for sore throat and trouble swallowing.    Respiratory: Negative for cough and shortness of breath.    Cardiovascular: Negative for chest pain and palpitations.   Gastrointestinal: Negative for abdominal pain, constipation, diarrhea, nausea and vomiting.   Skin: Negative for color change and rash.   Neurological: Negative for headaches.   Hematological:  "Negative for adenopathy. Does not bruise/bleed easily.       ECOG Performance Status: 1   Objective:      Vitals:   Vitals:    03/09/20 1059   BP: (!) 119/58   BP Location: Left arm   Patient Position: Sitting   BP Method: Large (Automatic)   Pulse: 81   Temp: 97.6 °F (36.4 °C)   TempSrc: Oral   SpO2: 97%   Weight: 85 kg (187 lb 6.3 oz)   Height: 5' 9" (1.753 m)       Physical Exam   Constitutional: He is oriented to person, place, and time. He appears well-developed and well-nourished. No distress.   HENT:   Head: Normocephalic and atraumatic.   Eyes: No scleral icterus.   Cardiovascular: Normal rate, regular rhythm, normal heart sounds and intact distal pulses. Exam reveals no gallop and no friction rub.   No murmur heard.  Pulmonary/Chest: Effort normal and breath sounds normal. No respiratory distress. He has no wheezes. He has no rales. He exhibits no tenderness.   Abdominal: Soft. Bowel sounds are normal. He exhibits no distension and no mass. There is no tenderness. There is no rebound.   Musculoskeletal: Normal range of motion. He exhibits no edema or tenderness.   Lymphadenopathy:     He has no cervical adenopathy.     He has no axillary adenopathy.        Right: No supraclavicular adenopathy present.        Left: No supraclavicular adenopathy present.   Neurological: He is alert and oriented to person, place, and time.   Skin: Skin is warm and dry. No rash noted. He is not diaphoretic. No erythema.   Psychiatric: He has a normal mood and affect. His behavior is normal.       Laboratory Data:  Lab Visit on 03/04/2020   Component Date Value Ref Range Status    WBC 03/04/2020 3.71* 3.90 - 12.70 K/uL Final    RBC 03/04/2020 4.71  4.60 - 6.20 M/uL Final    Hemoglobin 03/04/2020 13.3* 14.0 - 18.0 g/dL Final    Hematocrit 03/04/2020 39.4* 40.0 - 54.0 % Final    Mean Corpuscular Volume 03/04/2020 84  82 - 98 fL Final    Mean Corpuscular Hemoglobin 03/04/2020 28.2  27.0 - 31.0 pg Final    Mean Corpuscular " Hemoglobin Conc 03/04/2020 33.8  32.0 - 36.0 g/dL Final    RDW 03/04/2020 13.2  11.5 - 14.5 % Final    Platelets 03/04/2020 200  150 - 350 K/uL Final    MPV 03/04/2020 9.3  9.2 - 12.9 fL Final    Immature Granulocytes 03/04/2020 0.0  0.0 - 0.5 % Final    Gran # (ANC) 03/04/2020 1.7* 1.8 - 7.7 K/uL Final    Immature Grans (Abs) 03/04/2020 0.00  0.00 - 0.04 K/uL Final    Comment: Mild elevation in immature granulocytes is non specific and   can be seen in a variety of conditions including stress response,   acute inflammation, trauma and pregnancy. Correlation with other   laboratory and clinical findings is essential.      Lymph # 03/04/2020 1.5  1.0 - 4.8 K/uL Final    Mono # 03/04/2020 0.4  0.3 - 1.0 K/uL Final    Eos # 03/04/2020 0.1  0.0 - 0.5 K/uL Final    Baso # 03/04/2020 0.04  0.00 - 0.20 K/uL Final    nRBC 03/04/2020 0  0 /100 WBC Final    Gran% 03/04/2020 46.1  38.0 - 73.0 % Final    Lymph% 03/04/2020 39.6  18.0 - 48.0 % Final    Mono% 03/04/2020 10.5  4.0 - 15.0 % Final    Eosinophil% 03/04/2020 2.7  0.0 - 8.0 % Final    Basophil% 03/04/2020 1.1  0.0 - 1.9 % Final    Differential Method 03/04/2020 Automated   Final    Sodium 03/04/2020 138  136 - 145 mmol/L Final    Potassium 03/04/2020 4.2  3.5 - 5.1 mmol/L Final    Chloride 03/04/2020 105  95 - 110 mmol/L Final    CO2 03/04/2020 26  23 - 29 mmol/L Final    Glucose 03/04/2020 124* 70 - 110 mg/dL Final    BUN, Bld 03/04/2020 12  8 - 23 mg/dL Final    Creatinine 03/04/2020 1.0  0.5 - 1.4 mg/dL Final    Calcium 03/04/2020 9.4  8.7 - 10.5 mg/dL Final    Total Protein 03/04/2020 7.2  6.0 - 8.4 g/dL Final    Albumin 03/04/2020 4.3  3.5 - 5.2 g/dL Final    Total Bilirubin 03/04/2020 1.1* 0.1 - 1.0 mg/dL Final    Comment: For infants and newborns, interpretation of results should be based  on gestational age, weight and in agreement with clinical  observations.  Premature Infant recommended reference ranges:  Up to 24  hours.............<8.0 mg/dL  Up to 48 hours............<12.0 mg/dL  3-5 days..................<15.0 mg/dL  6-29 days.................<15.0 mg/dL      Alkaline Phosphatase 03/04/2020 86  55 - 135 U/L Final    AST 03/04/2020 22  10 - 40 U/L Final    ALT 03/04/2020 27  10 - 44 U/L Final    Anion Gap 03/04/2020 7* 8 - 16 mmol/L Final    eGFR if African American 03/04/2020 >60  >60 mL/min/1.73 m^2 Final    eGFR if non African American 03/04/2020 >60  >60 mL/min/1.73 m^2 Final    Comment: Calculation used to obtain the estimated glomerular filtration  rate (eGFR) is the CKD-EPI equation.       Bilirubin, Direct 03/04/2020 0.5* 0.1 - 0.3 mg/dL Final         Imaging: CT Chest 10/02/17      CT scan of chest without contrast, comparison chest x-ray and CT chest 2014.  History lung cancer, right upper lobe resection.    Few tiny cysts within liver largest right lateral anterior dome at 2:51 0.9 CM.  Few Tiny calcification posterior spleen.  Portion of upper left mid renal cyst is included.  Scattered calcified plaque aorta great vessels.    Bilateral hilar calcifications, largest left mid anterior 1 CM.    Thyroid gland normal.  Mild spondylosis dorsal spine.  No hilar mediastinal mass or adenopathy.      Tracheobronchial tree normal.  Hyperinflation remaining right lung with linear scar formation, centrilobular emphysema.  Very tiny calcific densities medial basal segment right lung.  Tiny calcification anterior segment left upper lobe.  No active infiltrate or effusion.      Assessment:       1. History of lung cancer    2. Pulmonary nodule    3. Chronic obstructive pulmonary disease, unspecified COPD type    4. Type 2 diabetes mellitus without complication, with long-term current use of insulin    5. Normocytic anemia    6. BPH with urinary obstruction           Plan:     History of Lung Cancer - The patient has a history of adenocarcinoma of the lung stage stage 1B s/p RUL lobectomy 3/27/14  -Repeat surveillance  scan shows multiple stable ground glass opacities about both lung in addition to a new ground glass opacity in the left ligular region  -Imaging reviewed personally with radiologist, whom stated this could be scarring  -Will repeat scan in 3 month given new ground glass opacity.    Pulmonary Nodules - Pt with a previous nodules seen on CT of the chest  -CT Chest on 3/04/20 shows multiple stable ground glass opacities with one new one in the left ligular section  -Will repeat CT scan in 3 months    COPD - pt with SOB from underlying COPD  -Pt taking daily fluticasone-salmeterol, and as needed albuterol  -Management per PCP    DMII - last A1c 6.4 on 1/07/20  -management per PCP    Anemia - pt likely with anemia of chronic kidney disease  -Hemoglobin 13.3 on 3/04/20  -Will monitor    Aortic Atherosclerosis - pt on lpitor  Management per PCP    BPH - PSA not elevated on last check  -management per PCP    Advance Care Planning     Power of   I initiated the process of advance care planning today and explained the importance of this process to the patient.  I introduced the concept of advance directives to the patient, as well. Then the patient received detailed information about the importance of designating a Health Care Power of  (HCPOA). He was also instructed to communicate with this person about their wishes for future healthcare, should he become sick and lose decision-making capacity. The patient has not previously appointed a HCPOA. After our discussion, the patient has decided to complete a HCPOA and has appointed his wife Terrance Engel [phone].          Follow Up - 3 months with CT chest to assess pulmonary opacities and labs    Iker Grider MD  Hematology and Oncology  [facility]  Office:[phone]  Fax: [phone]

## 2020-03-17 ENCOUNTER — TELEPHONE (OUTPATIENT)
Dept: FAMILY MEDICINE | Facility: CLINIC | Age: 65
End: 2020-03-17

## 2020-03-17 ENCOUNTER — NURSE TRIAGE (OUTPATIENT)
Dept: ADMINISTRATIVE | Facility: CLINIC | Age: 65
End: 2020-03-17

## 2020-03-17 NOTE — TELEPHONE ENCOUNTER
Spoke with the patient's wife and gave her the on call nurse number.  She has concerns about her  having the virus.  Patient verbalized understandings.

## 2020-03-17 NOTE — TELEPHONE ENCOUNTER
Pt's wife calling w/ pt present. She reports the pt has a cough w/ weakness. Pt reports 2 days ago he had a decent amount of blood in his stool. It has since stopped, but then he began feeling fatigued. Per protocol the pt was advised to go be evaluated at the nearest ED. Pt's wife agrees to drive him. She verbalizes understanding that if he is too weak to stand she will call 911. She was given call back instructions.        Reason for Disposition   Follows bleeding (e.g., from vomiting, rectum, vagina)    Additional Information   Negative: Severe difficulty breathing (e.g., struggling for each breath, speak in single words, bluish lips)   Negative: Sounds like a life-threatening emergency to the triager   Negative: Difficulty breathing occurs within 14 days of CORONAVIRUS EXPOSURE   Negative: Patient sounds very sick or weak to the triager   Negative: Severe difficulty breathing (e.g., struggling for each breath, speaks in single words)   Negative: Shock suspected (e.g., cold/pale/clammy skin, too weak to stand, low BP, rapid pulse)   Negative: Difficult to awaken or acting confused (e.g., disoriented, slurred speech)   Negative: Fainted > 15 minutes ago and still feels too weak or dizzy to stand   Negative: SEVERE weakness (i.e., unable to walk or barely able to walk, requires support) and new onset or worsening   Negative: Sounds like a life-threatening emergency to the triager   Negative: Difficulty breathing   Negative: Heart beating < 50 beats per minute OR > 140 beats per minute   Negative: Extra heartbeats OR irregular heart beating (i.e., 'palpitations')    Protocols used: WEAKNESS (GENERALIZED) AND FATIGUE-A-[place], CORONAVIRUS (2019-NCOV) EXPOSURE-A-[place]

## 2020-03-17 NOTE — TELEPHONE ENCOUNTER
----- Message from Monikajj Dutta sent at 3/17/2020  1:25 PM CDT -----  Contact: Wife Terrance  [phone] or [phone]  Patient called [facility] number for Coronavirus.  Patient has extreme fatigue, dry cough, and chills. They don't have a thermometer.  Patient came home from work so fatigued he couldn't take off his clothes. He went straight to sleep.  Patient has been sick since Wednesday last week.  Patient has just been told he has a possible cancer spot on lung.  Patient has had lung cancer in the past.  Patient is diabetic.   Patient is needing to be be evaluated by PCP so a request can be put in for him to be tested for Coronavirus.  Please contact patient's wife to discuss.

## 2020-03-17 NOTE — TELEPHONE ENCOUNTER
----- Message from Lillian Briscoe sent at 3/17/2020  3:08 PM CDT -----  Contact: Ms Engel [phone]  Type:Appointment Request    Name of Caller:  Ms Engel states Patient experiencing fever, chills fatigue weak   When is the first available appointment?  Symptoms:  Best Call Back Number: [phone]    Additional Information: Patient need referral for testing

## 2020-04-07 ENCOUNTER — TELEPHONE (OUTPATIENT)
Dept: FAMILY MEDICINE | Facility: CLINIC | Age: 65
End: 2020-04-07

## 2020-06-08 ENCOUNTER — HOSPITAL ENCOUNTER (OUTPATIENT)
Dept: RADIOLOGY | Facility: HOSPITAL | Age: 65
Discharge: HOME | End: 2020-06-08
Attending: INTERNAL MEDICINE
Payer: MEDICARE

## 2020-06-08 DIAGNOSIS — Z85.118 HISTORY OF LUNG CANCER: ICD-10-CM

## 2020-06-08 PROCEDURE — 71250 CT THORAX DX C-: CPT | Mod: TC,HCNC

## 2020-06-08 PROCEDURE — 71250 CT CHEST WITHOUT CONTRAST: ICD-10-PCS | Mod: 26,HCNC,, | Performed by: RADIOLOGY

## 2020-06-08 PROCEDURE — 71250 CT THORAX DX C-: CPT | Mod: 26,HCNC,, | Performed by: RADIOLOGY

## 2020-06-10 ENCOUNTER — OFFICE VISIT (OUTPATIENT)
Dept: HEMATOLOGY/ONCOLOGY | Facility: CLINIC | Age: 65
End: 2020-06-10
Payer: MEDICARE

## 2020-06-10 VITALS
HEIGHT: 69 IN | BODY MASS INDEX: 27.11 KG/M2 | OXYGEN SATURATION: 99 % | DIASTOLIC BLOOD PRESSURE: 76 MMHG | SYSTOLIC BLOOD PRESSURE: 135 MMHG | TEMPERATURE: 98 F | HEART RATE: 69 BPM | WEIGHT: 183 LBS

## 2020-06-10 DIAGNOSIS — I70.0 AORTIC ATHEROSCLEROSIS: ICD-10-CM

## 2020-06-10 DIAGNOSIS — Z79.4 TYPE 2 DIABETES MELLITUS WITHOUT COMPLICATION, WITH LONG-TERM CURRENT USE OF INSULIN: ICD-10-CM

## 2020-06-10 DIAGNOSIS — E11.9 TYPE 2 DIABETES MELLITUS WITHOUT COMPLICATION, WITH LONG-TERM CURRENT USE OF INSULIN: ICD-10-CM

## 2020-06-10 DIAGNOSIS — Z85.118 HISTORY OF LUNG CANCER: Primary | ICD-10-CM

## 2020-06-10 DIAGNOSIS — J44.9 CHRONIC OBSTRUCTIVE PULMONARY DISEASE, UNSPECIFIED COPD TYPE: ICD-10-CM

## 2020-06-10 DIAGNOSIS — D64.9 NORMOCYTIC ANEMIA: ICD-10-CM

## 2020-06-10 DIAGNOSIS — N40.1 BPH WITH URINARY OBSTRUCTION: ICD-10-CM

## 2020-06-10 DIAGNOSIS — N13.8 BPH WITH URINARY OBSTRUCTION: ICD-10-CM

## 2020-06-10 DIAGNOSIS — R91.1 PULMONARY NODULE: ICD-10-CM

## 2020-06-10 PROCEDURE — 99999 PR PBB SHADOW E&M-EST. PATIENT-LVL IV: ICD-10-PCS | Mod: PBBFAC,HCNC,, | Performed by: INTERNAL MEDICINE

## 2020-06-10 PROCEDURE — 99999 PR PBB SHADOW E&M-EST. PATIENT-LVL IV: CPT | Mod: PBBFAC,HCNC,, | Performed by: INTERNAL MEDICINE

## 2020-06-10 PROCEDURE — 3044F PR MOST RECENT HEMOGLOBIN A1C LEVEL <7.0%: ICD-10-PCS | Mod: HCNC,CPTII,S$GLB, | Performed by: INTERNAL MEDICINE

## 2020-06-10 PROCEDURE — 3008F BODY MASS INDEX DOCD: CPT | Mod: HCNC,CPTII,S$GLB, | Performed by: INTERNAL MEDICINE

## 2020-06-10 PROCEDURE — 99214 PR OFFICE/OUTPT VISIT, EST, LEVL IV, 30-39 MIN: ICD-10-PCS | Mod: HCNC,S$GLB,, | Performed by: INTERNAL MEDICINE

## 2020-06-10 PROCEDURE — 3008F PR BODY MASS INDEX (BMI) DOCUMENTED: ICD-10-PCS | Mod: HCNC,CPTII,S$GLB, | Performed by: INTERNAL MEDICINE

## 2020-06-10 PROCEDURE — 3044F HG A1C LEVEL LT 7.0%: CPT | Mod: HCNC,CPTII,S$GLB, | Performed by: INTERNAL MEDICINE

## 2020-06-10 PROCEDURE — 99214 OFFICE O/P EST MOD 30 MIN: CPT | Mod: HCNC,S$GLB,, | Performed by: INTERNAL MEDICINE

## 2020-06-10 NOTE — PROGRESS NOTES
PATIENT: Hernan Engel  MRN: [id number]  DATE: 6/10/2020      Diagnosis:   1. History of lung cancer    2. Pulmonary nodule    3. Chronic obstructive pulmonary disease, unspecified COPD type    4. Type 2 diabetes mellitus without complication, with long-term current use of insulin    5. Normocytic anemia    6. BPH with urinary obstruction    7. Aortic atherosclerosis        Chief Complaint: Follow-up (Pulmonary Nodules)      Oncologic History:      Oncologic History 1/16/14  CXR   1/21/14 CT of the chest   2/25/14 IR biopsy  3/11/14 PET/CT  3/20/14 MRI brain  3/24/14 PFTs  3/27/14 RUL lobectomy    Oncologic Treatment 3/27/14 RUL lobectomy    Pathology 2/25/14 IR biopsy RUL mass - adenocarcinoma  3/27/14 RUL lobectomy - adenocarcinoma 3.8cm; no pleural invasion or extension and negative LNs at the level 4; level 10 alpha, beta and gamma; and level 11 alpha and beta      Oncologic History: The patient initially underwent a CXR on 1/16/14 under the care of his PCP Dr. Patel. CXR showed a right upper lung measuring approximately 7 x 4 cm in size.  CT of the chest done on 1/21/14 showed a cavitary right upper lobe lung mass measuring 5.2 cm with spiculated margins and thick walls.  He underwent a biopsy via IR on 2/25/14 showing adenocarcinoma.  PET/Ct was done on 3/11/14 showing hypermetabolic cavitary mass in the right upper lobe consistent with malignancy and no definite evidence for locoregional or distant metastatic spread.  MRI brain on 3/20/14 showed no evidence of CNS disease.  PFTs done on 3/24/14 showed mild obstruction.  The patient underwent partial right upper lobectomy on 3/27/14 under the care of Dr. Jensen Mendez.  Pathology form the surgery showed a mass measuring 3.8cm; no pleural invasion or extension and negative LNs at the level 4; level 10 alpha, beta and gamma; and level 11 alpha and beta.  The patient was then followed by Dr Blevins and then transitioned care to [facility].  He was found to have a  nodule in the RML on CT chest 7/14/14 measuring 0.6cm.  The nodule was followed with periodic CTs of the chest with last one done on 10/02/17 showing no concern for recurrent or metastatic disease but did show bilateral hilar calcifications.  The patient was most recently followed by Dr. Ty Orta a fellow at [facility] on 2/20/17 who planned for CT scans of the chest every 6-12 months.  The patient underwent a CT of the chest on 3/04/20 showing postsurgical changes of right upper lobectomy with scattered curvilinear opacities throughout the right lung field that are not significantly changed a focal small ill-defined patchy ground-glass airspace opacity in the superior lingula.  Otherwise, no new or enlarging suspicious pulmonary nodular opacities appreciated.  Multiple stable subcentimeter ground-glass nodules seen in the anteromedial left upper lobe, anterior left upper lobe, pleural based ground-glass nodule in the medial segment of the right middle lobe, ground-glass nodule in the anteroinferior left upper lobe, three adjacent 3 mm left oblique fissural based ground-glass nodules, ground-glass nodule in the lateral basal segment of the left lower lobe, 3 mm ground-glass nodule in the posterior basal segment of the right lower lobe, 3 mm ground-glass nodule in the lateral basal segment of the left lower lobe.     Subjective:    Interval History: Mr. Engel is a 64 y.o. male with DMII, normocytic anemia, ED, depression , COPD, BPH, aortic atherosclerosis, anxiety who presents for follow up for history of lung cancer.  Since the last clinic visit the patient underwent a CT of the chest on 6/08/20 showing stable findings when compared to his previous study on 3/04/20.  The patient states he feels he had COVID-19 in March with decrease in his smell and taste.  Symptoms have now improved.  The patient denies CP, SOB, abdominal pain, N/V, constipation, diarrhea.  The patient denies fever, chills, night  sweats, weight loss, new lumps or bumps, easy bruising or bleeding.    Past Medical History:   Past Medical History:   Diagnosis Date    Anxiety     Aortic atherosclerosis 12/6/2017    BPH with urinary obstruction 2/20/2019    Colon polyp 1/16/2014    COPD (chronic obstructive pulmonary disease)     Depression     Disorder of kidney and ureter     ED (erectile dysfunction) 1/16/2014    Family history of colon cancer 1/16/2014    Hearing loss in left ear 34 years    Lung cancer     Normocytic anemia 9/3/2014    Seizure 2005    single event - not controlled by medication    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    T2DM (type 2 diabetes mellitus) 2014    DKA 9/2014       Past Surgical HIstory:   Past Surgical History:   Procedure Laterality Date    COLONOSCOPY      LUNG REMOVAL, PARTIAL         Family History:   Family History   Problem Relation Age of Onset    Cancer Father         colon cancer    Colon cancer Father     Cataracts Father     Colon cancer Sister     Cancer Sister         colon or lung cancer     No Known Problems Mother     No Known Problems Brother     No Known Problems Sister     No Known Problems Sister     No Known Problems Maternal Aunt     No Known Problems Maternal Uncle     No Known Problems Paternal Aunt     No Known Problems Paternal Uncle     No Known Problems Maternal Grandmother     No Known Problems Maternal Grandfather     No Known Problems Paternal Grandmother     No Known Problems Paternal Grandfather     Amblyopia Neg Hx     Blindness Neg Hx     Diabetes Neg Hx     Glaucoma Neg Hx     Hypertension Neg Hx     Macular degeneration Neg Hx     Retinal detachment Neg Hx     Strabismus Neg Hx     Stroke Neg Hx     Thyroid disease Neg Hx        Social History:  reports that he quit smoking about 6 years ago. He has a 30.00 pack-year smoking history. He has never used smokeless tobacco. He reports that he drinks about 5.7 standard drinks of  alcohol per week. He reports that he does not use drugs.    Allergies:  Review of patient's allergies indicates:   Allergen Reactions    Adhesive Itching, Rash, Other (See Comments) and Dermatitis     Blister       Medications:  Current Outpatient Medications   Medication Sig Dispense Refill    albuterol (PROVENTIL/VENTOLIN HFA) 90 mcg/actuation inhaler Inhale 2 puffs into the lungs every 6 (six) hours as needed for Wheezing. 18 g 10    atorvastatin (LIPITOR) 10 MG tablet Take 1 tablet (10 mg total) by mouth once daily. 90 tablet 3    blood-glucose meter kit Use as instructed 1 each 0    fluticasone-salmeterol diskus inhaler 500-50 mcg Inhale 1 puff into the lungs 2 (two) times daily. 180 each 3    hydrocortisone 2.5 % lotion Apply topically 2 (two) times daily. 59 mL 0    insulin syringe-needle U-100 1 mL 31 gauge x 5/16 Syrg 1 each.      ipratropium (ATROVENT) 42 mcg (0.06 %) nasal spray 2 sprays by Nasal route 4 (four) times daily. 15 mL 0    lancets Misc 1 lancet by Misc.(Non-Drug; Combo Route) route once daily. 100 each 1    omeprazole (PRILOSEC) 20 MG capsule Take 1 capsule (20 mg total) by mouth 2 (two) times daily. (Patient taking differently: Take 20 mg by mouth 2 (two) times daily. ) 30 capsule 0    sildenafil (VIAGRA) 100 MG tablet Take 1 tablet (100 mg total) by mouth daily as needed for Erectile Dysfunction. 10 tablet 11    TRUE METRIX GLUCOSE TEST STRIP Strp Check sugar once daily 100 each 1     No current facility-administered medications for this visit.        Review of Systems   Constitutional: Negative for chills, diaphoresis, fatigue, fever and unexpected weight change.   HENT: Negative for sore throat and trouble swallowing.    Respiratory: Negative for cough and shortness of breath.    Cardiovascular: Negative for chest pain and palpitations.   Gastrointestinal: Negative for abdominal pain, constipation, diarrhea, nausea and vomiting.   Skin: Negative for color change and rash.  "  Neurological: Negative for headaches.   Hematological: Negative for adenopathy. Does not bruise/bleed easily.       ECOG Performance Status: 1   Objective:      Vitals:   Vitals:    06/10/20 1014   BP: 135/76   BP Location: Left arm   Patient Position: Sitting   BP Method: Medium (Automatic)   Pulse: 69   Temp: 97.7 °F (36.5 °C)   TempSrc: Oral   SpO2: 99%   Weight: 83 kg (182 lb 15.7 oz)   Height: 5' 9" (1.753 m)       Physical Exam   Constitutional: He is oriented to person, place, and time. He appears well-developed and well-nourished. No distress.   HENT:   Head: Normocephalic and atraumatic.   Eyes: No scleral icterus.   Cardiovascular: Normal rate, regular rhythm, normal heart sounds and intact distal pulses. Exam reveals no gallop and no friction rub.   No murmur heard.  Pulmonary/Chest: Effort normal and breath sounds normal. No respiratory distress. He has no wheezes. He has no rales. He exhibits no tenderness.   Abdominal: Soft. Bowel sounds are normal. He exhibits no distension and no mass. There is no tenderness. There is no rebound.   Musculoskeletal: Normal range of motion. He exhibits no edema or tenderness.   Lymphadenopathy:     He has no cervical adenopathy.     He has no axillary adenopathy.        Right: No supraclavicular adenopathy present.        Left: No supraclavicular adenopathy present.   Neurological: He is alert and oriented to person, place, and time.   Skin: Skin is warm and dry. No rash noted. He is not diaphoretic. No erythema.   Psychiatric: He has a normal mood and affect. His behavior is normal.       Laboratory Data:  Lab Visit on 06/08/2020   Component Date Value Ref Range Status    WBC 06/08/2020 3.68* 3.90 - 12.70 K/uL Final    RBC 06/08/2020 4.69  4.60 - 6.20 M/uL Final    Hemoglobin 06/08/2020 13.1* 14.0 - 18.0 g/dL Final    Hematocrit 06/08/2020 39.6* 40.0 - 54.0 % Final    Mean Corpuscular Volume 06/08/2020 84  82 - 98 fL Final    Mean Corpuscular Hemoglobin " 06/08/2020 27.9  27.0 - 31.0 pg Final    Mean Corpuscular Hemoglobin Conc 06/08/2020 33.1  32.0 - 36.0 g/dL Final    RDW 06/08/2020 13.4  11.5 - 14.5 % Final    Platelets 06/08/2020 200  150 - 350 K/uL Final    MPV 06/08/2020 10.0  9.2 - 12.9 fL Final    Gran # (ANC) 06/08/2020 1.6* 1.8 - 7.7 K/uL Final    Comment: The ANC is based on a white cell differential from an   automated cell counter. It has not been microscopically   reviewed for the presence of abnormal cells. Clinical   correlation is required.      Immature Grans (Abs) 06/08/2020 0.01  0.00 - 0.04 K/uL Final    Comment: Mild elevation in immature granulocytes is non specific and   can be seen in a variety of conditions including stress response,   acute inflammation, trauma and pregnancy. Correlation with other   laboratory and clinical findings is essential.      Sodium 06/08/2020 140  136 - 145 mmol/L Final    Potassium 06/08/2020 4.2  3.5 - 5.1 mmol/L Final    Chloride 06/08/2020 105  95 - 110 mmol/L Final    CO2 06/08/2020 27  23 - 29 mmol/L Final    Glucose 06/08/2020 138* 70 - 110 mg/dL Final    BUN, Bld 06/08/2020 15  8 - 23 mg/dL Final    Creatinine 06/08/2020 1.0  0.5 - 1.4 mg/dL Final    Calcium 06/08/2020 9.4  8.7 - 10.5 mg/dL Final    Total Protein 06/08/2020 7.4  6.0 - 8.4 g/dL Final    Albumin 06/08/2020 4.3  3.5 - 5.2 g/dL Final    Total Bilirubin 06/08/2020 0.8  0.1 - 1.0 mg/dL Final    Comment: For infants and newborns, interpretation of results should be based  on gestational age, weight and in agreement with clinical  observations.  Premature Infant recommended reference ranges:  Up to 24 hours.............<8.0 mg/dL  Up to 48 hours............<12.0 mg/dL  3-5 days..................<15.0 mg/dL  6-29 days.................<15.0 mg/dL      Alkaline Phosphatase 06/08/2020 93  55 - 135 U/L Final    AST 06/08/2020 32  10 - 40 U/L Final    ALT 06/08/2020 36  10 - 44 U/L Final    Anion Gap 06/08/2020 8  8 - 16 mmol/L  Final    eGFR if African American 06/08/2020 >60  >60 mL/min/1.73 m^2 Final    eGFR if non African American 06/08/2020 >60  >60 mL/min/1.73 m^2 Final    Comment: Calculation used to obtain the estimated glomerular filtration  rate (eGFR) is the CKD-EPI equation.            Imaging: CT Chest 10/02/17      CT scan of chest without contrast, comparison chest x-ray and CT chest 2014.  History lung cancer, right upper lobe resection.    Few tiny cysts within liver largest right lateral anterior dome at 2:51 0.9 CM.  Few Tiny calcification posterior spleen.  Portion of upper left mid renal cyst is included.  Scattered calcified plaque aorta great vessels.    Bilateral hilar calcifications, largest left mid anterior 1 CM.    Thyroid gland normal.  Mild spondylosis dorsal spine.  No hilar mediastinal mass or adenopathy.      Tracheobronchial tree normal.  Hyperinflation remaining right lung with linear scar formation, centrilobular emphysema.  Very tiny calcific densities medial basal segment right lung.  Tiny calcification anterior segment left upper lobe.  No active infiltrate or effusion.    Ct Chest 6/08/20    There is slight prominence of the right thyroid lobe, similar when compared to the previous exam.    There is a left-sided aortic arch with 3 branch vessels.  The thoracic aorta tapers normally without significant atherosclerotic calcification.    Pulmonary arteries distribute normally.  There are 4 pulmonary veins.    Heart is normal in size.  No pericardial effusion.    There are a few calcified mediastinal and hilar lymph nodes.  No axillary, supraclavicular or axillary lymphadenopathy.    Esophagus is normal in caliber and course.    There is apparent wall thickening involving the stomach, possibly related to nondistention but incompletely evaluated on this exam.  Remaining visualized upper abdominal structures demonstrate no significant abnormalities.    Subcutaneous soft tissues are within normal  limits.    No acute osseous abnormalities.  No suspicious lytic or blastic lesions.    Trachea and proximal airways are patent.  Vocal cords are opposed, presumably from breath-holding.    There are postoperative changes of right upper lobectomy.  Residual lungs demonstrates severe emphysema.  No mass, consolidation, pneumothorax or pleural effusions.  There are persistent punctate and linear high attenuation foci within the posterior and medial basal segments of the right lower lobe possibly related to chronic post inflammatory change and unchanged when compared to the previous exam.  There are a few bilateral calcified and noncalcified solid pulmonary nodules, unchanged when compared to CTs dated 03/04/2020 and 10/02/2017 with largest nodule located within the right middle lobe and measuring 0.6 cm (series 4, image 156).  There is a 1.8 x 1.9 cm subsegmental ground-glass opacity within the lingula (series 4, image 293), similar when compared to CT dated 03/04/2020 and slightly increased in conspicuity when compared to CT dated 10/02/2017, presumably related to differences in technique.  No new pulmonary parenchymal opacities.  No bronchiectasis.        Assessment:       1. History of lung cancer    2. Pulmonary nodule    3. Chronic obstructive pulmonary disease, unspecified COPD type    4. Type 2 diabetes mellitus without complication, with long-term current use of insulin    5. Normocytic anemia    6. BPH with urinary obstruction    7. Aortic atherosclerosis           Plan:     History of Lung Cancer - The patient has a history of adenocarcinoma of the lung stage stage 1B s/p RUL lobectomy 3/27/14  -Repeat surveillance scan shows multiple stable ground glass opacities about both lung in addition to a new ground glass opacity in the left ligular region  -Scans were repeated 6/08/20 showing similar findings to the scan 3/04/20  -Imaging reviewed personally with radiologist whom stated a PET/CT at this time would  not be beneficial  -Will repeat scan in 6 months given stability    Pulmonary Nodules - Pt with a previous nodules seen on CT of the chest  -CT Chest on 3/04/20 shows multiple stable ground glass opacities with one new one in the left ligular section  -Repeat CT chest 6/08/20 showed stable findings.  Will repeat scans in 6 months    COPD - pt denies current SOB  -Pt taking daily fluticasone-salmeterol, and as needed albuterol  -Management per PCP    DMII - last A1c 6.4 on 1/07/20  -management per PCP    Anemia - pt likely with anemia of chronic kidney disease from COPD  -Hemoglobin 13.1 on 6/08/20  -Will monitor    Aortic Atherosclerosis - pt on lpitor  Management per PCP    BPH - PSA not elevated on last check  -management per PCP    Advance Care Planning     Power of   I initiated the process of advance care planning today and explained the importance of this process to the patient.  I introduced the concept of advance directives to the patient, as well. Then the patient received detailed information about the importance of designating a Health Care Power of  (HCPOA). He was also instructed to communicate with this person about their wishes for future healthcare, should he become sick and lose decision-making capacity. The patient has not previously appointed a HCPOA. After our discussion, the patient has decided to complete a HCPOA and has appointed his wife Terrance Engel [phone].          Follow Up - 6 months with CT chest to assess pulmonary opacities and labs    Iker Grider MD  Hematology and Oncology  [facility]  Office:[phone]  Fax: [phone]

## 2020-06-26 ENCOUNTER — HOSPITAL ENCOUNTER (EMERGENCY)
Facility: HOSPITAL | Age: 65
Discharge: HOME | End: 2020-06-26
Attending: EMERGENCY MEDICINE
Payer: MEDICARE

## 2020-06-26 VITALS
BODY MASS INDEX: 27.62 KG/M2 | HEART RATE: 66 BPM | HEIGHT: 67 IN | SYSTOLIC BLOOD PRESSURE: 148 MMHG | RESPIRATION RATE: 17 BRPM | WEIGHT: 176 LBS | OXYGEN SATURATION: 99 % | DIASTOLIC BLOOD PRESSURE: 75 MMHG | TEMPERATURE: 98 F

## 2020-06-26 DIAGNOSIS — N17.9 AKI (ACUTE KIDNEY INJURY): ICD-10-CM

## 2020-06-26 DIAGNOSIS — E86.0 DEHYDRATION: Primary | ICD-10-CM

## 2020-06-26 DIAGNOSIS — R25.2 CRAMPING OF HANDS: ICD-10-CM

## 2020-06-26 DIAGNOSIS — E11.9 TYPE 2 DIABETES MELLITUS WITHOUT COMPLICATION, UNSPECIFIED WHETHER LONG TERM INSULIN USE: ICD-10-CM

## 2020-06-26 LAB
ALBUMIN SERPL BCP-MCNC: 5.1 G/DL (ref 3.5–5.2)
ALP SERPL-CCNC: 87 U/L (ref 55–135)
ALT SERPL W/O P-5'-P-CCNC: 39 U/L (ref 10–44)
ANION GAP SERPL CALC-SCNC: 13 MMOL/L (ref 8–16)
AST SERPL-CCNC: 34 U/L (ref 10–40)
BACTERIA #/AREA URNS HPF: ABNORMAL /HPF
BASOPHILS # BLD AUTO: 0.04 K/UL (ref 0–0.2)
BASOPHILS NFR BLD: 0.5 % (ref 0–1.9)
BILIRUB SERPL-MCNC: 1.1 MG/DL (ref 0.1–1)
BILIRUB UR QL STRIP: NEGATIVE
BUN SERPL-MCNC: 30 MG/DL (ref 8–23)
CALCIUM SERPL-MCNC: 10.9 MG/DL (ref 8.7–10.5)
CHLORIDE SERPL-SCNC: 105 MMOL/L (ref 95–110)
CK SERPL-CCNC: 177 U/L (ref 20–200)
CLARITY UR: ABNORMAL
CO2 SERPL-SCNC: 26 MMOL/L (ref 23–29)
COLOR UR: YELLOW
CREAT SERPL-MCNC: 1.8 MG/DL (ref 0.5–1.4)
DIFFERENTIAL METHOD: ABNORMAL
EOSINOPHIL # BLD AUTO: 0 K/UL (ref 0–0.5)
EOSINOPHIL NFR BLD: 0 % (ref 0–8)
ERYTHROCYTE [DISTWIDTH] IN BLOOD BY AUTOMATED COUNT: 13.2 % (ref 11.5–14.5)
EST. GFR  (AFRICAN AMERICAN): 45 ML/MIN/1.73 M^2
EST. GFR  (NON AFRICAN AMERICAN): 39 ML/MIN/1.73 M^2
GLUCOSE SERPL-MCNC: 162 MG/DL (ref 70–110)
GLUCOSE UR QL STRIP: ABNORMAL
HCT VFR BLD AUTO: 41.9 % (ref 40–54)
HGB BLD-MCNC: 14.4 G/DL (ref 14–18)
HGB UR QL STRIP: NEGATIVE
HYALINE CASTS #/AREA URNS LPF: 2 /LPF
IMM GRANULOCYTES # BLD AUTO: 0.02 K/UL (ref 0–0.04)
IMM GRANULOCYTES NFR BLD AUTO: 0.3 % (ref 0–0.5)
KETONES UR QL STRIP: NEGATIVE
LEUKOCYTE ESTERASE UR QL STRIP: NEGATIVE
LYMPHOCYTES # BLD AUTO: 1.1 K/UL (ref 1–4.8)
LYMPHOCYTES NFR BLD: 13.8 % (ref 18–48)
MAGNESIUM SERPL-MCNC: 2.8 MG/DL (ref 1.6–2.6)
MCH RBC QN AUTO: 28.2 PG (ref 27–31)
MCHC RBC AUTO-ENTMCNC: 34.4 G/DL (ref 32–36)
MCV RBC AUTO: 82 FL (ref 82–98)
MICROSCOPIC COMMENT: ABNORMAL
MONOCYTES # BLD AUTO: 0.4 K/UL (ref 0.3–1)
MONOCYTES NFR BLD: 5.2 % (ref 4–15)
NEUTROPHILS # BLD AUTO: 6.3 K/UL (ref 1.8–7.7)
NEUTROPHILS NFR BLD: 80.2 % (ref 38–73)
NITRITE UR QL STRIP: NEGATIVE
NRBC BLD-RTO: 0 /100 WBC
PH UR STRIP: 5 [PH] (ref 5–8)
PLATELET # BLD AUTO: 206 K/UL (ref 150–350)
PMV BLD AUTO: 10.1 FL (ref 9.2–12.9)
POTASSIUM SERPL-SCNC: 4.2 MMOL/L (ref 3.5–5.1)
PROT SERPL-MCNC: 8.3 G/DL (ref 6–8.4)
PROT UR QL STRIP: ABNORMAL
RBC # BLD AUTO: 5.1 M/UL (ref 4.6–6.2)
RBC #/AREA URNS HPF: 0 /HPF (ref 0–4)
SODIUM SERPL-SCNC: 144 MMOL/L (ref 136–145)
SP GR UR STRIP: 1.02 (ref 1–1.03)
SQUAMOUS #/AREA URNS HPF: 1 /HPF
TROPONIN I SERPL DL<=0.01 NG/ML-MCNC: <0.006 NG/ML (ref 0–0.03)
URATE CRY URNS QL MICRO: ABNORMAL
URN SPEC COLLECT METH UR: ABNORMAL
UROBILINOGEN UR STRIP-ACNC: NEGATIVE EU/DL
WBC # BLD AUTO: 7.82 K/UL (ref 3.9–12.7)
WBC #/AREA URNS HPF: 0 /HPF (ref 0–5)

## 2020-06-26 PROCEDURE — 93005 ELECTROCARDIOGRAM TRACING: CPT | Mod: HCNC

## 2020-06-26 PROCEDURE — 25000003 PHARM REV CODE 250: Mod: HCNC | Performed by: NURSE PRACTITIONER

## 2020-06-26 PROCEDURE — 99284 EMERGENCY DEPT VISIT MOD MDM: CPT | Mod: 25,HCNC

## 2020-06-26 PROCEDURE — 93010 ELECTROCARDIOGRAM REPORT: CPT | Mod: HCNC,,, | Performed by: INTERNAL MEDICINE

## 2020-06-26 PROCEDURE — 80053 COMPREHEN METABOLIC PANEL: CPT | Mod: HCNC

## 2020-06-26 PROCEDURE — 85025 COMPLETE CBC W/AUTO DIFF WBC: CPT | Mod: HCNC

## 2020-06-26 PROCEDURE — 83735 ASSAY OF MAGNESIUM: CPT | Mod: HCNC

## 2020-06-26 PROCEDURE — 84484 ASSAY OF TROPONIN QUANT: CPT | Mod: HCNC

## 2020-06-26 PROCEDURE — 81000 URINALYSIS NONAUTO W/SCOPE: CPT | Mod: HCNC

## 2020-06-26 PROCEDURE — 93010 EKG 12-LEAD: ICD-10-PCS | Mod: HCNC,,, | Performed by: INTERNAL MEDICINE

## 2020-06-26 PROCEDURE — 82550 ASSAY OF CK (CPK): CPT | Mod: HCNC

## 2020-06-26 PROCEDURE — 96361 HYDRATE IV INFUSION ADD-ON: CPT | Mod: HCNC

## 2020-06-26 PROCEDURE — 25000003 PHARM REV CODE 250: Mod: HCNC | Performed by: EMERGENCY MEDICINE

## 2020-06-26 PROCEDURE — 96360 HYDRATION IV INFUSION INIT: CPT | Mod: HCNC

## 2020-06-26 RX ORDER — SODIUM CHLORIDE 9 MG/ML
1000 INJECTION, SOLUTION INTRAVENOUS
Status: COMPLETED | OUTPATIENT
Start: 2020-06-26 | End: 2020-06-26

## 2020-06-26 RX ADMIN — SODIUM CHLORIDE 1000 ML: 0.9 INJECTION, SOLUTION INTRAVENOUS at 07:06

## 2020-06-26 RX ADMIN — SODIUM CHLORIDE 1000 ML: 0.9 INJECTION, SOLUTION INTRAVENOUS at 08:06

## 2020-06-26 NOTE — ED TRIAGE NOTES
"Pt. Reports he was outside cutting grass and he stated to "cramp". Pt. States, he has muscle spasm and cramps. Pt. Denies any nausea and vomiting. Pt. Reports slight lightheadedness. Pt. Is able to verbally communicate.   "

## 2020-06-27 NOTE — DISCHARGE INSTRUCTIONS
Lab testing show some decrease in your kidney function that may be related to dehydration.  Be sure to drink adequate fluids to remain hydrated.  Avoid excessive exertion in the heat.  Schedule close follow-up with your primary physician to monitor your symptoms and to have repeat lab testing within the next week to monitor your kidney function.  Return to the ER for any new, worsening or significantly concerning symptoms.    Thank you for coming to our Emergency Department today. It is important to remember that some problems are difficult to diagnose and may not be found during your first visit. Be sure to follow up with your primary care doctor and review any labs/imaging that was performed with them. If you do not have a primary care doctor, you may contact the one listed on your discharge paperwork or you may also call the [facility] Appointment Desk at [phone] to schedule an appointment with one.     All medications may potentially have side effects and it is impossible to predict which medications may give you side effects. If you feel that you are having a negative effect of any medication you should immediately stop taking them and seek medical attention.    Return to the ER with any questions/concerns, new/concerning symptoms, worsening or failure to improve. Do not drive or make any important decisions for 24 hours if you have received any pain medications, sedatives or mood altering drugs during your ER visit.

## 2020-07-16 ENCOUNTER — LAB VISIT (OUTPATIENT)
Dept: LAB | Facility: HOSPITAL | Age: 65
End: 2020-07-16
Attending: INTERNAL MEDICINE
Payer: MEDICARE

## 2020-07-16 ENCOUNTER — CLINICAL SUPPORT (OUTPATIENT)
Dept: OPHTHALMOLOGY | Facility: CLINIC | Age: 65
End: 2020-07-16
Attending: INTERNAL MEDICINE
Payer: MEDICARE

## 2020-07-16 ENCOUNTER — OFFICE VISIT (OUTPATIENT)
Dept: FAMILY MEDICINE | Facility: CLINIC | Age: 65
End: 2020-07-16
Payer: MEDICARE

## 2020-07-16 VITALS
HEIGHT: 67 IN | TEMPERATURE: 98 F | HEART RATE: 65 BPM | OXYGEN SATURATION: 97 % | DIASTOLIC BLOOD PRESSURE: 66 MMHG | WEIGHT: 186.38 LBS | SYSTOLIC BLOOD PRESSURE: 116 MMHG | BODY MASS INDEX: 29.25 KG/M2

## 2020-07-16 DIAGNOSIS — E11.9 TYPE 2 DIABETES MELLITUS WITHOUT COMPLICATION, WITH LONG-TERM CURRENT USE OF INSULIN: ICD-10-CM

## 2020-07-16 DIAGNOSIS — D64.9 NORMOCYTIC ANEMIA: ICD-10-CM

## 2020-07-16 DIAGNOSIS — J44.9 CHRONIC OBSTRUCTIVE PULMONARY DISEASE, UNSPECIFIED COPD TYPE: ICD-10-CM

## 2020-07-16 DIAGNOSIS — K21.9 GASTROESOPHAGEAL REFLUX DISEASE, ESOPHAGITIS PRESENCE NOT SPECIFIED: ICD-10-CM

## 2020-07-16 DIAGNOSIS — N52.1 TYPE 2 DIABETES MELLITUS WITH CIRCULATORY DISORDER CAUSING ERECTILE DYSFUNCTION: ICD-10-CM

## 2020-07-16 DIAGNOSIS — E11.69 DYSLIPIDEMIA ASSOCIATED WITH TYPE 2 DIABETES MELLITUS: ICD-10-CM

## 2020-07-16 DIAGNOSIS — C34.91 MALIGNANT NEOPLASM OF RIGHT LUNG, UNSPECIFIED PART OF LUNG: ICD-10-CM

## 2020-07-16 DIAGNOSIS — A04.8 H. PYLORI INFECTION: ICD-10-CM

## 2020-07-16 DIAGNOSIS — E55.9 VITAMIN D DEFICIENCY DISEASE: ICD-10-CM

## 2020-07-16 DIAGNOSIS — N52.1 TYPE 2 DIABETES MELLITUS WITH CIRCULATORY DISORDER CAUSING ERECTILE DYSFUNCTION: Primary | ICD-10-CM

## 2020-07-16 DIAGNOSIS — N52.9 ERECTILE DYSFUNCTION, UNSPECIFIED ERECTILE DYSFUNCTION TYPE: ICD-10-CM

## 2020-07-16 DIAGNOSIS — E78.5 DYSLIPIDEMIA ASSOCIATED WITH TYPE 2 DIABETES MELLITUS: ICD-10-CM

## 2020-07-16 DIAGNOSIS — G47.00 INSOMNIA, UNSPECIFIED TYPE: ICD-10-CM

## 2020-07-16 DIAGNOSIS — E11.59 TYPE 2 DIABETES MELLITUS WITH CIRCULATORY DISORDER CAUSING ERECTILE DYSFUNCTION: Primary | ICD-10-CM

## 2020-07-16 DIAGNOSIS — E11.59 TYPE 2 DIABETES MELLITUS WITH CIRCULATORY DISORDER CAUSING ERECTILE DYSFUNCTION: ICD-10-CM

## 2020-07-16 DIAGNOSIS — J43.1 PANLOBULAR EMPHYSEMA: ICD-10-CM

## 2020-07-16 DIAGNOSIS — Z12.5 PROSTATE CANCER SCREENING: ICD-10-CM

## 2020-07-16 DIAGNOSIS — Z12.11 COLON CANCER SCREENING: ICD-10-CM

## 2020-07-16 DIAGNOSIS — N13.8 BPH WITH URINARY OBSTRUCTION: ICD-10-CM

## 2020-07-16 DIAGNOSIS — Z79.4 TYPE 2 DIABETES MELLITUS WITHOUT COMPLICATION, WITH LONG-TERM CURRENT USE OF INSULIN: ICD-10-CM

## 2020-07-16 DIAGNOSIS — E11.9 TYPE 2 DIABETES MELLITUS WITHOUT COMPLICATION, UNSPECIFIED WHETHER LONG TERM INSULIN USE: ICD-10-CM

## 2020-07-16 DIAGNOSIS — N40.1 BPH WITH URINARY OBSTRUCTION: ICD-10-CM

## 2020-07-16 LAB
ALBUMIN/CREAT UR: NORMAL UG/MG (ref 0–30)
CREAT UR-MCNC: 45 MG/DL (ref 23–375)
MICROALBUMIN UR DL<=1MG/L-MCNC: <2.5 UG/ML

## 2020-07-16 PROCEDURE — 92250 FUNDUS PHOTOGRAPHY W/I&R: CPT | Mod: 26,HCNC,S$GLB, | Performed by: OPHTHALMOLOGY

## 2020-07-16 PROCEDURE — 3008F PR BODY MASS INDEX (BMI) DOCUMENTED: ICD-10-PCS | Mod: HCNC,CPTII,S$GLB, | Performed by: NURSE PRACTITIONER

## 2020-07-16 PROCEDURE — 2022F DILAT RTA XM EVC RTNOPTHY: CPT | Mod: HCNC,S$GLB,, | Performed by: OPHTHALMOLOGY

## 2020-07-16 PROCEDURE — 99214 PR OFFICE/OUTPT VISIT, EST, LEVL IV, 30-39 MIN: ICD-10-PCS | Mod: HCNC,S$GLB,, | Performed by: NURSE PRACTITIONER

## 2020-07-16 PROCEDURE — 99999 PR PBB SHADOW E&M-EST. PATIENT-LVL III: ICD-10-PCS | Mod: PBBFAC,HCNC,, | Performed by: NURSE PRACTITIONER

## 2020-07-16 PROCEDURE — 99499 UNLISTED E&M SERVICE: CPT | Mod: S$GLB,,, | Performed by: NURSE PRACTITIONER

## 2020-07-16 PROCEDURE — 3044F HG A1C LEVEL LT 7.0%: CPT | Mod: HCNC,CPTII,S$GLB, | Performed by: NURSE PRACTITIONER

## 2020-07-16 PROCEDURE — 92250 DIABETIC EYE SCREENING PHOTO: ICD-10-PCS | Mod: 26,HCNC,S$GLB, | Performed by: OPHTHALMOLOGY

## 2020-07-16 PROCEDURE — 99214 OFFICE O/P EST MOD 30 MIN: CPT | Mod: HCNC,S$GLB,, | Performed by: NURSE PRACTITIONER

## 2020-07-16 PROCEDURE — 99499 RISK ADDL DX/OHS AUDIT: ICD-10-PCS | Mod: S$GLB,,, | Performed by: NURSE PRACTITIONER

## 2020-07-16 PROCEDURE — 99999 PR PBB SHADOW E&M-EST. PATIENT-LVL III: CPT | Mod: PBBFAC,HCNC,, | Performed by: NURSE PRACTITIONER

## 2020-07-16 PROCEDURE — 3044F PR MOST RECENT HEMOGLOBIN A1C LEVEL <7.0%: ICD-10-PCS | Mod: HCNC,CPTII,S$GLB, | Performed by: NURSE PRACTITIONER

## 2020-07-16 PROCEDURE — 82043 UR ALBUMIN QUANTITATIVE: CPT | Mod: HCNC

## 2020-07-16 PROCEDURE — 3008F BODY MASS INDEX DOCD: CPT | Mod: HCNC,CPTII,S$GLB, | Performed by: NURSE PRACTITIONER

## 2020-07-16 PROCEDURE — 2022F DIABETIC EYE SCREENING PHOTO: ICD-10-PCS | Mod: HCNC,S$GLB,, | Performed by: OPHTHALMOLOGY

## 2020-07-16 RX ORDER — OMEPRAZOLE 20 MG/1
40 CAPSULE, DELAYED RELEASE ORAL DAILY
Qty: 180 CAPSULE | Refills: 1 | Status: SHIPPED | OUTPATIENT
Start: 2020-07-16 | End: 2021-02-08 | Stop reason: SDUPTHER

## 2020-07-16 RX ORDER — SILDENAFIL 100 MG/1
100 TABLET, FILM COATED ORAL DAILY PRN
Qty: 10 TABLET | Refills: 11 | Status: CANCELLED | OUTPATIENT
Start: 2020-07-16 | End: 2021-07-16

## 2020-07-16 RX ORDER — ALBUTEROL SULFATE 90 UG/1
2 AEROSOL, METERED RESPIRATORY (INHALATION) EVERY 6 HOURS PRN
Qty: 18 G | Refills: 10 | Status: SHIPPED | OUTPATIENT
Start: 2020-07-16 | End: 2021-02-08 | Stop reason: SDUPTHER

## 2020-07-16 RX ORDER — SILDENAFIL 100 MG/1
100 TABLET, FILM COATED ORAL DAILY PRN
Qty: 10 TABLET | Refills: 11 | Status: SHIPPED | OUTPATIENT
Start: 2020-07-16 | End: 2021-02-08 | Stop reason: SDUPTHER

## 2020-07-16 RX ORDER — ATORVASTATIN CALCIUM 10 MG/1
10 TABLET, FILM COATED ORAL DAILY
Qty: 90 TABLET | Refills: 3 | Status: SHIPPED | OUTPATIENT
Start: 2020-07-16 | End: 2021-02-08 | Stop reason: SDUPTHER

## 2020-07-16 RX ORDER — FLUTICASONE PROPIONATE AND SALMETEROL 500; 50 UG/1; UG/1
1 POWDER RESPIRATORY (INHALATION) 2 TIMES DAILY
Qty: 180 EACH | Refills: 3 | Status: SHIPPED | OUTPATIENT
Start: 2020-07-16 | End: 2021-02-08 | Stop reason: SDUPTHER

## 2020-07-16 RX ORDER — TRAZODONE HYDROCHLORIDE 50 MG/1
50 TABLET ORAL NIGHTLY
Qty: 90 TABLET | Refills: 1 | Status: SHIPPED | OUTPATIENT
Start: 2020-07-16 | End: 2021-02-08 | Stop reason: SDUPTHER

## 2020-07-16 NOTE — PROGRESS NOTES
Subjective:       Patient ID: Hernan Engel is a 64 y.o. male.    Chief Complaint: Diabetes (F/U) and Hyperlipidemia (F/U)    64-year-old male presents to the clinic today for follow-up on diabetes and hyperlipidemia.  He does not check his blood sugars.  He has fair dietary habits.  He does not exercise but he is active.  He has been out of all of his medications for over a month.  He denies any cardiac chest pain, heart palpitations, shortness of breath, or swelling to lower extremities.  He denies any headaches, dizziness, or blurred vision.  He is due for photo cam and colonoscopy.  He is due for fasting lab works with urine microalbumin.    Diabetes  Pertinent negatives for hypoglycemia include no dizziness or headaches. Pertinent negatives for diabetes include no chest pain and no fatigue.   Hyperlipidemia  Pertinent negatives include no chest pain or shortness of breath.     Past Medical History:   Diagnosis Date    Anxiety     Aortic atherosclerosis 12/6/2017    BPH with urinary obstruction 2/20/2019    Colon polyp 1/16/2014    COPD (chronic obstructive pulmonary disease)     Depression     Disorder of kidney and ureter     ED (erectile dysfunction) 1/16/2014    Family history of colon cancer 1/16/2014    Hearing loss in left ear 34 years    Lung cancer     Normocytic anemia 9/3/2014    Seizure 2005    single event - not controlled by medication    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    T2DM (type 2 diabetes mellitus) 2014    DKA 9/2014     Past Surgical History:   Procedure Laterality Date    COLONOSCOPY      LUNG REMOVAL, PARTIAL        reports that he quit smoking about 6 years ago. He has a 30.00 pack-year smoking history. He has never used smokeless tobacco. He reports current alcohol use of about 5.7 standard drinks of alcohol per week. He reports that he does not use drugs.  Review of Systems   Constitutional: Negative for activity change and fatigue.   Respiratory: Negative for  cough, chest tightness, shortness of breath and wheezing.    Cardiovascular: Negative for chest pain, palpitations and leg swelling.   Gastrointestinal: Negative for abdominal pain, blood in stool, constipation, diarrhea, nausea and vomiting.   Musculoskeletal: Negative for back pain and gait problem.   Skin: Negative for color change and rash.   Neurological: Negative for dizziness, light-headedness and headaches.       Objective:      Physical Exam  Constitutional:       General: He is not in acute distress.     Appearance: He is well-developed. He is not diaphoretic.   Eyes:      General: No scleral icterus.        Right eye: No discharge.         Left eye: No discharge.      Conjunctiva/sclera: Conjunctivae normal.      Pupils: Pupils are equal, round, and reactive to light.   Neck:      Musculoskeletal: Normal range of motion and neck supple.      Vascular: No JVD.   Cardiovascular:      Rate and Rhythm: Normal rate and regular rhythm.      Heart sounds: Normal heart sounds. No murmur.   Pulmonary:      Effort: Pulmonary effort is normal. No respiratory distress.      Breath sounds: Normal breath sounds. No wheezing or rales.   Abdominal:      General: Bowel sounds are normal.      Palpations: Abdomen is soft.      Tenderness: There is no abdominal tenderness.   Musculoskeletal: Normal range of motion.      Comments: Protective Sensation (w/ 10 gram monofilament):  Right: Intact  Left: Intact    Visual Inspection:  Onychomycosis -  Bilateral    Pedal Pulses:   Right: Present  Left: Present    Posterior tibialis:   Right:Present  Left: Present     Skin:     General: Skin is warm and dry.   Neurological:      Mental Status: He is alert and oriented to person, place, and time.         Assessment:       1. Type 2 diabetes mellitus with circulatory disorder causing erectile dysfunction    2. Vitamin D deficiency disease    3. BPH with urinary obstruction    4. Dyslipidemia associated with type 2 diabetes mellitus     5. Normocytic anemia    6. Chronic obstructive pulmonary disease, unspecified COPD type    7. Panlobular emphysema    8. Malignant neoplasm of right lung, unspecified part of lung    9. Type 2 diabetes mellitus without complication, with long-term current use of insulin    10. Prostate cancer screening    11. H. pylori infection    12. Gastroesophageal reflux disease, esophagitis presence not specified    13. Erectile dysfunction, unspecified erectile dysfunction type    14. Insomnia, unspecified type    15. Colon cancer screening        Plan:         Type 2 diabetes mellitus with circulatory disorder causing erectile dysfunction  -     Hemoglobin A1C; Future; Expected date: 07/16/2020  -     Microalbumin/creatinine urine ratio; Future; Expected date: 07/16/2020    Vitamin D deficiency disease  - on no Vit D    BPH with urinary obstruction  - no on any medication at this time    Dyslipidemia associated with type 2 diabetes mellitus  -     Comprehensive metabolic panel; Future; Expected date: 07/16/2020  -     Lipid Panel; Future; Expected date: 07/16/2020  -     atorvastatin (LIPITOR) 10 MG tablet; Take 1 tablet (10 mg total) by mouth once daily.  Dispense: 90 tablet; Refill: 3  - The current medical regimen is effective;  continue present plan and medications.    Normocytic anemia  -     CBC auto differential; Future; Expected date: 07/16/2020    Chronic obstructive pulmonary disease, unspecified COPD type  - The current medical regimen is effective;  continue present plan and medications.    Panlobular emphysema  -     fluticasone-salmeterol diskus inhaler 500-50 mcg; Inhale 1 puff into the lungs 2 (two) times daily.  Dispense: 180 each; Refill: 3  -     albuterol (PROVENTIL/VENTOLIN HFA) 90 mcg/actuation inhaler; Inhale 2 puffs into the lungs every 6 (six) hours as needed for Wheezing.  Dispense: 18 g; Refill: 10  - The current medical regimen is effective;  continue present plan and medications.    Malignant  neoplasm of right lung, unspecified part of lung  - followed by Dr. Grider oncology    Type 2 diabetes mellitus without complication, with long-term current use of insulin  - diet controlled     Prostate cancer screening  -     PSA, Screening; Future; Expected date: 07/16/2020    H. pylori infection  -     omeprazole (PRILOSEC) 20 MG capsule; Take 2 capsules (40 mg total) by mouth once daily.  Dispense: 180 capsule; Refill: 1    Gastroesophageal reflux disease, esophagitis presence not specified  - The current medical regimen is effective;  continue present plan and medications.    Erectile dysfunction, unspecified erectile dysfunction type  -     sildenafiL (VIAGRA) 100 MG tablet; Take 1 tablet (100 mg total) by mouth daily as needed for Erectile Dysfunction.  Dispense: 10 tablet; Refill: 11  - The current medical regimen is effective;  continue present plan and medications.    Insomnia, unspecified type  -     traZODone (DESYREL) 50 MG tablet; Take 1 tablet (50 mg total) by mouth every evening.  Dispense: 90 tablet; Refill: 1  - start Trazodone 50 mg po every night     Colon cancer screening  -     Case request GI: COLONOSCOPY

## 2020-07-16 NOTE — PATIENT INSTRUCTIONS
Schedule fasting labs with urine   Schedule photo cam   Referral for colonoscopy  Follow up with Dr. Schaefer in 6 months

## 2020-07-16 NOTE — PROGRESS NOTES
HPI     63 Y/o here for screening for diabetic retinopathy with non-dilated   fundus photos per Dr. Blakely     Last edited by Douglas Simms MA on 7/16/2020 11:31 AM. (History)            Assessment /Plan     For exam results, see Encounter Report.    Type 2 diabetes mellitus without complication, unspecified whether long term insulin use  -     Diabetic Eye Screening Photo      Please see Dr. Hines progress note for interpretation

## 2020-07-21 ENCOUNTER — TELEPHONE (OUTPATIENT)
Dept: FAMILY MEDICINE | Facility: CLINIC | Age: 65
End: 2020-07-21

## 2020-07-22 ENCOUNTER — TELEPHONE (OUTPATIENT)
Dept: FAMILY MEDICINE | Facility: CLINIC | Age: 65
End: 2020-07-22

## 2020-07-23 ENCOUNTER — TELEPHONE (OUTPATIENT)
Dept: FAMILY MEDICINE | Facility: CLINIC | Age: 65
End: 2020-07-23

## 2020-07-23 NOTE — TELEPHONE ENCOUNTER
I spoke with the patient and explained that his kidney function has now returned to normal. You HgbA1C is good. You have mild anemia but controlled. You have no protein in your urine. Patient verbalized understanding of above.

## 2020-07-24 ENCOUNTER — TELEPHONE (OUTPATIENT)
Dept: OPHTHALMOLOGY | Facility: CLINIC | Age: 65
End: 2020-07-24

## 2020-07-24 NOTE — TELEPHONE ENCOUNTER
Called patient regarding diabetic eye screening result    lvm No Background Diabetic Retinopathy. Follow up in 1 months.

## 2020-07-29 ENCOUNTER — TELEPHONE (OUTPATIENT)
Dept: OPHTHALMOLOGY | Facility: CLINIC | Age: 65
End: 2020-07-29

## 2020-08-06 ENCOUNTER — TELEPHONE (OUTPATIENT)
Dept: OPHTHALMOLOGY | Facility: CLINIC | Age: 65
End: 2020-08-06

## 2020-08-06 NOTE — TELEPHONE ENCOUNTER
Called patient regarding diabetic eye screening results ; gave patient his results and schedule him an appointment

## 2020-09-03 ENCOUNTER — PATIENT OUTREACH (OUTPATIENT)
Dept: ADMINISTRATIVE | Facility: OTHER | Age: 65
End: 2020-09-03

## 2020-09-03 NOTE — PROGRESS NOTES
Health Maintenance Due   Topic Date Due    Colorectal Cancer Screening  02/06/2019    Influenza Vaccine (1) 08/01/2020     Updates were requested from care everywhere.  Chart was reviewed for overdue Proactive [facility] Encounters ([facility]) topics (CRS, Breast Cancer Screening, Eye exam)  Health Maintenance has been updated.  [facility] immunization registry triggered.  Immunizations were reconciled.    Case request already placed for cscope.

## 2020-09-04 ENCOUNTER — OFFICE VISIT (OUTPATIENT)
Dept: OPTOMETRY | Facility: CLINIC | Age: 65
End: 2020-09-04
Payer: MEDICARE

## 2020-09-04 DIAGNOSIS — E11.36 TYPE 2 DIABETES MELLITUS WITH DIABETIC CATARACT, WITHOUT LONG-TERM CURRENT USE OF INSULIN: Primary | ICD-10-CM

## 2020-09-04 DIAGNOSIS — H25.13 NUCLEAR SCLEROSIS OF BOTH EYES: ICD-10-CM

## 2020-09-04 DIAGNOSIS — H52.7 REFRACTIVE ERROR: ICD-10-CM

## 2020-09-04 PROCEDURE — 92015 PR REFRACTION: ICD-10-PCS | Mod: HCNC,S$GLB,, | Performed by: OPTOMETRIST

## 2020-09-04 PROCEDURE — 92014 PR EYE EXAM, EST PATIENT,COMPREHESV: ICD-10-PCS | Mod: HCNC,S$GLB,, | Performed by: OPTOMETRIST

## 2020-09-04 PROCEDURE — 99999 PR PBB SHADOW E&M-EST. PATIENT-LVL III: ICD-10-PCS | Mod: PBBFAC,HCNC,, | Performed by: OPTOMETRIST

## 2020-09-04 PROCEDURE — 99999 PR PBB SHADOW E&M-EST. PATIENT-LVL III: CPT | Mod: PBBFAC,HCNC,, | Performed by: OPTOMETRIST

## 2020-09-04 PROCEDURE — 92014 COMPRE OPH EXAM EST PT 1/>: CPT | Mod: HCNC,S$GLB,, | Performed by: OPTOMETRIST

## 2020-09-04 PROCEDURE — 92015 DETERMINE REFRACTIVE STATE: CPT | Mod: HCNC,S$GLB,, | Performed by: OPTOMETRIST

## 2020-09-04 NOTE — PROGRESS NOTES
Subjective:       Patient ID: Hernan Engel is a 64 y.o. male      Chief Complaint   Patient presents with    Concerns About Ocular Health    Diabetic Eye Exam     History of Present Illness  Dls: 12/19/17 Dr. Francis     63 y/o male presents today for diabetic eye exam.   Pt states no changes in vision.   Pt lost single vision glasses for reading.      x 2 days    No tearing  No itching   No burning  No pain  No ha's  + floaters  No flashes    Eye meds  otc gtts ou prn     Hemoglobin A1C       Date                     Value               Ref Range           Status                07/16/2020               5.9 (H)             4.0 - 5.6 %         Final             01/07/2020               6.4 (H)             4.0 - 5.6 %         Final          10/05/2019               5.4                 4.0 - 5.6 %         Final                    Assessment/Plan:     1. Type 2 diabetes mellitus with diabetic cataract, without long-term current use of insulin  No diabetic retinopathy. Discussed with pt the effects of diabetes on vision, importance of good blood sugar control, compliance with meds, and follow up care with PCP. Return in 1 year for dilated eye exam, sooner PRN.    2. Nuclear sclerosis of both eyes  Educated pt on presence of cataracts and effects on vision. No surgery at this time. Recheck in one year.    3. Refractive error  Educated patient on refractive error and discussed lens options. Dispensed updated spectacle Rx. Educated about adaptation period to new specs.    Eyeglass Final Rx     Eyeglass Final Rx       Sphere Cylinder Axis    Right +2.50 +0.25 035    Left +2.75 Sphere     Type: SVL near    Expiration Date: 9/5/2021                  Follow up in about 1 year (around 9/4/2021) for Diabetic Eye Exam.

## 2020-12-01 ENCOUNTER — TELEPHONE (OUTPATIENT)
Dept: HEMATOLOGY/ONCOLOGY | Facility: CLINIC | Age: 65
End: 2020-12-01

## 2020-12-01 NOTE — TELEPHONE ENCOUNTER
----- Message from Belen Blount sent at 12/1/2020  6:56 AM CST -----  Pt would like to be called back regarding an appt    Pt can be reached at [phone]

## 2020-12-01 NOTE — TELEPHONE ENCOUNTER
I called the patient and left him a message to call me back at [phone].     Iker Grider MD  Hematology and Oncology  [facility]  Office:[phone]  Fax: [phone]

## 2020-12-15 ENCOUNTER — HOSPITAL ENCOUNTER (OUTPATIENT)
Dept: RADIOLOGY | Facility: HOSPITAL | Age: 65
Discharge: HOME | End: 2020-12-15
Attending: INTERNAL MEDICINE
Payer: MEDICARE

## 2020-12-15 DIAGNOSIS — Z85.118 HISTORY OF LUNG CANCER: ICD-10-CM

## 2020-12-15 DIAGNOSIS — R91.1 PULMONARY NODULE: ICD-10-CM

## 2020-12-15 PROCEDURE — 71250 CT CHEST WITHOUT CONTRAST: ICD-10-PCS | Mod: 26,,, | Performed by: RADIOLOGY

## 2020-12-15 PROCEDURE — 71250 CT THORAX DX C-: CPT | Mod: TC

## 2020-12-15 PROCEDURE — 71250 CT THORAX DX C-: CPT | Mod: 26,,, | Performed by: RADIOLOGY

## 2020-12-16 NOTE — PROGRESS NOTES
PATIENT: Hernan Engel  MRN: [id number]  DATE: 12/17/2020      Diagnosis:   1. History of lung cancer    2. Pulmonary nodule    3. Chronic obstructive pulmonary disease, unspecified COPD type    4. Type 2 diabetes mellitus without complication, with long-term current use of insulin    5. Normocytic anemia    6. BPH with urinary obstruction    7. Aortic atherosclerosis        Chief Complaint: Follow-up (Pulmonary nodules)      Oncologic History:      Oncologic History 1/16/14  CXR   1/21/14 CT of the chest   2/25/14 IR biopsy  3/11/14 PET/CT  3/20/14 MRI brain  3/24/14 PFTs  3/27/14 RUL lobectomy  3/04/20 CT Chest  6/08/20 CT Chest  12/15/20 CT Chest    Oncologic Treatment 3/27/14 RUL lobectomy    Pathology 2/25/14 IR biopsy RUL mass - adenocarcinoma  3/27/14 RUL lobectomy - adenocarcinoma 3.8cm; no pleural invasion or extension and negative LNs at the level 4; level 10 alpha, beta and gamma; and level 11 alpha and beta      Oncologic History: The patient initially underwent a CXR on 1/16/14 under the care of his PCP Dr. Patel. CXR showed a right upper lung measuring approximately 7 x 4 cm in size.  CT of the chest done on 1/21/14 showed a cavitary right upper lobe lung mass measuring 5.2 cm with spiculated margins and thick walls.  He underwent a biopsy via IR on 2/25/14 showing adenocarcinoma.  PET/Ct was done on 3/11/14 showing hypermetabolic cavitary mass in the right upper lobe consistent with malignancy and no definite evidence for locoregional or distant metastatic spread.  MRI brain on 3/20/14 showed no evidence of CNS disease.  PFTs done on 3/24/14 showed mild obstruction.  The patient underwent partial right upper lobectomy on 3/27/14 under the care of Dr. Jensen Mendez.  Pathology form the surgery showed a mass measuring 3.8cm; no pleural invasion or extension and negative LNs at the level 4; level 10 alpha, beta and gamma; and level 11 alpha and beta.  The patient was then followed by Dr Blevins and  then transitioned care to [facility].  He was found to have a nodule in the RML on CT chest 7/14/14 measuring 0.6cm.  The nodule was followed with periodic CTs of the chest with last one done on 10/02/17 showing no concern for recurrent or metastatic disease but did show bilateral hilar calcifications.  The patient was most recently followed by Dr. Ty Orta a fellow at [facility] on 2/20/17 who planned for CT scans of the chest every 6-12 months.  The patient underwent a CT of the chest on 3/04/20 showing postsurgical changes of right upper lobectomy with scattered curvilinear opacities throughout the right lung field that are not significantly changed a focal small ill-defined patchy ground-glass airspace opacity in the superior lingula.  Otherwise, no new or enlarging suspicious pulmonary nodular opacities appreciated.  Multiple stable subcentimeter ground-glass nodules seen in the anteromedial left upper lobe, anterior left upper lobe, pleural based ground-glass nodule in the medial segment of the right middle lobe, ground-glass nodule in the anteroinferior left upper lobe, three adjacent 3 mm left oblique fissural based ground-glass nodules, ground-glass nodule in the lateral basal segment of the left lower lobe, 3 mm ground-glass nodule in the posterior basal segment of the right lower lobe, 3 mm ground-glass nodule in the lateral basal segment of the left lower lobe.  The patient underwent a CT of the chest on 6/08/20 showing stable findings when compared to his previous study on 3/04/20.     Subjective:    Interval History: Mr. Engel is a 65 y.o. male with DMII, normocytic anemia, ED, depression , COPD, BPH, aortic atherosclerosis, anxiety who presents for follow up for history of lung cancer.  Since the last clinic visit the patient underwent a CT of the chest on 12/15/20 showing a calcified left hilar lymph node which was unchanged, left lung calcified nodules consistent with prior granulomatous  disease, lingular ground-glass nodule measuring 2.2 x 1.9 cm which was previously 1.8 x 1.9 cm, stable right middle lobe 6 mm pulmonary nodule, stable left upper lobe 3 mm pulmonary nodule.  The patient states he feels well.  The patient denies CP, SOB, abdominal pain, N/V, constipation, diarrhea.  The patient denies fever, chills, night sweats, weight loss, new lumps or bumps, easy bruising or bleeding.    Past Medical History:   Past Medical History:   Diagnosis Date    Anxiety     Aortic atherosclerosis 12/6/2017    BPH with urinary obstruction 2/20/2019    Colon polyp 1/16/2014    COPD (chronic obstructive pulmonary disease)     Depression     Disorder of kidney and ureter     ED (erectile dysfunction) 1/16/2014    Family history of colon cancer 1/16/2014    Hearing loss in left ear 34 years    Lung cancer     Normocytic anemia 9/3/2014    Nuclear sclerosis of both eyes 9/4/2020    Seizure 2005    single event - not controlled by medication    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    T2DM (type 2 diabetes mellitus) 2014    DKA 9/2014       Past Surgical HIstory:   Past Surgical History:   Procedure Laterality Date    COLONOSCOPY      LUNG REMOVAL, PARTIAL         Family History:   Family History   Problem Relation Age of Onset    Cancer Father         colon cancer    Colon cancer Father     Cataracts Father     Colon cancer Sister     Cancer Sister         colon or lung cancer     No Known Problems Mother     No Known Problems Brother     No Known Problems Sister     No Known Problems Sister     No Known Problems Maternal Aunt     No Known Problems Maternal Uncle     No Known Problems Paternal Aunt     No Known Problems Paternal Uncle     No Known Problems Maternal Grandmother     No Known Problems Maternal Grandfather     No Known Problems Paternal Grandmother     No Known Problems Paternal Grandfather     Amblyopia Neg Hx     Blindness Neg Hx     Diabetes Neg Hx      Glaucoma Neg Hx     Hypertension Neg Hx     Macular degeneration Neg Hx     Retinal detachment Neg Hx     Strabismus Neg Hx     Stroke Neg Hx     Thyroid disease Neg Hx        Social History:  reports that he quit smoking about 6 years ago. He has a 30.00 pack-year smoking history. He has never used smokeless tobacco. He reports current alcohol use of about 5.7 standard drinks of alcohol per week. He reports that he does not use drugs.    Allergies:  Review of patient's allergies indicates:   Allergen Reactions    Adhesive Itching, Rash, Other (See Comments) and Dermatitis     Blister       Medications:  Current Outpatient Medications   Medication Sig Dispense Refill    atorvastatin (LIPITOR) 10 MG tablet Take 1 tablet (10 mg total) by mouth once daily. 90 tablet 3    fluticasone-salmeterol diskus inhaler 500-50 mcg Inhale 1 puff into the lungs 2 (two) times daily. 180 each 3    hydrocortisone 2.5 % lotion Apply topically 2 (two) times daily. 59 mL 0    insulin syringe-needle U-100 1 mL 31 gauge x 5/16 Syrg 1 each.      ipratropium (ATROVENT) 42 mcg (0.06 %) nasal spray 2 sprays by Nasal route 4 (four) times daily. 15 mL 0    lancets Misc 1 lancet by Misc.(Non-Drug; Combo Route) route once daily. 100 each 1    sildenafiL (VIAGRA) 100 MG tablet Take 1 tablet (100 mg total) by mouth daily as needed for Erectile Dysfunction. 10 tablet 11    traZODone (DESYREL) 50 MG tablet Take 1 tablet (50 mg total) by mouth every evening. 90 tablet 1    TRUE METRIX GLUCOSE TEST STRIP Strp Check sugar once daily 100 each 1    albuterol (PROVENTIL/VENTOLIN HFA) 90 mcg/actuation inhaler Inhale 2 puffs into the lungs every 6 (six) hours as needed for Wheezing. (Patient not taking: Reported on 12/17/2020) 18 g 10    blood-glucose meter kit Use as instructed 1 each 0    omeprazole (PRILOSEC) 20 MG capsule Take 2 capsules (40 mg total) by mouth once daily. 180 capsule 1     No current facility-administered medications for  "this visit.        Review of Systems   Constitutional: Negative for chills, diaphoresis, fatigue, fever and unexpected weight change.   HENT: Negative for sore throat and trouble swallowing.    Respiratory: Negative for cough and shortness of breath.    Cardiovascular: Negative for chest pain and palpitations.   Gastrointestinal: Negative for abdominal pain, constipation, diarrhea, nausea and vomiting.   Skin: Negative for color change and rash.   Neurological: Negative for headaches.   Hematological: Negative for adenopathy. Does not bruise/bleed easily.       ECOG Performance Status: 1   Objective:      Vitals:   Vitals:    12/17/20 1017   BP: 133/78   BP Location: Left arm   Patient Position: Sitting   BP Method: Large (Automatic)   Pulse: 74   Temp: 98.6 °F (37 °C)   SpO2: 99%   Weight: 82.6 kg (182 lb 1.6 oz)   Height: 5' 7" (1.702 m)       Physical Exam  Constitutional:       General: He is not in acute distress.     Appearance: He is well-developed. He is not diaphoretic.   HENT:      Head: Normocephalic and atraumatic.   Eyes:      General: No scleral icterus.  Cardiovascular:      Rate and Rhythm: Normal rate and regular rhythm.      Heart sounds: Normal heart sounds. No murmur. No friction rub. No gallop.    Pulmonary:      Effort: Pulmonary effort is normal. No respiratory distress.      Breath sounds: Normal breath sounds. No wheezing or rales.   Chest:      Chest wall: No tenderness.   Abdominal:      General: Bowel sounds are normal. There is no distension.      Palpations: Abdomen is soft. There is no mass.      Tenderness: There is no abdominal tenderness. There is no rebound.   Musculoskeletal: Normal range of motion.         General: No tenderness.   Lymphadenopathy:      Cervical: No cervical adenopathy.      Upper Body:      Right upper body: No supraclavicular adenopathy.      Left upper body: No supraclavicular adenopathy.   Skin:     General: Skin is warm and dry.      Findings: No erythema or " rash.   Neurological:      Mental Status: He is alert and oriented to person, place, and time.   Psychiatric:         Behavior: Behavior normal.         Laboratory Data:  Lab Visit on 12/15/2020   Component Date Value Ref Range Status    WBC 12/15/2020 3.95  3.90 - 12.70 K/uL Final    RBC 12/15/2020 4.90  4.60 - 6.20 M/uL Final    Hemoglobin 12/15/2020 13.8* 14.0 - 18.0 g/dL Final    Hematocrit 12/15/2020 41.4  40.0 - 54.0 % Final    MCV 12/15/2020 85  82 - 98 fL Final    MCH 12/15/2020 28.2  27.0 - 31.0 pg Final    MCHC 12/15/2020 33.3  32.0 - 36.0 g/dL Final    RDW 12/15/2020 13.1  11.5 - 14.5 % Final    Platelets 12/15/2020 219  150 - 350 K/uL Final    MPV 12/15/2020 9.6  9.2 - 12.9 fL Final    Immature Granulocytes 12/15/2020 0.3  0.0 - 0.5 % Final    Gran # (ANC) 12/15/2020 1.8  1.8 - 7.7 K/uL Final    Immature Grans (Abs) 12/15/2020 0.01  0.00 - 0.04 K/uL Final    Comment: Mild elevation in immature granulocytes is non specific and   can be seen in a variety of conditions including stress response,   acute inflammation, trauma and pregnancy. Correlation with other   laboratory and clinical findings is essential.      Lymph # 12/15/2020 1.6  1.0 - 4.8 K/uL Final    Mono # 12/15/2020 0.4  0.3 - 1.0 K/uL Final    Eos # 12/15/2020 0.1  0.0 - 0.5 K/uL Final    Baso # 12/15/2020 0.03  0.00 - 0.20 K/uL Final    nRBC 12/15/2020 0  0 /100 WBC Final    Gran % 12/15/2020 45.7  38.0 - 73.0 % Final    Lymph % 12/15/2020 40.8  18.0 - 48.0 % Final    Mono % 12/15/2020 8.9  4.0 - 15.0 % Final    Eosinophil % 12/15/2020 3.5  0.0 - 8.0 % Final    Basophil % 12/15/2020 0.8  0.0 - 1.9 % Final    Differential Method 12/15/2020 Automated   Final    Sodium 12/15/2020 140  136 - 145 mmol/L Final    Potassium 12/15/2020 4.5  3.5 - 5.1 mmol/L Final    Chloride 12/15/2020 106  95 - 110 mmol/L Final    CO2 12/15/2020 27  23 - 29 mmol/L Final    Glucose 12/15/2020 141* 70 - 110 mg/dL Final    BUN 12/15/2020  12  8 - 23 mg/dL Final    Creatinine 12/15/2020 1.1  0.5 - 1.4 mg/dL Final    Calcium 12/15/2020 9.6  8.7 - 10.5 mg/dL Final    Total Protein 12/15/2020 7.4  6.0 - 8.4 g/dL Final    Albumin 12/15/2020 4.4  3.5 - 5.2 g/dL Final    Total Bilirubin 12/15/2020 1.3* 0.1 - 1.0 mg/dL Final    Comment: For infants and newborns, interpretation of results should be based  on gestational age, weight and in agreement with clinical  observations.  Premature Infant recommended reference ranges:  Up to 24 hours.............<8.0 mg/dL  Up to 48 hours............<12.0 mg/dL  3-5 days..................<15.0 mg/dL  6-29 days.................<15.0 mg/dL      Alkaline Phosphatase 12/15/2020 77  55 - 135 U/L Final    AST 12/15/2020 18  10 - 40 U/L Final    ALT 12/15/2020 19  10 - 44 U/L Final    Anion Gap 12/15/2020 7* 8 - 16 mmol/L Final    eGFR if African American 12/15/2020 >60  >60 mL/min/1.73 m^2 Final    eGFR if non African American 12/15/2020 >60  >60 mL/min/1.73 m^2 Final    Comment: Calculation used to obtain the estimated glomerular filtration  rate (eGFR) is the CKD-EPI equation.            Imaging: CT Chest 10/02/17      CT scan of chest without contrast, comparison chest x-ray and CT chest 2014.  History lung cancer, right upper lobe resection.    Few tiny cysts within liver largest right lateral anterior dome at 2:51 0.9 CM.  Few Tiny calcification posterior spleen.  Portion of upper left mid renal cyst is included.  Scattered calcified plaque aorta great vessels.    Bilateral hilar calcifications, largest left mid anterior 1 CM.    Thyroid gland normal.  Mild spondylosis dorsal spine.  No hilar mediastinal mass or adenopathy.      Tracheobronchial tree normal.  Hyperinflation remaining right lung with linear scar formation, centrilobular emphysema.  Very tiny calcific densities medial basal segment right lung.  Tiny calcification anterior segment left upper lobe.  No active infiltrate or effusion.    Ct Chest  6/08/20    There is slight prominence of the right thyroid lobe, similar when compared to the previous exam.    There is a left-sided aortic arch with 3 branch vessels.  The thoracic aorta tapers normally without significant atherosclerotic calcification.    Pulmonary arteries distribute normally.  There are 4 pulmonary veins.    Heart is normal in size.  No pericardial effusion.    There are a few calcified mediastinal and hilar lymph nodes.  No axillary, supraclavicular or axillary lymphadenopathy.    Esophagus is normal in caliber and course.    There is apparent wall thickening involving the stomach, possibly related to nondistention but incompletely evaluated on this exam.  Remaining visualized upper abdominal structures demonstrate no significant abnormalities.    Subcutaneous soft tissues are within normal limits.    No acute osseous abnormalities.  No suspicious lytic or blastic lesions.    Trachea and proximal airways are patent.  Vocal cords are opposed, presumably from breath-holding.    There are postoperative changes of right upper lobectomy.  Residual lungs demonstrates severe emphysema.  No mass, consolidation, pneumothorax or pleural effusions.  There are persistent punctate and linear high attenuation foci within the posterior and medial basal segments of the right lower lobe possibly related to chronic post inflammatory change and unchanged when compared to the previous exam.  There are a few bilateral calcified and noncalcified solid pulmonary nodules, unchanged when compared to CTs dated 03/04/2020 and 10/02/2017 with largest nodule located within the right middle lobe and measuring 0.6 cm (series 4, image 156).  There is a 1.8 x 1.9 cm subsegmental ground-glass opacity within the lingula (series 4, image 293), similar when compared to CT dated 03/04/2020 and slightly increased in conspicuity when compared to CT dated 10/02/2017, presumably related to differences in technique.  No new pulmonary  parenchymal opacities.  No bronchiectasis.        Assessment:       1. History of lung cancer    2. Pulmonary nodule    3. Chronic obstructive pulmonary disease, unspecified COPD type    4. Type 2 diabetes mellitus without complication, with long-term current use of insulin    5. Normocytic anemia    6. BPH with urinary obstruction    7. Aortic atherosclerosis           Plan:     History of Lung Cancer - The patient has a history of adenocarcinoma of the lung stage stage 1B s/p RUL lobectomy 3/27/14  -Repeat surveillance scan shows multiple stable ground glass opacities about both lung in addition to a new ground glass opacity in the left ligular region  -Repeat CT chest 12/15/20 shows stable nodules with slight increase in the lingular ground-glass nodule measuring 2.2 x 1.9 cm which was previously 1.8 x 1.9 cm  -This was discussed with radiology who felt the area had not changed much and taht PET/CT would not be of benefit   -Will repeat scan in 3 months    Pulmonary Nodules - see above    COPD - pt denies current SOB  -Pt taking daily fluticasone-salmeterol, and as needed albuterol  -Management per PCP    DMII - last A1c 5.9 on 7/16/20  -management per PCP    Anemia - pt likely with anemia of chronic kidney disease from COPD  -Hemoglobin 13.8 on 12/15/20  -Will monitor    Aortic Atherosclerosis - pt on lpitor  Management per PCP    BPH - PSA not elevated on last check  -management per PCP    Advance Care Planning     Power of   I initiated the process of advance care planning today and explained the importance of this process to the patient.  I introduced the concept of advance directives to the patient, as well. Then the patient received detailed information about the importance of designating a Health Care Power of  (HCPOA). He was also instructed to communicate with this person about their wishes for future healthcare, should he become sick and lose decision-making capacity. The patient has not  previously appointed a HCPOA. After our discussion, the patient has decided to complete a HCPOA and has appointed his wife Terrance Engel [phone].          Follow Up - 3 months with CT chest and labs    Iker Grider MD  Hematology and Oncology  [facility]  Office:[phone]  Fax: [phone]

## 2020-12-17 ENCOUNTER — OFFICE VISIT (OUTPATIENT)
Dept: HEMATOLOGY/ONCOLOGY | Facility: CLINIC | Age: 65
End: 2020-12-17
Payer: MEDICARE

## 2020-12-17 VITALS
HEART RATE: 74 BPM | WEIGHT: 182.13 LBS | HEIGHT: 67 IN | BODY MASS INDEX: 28.58 KG/M2 | SYSTOLIC BLOOD PRESSURE: 133 MMHG | TEMPERATURE: 99 F | DIASTOLIC BLOOD PRESSURE: 78 MMHG | OXYGEN SATURATION: 99 %

## 2020-12-17 DIAGNOSIS — N13.8 BPH WITH URINARY OBSTRUCTION: ICD-10-CM

## 2020-12-17 DIAGNOSIS — E11.9 TYPE 2 DIABETES MELLITUS WITHOUT COMPLICATION, WITH LONG-TERM CURRENT USE OF INSULIN: ICD-10-CM

## 2020-12-17 DIAGNOSIS — Z85.118 HISTORY OF LUNG CANCER: Primary | ICD-10-CM

## 2020-12-17 DIAGNOSIS — N40.1 BPH WITH URINARY OBSTRUCTION: ICD-10-CM

## 2020-12-17 DIAGNOSIS — J44.9 CHRONIC OBSTRUCTIVE PULMONARY DISEASE, UNSPECIFIED COPD TYPE: ICD-10-CM

## 2020-12-17 DIAGNOSIS — D64.9 NORMOCYTIC ANEMIA: ICD-10-CM

## 2020-12-17 DIAGNOSIS — Z79.4 TYPE 2 DIABETES MELLITUS WITHOUT COMPLICATION, WITH LONG-TERM CURRENT USE OF INSULIN: ICD-10-CM

## 2020-12-17 DIAGNOSIS — R91.1 PULMONARY NODULE: ICD-10-CM

## 2020-12-17 DIAGNOSIS — I70.0 AORTIC ATHEROSCLEROSIS: ICD-10-CM

## 2020-12-17 PROCEDURE — 1157F PR ADVANCE CARE PLAN OR EQUIV PRESENT IN MEDICAL RECORD: ICD-10-PCS | Mod: S$GLB,,, | Performed by: INTERNAL MEDICINE

## 2020-12-17 PROCEDURE — 1101F PR PT FALLS ASSESS DOC 0-1 FALLS W/OUT INJ PAST YR: ICD-10-PCS | Mod: CPTII,S$GLB,, | Performed by: INTERNAL MEDICINE

## 2020-12-17 PROCEDURE — 99499 RISK ADDL DX/OHS AUDIT: ICD-10-PCS | Mod: S$GLB,,, | Performed by: INTERNAL MEDICINE

## 2020-12-17 PROCEDURE — 3008F PR BODY MASS INDEX (BMI) DOCUMENTED: ICD-10-PCS | Mod: CPTII,S$GLB,, | Performed by: INTERNAL MEDICINE

## 2020-12-17 PROCEDURE — 1126F AMNT PAIN NOTED NONE PRSNT: CPT | Mod: S$GLB,,, | Performed by: INTERNAL MEDICINE

## 2020-12-17 PROCEDURE — 3044F HG A1C LEVEL LT 7.0%: CPT | Mod: CPTII,S$GLB,, | Performed by: INTERNAL MEDICINE

## 2020-12-17 PROCEDURE — 99499 UNLISTED E&M SERVICE: CPT | Mod: S$GLB,,, | Performed by: INTERNAL MEDICINE

## 2020-12-17 PROCEDURE — 3288F FALL RISK ASSESSMENT DOCD: CPT | Mod: CPTII,S$GLB,, | Performed by: INTERNAL MEDICINE

## 2020-12-17 PROCEDURE — 3288F PR FALLS RISK ASSESSMENT DOCUMENTED: ICD-10-PCS | Mod: CPTII,S$GLB,, | Performed by: INTERNAL MEDICINE

## 2020-12-17 PROCEDURE — 1101F PT FALLS ASSESS-DOCD LE1/YR: CPT | Mod: CPTII,S$GLB,, | Performed by: INTERNAL MEDICINE

## 2020-12-17 PROCEDURE — 99213 PR OFFICE/OUTPT VISIT, EST, LEVL III, 20-29 MIN: ICD-10-PCS | Mod: S$GLB,,, | Performed by: INTERNAL MEDICINE

## 2020-12-17 PROCEDURE — 99213 OFFICE O/P EST LOW 20 MIN: CPT | Mod: S$GLB,,, | Performed by: INTERNAL MEDICINE

## 2020-12-17 PROCEDURE — 1126F PR PAIN SEVERITY QUANTIFIED, NO PAIN PRESENT: ICD-10-PCS | Mod: S$GLB,,, | Performed by: INTERNAL MEDICINE

## 2020-12-17 PROCEDURE — 3044F PR MOST RECENT HEMOGLOBIN A1C LEVEL <7.0%: ICD-10-PCS | Mod: CPTII,S$GLB,, | Performed by: INTERNAL MEDICINE

## 2020-12-17 PROCEDURE — 3008F BODY MASS INDEX DOCD: CPT | Mod: CPTII,S$GLB,, | Performed by: INTERNAL MEDICINE

## 2020-12-17 PROCEDURE — 1157F ADVNC CARE PLAN IN RCRD: CPT | Mod: S$GLB,,, | Performed by: INTERNAL MEDICINE

## 2020-12-17 PROCEDURE — 99999 PR PBB SHADOW E&M-EST. PATIENT-LVL IV: CPT | Mod: PBBFAC,,, | Performed by: INTERNAL MEDICINE

## 2020-12-17 PROCEDURE — 99999 PR PBB SHADOW E&M-EST. PATIENT-LVL IV: ICD-10-PCS | Mod: PBBFAC,,, | Performed by: INTERNAL MEDICINE

## 2020-12-17 NOTE — Clinical Note
The patient needs a CBC, CMP and CT of the chest in 3 months with return visit with me at that time.

## 2020-12-18 ENCOUNTER — PATIENT OUTREACH (OUTPATIENT)
Dept: ADMINISTRATIVE | Facility: HOSPITAL | Age: 65
End: 2020-12-18

## 2021-02-08 ENCOUNTER — OFFICE VISIT (OUTPATIENT)
Dept: FAMILY MEDICINE | Facility: CLINIC | Age: 66
End: 2021-02-08
Payer: MEDICARE

## 2021-02-08 VITALS
DIASTOLIC BLOOD PRESSURE: 78 MMHG | HEART RATE: 90 BPM | TEMPERATURE: 98 F | WEIGHT: 191.38 LBS | OXYGEN SATURATION: 98 % | HEIGHT: 67 IN | BODY MASS INDEX: 30.04 KG/M2 | SYSTOLIC BLOOD PRESSURE: 120 MMHG

## 2021-02-08 DIAGNOSIS — E11.9 TYPE 2 DIABETES MELLITUS WITHOUT COMPLICATION, WITH LONG-TERM CURRENT USE OF INSULIN: ICD-10-CM

## 2021-02-08 DIAGNOSIS — J44.9 CHRONIC OBSTRUCTIVE PULMONARY DISEASE, UNSPECIFIED COPD TYPE: ICD-10-CM

## 2021-02-08 DIAGNOSIS — J44.0 COPD WITH ACUTE LOWER RESPIRATORY INFECTION: ICD-10-CM

## 2021-02-08 DIAGNOSIS — N13.8 BPH WITH URINARY OBSTRUCTION: ICD-10-CM

## 2021-02-08 DIAGNOSIS — N40.1 BPH WITH URINARY OBSTRUCTION: ICD-10-CM

## 2021-02-08 DIAGNOSIS — Z12.12 SCREENING FOR COLORECTAL CANCER: ICD-10-CM

## 2021-02-08 DIAGNOSIS — J43.1 PANLOBULAR EMPHYSEMA: ICD-10-CM

## 2021-02-08 DIAGNOSIS — Z00.00 ROUTINE MEDICAL EXAM: Primary | ICD-10-CM

## 2021-02-08 DIAGNOSIS — N52.1 TYPE 2 DIABETES MELLITUS WITH CIRCULATORY DISORDER CAUSING ERECTILE DYSFUNCTION: ICD-10-CM

## 2021-02-08 DIAGNOSIS — I70.0 AORTIC ATHEROSCLEROSIS: ICD-10-CM

## 2021-02-08 DIAGNOSIS — E11.59 TYPE 2 DIABETES MELLITUS WITH CIRCULATORY DISORDER CAUSING ERECTILE DYSFUNCTION: ICD-10-CM

## 2021-02-08 DIAGNOSIS — E11.69 DYSLIPIDEMIA ASSOCIATED WITH TYPE 2 DIABETES MELLITUS: ICD-10-CM

## 2021-02-08 DIAGNOSIS — N52.9 ERECTILE DYSFUNCTION, UNSPECIFIED ERECTILE DYSFUNCTION TYPE: ICD-10-CM

## 2021-02-08 DIAGNOSIS — E78.5 DYSLIPIDEMIA ASSOCIATED WITH TYPE 2 DIABETES MELLITUS: ICD-10-CM

## 2021-02-08 DIAGNOSIS — D64.9 NORMOCYTIC ANEMIA: ICD-10-CM

## 2021-02-08 DIAGNOSIS — E11.9 TYPE 2 DIABETES MELLITUS WITHOUT COMPLICATION, WITHOUT LONG-TERM CURRENT USE OF INSULIN: ICD-10-CM

## 2021-02-08 DIAGNOSIS — Z80.0 FAMILY HISTORY OF COLON CANCER: ICD-10-CM

## 2021-02-08 DIAGNOSIS — G47.00 INSOMNIA, UNSPECIFIED TYPE: ICD-10-CM

## 2021-02-08 DIAGNOSIS — Z85.118 HISTORY OF LUNG CANCER: ICD-10-CM

## 2021-02-08 DIAGNOSIS — Z12.11 SCREENING FOR COLORECTAL CANCER: ICD-10-CM

## 2021-02-08 DIAGNOSIS — Z86.010 HISTORY OF COLONIC POLYPS: ICD-10-CM

## 2021-02-08 DIAGNOSIS — Z79.4 TYPE 2 DIABETES MELLITUS WITHOUT COMPLICATION, WITH LONG-TERM CURRENT USE OF INSULIN: ICD-10-CM

## 2021-02-08 DIAGNOSIS — A04.8 H. PYLORI INFECTION: ICD-10-CM

## 2021-02-08 PROCEDURE — 99214 PR OFFICE/OUTPT VISIT, EST, LEVL IV, 30-39 MIN: ICD-10-PCS | Mod: S$GLB,,, | Performed by: INTERNAL MEDICINE

## 2021-02-08 PROCEDURE — 99214 OFFICE O/P EST MOD 30 MIN: CPT | Mod: S$GLB,,, | Performed by: INTERNAL MEDICINE

## 2021-02-08 PROCEDURE — 3288F PR FALLS RISK ASSESSMENT DOCUMENTED: ICD-10-PCS | Mod: CPTII,S$GLB,, | Performed by: INTERNAL MEDICINE

## 2021-02-08 PROCEDURE — 3008F BODY MASS INDEX DOCD: CPT | Mod: CPTII,S$GLB,, | Performed by: INTERNAL MEDICINE

## 2021-02-08 PROCEDURE — 99999 PR PBB SHADOW E&M-EST. PATIENT-LVL III: CPT | Mod: PBBFAC,,, | Performed by: INTERNAL MEDICINE

## 2021-02-08 PROCEDURE — 1125F PR PAIN SEVERITY QUANTIFIED, PAIN PRESENT: ICD-10-PCS | Mod: S$GLB,,, | Performed by: INTERNAL MEDICINE

## 2021-02-08 PROCEDURE — 99999 PR PBB SHADOW E&M-EST. PATIENT-LVL III: ICD-10-PCS | Mod: PBBFAC,,, | Performed by: INTERNAL MEDICINE

## 2021-02-08 PROCEDURE — 3008F PR BODY MASS INDEX (BMI) DOCUMENTED: ICD-10-PCS | Mod: CPTII,S$GLB,, | Performed by: INTERNAL MEDICINE

## 2021-02-08 PROCEDURE — 3044F PR MOST RECENT HEMOGLOBIN A1C LEVEL <7.0%: ICD-10-PCS | Mod: CPTII,S$GLB,, | Performed by: INTERNAL MEDICINE

## 2021-02-08 PROCEDURE — 99499 RISK ADDL DX/OHS AUDIT: ICD-10-PCS | Mod: S$GLB,,, | Performed by: INTERNAL MEDICINE

## 2021-02-08 PROCEDURE — 3288F FALL RISK ASSESSMENT DOCD: CPT | Mod: CPTII,S$GLB,, | Performed by: INTERNAL MEDICINE

## 2021-02-08 PROCEDURE — 3044F HG A1C LEVEL LT 7.0%: CPT | Mod: CPTII,S$GLB,, | Performed by: INTERNAL MEDICINE

## 2021-02-08 PROCEDURE — 1157F PR ADVANCE CARE PLAN OR EQUIV PRESENT IN MEDICAL RECORD: ICD-10-PCS | Mod: S$GLB,,, | Performed by: INTERNAL MEDICINE

## 2021-02-08 PROCEDURE — 1125F AMNT PAIN NOTED PAIN PRSNT: CPT | Mod: S$GLB,,, | Performed by: INTERNAL MEDICINE

## 2021-02-08 PROCEDURE — 1157F ADVNC CARE PLAN IN RCRD: CPT | Mod: S$GLB,,, | Performed by: INTERNAL MEDICINE

## 2021-02-08 PROCEDURE — 1101F PR PT FALLS ASSESS DOC 0-1 FALLS W/OUT INJ PAST YR: ICD-10-PCS | Mod: CPTII,S$GLB,, | Performed by: INTERNAL MEDICINE

## 2021-02-08 PROCEDURE — 99499 UNLISTED E&M SERVICE: CPT | Mod: S$GLB,,, | Performed by: INTERNAL MEDICINE

## 2021-02-08 PROCEDURE — 1101F PT FALLS ASSESS-DOCD LE1/YR: CPT | Mod: CPTII,S$GLB,, | Performed by: INTERNAL MEDICINE

## 2021-02-08 RX ORDER — ATORVASTATIN CALCIUM 10 MG/1
10 TABLET, FILM COATED ORAL DAILY
Qty: 90 TABLET | Refills: 3 | Status: SHIPPED | OUTPATIENT
Start: 2021-02-08 | End: 2023-09-28

## 2021-02-08 RX ORDER — LANCETS
1 EACH MISCELLANEOUS DAILY
Qty: 100 EACH | Refills: 1 | Status: SHIPPED | OUTPATIENT
Start: 2021-02-08 | End: 2023-09-28

## 2021-02-08 RX ORDER — CALCIUM CITRATE/VITAMIN D3 200MG-6.25
TABLET ORAL
Qty: 100 EACH | Refills: 1 | Status: SHIPPED | OUTPATIENT
Start: 2021-02-08 | End: 2023-09-28

## 2021-02-08 RX ORDER — OMEPRAZOLE 20 MG/1
40 CAPSULE, DELAYED RELEASE ORAL DAILY
Qty: 180 CAPSULE | Refills: 1 | Status: SHIPPED | OUTPATIENT
Start: 2021-02-08 | End: 2023-09-28

## 2021-02-08 RX ORDER — SILDENAFIL CITRATE 20 MG/1
TABLET ORAL
Qty: 30 TABLET | Refills: 11 | Status: SHIPPED | OUTPATIENT
Start: 2021-02-08 | End: 2023-09-28

## 2021-02-08 RX ORDER — TRAZODONE HYDROCHLORIDE 50 MG/1
50 TABLET ORAL NIGHTLY
Qty: 90 TABLET | Refills: 1 | Status: SHIPPED | OUTPATIENT
Start: 2021-02-08 | End: 2023-09-28

## 2021-02-08 RX ORDER — ALBUTEROL SULFATE 90 UG/1
2 AEROSOL, METERED RESPIRATORY (INHALATION) EVERY 6 HOURS PRN
Qty: 18 G | Refills: 10 | Status: SHIPPED | OUTPATIENT
Start: 2021-02-08 | End: 2023-02-17 | Stop reason: CLARIF

## 2021-02-08 RX ORDER — SILDENAFIL 100 MG/1
100 TABLET, FILM COATED ORAL DAILY PRN
Qty: 10 TABLET | Refills: 11 | Status: SHIPPED | OUTPATIENT
Start: 2021-02-08 | End: 2021-02-08

## 2021-02-08 RX ORDER — IPRATROPIUM BROMIDE 42 UG/1
2 SPRAY, METERED NASAL 4 TIMES DAILY
Qty: 15 ML | Refills: 0 | Status: SHIPPED | OUTPATIENT
Start: 2021-02-08 | End: 2023-09-28

## 2021-02-08 RX ORDER — FLUTICASONE PROPIONATE AND SALMETEROL 500; 50 UG/1; UG/1
1 POWDER RESPIRATORY (INHALATION) 2 TIMES DAILY
Qty: 180 EACH | Refills: 3 | Status: SHIPPED | OUTPATIENT
Start: 2021-02-08 | End: 2023-09-28

## 2021-02-18 ENCOUNTER — PES CALL (OUTPATIENT)
Dept: ADMINISTRATIVE | Facility: CLINIC | Age: 66
End: 2021-02-18

## 2021-03-15 ENCOUNTER — HOSPITAL ENCOUNTER (OUTPATIENT)
Dept: RADIOLOGY | Facility: HOSPITAL | Age: 66
Discharge: HOME | End: 2021-03-15
Attending: INTERNAL MEDICINE
Payer: MEDICARE

## 2021-03-15 DIAGNOSIS — Z85.118 HISTORY OF LUNG CANCER: ICD-10-CM

## 2021-03-15 PROCEDURE — 71250 CT CHEST WITHOUT CONTRAST: ICD-10-PCS | Mod: 26,,, | Performed by: RADIOLOGY

## 2021-03-15 PROCEDURE — 71250 CT THORAX DX C-: CPT | Mod: 26,,, | Performed by: RADIOLOGY

## 2021-03-15 PROCEDURE — 71250 CT THORAX DX C-: CPT | Mod: TC

## 2021-03-17 ENCOUNTER — OFFICE VISIT (OUTPATIENT)
Dept: HEMATOLOGY/ONCOLOGY | Facility: CLINIC | Age: 66
End: 2021-03-17
Payer: MEDICARE

## 2021-03-17 VITALS
BODY MASS INDEX: 29 KG/M2 | DIASTOLIC BLOOD PRESSURE: 78 MMHG | OXYGEN SATURATION: 98 % | SYSTOLIC BLOOD PRESSURE: 148 MMHG | WEIGHT: 184.75 LBS | TEMPERATURE: 98 F | HEART RATE: 59 BPM | HEIGHT: 67 IN

## 2021-03-17 DIAGNOSIS — Z79.4 TYPE 2 DIABETES MELLITUS WITHOUT COMPLICATION, WITH LONG-TERM CURRENT USE OF INSULIN: ICD-10-CM

## 2021-03-17 DIAGNOSIS — Z85.118 HISTORY OF LUNG CANCER: Primary | ICD-10-CM

## 2021-03-17 DIAGNOSIS — D64.9 NORMOCYTIC ANEMIA: ICD-10-CM

## 2021-03-17 DIAGNOSIS — J44.9 CHRONIC OBSTRUCTIVE PULMONARY DISEASE, UNSPECIFIED COPD TYPE: ICD-10-CM

## 2021-03-17 DIAGNOSIS — N13.8 BPH WITH URINARY OBSTRUCTION: ICD-10-CM

## 2021-03-17 DIAGNOSIS — R91.1 PULMONARY NODULE: ICD-10-CM

## 2021-03-17 DIAGNOSIS — I70.0 AORTIC ATHEROSCLEROSIS: ICD-10-CM

## 2021-03-17 DIAGNOSIS — N40.1 BPH WITH URINARY OBSTRUCTION: ICD-10-CM

## 2021-03-17 DIAGNOSIS — E11.9 TYPE 2 DIABETES MELLITUS WITHOUT COMPLICATION, WITH LONG-TERM CURRENT USE OF INSULIN: ICD-10-CM

## 2021-03-17 PROCEDURE — 99213 OFFICE O/P EST LOW 20 MIN: CPT | Mod: S$GLB,,, | Performed by: INTERNAL MEDICINE

## 2021-03-17 PROCEDURE — 1157F PR ADVANCE CARE PLAN OR EQUIV PRESENT IN MEDICAL RECORD: ICD-10-PCS | Mod: S$GLB,,, | Performed by: INTERNAL MEDICINE

## 2021-03-17 PROCEDURE — 3288F FALL RISK ASSESSMENT DOCD: CPT | Mod: CPTII,S$GLB,, | Performed by: INTERNAL MEDICINE

## 2021-03-17 PROCEDURE — 1126F PR PAIN SEVERITY QUANTIFIED, NO PAIN PRESENT: ICD-10-PCS | Mod: S$GLB,,, | Performed by: INTERNAL MEDICINE

## 2021-03-17 PROCEDURE — 1157F ADVNC CARE PLAN IN RCRD: CPT | Mod: S$GLB,,, | Performed by: INTERNAL MEDICINE

## 2021-03-17 PROCEDURE — 1101F PT FALLS ASSESS-DOCD LE1/YR: CPT | Mod: CPTII,S$GLB,, | Performed by: INTERNAL MEDICINE

## 2021-03-17 PROCEDURE — 99499 RISK ADDL DX/OHS AUDIT: ICD-10-PCS | Mod: S$GLB,,, | Performed by: INTERNAL MEDICINE

## 2021-03-17 PROCEDURE — 3044F HG A1C LEVEL LT 7.0%: CPT | Mod: CPTII,S$GLB,, | Performed by: INTERNAL MEDICINE

## 2021-03-17 PROCEDURE — 3008F PR BODY MASS INDEX (BMI) DOCUMENTED: ICD-10-PCS | Mod: CPTII,S$GLB,, | Performed by: INTERNAL MEDICINE

## 2021-03-17 PROCEDURE — 3044F PR MOST RECENT HEMOGLOBIN A1C LEVEL <7.0%: ICD-10-PCS | Mod: CPTII,S$GLB,, | Performed by: INTERNAL MEDICINE

## 2021-03-17 PROCEDURE — 1101F PR PT FALLS ASSESS DOC 0-1 FALLS W/OUT INJ PAST YR: ICD-10-PCS | Mod: CPTII,S$GLB,, | Performed by: INTERNAL MEDICINE

## 2021-03-17 PROCEDURE — 3008F BODY MASS INDEX DOCD: CPT | Mod: CPTII,S$GLB,, | Performed by: INTERNAL MEDICINE

## 2021-03-17 PROCEDURE — 99999 PR PBB SHADOW E&M-EST. PATIENT-LVL IV: CPT | Mod: PBBFAC,,, | Performed by: INTERNAL MEDICINE

## 2021-03-17 PROCEDURE — 99213 PR OFFICE/OUTPT VISIT, EST, LEVL III, 20-29 MIN: ICD-10-PCS | Mod: S$GLB,,, | Performed by: INTERNAL MEDICINE

## 2021-03-17 PROCEDURE — 1126F AMNT PAIN NOTED NONE PRSNT: CPT | Mod: S$GLB,,, | Performed by: INTERNAL MEDICINE

## 2021-03-17 PROCEDURE — 99499 UNLISTED E&M SERVICE: CPT | Mod: S$GLB,,, | Performed by: INTERNAL MEDICINE

## 2021-03-17 PROCEDURE — 3288F PR FALLS RISK ASSESSMENT DOCUMENTED: ICD-10-PCS | Mod: CPTII,S$GLB,, | Performed by: INTERNAL MEDICINE

## 2021-03-17 PROCEDURE — 99999 PR PBB SHADOW E&M-EST. PATIENT-LVL IV: ICD-10-PCS | Mod: PBBFAC,,, | Performed by: INTERNAL MEDICINE

## 2021-03-30 DIAGNOSIS — Z12.11 SPECIAL SCREENING FOR MALIGNANT NEOPLASMS, COLON: Primary | ICD-10-CM

## 2021-03-30 DIAGNOSIS — Z01.818 PRE-OP TESTING: ICD-10-CM

## 2021-03-30 RX ORDER — SODIUM, POTASSIUM,MAG SULFATES 17.5-3.13G
1 SOLUTION, RECONSTITUTED, ORAL ORAL DAILY
Qty: 1 KIT | Refills: 0 | Status: SHIPPED | OUTPATIENT
Start: 2021-03-30 | End: 2021-04-01

## 2021-04-23 ENCOUNTER — LAB VISIT (OUTPATIENT)
Dept: INTERNAL MEDICINE | Facility: CLINIC | Age: 66
End: 2021-04-23
Payer: MEDICARE

## 2021-04-23 DIAGNOSIS — Z01.818 PRE-OP TESTING: ICD-10-CM

## 2021-04-23 LAB — SARS-COV-2 RNA RESP QL NAA+PROBE: NOT DETECTED

## 2021-04-23 PROCEDURE — U0005 INFEC AGEN DETEC AMPLI PROBE: HCPCS | Performed by: FAMILY MEDICINE

## 2021-04-23 PROCEDURE — U0003 INFECTIOUS AGENT DETECTION BY NUCLEIC ACID (DNA OR RNA); SEVERE ACUTE RESPIRATORY SYNDROME CORONAVIRUS 2 (SARS-COV-2) (CORONAVIRUS DISEASE [COVID-19]), AMPLIFIED PROBE TECHNIQUE, MAKING USE OF HIGH THROUGHPUT TECHNOLOGIES AS DESCRIBED BY CMS-2020-01-R: HCPCS | Performed by: FAMILY MEDICINE

## 2021-04-26 DIAGNOSIS — Z01.818 PRE-OP TESTING: ICD-10-CM

## 2021-06-14 ENCOUNTER — TELEPHONE (OUTPATIENT)
Dept: HEMATOLOGY/ONCOLOGY | Facility: CLINIC | Age: 66
End: 2021-06-14

## 2021-06-14 ENCOUNTER — HOSPITAL ENCOUNTER (OUTPATIENT)
Dept: RADIOLOGY | Facility: HOSPITAL | Age: 66
Discharge: HOME | End: 2021-06-14
Attending: INTERNAL MEDICINE
Payer: MEDICARE

## 2021-06-14 DIAGNOSIS — Z85.118 HISTORY OF LUNG CANCER: ICD-10-CM

## 2021-06-14 PROCEDURE — 71250 CT THORAX DX C-: CPT | Mod: 26,,, | Performed by: RADIOLOGY

## 2021-06-14 PROCEDURE — 71250 CT CHEST WITHOUT CONTRAST: ICD-10-PCS | Mod: 26,,, | Performed by: RADIOLOGY

## 2021-06-14 PROCEDURE — 71250 CT THORAX DX C-: CPT | Mod: TC

## 2021-06-17 ENCOUNTER — OFFICE VISIT (OUTPATIENT)
Dept: HEMATOLOGY/ONCOLOGY | Facility: CLINIC | Age: 66
End: 2021-06-17
Payer: MEDICARE

## 2021-06-17 VITALS
DIASTOLIC BLOOD PRESSURE: 72 MMHG | WEIGHT: 181.19 LBS | OXYGEN SATURATION: 98 % | BODY MASS INDEX: 28.44 KG/M2 | HEIGHT: 67 IN | TEMPERATURE: 98 F | HEART RATE: 82 BPM | SYSTOLIC BLOOD PRESSURE: 137 MMHG

## 2021-06-17 DIAGNOSIS — R91.1 PULMONARY NODULE: ICD-10-CM

## 2021-06-17 DIAGNOSIS — D64.9 NORMOCYTIC ANEMIA: ICD-10-CM

## 2021-06-17 DIAGNOSIS — Z85.118 HISTORY OF LUNG CANCER: Primary | ICD-10-CM

## 2021-06-17 DIAGNOSIS — J44.9 CHRONIC OBSTRUCTIVE PULMONARY DISEASE, UNSPECIFIED COPD TYPE: ICD-10-CM

## 2021-06-17 DIAGNOSIS — I70.0 AORTIC ATHEROSCLEROSIS: ICD-10-CM

## 2021-06-17 PROCEDURE — 3288F PR FALLS RISK ASSESSMENT DOCUMENTED: ICD-10-PCS | Mod: CPTII,S$GLB,, | Performed by: INTERNAL MEDICINE

## 2021-06-17 PROCEDURE — 3008F PR BODY MASS INDEX (BMI) DOCUMENTED: ICD-10-PCS | Mod: CPTII,S$GLB,, | Performed by: INTERNAL MEDICINE

## 2021-06-17 PROCEDURE — 99214 PR OFFICE/OUTPT VISIT, EST, LEVL IV, 30-39 MIN: ICD-10-PCS | Mod: S$GLB,,, | Performed by: INTERNAL MEDICINE

## 2021-06-17 PROCEDURE — 3288F FALL RISK ASSESSMENT DOCD: CPT | Mod: CPTII,S$GLB,, | Performed by: INTERNAL MEDICINE

## 2021-06-17 PROCEDURE — 3008F BODY MASS INDEX DOCD: CPT | Mod: CPTII,S$GLB,, | Performed by: INTERNAL MEDICINE

## 2021-06-17 PROCEDURE — 1157F ADVNC CARE PLAN IN RCRD: CPT | Mod: S$GLB,,, | Performed by: INTERNAL MEDICINE

## 2021-06-17 PROCEDURE — 1157F PR ADVANCE CARE PLAN OR EQUIV PRESENT IN MEDICAL RECORD: ICD-10-PCS | Mod: S$GLB,,, | Performed by: INTERNAL MEDICINE

## 2021-06-17 PROCEDURE — 99999 PR PBB SHADOW E&M-EST. PATIENT-LVL IV: CPT | Mod: PBBFAC,,, | Performed by: INTERNAL MEDICINE

## 2021-06-17 PROCEDURE — 99999 PR PBB SHADOW E&M-EST. PATIENT-LVL IV: ICD-10-PCS | Mod: PBBFAC,,, | Performed by: INTERNAL MEDICINE

## 2021-06-17 PROCEDURE — 1101F PT FALLS ASSESS-DOCD LE1/YR: CPT | Mod: CPTII,S$GLB,, | Performed by: INTERNAL MEDICINE

## 2021-06-17 PROCEDURE — 1126F PR PAIN SEVERITY QUANTIFIED, NO PAIN PRESENT: ICD-10-PCS | Mod: S$GLB,,, | Performed by: INTERNAL MEDICINE

## 2021-06-17 PROCEDURE — 1126F AMNT PAIN NOTED NONE PRSNT: CPT | Mod: S$GLB,,, | Performed by: INTERNAL MEDICINE

## 2021-06-17 PROCEDURE — 99214 OFFICE O/P EST MOD 30 MIN: CPT | Mod: S$GLB,,, | Performed by: INTERNAL MEDICINE

## 2021-06-17 PROCEDURE — 1101F PR PT FALLS ASSESS DOC 0-1 FALLS W/OUT INJ PAST YR: ICD-10-PCS | Mod: CPTII,S$GLB,, | Performed by: INTERNAL MEDICINE

## 2021-09-08 NOTE — PROGRESS NOTES
Chief complaint:  New patient, establish care    64-year-old black male new to the clinic.  Regarding health maintenance it looks like he has a colonoscopy that has been recently ordered.  His PSA was normal in October 2019.  Patient quit smoking in 2014 when he had his lung cancer diagnosis.    ROS:   CONST: weight stable. EYES: no vision change. ENT: no sore throat. CV: no chest pain w/ exertion. RESP: no shortness of breath. GI: no nausea, vomiting, diarrhea. No dysphagia. : no urinary issues. MUSCULOSKELETAL: no new myalgias or arthralgias. SKIN: no new changes. NEURO: no focal deficits. PSYCH: no new issues. ENDOCRINE: no polyuria. HEME: no lymph nodes. ALLERGY: no general pruritis.    Past Medical History:   Diagnosis Date    Anxiety     Aortic atherosclerosis 12/6/2017    BPH with urinary obstruction 2/20/2019    Colon polyp 1/16/2014    COPD (chronic obstructive pulmonary disease)     Depression     Disorder of kidney and ureter     ED (erectile dysfunction) 1/16/2014    Family history of colon cancer 1/16/2014    Hearing loss in left ear 34 years    Lung cancer     Normocytic anemia 9/3/2014    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    T2DM (type 2 diabetes mellitus) 2014    DKA 9/2014     Past Surgical History:   Procedure Laterality Date    COLONOSCOPY      LUNG REMOVAL, PARTIAL       Social History     Socioeconomic History    Marital status:      Spouse name: Not on file    Number of children: Not on file    Years of education: Not on file    Highest education level: Not on file   Occupational History     Employer: [employer]   Social Needs    Financial resource strain: Not on file    Food insecurity:     Worry: Not on file     Inability: Not on file    Transportation needs:     Medical: Not on file     Non-medical: Not on file   Tobacco Use    Smoking status: Former Smoker     Packs/day: 1.00     Years: 30.00     Pack years: 30.00     Last attempt to quit: 1/15/2014      Years since quittin.0    Smokeless tobacco: Never Used   Substance and Sexual Activity    Alcohol use: Yes     Alcohol/week: 5.7 standard drinks     Types: 4 Cans of beer, 2 Standard drinks or equivalent per week     Comment: socially    Drug use: No    Sexual activity: Not Currently     Partners: Female     Birth control/protection: None   Lifestyle    Physical activity:     Days per week: Not on file     Minutes per session: Not on file    Stress: Not on file   Relationships    Social connections:     Talks on phone: Not on file     Gets together: Not on file     Attends [employer] service: Not on file     Active member of club or organization: Not on file     Attends meetings of clubs or organizations: Not on file     Relationship status: Not on file   Other Topics Concern    Not on file   Social History Narrative    Not on file     family history includes Cancer in his father and sister; Cataracts in his father; Colon cancer in his father and sister; No Known Problems in his brother, maternal aunt, maternal grandfather, maternal grandmother, maternal uncle, mother, paternal aunt, paternal grandfather, paternal grandmother, paternal uncle, sister, and sister.      Gen: no distress  EYES: conjunctiva clear, non-icteric, PERRL  ENT: nose clear, nasal mucosa normal, oropharynx clear and moist, teeth good  NECK:supple, thyroid non-palpable  RESP: effort is good, lungs clear  CV: heart RRR w/o murmur, gallops or rubs; no carotid bruits, no edema  GI: abdomen soft, non-distended, non-tender, no hepatosplenomegaly  MS: gait normal, no clubbing or cyanosis of the digits  SKIN: no rashes, warm to touch      Hernan was seen today for annual exam.    Diagnoses and all orders for this visit:    Routine medical exam, appears to be up-to-date on PSA screening but due for colonoscopy follow-up and he would like to get at the [facility] with Dr. Alcantara who did it before and did his wife's colonoscopy and so forth.   I will have him return every three months.                                                        Additional evaluation and management issues:    Additionally, patient has numerous other medical issues are to address separate from his physical.  He does appear to have diabetes.  His A1c was good but his most recent one was 6.4 and we discussed that is uncontrolled.  He says metformin made him feel bad and a really nonspecific way but no GI symptoms or diarrhea.  We discussed improved diet but also possibly restarting one metformin pill per day that he has at home and assess.  Otherwise will need to follow this every three months which will be April.  I gave him copies of his prior labs and written instructions.    Does have a history of colon polyps apparently and has copies of his old reports.  He has a family history of colon cancer in for all these reasons will set him up with a colonoscopy.    He does have some vitamin-D deficiency and BPH all of which are stable.  Will reassess vitamin-D levels in the future.    He does have some LDL above 100 although not bad.  It does look like he was given Lipitor in the past but he is really not aware of that so he can double check he has it at home but pretty sure he is not on it at this time.  We discussed benefits of statin therapy and I will restart the Lipitor    He does have COPD.  No limiting symptoms except when he carry something 0 get some dyspnea on exertion.  I explained to him maintenance medications and also the cost which might be an issue now with his insurance.  He does have the Spiriva and does appear to have the Advair inhaler.  He might have been given Symbicort in the past.  He was worried about there being lawsuit related to these medications and explained him the medicines are perfectly safe and beneficial if use regularly.    We also reviewed his history of lung cancer.  It looks like he was getting a chest x-ray twice a year but discussed in review  of his last oncology visit at [facility] from 2017 the plan was continued CT scan follow-up not only for the history of lung cancer but apparently there was a small pulmonary nodule being followed and this would only be adequately followed by repeat CT scans.  His prior CT scans are available for review in Care everywhere and we did look at those.  All the separate issues reviewed patient counseled and is evaluation and management will be based upon time counseling.  We discussed that would be good for him to be followed long-term by an oncologist and I will put in that referral and he is looking forward to that.Total time over 25 minutes with over 50% counseling.                  Assessment and plan:    Type 2 diabetes mellitus with circulatory disorder causing erectile dysfunction, possibly restart metformin, follow-up in April and every three months    Family history of colon cancer  -     Case request GI: COLONOSCOPY    History of colonic polyps  -     Case request GI: COLONOSCOPY    Vitamin D deficiency disease, reassess in the future    BPH with urinary obstruction, chronic and stable, PSA up-to-date    Type 2 diabetes mellitus without complication, without long-term current use of insulin    Dyslipidemia associated with type 2 diabetes mellitus, start Lipitor  -     atorvastatin (LIPITOR) 10 MG tablet; Take 1 tablet (10 mg total) by mouth once daily.    Aortic atherosclerosis, noted    Normocytic anemia, noted    Chronic obstructive pulmonary disease, unspecified COPD type, discussed medications    Panlobular emphysema    History of lung cancer, update CT, referred to Oncology  -     Ambulatory referral to Hematology / Oncology    Lung nodule, update CT, probably overdue  -     CT Chest Without Contrast; Future        no

## 2021-10-15 ENCOUNTER — PES CALL (OUTPATIENT)
Dept: ADMINISTRATIVE | Facility: CLINIC | Age: 66
End: 2021-10-15

## 2021-10-21 ENCOUNTER — PATIENT OUTREACH (OUTPATIENT)
Dept: ADMINISTRATIVE | Facility: OTHER | Age: 66
End: 2021-10-21

## 2021-10-21 ENCOUNTER — OFFICE VISIT (OUTPATIENT)
Dept: FAMILY MEDICINE | Facility: CLINIC | Age: 66
End: 2021-10-21
Payer: MEDICARE

## 2021-10-21 VITALS
SYSTOLIC BLOOD PRESSURE: 120 MMHG | HEIGHT: 67 IN | WEIGHT: 181.13 LBS | DIASTOLIC BLOOD PRESSURE: 74 MMHG | BODY MASS INDEX: 28.43 KG/M2 | TEMPERATURE: 98 F | OXYGEN SATURATION: 98 % | HEART RATE: 72 BPM

## 2021-10-21 DIAGNOSIS — N52.1 TYPE 2 DIABETES MELLITUS WITH CIRCULATORY DISORDER CAUSING ERECTILE DYSFUNCTION: ICD-10-CM

## 2021-10-21 DIAGNOSIS — E11.69 DYSLIPIDEMIA ASSOCIATED WITH TYPE 2 DIABETES MELLITUS: ICD-10-CM

## 2021-10-21 DIAGNOSIS — N52.9 ERECTILE DYSFUNCTION, UNSPECIFIED ERECTILE DYSFUNCTION TYPE: ICD-10-CM

## 2021-10-21 DIAGNOSIS — J42 CHRONIC BRONCHITIS, UNSPECIFIED CHRONIC BRONCHITIS TYPE: ICD-10-CM

## 2021-10-21 DIAGNOSIS — J43.1 PANLOBULAR EMPHYSEMA: ICD-10-CM

## 2021-10-21 DIAGNOSIS — E11.8 TYPE 2 DIABETES MELLITUS WITH COMPLICATION, WITHOUT LONG-TERM CURRENT USE OF INSULIN: ICD-10-CM

## 2021-10-21 DIAGNOSIS — Z00.00 ENCOUNTER FOR PREVENTIVE HEALTH EXAMINATION: Primary | ICD-10-CM

## 2021-10-21 DIAGNOSIS — E11.59 TYPE 2 DIABETES MELLITUS WITH CIRCULATORY DISORDER CAUSING ERECTILE DYSFUNCTION: ICD-10-CM

## 2021-10-21 DIAGNOSIS — I70.0 AORTIC ATHEROSCLEROSIS: ICD-10-CM

## 2021-10-21 DIAGNOSIS — R91.1 PULMONARY NODULE: ICD-10-CM

## 2021-10-21 DIAGNOSIS — E78.5 DYSLIPIDEMIA ASSOCIATED WITH TYPE 2 DIABETES MELLITUS: ICD-10-CM

## 2021-10-21 DIAGNOSIS — Z12.11 COLON CANCER SCREENING: ICD-10-CM

## 2021-10-21 DIAGNOSIS — E11.8 TYPE 2 DIABETES MELLITUS WITH COMPLICATION, WITHOUT LONG-TERM CURRENT USE OF INSULIN: Primary | ICD-10-CM

## 2021-10-21 PROCEDURE — 3044F HG A1C LEVEL LT 7.0%: CPT | Mod: CPTII,S$GLB,, | Performed by: NURSE PRACTITIONER

## 2021-10-21 PROCEDURE — 1170F FXNL STATUS ASSESSED: CPT | Mod: CPTII,S$GLB,, | Performed by: NURSE PRACTITIONER

## 2021-10-21 PROCEDURE — 3288F FALL RISK ASSESSMENT DOCD: CPT | Mod: CPTII,S$GLB,, | Performed by: NURSE PRACTITIONER

## 2021-10-21 PROCEDURE — G0439 PR MEDICARE ANNUAL WELLNESS SUBSEQUENT VISIT: ICD-10-PCS | Mod: S$GLB,,, | Performed by: NURSE PRACTITIONER

## 2021-10-21 PROCEDURE — 3288F PR FALLS RISK ASSESSMENT DOCUMENTED: ICD-10-PCS | Mod: CPTII,S$GLB,, | Performed by: NURSE PRACTITIONER

## 2021-10-21 PROCEDURE — 1159F PR MEDICATION LIST DOCUMENTED IN MEDICAL RECORD: ICD-10-PCS | Mod: CPTII,S$GLB,, | Performed by: NURSE PRACTITIONER

## 2021-10-21 PROCEDURE — 1159F MED LIST DOCD IN RCRD: CPT | Mod: CPTII,S$GLB,, | Performed by: NURSE PRACTITIONER

## 2021-10-21 PROCEDURE — 1157F ADVNC CARE PLAN IN RCRD: CPT | Mod: CPTII,S$GLB,, | Performed by: NURSE PRACTITIONER

## 2021-10-21 PROCEDURE — 1157F PR ADVANCE CARE PLAN OR EQUIV PRESENT IN MEDICAL RECORD: ICD-10-PCS | Mod: CPTII,S$GLB,, | Performed by: NURSE PRACTITIONER

## 2021-10-21 PROCEDURE — 1126F PR PAIN SEVERITY QUANTIFIED, NO PAIN PRESENT: ICD-10-PCS | Mod: CPTII,S$GLB,, | Performed by: NURSE PRACTITIONER

## 2021-10-21 PROCEDURE — 1101F PR PT FALLS ASSESS DOC 0-1 FALLS W/OUT INJ PAST YR: ICD-10-PCS | Mod: CPTII,S$GLB,, | Performed by: NURSE PRACTITIONER

## 2021-10-21 PROCEDURE — 3074F SYST BP LT 130 MM HG: CPT | Mod: CPTII,S$GLB,, | Performed by: NURSE PRACTITIONER

## 2021-10-21 PROCEDURE — 3074F PR MOST RECENT SYSTOLIC BLOOD PRESSURE < 130 MM HG: ICD-10-PCS | Mod: CPTII,S$GLB,, | Performed by: NURSE PRACTITIONER

## 2021-10-21 PROCEDURE — 3078F PR MOST RECENT DIASTOLIC BLOOD PRESSURE < 80 MM HG: ICD-10-PCS | Mod: CPTII,S$GLB,, | Performed by: NURSE PRACTITIONER

## 2021-10-21 PROCEDURE — 3078F DIAST BP <80 MM HG: CPT | Mod: CPTII,S$GLB,, | Performed by: NURSE PRACTITIONER

## 2021-10-21 PROCEDURE — G0439 PPPS, SUBSEQ VISIT: HCPCS | Mod: S$GLB,,, | Performed by: NURSE PRACTITIONER

## 2021-10-21 PROCEDURE — 3008F PR BODY MASS INDEX (BMI) DOCUMENTED: ICD-10-PCS | Mod: CPTII,S$GLB,, | Performed by: NURSE PRACTITIONER

## 2021-10-21 PROCEDURE — 99999 PR PBB SHADOW E&M-EST. PATIENT-LVL V: CPT | Mod: PBBFAC,,,

## 2021-10-21 PROCEDURE — 3008F BODY MASS INDEX DOCD: CPT | Mod: CPTII,S$GLB,, | Performed by: NURSE PRACTITIONER

## 2021-10-21 PROCEDURE — 99999 PR PBB SHADOW E&M-EST. PATIENT-LVL V: ICD-10-PCS | Mod: PBBFAC,,,

## 2021-10-21 PROCEDURE — 1126F AMNT PAIN NOTED NONE PRSNT: CPT | Mod: CPTII,S$GLB,, | Performed by: NURSE PRACTITIONER

## 2021-10-21 PROCEDURE — 1101F PT FALLS ASSESS-DOCD LE1/YR: CPT | Mod: CPTII,S$GLB,, | Performed by: NURSE PRACTITIONER

## 2021-10-21 PROCEDURE — 1170F PR FUNCTIONAL STATUS ASSESSED: ICD-10-PCS | Mod: CPTII,S$GLB,, | Performed by: NURSE PRACTITIONER

## 2021-10-21 PROCEDURE — 3044F PR MOST RECENT HEMOGLOBIN A1C LEVEL <7.0%: ICD-10-PCS | Mod: CPTII,S$GLB,, | Performed by: NURSE PRACTITIONER

## 2021-10-21 RX ORDER — LEVOFLOXACIN 750 MG/1
750 TABLET ORAL DAILY
COMMUNITY
Start: 2021-06-17 | End: 2023-02-17 | Stop reason: CLARIF

## 2021-10-21 RX ORDER — ROSUVASTATIN CALCIUM 5 MG/1
TABLET, COATED ORAL
COMMUNITY
Start: 2021-06-09 | End: 2023-02-17 | Stop reason: CLARIF

## 2021-11-15 LAB — NONINV COLON CA DNA+OCC BLD SCRN STL QL: NORMAL

## 2021-12-23 ENCOUNTER — PES CALL (OUTPATIENT)
Dept: ADMINISTRATIVE | Facility: CLINIC | Age: 66
End: 2021-12-23
Payer: MEDICARE

## 2022-01-19 ENCOUNTER — PES CALL (OUTPATIENT)
Dept: ADMINISTRATIVE | Facility: CLINIC | Age: 67
End: 2022-01-19
Payer: MEDICARE

## 2022-05-03 ENCOUNTER — PES CALL (OUTPATIENT)
Dept: ADMINISTRATIVE | Facility: CLINIC | Age: 67
End: 2022-05-03
Payer: MEDICARE

## 2022-05-23 ENCOUNTER — PES CALL (OUTPATIENT)
Dept: ADMINISTRATIVE | Facility: CLINIC | Age: 67
End: 2022-05-23
Payer: MEDICARE

## 2022-06-28 ENCOUNTER — PES CALL (OUTPATIENT)
Dept: ADMINISTRATIVE | Facility: CLINIC | Age: 67
End: 2022-06-28
Payer: MEDICARE

## 2022-06-29 ENCOUNTER — PATIENT OUTREACH (OUTPATIENT)
Dept: ADMINISTRATIVE | Facility: HOSPITAL | Age: 67
End: 2022-06-29
Payer: MEDICARE

## 2022-06-29 DIAGNOSIS — E11.8 TYPE 2 DIABETES MELLITUS WITH COMPLICATION, WITHOUT LONG-TERM CURRENT USE OF INSULIN: Primary | ICD-10-CM

## 2022-07-28 ENCOUNTER — PES CALL (OUTPATIENT)
Dept: ADMINISTRATIVE | Facility: CLINIC | Age: 67
End: 2022-07-28
Payer: MEDICARE

## 2022-08-18 ENCOUNTER — PES CALL (OUTPATIENT)
Dept: ADMINISTRATIVE | Facility: CLINIC | Age: 67
End: 2022-08-18
Payer: MEDICARE

## 2022-09-02 ENCOUNTER — PES CALL (OUTPATIENT)
Dept: ADMINISTRATIVE | Facility: CLINIC | Age: 67
End: 2022-09-02
Payer: MEDICARE

## 2022-09-08 ENCOUNTER — PES CALL (OUTPATIENT)
Dept: ADMINISTRATIVE | Facility: CLINIC | Age: 67
End: 2022-09-08
Payer: MEDICARE

## 2022-09-12 ENCOUNTER — PES CALL (OUTPATIENT)
Dept: ADMINISTRATIVE | Facility: CLINIC | Age: 67
End: 2022-09-12
Payer: MEDICARE

## 2022-09-15 ENCOUNTER — PES CALL (OUTPATIENT)
Dept: ADMINISTRATIVE | Facility: CLINIC | Age: 67
End: 2022-09-15
Payer: MEDICARE

## 2022-09-23 ENCOUNTER — PES CALL (OUTPATIENT)
Dept: ADMINISTRATIVE | Facility: CLINIC | Age: 67
End: 2022-09-23
Payer: MEDICARE

## 2022-10-08 ENCOUNTER — PES CALL (OUTPATIENT)
Dept: ADMINISTRATIVE | Facility: CLINIC | Age: 67
End: 2022-10-08
Payer: MEDICARE

## 2022-10-12 ENCOUNTER — PES CALL (OUTPATIENT)
Dept: ADMINISTRATIVE | Facility: CLINIC | Age: 67
End: 2022-10-12
Payer: MEDICARE

## 2022-10-28 ENCOUNTER — PES CALL (OUTPATIENT)
Dept: ADMINISTRATIVE | Facility: CLINIC | Age: 67
End: 2022-10-28
Payer: MEDICARE

## 2022-11-02 ENCOUNTER — PES CALL (OUTPATIENT)
Dept: ADMINISTRATIVE | Facility: CLINIC | Age: 67
End: 2022-11-02
Payer: MEDICARE

## 2023-02-17 ENCOUNTER — NURSE TRIAGE (OUTPATIENT)
Dept: ADMINISTRATIVE | Facility: CLINIC | Age: 68
End: 2023-02-17
Payer: MEDICARE

## 2023-02-17 ENCOUNTER — HOSPITAL ENCOUNTER (EMERGENCY)
Facility: HOSPITAL | Age: 68
Discharge: HOME | End: 2023-02-17
Attending: EMERGENCY MEDICINE
Payer: MEDICARE

## 2023-02-17 VITALS
HEIGHT: 67 IN | SYSTOLIC BLOOD PRESSURE: 155 MMHG | RESPIRATION RATE: 20 BRPM | BODY MASS INDEX: 27.47 KG/M2 | TEMPERATURE: 98 F | HEART RATE: 71 BPM | DIASTOLIC BLOOD PRESSURE: 86 MMHG | WEIGHT: 175 LBS | OXYGEN SATURATION: 97 %
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
## 2023-02-17 PROCEDURE — 25000003 PHARM REV CODE 250

## 2023-02-17 PROCEDURE — 99284 EMERGENCY DEPT VISIT MOD MDM: CPT | Mod: ,,,

## 2023-02-17 PROCEDURE — 99284 PR EMERGENCY DEPT VISIT,LEVEL IV: ICD-10-PCS | Mod: ,,,

## 2023-02-17 PROCEDURE — 99284 EMERGENCY DEPT VISIT MOD MDM: CPT | Mod: 25

## 2023-02-17 RX ORDER — EMTRICITABINE 200 MG/1
200 CAPSULE ORAL
Status: CANCELLED | OUTPATIENT
Start: 2023-02-17 | End: 2023-02-17

## 2023-02-17 RX ORDER — TENOFOVIR DISOPROXIL FUMARATE 300 MG/1
300 TABLET, FILM COATED ORAL
Status: COMPLETED | OUTPATIENT
Start: 2023-02-17 | End: 2023-02-17

## 2023-02-17 RX ORDER — EMTRICITABINE 200 MG/1
200 CAPSULE ORAL
Status: COMPLETED | OUTPATIENT
Start: 2023-02-17 | End: 2023-02-17

## 2023-02-17 RX ORDER — TENOFOVIR DISOPROXIL FUMARATE 300 MG/1
300 TABLET, FILM COATED ORAL
Status: CANCELLED | OUTPATIENT
Start: 2023-02-17 | End: 2023-02-17

## 2023-02-17 RX ORDER — EMTRICITABINE AND TENOFOVIR DISOPROXIL FUMARATE 200; 300 MG/1; MG/1
1 TABLET, FILM COATED ORAL DAILY
Qty: 3 TABLET | Refills: 0 | Status: SHIPPED | OUTPATIENT
Start: 2023-02-17 | End: 2023-09-28

## 2023-02-17 RX ORDER — ONDANSETRON 4 MG/1
4 TABLET, FILM COATED ORAL EVERY 6 HOURS PRN
Qty: 12 TABLET | Refills: 0 | Status: SHIPPED | OUTPATIENT
Start: 2023-02-17 | End: 2023-09-28

## 2023-02-17 RX ADMIN — TENOFOVIR DISPROXIL FUMARATE 300 MG: 300 TABLET ORAL at 06:02

## 2023-02-17 RX ADMIN — RALTEGRAVIR 400 MG: 400 TABLET, FILM COATED ORAL at 07:02

## 2023-02-17 RX ADMIN — EMTRICITABINE 200 MG: 200 CAPSULE ORAL at 06:02

## 2023-02-17 NOTE — ED NOTES
LOC: The patient is awake, alert, and oriented to self, place, time, and situation. Pt is calm and cooperative. Affect is appropriate.  Speech is appropriate and clear.     APPEARANCE: Patient resting comfortably in no acute distress.  Patient is clean and well groomed.    SKIN: The skin is warm and dry; color consistent with ethnicity.  Patient has normal skin turgor and moist mucus membranes.  Skin intact; no breakdown or bruising noted.     MUSCULOSKELETAL: Patient moving upper and lower extremities without difficulty; denies pain in the extremities or back.  Denies weakness.     RESPIRATORY: Airway is open and patent. Respirations spontaneous, even, easy, and non-labored.  Patient has a normal effort and rate.  No accessory muscle use noted. Denies cough.     CARDIAC:    No peripheral edema noted. No complaints of chest pain.     ABDOMEN: Soft and non tender to palpation.  No distention noted. Pt denies abdominal pain; denies nausea, vomiting, diarrhea, or constipation.    NEUROLOGIC: Eyes open spontaneously.  Behavior appropriate to situation.  Follows commands; facial expression symmetrical.  Purposeful motor response noted; normal sensation in all extremities. Pt denies headache; denies lightheadedness or dizziness; denies visual disturbances; denies loss of balance; denies unilateral weakness.

## 2023-02-17 NOTE — ED TRIAGE NOTES
To the ED from home.  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

## 2023-02-17 NOTE — ED PROVIDER NOTES
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 Pt wishes to be evaluated     67-year-old male with history of lung cancer (post lobectomy), DM2, COPD, and anxiety presents to the ED for post-exposure needle stick onset yesterday. 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 Reports he went to PCP after "feeling bad" from the stick yesterday who sent him back to his employer to file a report. He was unable to get his company to provide paperwork for the incident and when they attempted to go [place] [facility] they were instructed they would need that paperwork to be seen. Patient then decided to come here for lab work and prophylactic medication. Reports he cleaned the puncture wound with rubbing alcohol and was wearing cloth gloves when stuck.         The history is provided by the patient.   Review of patient's allergies indicates:   Allergen Reactions    Adhesive Itching, Rash, Other (See Comments) and Dermatitis     Blister     Past Medical History:   Diagnosis Date    Anxiety     Aortic atherosclerosis 12/6/2017    BPH with urinary obstruction 2/20/2019    Colon polyp 1/16/2014    COPD (chronic obstructive pulmonary disease)     Depression     Disorder of kidney and ureter     ED (erectile dysfunction) 1/16/2014    Family history of colon cancer 1/16/2014    Hearing loss in left ear 34 years    Lung cancer     Normocytic anemia 9/3/2014    Nuclear sclerosis of both eyes 9/4/2020    Seizure 2005    single event - not controlled by medication    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    T2DM (type 2 diabetes mellitus) 2014    DKA 9/2014     Past Surgical History:   Procedure Laterality Date    COLONOSCOPY      LUNG REMOVAL, PARTIAL       Family " History   Problem Relation Age of Onset    Cancer Father         colon cancer    Colon cancer Father     Cataracts Father     Colon cancer Sister     Cancer Sister         colon or lung cancer     No Known Problems Mother     No Known Problems Brother     No Known Problems Sister     No Known Problems Sister     No Known Problems Maternal Aunt     No Known Problems Maternal Uncle     No Known Problems Paternal Aunt     No Known Problems Paternal Uncle     No Known Problems Maternal Grandmother     No Known Problems Maternal Grandfather     No Known Problems Paternal Grandmother     No Known Problems Paternal Grandfather     Amblyopia Neg Hx     Blindness Neg Hx     Diabetes Neg Hx     Glaucoma Neg Hx     Hypertension Neg Hx     Macular degeneration Neg Hx     Retinal detachment Neg Hx     Strabismus Neg Hx     Stroke Neg Hx     Thyroid disease Neg Hx      Social History     Tobacco Use    Smoking status: Former     Packs/day: 1.00     Years: 30.00     Pack years: 30.00     Types: Cigarettes     Quit date: 1/15/2014     Years since quittin.0    Smokeless tobacco: Never   Substance Use Topics    Alcohol use: Yes     Alcohol/week: 5.7 standard drinks     Types: 4 Cans of beer, 2 Standard drinks or equivalent per week     Comment: every other day beer drinker    Drug use: No     Review of Systems   Constitutional:  Negative for fever.   HENT:  Negative for sore throat.    Respiratory:  Negative for shortness of breath.    Cardiovascular:  Negative for chest pain.   Gastrointestinal:  Negative for nausea.   Genitourinary:  Negative for dysuria.   Musculoskeletal:  Negative for back pain.   Skin:  Positive for wound. Negative for rash.   Neurological:  Negative for weakness.   Hematological:  Does not bruise/bleed easily.     Physical Exam     Initial Vitals   BP Pulse Resp Temp SpO2   23 1634 23 1631 23 1631 23 1631 23 1631   (!) 155/86 71 20 97.9 °F (36.6 °C) 97 %      MAP       --                 Physical Exam    Constitutional: He appears well-developed and well-nourished. No distress.   HENT:   Head: Normocephalic and atraumatic.   Eyes: Conjunctivae are normal.   Cardiovascular:  Normal rate, regular rhythm and normal heart sounds.     Exam reveals no gallop and no friction rub.       No murmur heard.  Pulmonary/Chest: Breath sounds normal. No respiratory distress. He has no wheezes. He has no rhonchi. He has no rales.     Neurological: He is alert and oriented to person, place, and time.   Skin:   No puncture wound on posterior aspect of right hand visualized. No swelling or erythema.  Normal appearing tissue.   Psychiatric: He has a normal mood and affect.       ED Course   Procedures  Labs Reviewed - No data to display       Imaging Results    None          Medications   emtricitabine capsule 200 mg (200 mg Oral Given 2/17/23 1821)   tenofovir disoproxil fumarate tablet 300 mg (300 mg Oral Given 2/17/23 1821)   raltegravir tablet 400 mg (400 mg Oral Given 2/17/23 1919)     Medical Decision Making:   History:   Old Records Summarized: records from clinic visits.  Initial Assessment:   67-year-old male with history of lung cancer (post lobectomy), DM2, COPD, and anxiety presents to the ED for dirty needle stick onset yesterday. Patient is a  at a homeless shelter and was taking out the trash when he got stuck by a needle on his top right hand.   Differential Diagnosis:   My differentials diagnoses include but are not limited to:   Puncture wound, exposure to HCV and HIV, needlestick, abrasion  ED Management:  No obvious puncture wound or swelling. No imaging needed. Discussed with patient how lab results drawn today would not show a new virus from the needle stick yesterday. Would have to get blood drawn again in 6 weeks.  Patient understanding and agreed to not have blood work today.  Patient did discuss how he would like prophylaxis treatment. Given 1 time dose prophylactic medication  in ED. Patient stable and ready for discharge. Educated that these medications may cause GI upset and Rx'd Zofran and take OTC PPI. Educated patient to follow up and get blood work through his employer. Patient understanding and agreeable to plan.     I have reviewed the patient's records and discussed with my supervising physician.                          Clinical Impression:   Final diagnoses:  [W46.1XXA] Needlestick injury accident with exposure to body fluid (Primary)        ED Disposition Condition    Discharge Stable          ED Prescriptions       Medication Sig Dispense Start Date End Date Auth. Provider    ondansetron (ZOFRAN) 4 MG tablet Take 1 tablet (4 mg total) by mouth every 6 (six) hours as needed for Nausea. 12 tablet 2/17/2023 -- Monika Whitlock PA-C    emtricitabine-tenofovir 200-300 mg (TRUVADA) 200-300 mg Tab Take 1 tablet by mouth once daily. for 3 days 3 tablet 2/17/2023 2/20/2023 Monika Whitlock PA-C    raltegravir (ISENTRESS) 400 mg tablet Take 1 tablet (400 mg total) by mouth 2 (two) times daily. for 3 days 6 tablet 2/17/2023 2/20/2023 Monika Whitlock PA-C          Follow-up Information    None          Monika Whitlock PA-C  02/17/23 1953

## 2023-02-17 NOTE — TELEPHONE ENCOUNTER
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 His right hand is now swollen. His wife states that they are unable to get his company to provide paperwork for the incident and when they attempted to go to [facility], they were instructed they would need that paperwork to be seen. Care advice is to go to ED or [facility] now. Wife says she will call back the company and demand the paperwork be completed. Reiterated care advice and instructed to go with or without the paperwork.     Reason for Disposition   SOURCE person is HIGHER RISK (e.g., shelter inmate, injection drug user) AND needlestick within past 72 hours    Additional Information   Negative: SOURCE person is HIV POSITIVE AND needlestick within past 72 hours   Negative: SOURCE person is HEPATITIS B POSITIVE AND needlestick within past 7 days AND EXPOSED person NOT fully immunized against Hepatitis B (or does not know)   Negative: Puncture is on the head, neck, chest, abdomen or overlying a joint AND could be deep   Negative: Sensation of something still in the wound (i.e., needle broke off)    Protocols used: [id number]

## 2023-02-18 NOTE — ED NOTES
Pt. Dc'd home all dc information reviewed with Pt by provider Pt verbalized understanding Pt. Ambulated to exit

## 2023-02-18 NOTE — DISCHARGE INSTRUCTIONS
Prescribed prophylaxis medication and Zofran as these medications can cause nausea and abdominal upset. Follow up with PCP and with employer for further lab workup to test for Hepatitis C and HIV. Return to ED if new or worsening symptoms.

## 2023-09-15 ENCOUNTER — OFFICE VISIT (OUTPATIENT)
Dept: URGENT CARE | Facility: CLINIC | Age: 68
End: 2023-09-15
Payer: MEDICARE

## 2023-09-15 VITALS
RESPIRATION RATE: 19 BRPM | BODY MASS INDEX: 27.48 KG/M2 | TEMPERATURE: 98 F | HEART RATE: 80 BPM | SYSTOLIC BLOOD PRESSURE: 120 MMHG | HEIGHT: 67 IN | OXYGEN SATURATION: 96 % | WEIGHT: 175.06 LBS | DIASTOLIC BLOOD PRESSURE: 72 MMHG

## 2023-09-15 DIAGNOSIS — J98.8 BACTERIAL RESPIRATORY INFECTION: Primary | ICD-10-CM

## 2023-09-15 DIAGNOSIS — R05.9 COUGH, UNSPECIFIED TYPE: ICD-10-CM

## 2023-09-15 DIAGNOSIS — J34.89 RHINORRHEA: ICD-10-CM

## 2023-09-15 DIAGNOSIS — B96.89 BACTERIAL RESPIRATORY INFECTION: Primary | ICD-10-CM

## 2023-09-15 DIAGNOSIS — Z87.09 HISTORY OF COPD: ICD-10-CM

## 2023-09-15 LAB
CTP QC/QA: YES
SARS-COV-2 AG RESP QL IA.RAPID: NEGATIVE

## 2023-09-15 PROCEDURE — 87811 SARS-COV-2 COVID19 W/OPTIC: CPT | Mod: QW,S$GLB,,

## 2023-09-15 PROCEDURE — 99214 PR OFFICE/OUTPT VISIT, EST, LEVL IV, 30-39 MIN: ICD-10-PCS | Mod: S$GLB,,,

## 2023-09-15 PROCEDURE — 71046 XR CHEST PA AND LATERAL: ICD-10-PCS | Mod: S$GLB,,, | Performed by: RADIOLOGY

## 2023-09-15 PROCEDURE — 71046 X-RAY EXAM CHEST 2 VIEWS: CPT | Mod: S$GLB,,, | Performed by: RADIOLOGY

## 2023-09-15 PROCEDURE — 99214 OFFICE O/P EST MOD 30 MIN: CPT | Mod: S$GLB,,,

## 2023-09-15 PROCEDURE — 87811 SARS CORONAVIRUS 2 ANTIGEN POCT, MANUAL READ: ICD-10-PCS | Mod: QW,S$GLB,,

## 2023-09-15 RX ORDER — ALBUTEROL SULFATE 90 UG/1
2 AEROSOL, METERED RESPIRATORY (INHALATION) EVERY 6 HOURS PRN
Qty: 18 G | Refills: 0 | Status: SHIPPED | OUTPATIENT
Start: 2023-09-15 | End: 2023-09-28 | Stop reason: SDUPTHER

## 2023-09-15 RX ORDER — DOXYCYCLINE HYCLATE 100 MG
100 TABLET ORAL 2 TIMES DAILY
Qty: 10 TABLET | Refills: 0 | Status: SHIPPED | OUTPATIENT
Start: 2023-09-15 | End: 2023-09-20

## 2023-09-15 RX ORDER — BENZONATATE 200 MG/1
200 CAPSULE ORAL 3 TIMES DAILY PRN
Qty: 30 CAPSULE | Refills: 0 | Status: SHIPPED | OUTPATIENT
Start: 2023-09-15 | End: 2023-09-25

## 2023-09-15 RX ORDER — AMOXICILLIN AND CLAVULANATE POTASSIUM 875; 125 MG/1; MG/1
1 TABLET, FILM COATED ORAL EVERY 12 HOURS
Qty: 10 TABLET | Refills: 0 | Status: SHIPPED | OUTPATIENT
Start: 2023-09-15 | End: 2023-09-20

## 2023-09-15 RX ORDER — CETIRIZINE HYDROCHLORIDE 10 MG/1
10 TABLET ORAL DAILY
Qty: 30 TABLET | Refills: 0 | Status: SHIPPED | OUTPATIENT
Start: 2023-09-15 | End: 2023-09-28

## 2023-09-15 NOTE — PATIENT INSTRUCTIONS
Please drink plenty of fluids.  Please get plenty of rest.  Please utilize saltwater gargles for sore throat.  Please utilize warm tea, and lemon for sore throat relief.  Please utilize over-the-counter throat numbing spray and lozenges for sore throat relief.    Please return here or go to the Emergency Department for any concerns or worsening of condition.    Please take TESSALON for cough.  Please take AUGMENTIN and DOXYCYCLINE for bacterial respiratory infection. Please complete the full course of this antibiotics. Please take this antibiotic with food and a full glass of water.  Please use ALBUTEROL INHALER for wheezing/shortness of breath as needed.  Please take CETIRIZINE for allergy relief.    Please follow up with your primary care provider/lung doctor THIS WEEK for follow up and to obtain a CT of the chest.    If you do have Hypertension or palpitations, it is safe to take Coricidin HBP for relief of sinus symptoms.  We recommend you take over the counter Flonase (Fluticasone) or another nasally inhaled steroid unless you are already taking one.  Nasal irrigation with a saline spray or Netti Pot like device per their directions is also recommended.  If not allergic, please take over the counter Tylenol (Acetaminophen) and/or Motrin (Ibuprofen) as directed for control of pain and/or fever.  Please follow up with your primary care doctor or specialist as needed.    If you  smoke, please stop smoking.

## 2023-09-15 NOTE — PROGRESS NOTES
"Subjective:     Patient ID: Hernan Engel is a 67 y.o. male.    Vitals:  height is 5' 7" (1.702 m) and weight is 79.4 kg (175 lb 0.7 oz). His oral temperature is 98.4 °F (36.9 °C). His blood pressure is 120/72 and his pulse is 80. His respiration is 19 and oxygen saturation is 96%.     Chief Complaint: Nasal Congestion    Patient presents with his wife.  Patient states that he is had a productive cough for a month.  Associated symptoms include rhinorrhea.  Patient states that he has a history of COPD and lung cancer.  Patient has not tried anything for his symptoms.  He denies fever, chills, chest pain, SOB, nausea, vomiting, abdominal pain.    Cough  This is a new problem. The current episode started more than 1 month ago (1 month). The problem has been unchanged. The cough is Productive of sputum. Associated symptoms include nasal congestion and rhinorrhea. Pertinent negatives include no chest pain, chills, ear congestion, ear pain, fever, headaches, heartburn, hemoptysis, myalgias, postnasal drip, rash, sore throat, shortness of breath, sweats, weight loss or wheezing. Nothing aggravates the symptoms. He has tried nothing for the symptoms. The treatment provided no relief. His past medical history is significant for COPD.     Constitution: Negative for chills and fever.   HENT:  Negative for ear pain, postnasal drip and sore throat.    Cardiovascular:  Negative for chest pain and sob on exertion.   Respiratory:  Positive for cough and sputum production. Negative for bloody sputum, shortness of breath and wheezing.    Gastrointestinal:  Negative for abdominal pain, nausea, vomiting, diarrhea and heartburn.   Musculoskeletal:  Negative for muscle ache.   Skin:  Negative for rash.   Neurological:  Negative for headaches.     Objective:     Physical Exam   Constitutional:  Non-toxic appearance. He does not appear ill. No distress.      Comments:Patient is in no acute distress, patient is non-toxic appearing, patient is " ox3, patient is answering question appropriately.   normal  HENT:   Head: Normocephalic.   Ears:   Right Ear: Tympanic membrane, external ear and ear canal normal. impacted cerumen  Left Ear: Tympanic membrane, external ear and ear canal normal. impacted cerumen  Nose: Rhinorrhea and congestion present.   Mouth/Throat: No oropharyngeal exudate or posterior oropharyngeal erythema. Oropharynx is clear.   Cardiovascular: Normal rate, regular rhythm and normal heart sounds.   No murmur heard.Exam reveals no gallop and no friction rub.   Pulmonary/Chest: Effort normal and breath sounds normal. No stridor. No respiratory distress. He has no wheezes. He has no rhonchi. He has no rales. He exhibits no tenderness.         Comments: Patient is in no respiratory distress. Breath sounds even, unlabored, and clear to auscultation bilaterally. No accessory muscle usage. Patient able to speak in complete sentences with ease.    Abdominal: Normal appearance.   Lymphadenopathy:     He has no cervical adenopathy.   Neurological: He is alert.   Skin: Skin is not diaphoretic.   Nursing note and vitals reviewed.    Results for orders placed or performed in visit on 09/15/23   SARS Coronavirus 2 Antigen, POCT Manual Read   Result Value Ref Range    SARS Coronavirus 2 Antigen Negative Negative     Acceptable Yes      XR CHEST PA AND LATERAL    Result Date: 9/15/2023  EXAMINATION: XR CHEST PA AND LATERAL CLINICAL HISTORY: Cough, unspecified TECHNIQUE: PA and lateral views of the chest were performed. COMPARISON: 01/07/2020, CT chest 06/14/2021 FINDINGS: The cardiomediastinal silhouette is not enlarged noting prominence of the left hilar region.  On lateral view, this projects posteriorly.  There is no pleural effusion.  The trachea is midline.  The lungs are symmetrically expanded bilaterally with coarse interstitial attenuation bilaterally noting scattered regions of bandlike atelectasis or scarring.  There is volume loss  involving the right lower lung zone.  There is right apical atelectasis or scarring..  There is no pneumothorax.  The osseous structures are remarkable for degenerative change..     This report was flagged in Epic as abnormal. Masslike focus along the left hilar region projecting posteriorly.  This was not seen on prior chest CT.  Differential would include consolidation associated with infection however mediastinal or pulmonary mass in the region is not excluded.  Comparison with any more recent exams would be helpful.  Correlation and further evaluation advised. Pulmonary findings suggest COPD/emphysema with scattered regions of bandlike atelectasis or scarring. Electronically signed by: Julian Dwyer MD Date:    09/15/2023 Time:    15:55     Assessment:     1. Bacterial respiratory infection    2. Cough, unspecified type    3. History of COPD    4. Rhinorrhea      Plan:   Previous notes reviewed.  Vital signs reviewed.  Labs ordered. Labs reviewed.  Assessment requiring independent historian other than the patient, patient's wife.  Discussed bacterial respiratory infection with history of COPD and lung cancer, home care, tx options, and given follow up precautions.  Patient was briefed on my thought process and diagnosis.   Patient involved with the treatment plan and agreed to the plan.    Patient presents with a persistent cough, patient is stable and nontoxic appearing, physical exam shows that the lungs are clear to auscultation bilaterally with an SpO2 of 96%, chest x-ray shows evidence of consolidation versus pulmonary mass, will treat for bacterial etiology at this time, will encourage patient to follow up with pulmonology/PCP regarding CT of the chest for further workup of the potential new pulmonary mass, patient given pulmonology referral for he states that he would like a pulmonologist within [facility], ER precautions given.    Patient informed on warning signs, patient understood warning  signs and to go to urgent care or ER if warning signs appear.  Please excuse grammatical/spelling errors appreciated throughout this visit encounter for a remote dictation device was used during this encounter.    Patient Instructions   Please drink plenty of fluids.  Please get plenty of rest.  Please utilize saltwater gargles for sore throat.  Please utilize warm tea, and lemon for sore throat relief.  Please utilize over-the-counter throat numbing spray and lozenges for sore throat relief.    Please return here or go to the Emergency Department for any concerns or worsening of condition.    Please take TESSALON for cough.  Please take AUGMENTIN and DOXYCYCLINE for bacterial respiratory infection. Please complete the full course of this antibiotics. Please take this antibiotic with food and a full glass of water.  Please use ALBUTEROL INHALER for wheezing/shortness of breath as needed.  Please take CETIRIZINE for allergy relief.    Please follow up with your primary care provider/lung doctor THIS WEEK for follow up and to obtain a CT of the chest.    If you do have Hypertension or palpitations, it is safe to take Coricidin HBP for relief of sinus symptoms.  We recommend you take over the counter Flonase (Fluticasone) or another nasally inhaled steroid unless you are already taking one.  Nasal irrigation with a saline spray or Netti Pot like device per their directions is also recommended.  If not allergic, please take over the counter Tylenol (Acetaminophen) and/or Motrin (Ibuprofen) as directed for control of pain and/or fever.  Please follow up with your primary care doctor or specialist as needed.    If you  smoke, please stop smoking.    Bacterial respiratory infection  -     amoxicillin-clavulanate 875-125mg (AUGMENTIN) 875-125 mg per tablet; Take 1 tablet by mouth every 12 (twelve) hours. for 5 days  Dispense: 10 tablet; Refill: 0  -     doxycycline (VIBRA-TABS) 100 MG tablet; Take 1 tablet (100 mg total) by  mouth 2 (two) times daily. for 5 days  Dispense: 10 tablet; Refill: 0  -     albuterol (PROVENTIL HFA) 90 mcg/actuation inhaler; Inhale 2 puffs into the lungs every 6 (six) hours as needed for Wheezing. Rescue  Dispense: 18 g; Refill: 0  -     benzonatate (TESSALON) 200 MG capsule; Take 1 capsule (200 mg total) by mouth 3 (three) times daily as needed for Cough.  Dispense: 30 capsule; Refill: 0    Cough, unspecified type  -     SARS Coronavirus 2 Antigen, POCT Manual Read  -     XR CHEST PA AND LATERAL; Future; Expected date: 09/15/2023    History of COPD  -     Ambulatory referral/consult to Pulmonology    Rhinorrhea  -     cetirizine (ZYRTEC) 10 MG tablet; Take 1 tablet (10 mg total) by mouth once daily.  Dispense: 30 tablet; Refill: 0      Reynaldo Bonds PA-C

## 2023-09-28 ENCOUNTER — OFFICE VISIT (OUTPATIENT)
Dept: FAMILY MEDICINE | Facility: CLINIC | Age: 68
End: 2023-09-28
Payer: MEDICARE

## 2023-09-28 VITALS
TEMPERATURE: 98 F | SYSTOLIC BLOOD PRESSURE: 122 MMHG | HEIGHT: 67 IN | OXYGEN SATURATION: 97 % | BODY MASS INDEX: 27.09 KG/M2 | RESPIRATION RATE: 16 BRPM | DIASTOLIC BLOOD PRESSURE: 74 MMHG | HEART RATE: 62 BPM | WEIGHT: 172.63 LBS

## 2023-09-28 DIAGNOSIS — Z13.6 SCREENING FOR AAA (AORTIC ABDOMINAL ANEURYSM): ICD-10-CM

## 2023-09-28 DIAGNOSIS — E11.8 TYPE 2 DIABETES MELLITUS WITH COMPLICATION, WITHOUT LONG-TERM CURRENT USE OF INSULIN: ICD-10-CM

## 2023-09-28 DIAGNOSIS — J42 CHRONIC BRONCHITIS, UNSPECIFIED CHRONIC BRONCHITIS TYPE: ICD-10-CM

## 2023-09-28 DIAGNOSIS — R91.1 SOLITARY PULMONARY NODULE: ICD-10-CM

## 2023-09-28 DIAGNOSIS — Z12.5 PROSTATE CANCER SCREENING: ICD-10-CM

## 2023-09-28 DIAGNOSIS — B96.89 BACTERIAL RESPIRATORY INFECTION: ICD-10-CM

## 2023-09-28 DIAGNOSIS — F41.9 ANXIETY: ICD-10-CM

## 2023-09-28 DIAGNOSIS — Z12.11 COLON CANCER SCREENING: ICD-10-CM

## 2023-09-28 DIAGNOSIS — Z23 INFLUENZA VACCINE NEEDED: ICD-10-CM

## 2023-09-28 DIAGNOSIS — Z76.89 ENCOUNTER TO ESTABLISH CARE WITH NEW DOCTOR: ICD-10-CM

## 2023-09-28 DIAGNOSIS — R91.8 HILAR MASS: Primary | ICD-10-CM

## 2023-09-28 DIAGNOSIS — H91.92 HEARING LOSS OF LEFT EAR, UNSPECIFIED HEARING LOSS TYPE: ICD-10-CM

## 2023-09-28 DIAGNOSIS — E78.5 DYSLIPIDEMIA ASSOCIATED WITH TYPE 2 DIABETES MELLITUS: ICD-10-CM

## 2023-09-28 DIAGNOSIS — E11.69 DYSLIPIDEMIA ASSOCIATED WITH TYPE 2 DIABETES MELLITUS: ICD-10-CM

## 2023-09-28 DIAGNOSIS — J98.8 BACTERIAL RESPIRATORY INFECTION: ICD-10-CM

## 2023-09-28 DIAGNOSIS — Z00.00 ANNUAL PHYSICAL EXAM: ICD-10-CM

## 2023-09-28 DIAGNOSIS — R79.9 ABNORMAL FINDING OF BLOOD CHEMISTRY, UNSPECIFIED: ICD-10-CM

## 2023-09-28 LAB
ALBUMIN/CREAT UR: 5.6 UG/MG (ref 0–30)
CREAT UR-MCNC: 197 MG/DL (ref 23–375)
MICROALBUMIN UR DL<=1MG/L-MCNC: 11 UG/ML

## 2023-09-28 PROCEDURE — 90694 FLU VACCINE - QUADRIVALENT - ADJUVANTED: ICD-10-PCS | Mod: S$GLB,,, | Performed by: INTERNAL MEDICINE

## 2023-09-28 PROCEDURE — 1160F PR REVIEW ALL MEDS BY PRESCRIBER/CLIN PHARMACIST DOCUMENTED: ICD-10-PCS | Mod: CPTII,S$GLB,, | Performed by: INTERNAL MEDICINE

## 2023-09-28 PROCEDURE — 3074F SYST BP LT 130 MM HG: CPT | Mod: CPTII,S$GLB,, | Performed by: INTERNAL MEDICINE

## 2023-09-28 PROCEDURE — G0008 FLU VACCINE - QUADRIVALENT - ADJUVANTED: ICD-10-PCS | Mod: S$GLB,,, | Performed by: INTERNAL MEDICINE

## 2023-09-28 PROCEDURE — 1157F ADVNC CARE PLAN IN RCRD: CPT | Mod: CPTII,S$GLB,, | Performed by: INTERNAL MEDICINE

## 2023-09-28 PROCEDURE — 3288F PR FALLS RISK ASSESSMENT DOCUMENTED: ICD-10-PCS | Mod: CPTII,S$GLB,, | Performed by: INTERNAL MEDICINE

## 2023-09-28 PROCEDURE — 99999 PR PBB SHADOW E&M-EST. PATIENT-LVL V: ICD-10-PCS | Mod: PBBFAC,,, | Performed by: INTERNAL MEDICINE

## 2023-09-28 PROCEDURE — 1101F PT FALLS ASSESS-DOCD LE1/YR: CPT | Mod: CPTII,S$GLB,, | Performed by: INTERNAL MEDICINE

## 2023-09-28 PROCEDURE — 3078F DIAST BP <80 MM HG: CPT | Mod: CPTII,S$GLB,, | Performed by: INTERNAL MEDICINE

## 2023-09-28 PROCEDURE — 3288F FALL RISK ASSESSMENT DOCD: CPT | Mod: CPTII,S$GLB,, | Performed by: INTERNAL MEDICINE

## 2023-09-28 PROCEDURE — 3074F PR MOST RECENT SYSTOLIC BLOOD PRESSURE < 130 MM HG: ICD-10-PCS | Mod: CPTII,S$GLB,, | Performed by: INTERNAL MEDICINE

## 2023-09-28 PROCEDURE — 3008F PR BODY MASS INDEX (BMI) DOCUMENTED: ICD-10-PCS | Mod: CPTII,S$GLB,, | Performed by: INTERNAL MEDICINE

## 2023-09-28 PROCEDURE — 99214 PR OFFICE/OUTPT VISIT, EST, LEVL IV, 30-39 MIN: ICD-10-PCS | Mod: S$GLB,,, | Performed by: INTERNAL MEDICINE

## 2023-09-28 PROCEDURE — 90694 VACC AIIV4 NO PRSRV 0.5ML IM: CPT | Mod: S$GLB,,, | Performed by: INTERNAL MEDICINE

## 2023-09-28 PROCEDURE — 99214 OFFICE O/P EST MOD 30 MIN: CPT | Mod: S$GLB,,, | Performed by: INTERNAL MEDICINE

## 2023-09-28 PROCEDURE — 82043 UR ALBUMIN QUANTITATIVE: CPT | Performed by: INTERNAL MEDICINE

## 2023-09-28 PROCEDURE — G0008 ADMIN INFLUENZA VIRUS VAC: HCPCS | Mod: S$GLB,,, | Performed by: INTERNAL MEDICINE

## 2023-09-28 PROCEDURE — 99999 PR PBB SHADOW E&M-EST. PATIENT-LVL V: CPT | Mod: PBBFAC,,, | Performed by: INTERNAL MEDICINE

## 2023-09-28 PROCEDURE — 1159F MED LIST DOCD IN RCRD: CPT | Mod: CPTII,S$GLB,, | Performed by: INTERNAL MEDICINE

## 2023-09-28 PROCEDURE — 1157F PR ADVANCE CARE PLAN OR EQUIV PRESENT IN MEDICAL RECORD: ICD-10-PCS | Mod: CPTII,S$GLB,, | Performed by: INTERNAL MEDICINE

## 2023-09-28 PROCEDURE — 1160F RVW MEDS BY RX/DR IN RCRD: CPT | Mod: CPTII,S$GLB,, | Performed by: INTERNAL MEDICINE

## 2023-09-28 PROCEDURE — 3008F BODY MASS INDEX DOCD: CPT | Mod: CPTII,S$GLB,, | Performed by: INTERNAL MEDICINE

## 2023-09-28 PROCEDURE — 3078F PR MOST RECENT DIASTOLIC BLOOD PRESSURE < 80 MM HG: ICD-10-PCS | Mod: CPTII,S$GLB,, | Performed by: INTERNAL MEDICINE

## 2023-09-28 PROCEDURE — 1159F PR MEDICATION LIST DOCUMENTED IN MEDICAL RECORD: ICD-10-PCS | Mod: CPTII,S$GLB,, | Performed by: INTERNAL MEDICINE

## 2023-09-28 PROCEDURE — 1101F PR PT FALLS ASSESS DOC 0-1 FALLS W/OUT INJ PAST YR: ICD-10-PCS | Mod: CPTII,S$GLB,, | Performed by: INTERNAL MEDICINE

## 2023-09-28 RX ORDER — ROSUVASTATIN CALCIUM 5 MG/1
20 TABLET, COATED ORAL DAILY
Qty: 90 TABLET | Refills: 3 | Status: SHIPPED | OUTPATIENT
Start: 2023-09-28 | End: 2024-02-28 | Stop reason: SDUPTHER

## 2023-09-28 RX ORDER — ALBUTEROL SULFATE 90 UG/1
2 AEROSOL, METERED RESPIRATORY (INHALATION) EVERY 6 HOURS PRN
Qty: 18 G | Refills: 0 | Status: SHIPPED | OUTPATIENT
Start: 2023-09-28 | End: 2024-02-28

## 2023-09-28 RX ORDER — ROSUVASTATIN CALCIUM 5 MG/1
5 TABLET, COATED ORAL
COMMUNITY
Start: 2023-09-08 | End: 2023-09-28 | Stop reason: SDUPTHER

## 2023-09-28 RX ORDER — UMECLIDINIUM 62.5 UG/1
62.5 AEROSOL, POWDER ORAL DAILY
Qty: 30 EACH | Refills: 0 | Status: SHIPPED | OUTPATIENT
Start: 2023-09-28

## 2023-09-28 NOTE — ASSESSMENT & PLAN NOTE
"no meds. "Been alright" does not feel depressed but is concerned and anxious about his lung mass.   "

## 2023-09-28 NOTE — ASSESSMENT & PLAN NOTE
Vaccination - side effects from shingles, that's why didn't take second. Counseling provided for COVID. Pneumococcal complete. Agreed to flu vaccine.   Cancer screening - Fit test and colonoscopy test in the past, found polyps?

## 2023-09-28 NOTE — ASSESSMENT & PLAN NOTE
Recent xray showed recurrence? (Left hilar mass). Does report weight loss (subjective). Loss 3lbs this year.  Xray (9/15/23): Masslike focus along the left hilar region projecting posteriorly.  This was not seen on prior chest CT.      - repeat CT Chest urgent

## 2023-09-28 NOTE — PROGRESS NOTES
"Patient presents to clinic today to establish as a new patient.     Chief Complaint: [facility]      Hernan Engel  is a 68 y.o. year old with a PMH of  has a past medical history of Anxiety, Aortic atherosclerosis (12/6/2017), BPH with urinary obstruction (2/20/2019), Colon polyp (1/16/2014), COPD (chronic obstructive pulmonary disease), Depression, Disorder of kidney and ureter, ED (erectile dysfunction) (1/16/2014), Family history of colon cancer (1/16/2014), Hearing loss in left ear (34 years), Lung cancer, Normocytic anemia (9/3/2014), Nuclear sclerosis of both eyes (9/4/2020), Seizure (2005), Status post lobectomy of lung (4/15/2014), and T2DM (type 2 diabetes mellitus) (2014).     Lung mass: had a partial right lobectomy (2014) 2/2 lung mass (Adenocarcinoma). Recent xray showed recurrence? (Left hilar mass). Does report weight loss (subjective). Loss 3lbs this year. Stopped smoking and using nicotine products. No chemo or radiation.     Hearing impairment on the left side. No hearing aids.     COPD: not on inhalers. Wheezing has improved, he reports no chest pain or SOB. Does sound wheezy during the encounter.     DM: no medication. Was on metformin and insulin a long time ago. Measures BG at home (193 yesterday after eating) and has been high. Reports that his last A1c check was 8.3% at [facility]. Last year his A1c was 5.5%.     Was on antivirals for HIV because needle-stick injury Feb 2017. Stopped taking meds now. HIV negative.     Mood: no meds. "Been alright" does not feel depressed but is concerned and anxious about his lung mass.     Social hx: Goes for walks (three times a week). Works re-entry program prisoners.     HCM:  Vaccination - side effects from shingles, that's why didn't take second. Counseling provided for COVID. Pneumococcal complete. Agreed to flu vaccine.   Cancer screening - Fit test and colonoscopy test in the past, found polyps?     Past Surgical History:   Procedure Laterality Date "    COLONOSCOPY      LUNG REMOVAL, PARTIAL          Family History   Problem Relation Age of Onset    Cancer Father         colon cancer    Colon cancer Father     Cataracts Father     Colon cancer Sister     Cancer Sister         colon or lung cancer     No Known Problems Mother     No Known Problems Brother     No Known Problems Sister     No Known Problems Sister     No Known Problems Maternal Aunt     No Known Problems Maternal Uncle     No Known Problems Paternal Aunt     No Known Problems Paternal Uncle     No Known Problems Maternal Grandmother     No Known Problems Maternal Grandfather     No Known Problems Paternal Grandmother     No Known Problems Paternal Grandfather     Amblyopia Neg Hx     Blindness Neg Hx     Diabetes Neg Hx     Glaucoma Neg Hx     Hypertension Neg Hx     Macular degeneration Neg Hx     Retinal detachment Neg Hx     Strabismus Neg Hx     Stroke Neg Hx     Thyroid disease Neg Hx         Social History     Socioeconomic History    Marital status:    Occupational History     Employer: [employer]   Tobacco Use    Smoking status: Former     Current packs/day: 0.00     Average packs/day: 1 pack/day for 30.0 years (30.0 ttl pk-yrs)     Types: Cigarettes     Start date: 1/15/1984     Quit date: 1/15/2014     Years since quittin.7    Smokeless tobacco: Never   Substance and Sexual Activity    Alcohol use: Yes     Alcohol/week: 5.7 standard drinks of alcohol     Types: 4 Cans of beer, 2 Standard drinks or equivalent per week     Comment: every other day beer drinker    Drug use: No    Sexual activity: Not Currently     Partners: Female     Birth control/protection: None         Current Outpatient Medications:     blood-glucose meter kit, Use as instructed, Disp: 1 each, Rfl: 0    albuterol (PROVENTIL HFA) 90 mcg/actuation inhaler, Inhale 2 puffs into the lungs every 6 (six) hours as needed for Wheezing. Rescue, Disp: 18 g, Rfl: 0    rosuvastatin (CRESTOR) 5 MG tablet, Take 4 tablets  (20 mg total) by mouth once daily., Disp: 90 tablet, Rfl: 3    umeclidinium (INCRUSE ELLIPTA) 62.5 mcg/actuation inhalation capsule, Inhale 62.5 mcg into the lungs once daily. Controller, Disp: 30 each, Rfl: 0     Review of Systems   Constitutional:  Positive for weight loss. Negative for chills and fever.   HENT:  Positive for hearing loss (left side). Negative for congestion.    Eyes:  Negative for blurred vision.   Respiratory:  Positive for wheezing. Negative for cough and shortness of breath.    Cardiovascular:  Negative for chest pain.   Gastrointestinal:  Positive for blood in stool (BRRBPR when wiping). Negative for abdominal pain, constipation, diarrhea, heartburn, melena and nausea.   Genitourinary: Negative.    Musculoskeletal:  Negative for joint pain.   Neurological:  Negative for headaches.   Psychiatric/Behavioral:  Negative for depression. The patient is nervous/anxious.         Objective:      Vitals:    09/28/23 0716   BP: 122/74   Pulse: 62   Resp: 16   Temp: 98.1 °F (36.7 °C)       Physical Exam  Vitals and nursing note reviewed.   Constitutional:       Appearance: Normal appearance.   HENT:      Head: Normocephalic and atraumatic.   Cardiovascular:      Rate and Rhythm: Normal rate and regular rhythm.   Pulmonary:      Effort: Pulmonary effort is normal.      Breath sounds: Rales present. No wheezing.   Abdominal:      General: Bowel sounds are normal.      Palpations: Abdomen is soft.      Tenderness: There is no abdominal tenderness.   Musculoskeletal:      Right lower leg: No edema.      Left lower leg: No edema.   Skin:     General: Skin is warm and dry.   Neurological:      General: No focal deficit present.      Mental Status: He is alert and oriented to person, place, and time.   Psychiatric:         Mood and Affect: Mood normal.         Behavior: Behavior normal.          Assessment:       1. Hilar mass    2. Solitary pulmonary nodule    3. Type 2 diabetes mellitus with complication,  without long-term current use of insulin    4. Dyslipidemia associated with type 2 diabetes mellitus    5. Hearing loss of left ear, unspecified hearing loss type    6. Annual physical exam    7. Encounter to establish care with new doctor    8. Prostate cancer screening    9. Chronic bronchitis, unspecified chronic bronchitis type    10. Anxiety    11. Influenza vaccine needed    12. Screening for AAA (aortic abdominal aneurysm)    13. Colon cancer screening    14. Bacterial respiratory infection    15. Abnormal finding of blood chemistry, unspecified          Plan:   1. Hilar mass  Assessment & Plan:  Recent xray showed recurrence? (Left hilar mass). Does report weight loss (subjective). Loss 3lbs this year.  Xray (9/15/23): Masslike focus along the left hilar region projecting posteriorly.  This was not seen on prior chest CT.      - repeat CT Chest urgent     Orders:  -     CT Chest Without Contrast; Future; Expected date: 09/28/2023    2. Solitary pulmonary nodule  -     CT Chest Without Contrast; Future; Expected date: 09/28/2023    3. Type 2 diabetes mellitus with complication, without long-term current use of insulin  Assessment & Plan:  no medication. Was on metformin and insulin a long time ago. Measures BG at home (193 yesterday after eating) and has been high. Reports that his last A1c check was 8.3% at [facility]. Last year his A1c was 5.5%.     - follow up on A1c and will decide course of Rx accordingly   - referral to optometry   - foot exam at next visit     Orders:  -     Comprehensive Metabolic Panel; Future; Expected date: 09/28/2023  -     Hemoglobin A1C; Future; Expected date: 09/28/2023  -     rosuvastatin (CRESTOR) 5 MG tablet; Take 4 tablets (20 mg total) by mouth once daily.  Dispense: 90 tablet; Refill: 3  -     Ambulatory referral/consult to Optometry; Future; Expected date: 10/05/2023  -     Microalbumin/Creatinine Ratio, Urine; Future; Expected date: 09/28/2023    4. Dyslipidemia  "associated with type 2 diabetes mellitus  Assessment & Plan:  No longer taking statin     - will restart high intensity statin       5. Hearing loss of left ear, unspecified hearing loss type  Assessment & Plan:  Hearing impairment on the left side. No hearing aids.     - will refer to audiometry at next visit       6. Annual physical exam  Assessment & Plan:  Vaccination - side effects from shingles, that's why didn't take second. Counseling provided for COVID. Pneumococcal complete. Agreed to flu vaccine.   Cancer screening - Fit test and colonoscopy test in the past, found polyps?     Orders:  -     CBC Auto Differential; Future; Expected date: 09/28/2023  -     Lipid Panel; Future; Expected date: 09/28/2023    7. Encounter to establish care with new doctor  Refer to above     8. Prostate cancer screening  Normal three years ago     -     PSA, SCREENING; Future; Expected date: 09/28/2023    9. Chronic bronchitis, unspecified chronic bronchitis type  Assessment & Plan:  not on inhalers. Wheezing has improved, he reports no chest pain or SOB. Does sound wheezy during the encounter.     - start Incruse Ellipta daily   - albuterol PRN   - patient counseled on course of COPD and inhalers, and if he doesn't respond to albuterol to go to ED     Orders:  -     umeclidinium (INCRUSE ELLIPTA) 62.5 mcg/actuation inhalation capsule; Inhale 62.5 mcg into the lungs once daily. Controller  Dispense: 30 each; Refill: 0    10. Anxiety  Assessment & Plan:  no meds. "Been alright" does not feel depressed but is concerned and anxious about his lung mass.       11. Influenza vaccine needed  -     Influenza - Quadrivalent (Adjuvanted); Future; Expected date: 09/28/2023    12. Screening for AAA (aortic abdominal aneurysm)  Counseling provided on AAA     -     US Abdominal Aorta; Future; Expected date: 09/28/2023    13. Colon cancer screening  Hx of colon cancer in the family   Two negative FIT test. Had colonoscopy at 51yo. " Polyps?    -     Ambulatory referral/consult to Endo Procedure ; Future; Expected date: 09/29/2023    14. Bacterial respiratory infection  -     albuterol (PROVENTIL HFA) 90 mcg/actuation inhaler; Inhale 2 puffs into the lungs every 6 (six) hours as needed for Wheezing. Rescue  Dispense: 18 g; Refill: 0    15. Abnormal finding of blood chemistry, unspecified  -     CBC Auto Differential; Future; Expected date: 09/28/2023  -     Lipid Panel; Future; Expected date: 09/28/2023       Follow up in about 3 weeks (around 10/19/2023) for f/up COPD .

## 2023-09-28 NOTE — ASSESSMENT & PLAN NOTE
Hearing impairment on the left side. No hearing aids.     - will refer to audiometry at next visit

## 2023-09-28 NOTE — ASSESSMENT & PLAN NOTE
no medication. Was on metformin and insulin a long time ago. Measures BG at home (193 yesterday after eating) and has been high. Reports that his last A1c check was 8.3% at [facility]. Last year his A1c was 5.5%.     - follow up on A1c and will decide course of Rx accordingly   - referral to optometry   - foot exam at next visit

## 2023-09-28 NOTE — PROGRESS NOTES
Health Maintenance Due   Topic     High Dose Statin      Shingles Vaccine (2 of 2)     Colorectal Cancer Screening      Abdominal Aortic Aneurysm Screening      PROSTATE-SPECIFIC ANTIGEN      Hemoglobin A1c      COVID-19 Vaccine (5 - Pfizer series)     Pneumococcal Vaccines (Age 65+) (3 - PPSV23 or PCV20)     Influenza Vaccine (1)

## 2023-09-28 NOTE — PATIENT INSTRUCTIONS
Please start taking your inhaler (incruse ellipta) daily and start taking the rosuvastatin (for your cholesterol) daily. I will let you know about your labs.

## 2023-09-28 NOTE — ASSESSMENT & PLAN NOTE
not on inhalers. Wheezing has improved, he reports no chest pain or SOB. Does sound wheezy during the encounter.     - start Incruse Ellipta daily   - albuterol PRN   - patient counseled on course of COPD and inhalers, and if he doesn't respond to albuterol to go to ED

## 2023-10-02 ENCOUNTER — LAB VISIT (OUTPATIENT)
Dept: LAB | Facility: HOSPITAL | Age: 68
End: 2023-10-02
Attending: INTERNAL MEDICINE
Payer: MEDICARE

## 2023-10-02 DIAGNOSIS — R79.9 ABNORMAL FINDING OF BLOOD CHEMISTRY, UNSPECIFIED: ICD-10-CM

## 2023-10-02 DIAGNOSIS — Z12.5 PROSTATE CANCER SCREENING: ICD-10-CM

## 2023-10-02 DIAGNOSIS — E11.8 TYPE 2 DIABETES MELLITUS WITH COMPLICATION, WITHOUT LONG-TERM CURRENT USE OF INSULIN: ICD-10-CM

## 2023-10-02 DIAGNOSIS — E11.8 TYPE 2 DIABETES MELLITUS WITH COMPLICATION, WITHOUT LONG-TERM CURRENT USE OF INSULIN: Primary | ICD-10-CM

## 2023-10-02 DIAGNOSIS — Z00.00 ANNUAL PHYSICAL EXAM: ICD-10-CM

## 2023-10-02 LAB
ALBUMIN SERPL BCP-MCNC: 3.6 G/DL (ref 3.5–5.2)
ALP SERPL-CCNC: 115 U/L (ref 55–135)
ALT SERPL W/O P-5'-P-CCNC: 18 U/L (ref 10–44)
ANION GAP SERPL CALC-SCNC: 8 MMOL/L (ref 8–16)
AST SERPL-CCNC: 13 U/L (ref 10–40)
BASOPHILS # BLD AUTO: 0.05 K/UL (ref 0–0.2)
BASOPHILS NFR BLD: 0.9 % (ref 0–1.9)
BILIRUB SERPL-MCNC: 1.3 MG/DL (ref 0.1–1)
BUN SERPL-MCNC: 11 MG/DL (ref 8–23)
CALCIUM SERPL-MCNC: 9.5 MG/DL (ref 8.7–10.5)
CHLORIDE SERPL-SCNC: 104 MMOL/L (ref 95–110)
CO2 SERPL-SCNC: 25 MMOL/L (ref 23–29)
COMPLEXED PSA SERPL-MCNC: 0.95 NG/ML (ref 0–4)
CREAT SERPL-MCNC: 0.8 MG/DL (ref 0.5–1.4)
DIFFERENTIAL METHOD: ABNORMAL
EOSINOPHIL # BLD AUTO: 0.2 K/UL (ref 0–0.5)
EOSINOPHIL NFR BLD: 3 % (ref 0–8)
ERYTHROCYTE [DISTWIDTH] IN BLOOD BY AUTOMATED COUNT: 13 % (ref 11.5–14.5)
EST. GFR  (NO RACE VARIABLE): >60 ML/MIN/1.73 M^2
ESTIMATED AVG GLUCOSE: 186 MG/DL (ref 68–131)
GLUCOSE SERPL-MCNC: 209 MG/DL (ref 70–110)
HBA1C MFR BLD: 8.1 % (ref 4–5.6)
HCT VFR BLD AUTO: 36.7 % (ref 40–54)
HGB BLD-MCNC: 11.8 G/DL (ref 14–18)
IMM GRANULOCYTES # BLD AUTO: 0.01 K/UL (ref 0–0.04)
IMM GRANULOCYTES NFR BLD AUTO: 0.2 % (ref 0–0.5)
LYMPHOCYTES # BLD AUTO: 1.4 K/UL (ref 1–4.8)
LYMPHOCYTES NFR BLD: 25.8 % (ref 18–48)
MCH RBC QN AUTO: 27.3 PG (ref 27–31)
MCHC RBC AUTO-ENTMCNC: 32.2 G/DL (ref 32–36)
MCV RBC AUTO: 85 FL (ref 82–98)
MONOCYTES # BLD AUTO: 0.6 K/UL (ref 0.3–1)
MONOCYTES NFR BLD: 10.4 % (ref 4–15)
NEUTROPHILS # BLD AUTO: 3.2 K/UL (ref 1.8–7.7)
NEUTROPHILS NFR BLD: 59.7 % (ref 38–73)
NRBC BLD-RTO: 0 /100 WBC
PLATELET # BLD AUTO: 343 K/UL (ref 150–450)
PMV BLD AUTO: 10.1 FL (ref 9.2–12.9)
POTASSIUM SERPL-SCNC: 4.1 MMOL/L (ref 3.5–5.1)
PROT SERPL-MCNC: 7.3 G/DL (ref 6–8.4)
RBC # BLD AUTO: 4.33 M/UL (ref 4.6–6.2)
SODIUM SERPL-SCNC: 137 MMOL/L (ref 136–145)
WBC # BLD AUTO: 5.38 K/UL (ref 3.9–12.7)

## 2023-10-02 PROCEDURE — 83036 HEMOGLOBIN GLYCOSYLATED A1C: CPT | Performed by: INTERNAL MEDICINE

## 2023-10-02 PROCEDURE — 80053 COMPREHEN METABOLIC PANEL: CPT | Performed by: INTERNAL MEDICINE

## 2023-10-02 PROCEDURE — 84153 ASSAY OF PSA TOTAL: CPT | Performed by: INTERNAL MEDICINE

## 2023-10-02 PROCEDURE — 36415 COLL VENOUS BLD VENIPUNCTURE: CPT | Mod: PO | Performed by: INTERNAL MEDICINE

## 2023-10-02 PROCEDURE — 85025 COMPLETE CBC W/AUTO DIFF WBC: CPT | Performed by: INTERNAL MEDICINE

## 2023-10-02 RX ORDER — INSULIN PUMP SYRINGE, 3 ML
EACH MISCELLANEOUS
Qty: 1 EACH | Refills: 0 | Status: SHIPPED | OUTPATIENT
Start: 2023-10-02 | End: 2023-11-21 | Stop reason: SDUPTHER

## 2023-10-02 RX ORDER — METFORMIN HYDROCHLORIDE 500 MG/1
1000 TABLET, EXTENDED RELEASE ORAL
Qty: 60 TABLET | Refills: 1 | Status: SHIPPED | OUTPATIENT
Start: 2023-10-02 | End: 2023-10-19 | Stop reason: SDUPTHER

## 2023-10-02 NOTE — PROGRESS NOTES
A1c was 8.1% up from 5.5%, will start patient on 1000mg daily of metformin. He reported some symptoms in the past but is willing to try it again. If this is not tolerable will consider other modes of treatment. Also ordered glucose monitor and counseled patient on checking his BG daily.

## 2023-10-06 ENCOUNTER — HOSPITAL ENCOUNTER (OUTPATIENT)
Dept: RADIOLOGY | Facility: HOSPITAL | Age: 68
Discharge: HOME | End: 2023-10-06
Attending: INTERNAL MEDICINE
Payer: MEDICARE

## 2023-10-06 DIAGNOSIS — R91.8 HILAR MASS: ICD-10-CM

## 2023-10-06 DIAGNOSIS — R91.1 SOLITARY PULMONARY NODULE: ICD-10-CM

## 2023-10-06 PROCEDURE — 71250 CT THORAX DX C-: CPT | Mod: TC

## 2023-10-11 ENCOUNTER — TELEPHONE (OUTPATIENT)
Dept: FAMILY MEDICINE | Facility: CLINIC | Age: 68
End: 2023-10-11
Payer: MEDICARE

## 2023-10-11 NOTE — TELEPHONE ENCOUNTER
Called patient to inform him of his CT Chest findings. Patient has a large mass in his left lower lobe that is likely malignant. Called his pulmonology clinic and left a message for Dr. Mcdowell who will get back to me.

## 2023-10-19 ENCOUNTER — OFFICE VISIT (OUTPATIENT)
Dept: FAMILY MEDICINE | Facility: CLINIC | Age: 68
End: 2023-10-19
Payer: MEDICARE

## 2023-10-19 VITALS
OXYGEN SATURATION: 98 % | HEART RATE: 67 BPM | SYSTOLIC BLOOD PRESSURE: 105 MMHG | DIASTOLIC BLOOD PRESSURE: 64 MMHG | TEMPERATURE: 98 F | BODY MASS INDEX: 27.51 KG/M2 | HEIGHT: 67 IN | WEIGHT: 175.25 LBS | RESPIRATION RATE: 20 BRPM

## 2023-10-19 DIAGNOSIS — H91.92 HEARING LOSS OF LEFT EAR, UNSPECIFIED HEARING LOSS TYPE: ICD-10-CM

## 2023-10-19 DIAGNOSIS — B35.3 TINEA PEDIS OF BOTH FEET: ICD-10-CM

## 2023-10-19 DIAGNOSIS — R91.8 MASS OF LOWER LOBE OF LEFT LUNG: ICD-10-CM

## 2023-10-19 DIAGNOSIS — E11.8 TYPE 2 DIABETES MELLITUS WITH COMPLICATION, WITHOUT LONG-TERM CURRENT USE OF INSULIN: Primary | ICD-10-CM

## 2023-10-19 DIAGNOSIS — J42 CHRONIC BRONCHITIS, UNSPECIFIED CHRONIC BRONCHITIS TYPE: ICD-10-CM

## 2023-10-19 PROBLEM — Z00.00 ANNUAL PHYSICAL EXAM: Status: RESOLVED | Noted: 2023-09-28 | Resolved: 2023-10-19

## 2023-10-19 PROCEDURE — 1157F PR ADVANCE CARE PLAN OR EQUIV PRESENT IN MEDICAL RECORD: ICD-10-PCS | Mod: CPTII,S$GLB,, | Performed by: INTERNAL MEDICINE

## 2023-10-19 PROCEDURE — 3078F DIAST BP <80 MM HG: CPT | Mod: CPTII,S$GLB,, | Performed by: INTERNAL MEDICINE

## 2023-10-19 PROCEDURE — 3008F BODY MASS INDEX DOCD: CPT | Mod: CPTII,S$GLB,, | Performed by: INTERNAL MEDICINE

## 2023-10-19 PROCEDURE — 3074F PR MOST RECENT SYSTOLIC BLOOD PRESSURE < 130 MM HG: ICD-10-PCS | Mod: CPTII,S$GLB,, | Performed by: INTERNAL MEDICINE

## 2023-10-19 PROCEDURE — 99215 PR OFFICE/OUTPT VISIT, EST, LEVL V, 40-54 MIN: ICD-10-PCS | Mod: S$GLB,,, | Performed by: INTERNAL MEDICINE

## 2023-10-19 PROCEDURE — 1159F PR MEDICATION LIST DOCUMENTED IN MEDICAL RECORD: ICD-10-PCS | Mod: CPTII,S$GLB,, | Performed by: INTERNAL MEDICINE

## 2023-10-19 PROCEDURE — 99999 PR PBB SHADOW E&M-EST. PATIENT-LVL III: CPT | Mod: PBBFAC,,, | Performed by: INTERNAL MEDICINE

## 2023-10-19 PROCEDURE — 3008F PR BODY MASS INDEX (BMI) DOCUMENTED: ICD-10-PCS | Mod: CPTII,S$GLB,, | Performed by: INTERNAL MEDICINE

## 2023-10-19 PROCEDURE — 1126F PR PAIN SEVERITY QUANTIFIED, NO PAIN PRESENT: ICD-10-PCS | Mod: CPTII,S$GLB,, | Performed by: INTERNAL MEDICINE

## 2023-10-19 PROCEDURE — 1159F MED LIST DOCD IN RCRD: CPT | Mod: CPTII,S$GLB,, | Performed by: INTERNAL MEDICINE

## 2023-10-19 PROCEDURE — 3052F HG A1C>EQUAL 8.0%<EQUAL 9.0%: CPT | Mod: CPTII,S$GLB,, | Performed by: INTERNAL MEDICINE

## 2023-10-19 PROCEDURE — 1160F PR REVIEW ALL MEDS BY PRESCRIBER/CLIN PHARMACIST DOCUMENTED: ICD-10-PCS | Mod: CPTII,S$GLB,, | Performed by: INTERNAL MEDICINE

## 2023-10-19 PROCEDURE — 1157F ADVNC CARE PLAN IN RCRD: CPT | Mod: CPTII,S$GLB,, | Performed by: INTERNAL MEDICINE

## 2023-10-19 PROCEDURE — 99999 PR PBB SHADOW E&M-EST. PATIENT-LVL III: ICD-10-PCS | Mod: PBBFAC,,, | Performed by: INTERNAL MEDICINE

## 2023-10-19 PROCEDURE — 1101F PT FALLS ASSESS-DOCD LE1/YR: CPT | Mod: CPTII,S$GLB,, | Performed by: INTERNAL MEDICINE

## 2023-10-19 PROCEDURE — 3066F NEPHROPATHY DOC TX: CPT | Mod: CPTII,S$GLB,, | Performed by: INTERNAL MEDICINE

## 2023-10-19 PROCEDURE — 1101F PR PT FALLS ASSESS DOC 0-1 FALLS W/OUT INJ PAST YR: ICD-10-PCS | Mod: CPTII,S$GLB,, | Performed by: INTERNAL MEDICINE

## 2023-10-19 PROCEDURE — 1160F RVW MEDS BY RX/DR IN RCRD: CPT | Mod: CPTII,S$GLB,, | Performed by: INTERNAL MEDICINE

## 2023-10-19 PROCEDURE — 3078F PR MOST RECENT DIASTOLIC BLOOD PRESSURE < 80 MM HG: ICD-10-PCS | Mod: CPTII,S$GLB,, | Performed by: INTERNAL MEDICINE

## 2023-10-19 PROCEDURE — 3288F FALL RISK ASSESSMENT DOCD: CPT | Mod: CPTII,S$GLB,, | Performed by: INTERNAL MEDICINE

## 2023-10-19 PROCEDURE — 1126F AMNT PAIN NOTED NONE PRSNT: CPT | Mod: CPTII,S$GLB,, | Performed by: INTERNAL MEDICINE

## 2023-10-19 PROCEDURE — 99215 OFFICE O/P EST HI 40 MIN: CPT | Mod: S$GLB,,, | Performed by: INTERNAL MEDICINE

## 2023-10-19 PROCEDURE — 3061F NEG MICROALBUMINURIA REV: CPT | Mod: CPTII,S$GLB,, | Performed by: INTERNAL MEDICINE

## 2023-10-19 PROCEDURE — 3061F PR NEG MICROALBUMINURIA RESULT DOCUMENTED/REVIEW: ICD-10-PCS | Mod: CPTII,S$GLB,, | Performed by: INTERNAL MEDICINE

## 2023-10-19 PROCEDURE — 3074F SYST BP LT 130 MM HG: CPT | Mod: CPTII,S$GLB,, | Performed by: INTERNAL MEDICINE

## 2023-10-19 PROCEDURE — 3288F PR FALLS RISK ASSESSMENT DOCUMENTED: ICD-10-PCS | Mod: CPTII,S$GLB,, | Performed by: INTERNAL MEDICINE

## 2023-10-19 PROCEDURE — 3052F PR MOST RECENT HEMOGLOBIN A1C LEVEL 8.0 - < 9.0%: ICD-10-PCS | Mod: CPTII,S$GLB,, | Performed by: INTERNAL MEDICINE

## 2023-10-19 PROCEDURE — 3066F PR DOCUMENTATION OF TREATMENT FOR NEPHROPATHY: ICD-10-PCS | Mod: CPTII,S$GLB,, | Performed by: INTERNAL MEDICINE

## 2023-10-19 RX ORDER — CICLOPIROX 80 MG/ML
SOLUTION TOPICAL NIGHTLY
Qty: 6.6 ML | Refills: 0 | Status: SHIPPED | OUTPATIENT
Start: 2023-10-19 | End: 2023-11-21

## 2023-10-19 RX ORDER — METFORMIN HYDROCHLORIDE 500 MG/1
1000 TABLET, EXTENDED RELEASE ORAL 2 TIMES DAILY WITH MEALS
Qty: 60 TABLET | Refills: 1 | Status: SHIPPED | OUTPATIENT
Start: 2023-10-19 | End: 2024-02-12 | Stop reason: SDUPTHER

## 2023-10-19 NOTE — ASSESSMENT & PLAN NOTE
Refer to HPI.    - called patient's pulmonology clinic at [facility] again today and left and urgent message to Dr. Kb Mcdowell.  If she does not get back to me by next week we will send an eConsult to either pulmonology or CT surgery to schedule biopsy of his new left lung mass.

## 2023-10-19 NOTE — ASSESSMENT & PLAN NOTE
Patient has whitish discoloration between his toes bilaterally which are likely tinea pedis    - will start ciclopirox topical solution nightly

## 2023-10-19 NOTE — PROGRESS NOTES
Chief Complaint: Follow-up      Hernan Engel  is a 68 y.o. year old with a PMH of  has a past medical history of Anxiety, Aortic atherosclerosis (12/6/2017), BPH with urinary obstruction (2/20/2019), Colon polyp (1/16/2014), COPD (chronic obstructive pulmonary disease), Depression, Disorder of kidney and ureter, ED (erectile dysfunction) (1/16/2014), Family history of colon cancer (1/16/2014), Hearing loss in left ear (34 years), Lung cancer, Normocytic anemia (9/3/2014), Nuclear sclerosis of both eyes (9/4/2020), Seizure (2005), Status post lobectomy of lung (4/15/2014), and T2DM (type 2 diabetes mellitus) (2014). who presents today for follow up    Lung mass and COPD: patient was not called by pulmonologist. He has been walking 2-3 miles everyday. Does not report SOB. Has not started his Incruse. Does have albuterol, incruse and trelegy at home, but does not use any of them. Upon further chart review, patient has a history of lung cancer in 2014. Biopsy showed adenocarcinoma. He also had a metabolic cavitary. There is further extensive history from a note on 6/17/2021    T2DM: uncontrolled. He's been measuring his BG everyday. It was 225 this morning, after coffee with [place] vanilla creamer. Was 125 last night. He has not started his metformin, he said he was not informed he could pick it up.    Past Surgical History:   Procedure Laterality Date    COLONOSCOPY      LUNG REMOVAL, PARTIAL          Family History   Problem Relation Age of Onset    Cancer Father         colon cancer    Colon cancer Father     Cataracts Father     Colon cancer Sister     Cancer Sister         colon or lung cancer     No Known Problems Mother     No Known Problems Brother     No Known Problems Sister     No Known Problems Sister     No Known Problems Maternal Aunt     No Known Problems Maternal Uncle     No Known Problems Paternal Aunt     No Known Problems Paternal Uncle     No Known Problems Maternal Grandmother     No Known Problems  Maternal Grandfather     No Known Problems Paternal Grandmother     No Known Problems Paternal Grandfather     Amblyopia Neg Hx     Blindness Neg Hx     Diabetes Neg Hx     Glaucoma Neg Hx     Hypertension Neg Hx     Macular degeneration Neg Hx     Retinal detachment Neg Hx     Strabismus Neg Hx     Stroke Neg Hx     Thyroid disease Neg Hx         Social History     Socioeconomic History    Marital status:    Occupational History     Employer: [employer]   Tobacco Use    Smoking status: Former     Current packs/day: 0.00     Average packs/day: 1 pack/day for 30.0 years (30.0 ttl pk-yrs)     Types: Cigarettes     Start date: 1/15/1984     Quit date: 1/15/2014     Years since quittin.7    Smokeless tobacco: Never   Substance and Sexual Activity    Alcohol use: Yes     Alcohol/week: 5.7 standard drinks of alcohol     Types: 4 Cans of beer, 2 Standard drinks or equivalent per week     Comment: every other day beer drinker    Drug use: No    Sexual activity: Not Currently     Partners: Female     Birth control/protection: None         Current Outpatient Medications:     albuterol (PROVENTIL HFA) 90 mcg/actuation inhaler, Inhale 2 puffs into the lungs every 6 (six) hours as needed for Wheezing. Rescue, Disp: 18 g, Rfl: 0    blood-glucose meter kit, Use as instructed, Disp: 1 each, Rfl: 0    rosuvastatin (CRESTOR) 5 MG tablet, Take 4 tablets (20 mg total) by mouth once daily., Disp: 90 tablet, Rfl: 3    umeclidinium (INCRUSE ELLIPTA) 62.5 mcg/actuation inhalation capsule, Inhale 62.5 mcg into the lungs once daily. Controller, Disp: 30 each, Rfl: 0    ciclopirox (PENLAC) 8 % Soln, Apply topically nightly., Disp: 6.6 mL, Rfl: 0    metFORMIN (GLUCOPHAGE-XR) 500 MG ER 24hr tablet, Take 2 tablets (1,000 mg total) by mouth 2 (two) times daily with meals., Disp: 60 tablet, Rfl: 1     Review of Systems   Respiratory:  Negative for cough and shortness of breath.    Cardiovascular:  Negative for chest pain.    Gastrointestinal:  Negative for abdominal pain, constipation, diarrhea and nausea.   Musculoskeletal:  Negative for back pain, joint pain and myalgias.        Objective:      Vitals:    10/19/23 1103   BP: 105/64   Pulse: 67   Resp: 20   Temp: 98 °F (36.7 °C)       Physical Exam  Vitals and nursing note reviewed.   Constitutional:       Appearance: Normal appearance.   HENT:      Head: Normocephalic and atraumatic.   Cardiovascular:      Rate and Rhythm: Normal rate and regular rhythm.   Pulmonary:      Effort: Pulmonary effort is normal.      Breath sounds: Normal breath sounds. No wheezing or rales.   Abdominal:      Palpations: Abdomen is soft.      Tenderness: There is no abdominal tenderness.   Musculoskeletal:      Right lower leg: No edema.      Left lower leg: No edema.   Skin:     General: Skin is warm and dry.   Neurological:      General: No focal deficit present.      Mental Status: He is alert.      Sensory: No sensory deficit.      Motor: No weakness.          Protective Sensation (w/ 10 gram monofilament):  Right: Intact  Left: Intact    Visual Inspection:  Normal -  Bilateral and Nails Intact - without Evidence of Foot Deformity- Bilateral  He does have thickened toenails. Whitish discoloration between patient's toes bilaterally likely fungal.     Pedal Pulses:   Right: Present  Left: Absent    Posterior Tibialis Pulses:   Right:Absent  Left: Absent      Assessment:       1. Type 2 diabetes mellitus with complication, without long-term current use of insulin    2. Mass of lower lobe of left lung    3. Tinea pedis of both feet    4. Hearing loss of left ear, unspecified hearing loss type    5. Chronic bronchitis, unspecified chronic bronchitis type          Plan:   1. Type 2 diabetes mellitus with complication, without long-term current use of insulin  Assessment & Plan:  Chronic, uncontrolled.  Patient did not start taking his metformin.  He has been measuring his sugars every day.  Refer to  HPI    - advised patient to start taking his metformin 1000 mg twice a day.  Told him to inform me if his side effects recur.  If this does we will consider alternative therapy.  - advise him to measure his sugars 3 times a day before meals or at least once a day in the morning when fasting.  Asked him to write down his blood sugar readings and to bring it to his next follow-up in 1 month.  - discussed with patient dietary changes to his reduce his sugar intake    Orders:  -     metFORMIN (GLUCOPHAGE-XR) 500 MG ER 24hr tablet; Take 2 tablets (1,000 mg total) by mouth 2 (two) times daily with meals.  Dispense: 60 tablet; Refill: 1    2. Mass of lower lobe of left lung  Assessment & Plan:  Refer to HPI.    - called patient's pulmonology clinic at [facility] again today and left and urgent message to Dr. Kb Mcdowell.  If she does not get back to me by next week we will send an eConsult to either pulmonology or CT surgery to schedule biopsy of his new left lung mass.      3. Tinea pedis of both feet  Assessment & Plan:  Patient has whitish discoloration between his toes bilaterally which are likely tinea pedis    - will start ciclopirox topical solution nightly    Orders:  -     ciclopirox (PENLAC) 8 % Soln; Apply topically nightly.  Dispense: 6.6 mL; Refill: 0    4. Hearing loss of left ear, unspecified hearing loss type  Assessment & Plan:  Patient recently had audiometric testing.  Got new hearing aids bilaterally which cost 800 dollars each. He reports much improvement with these hearing aids      5. Chronic bronchitis, unspecified chronic bronchitis type  Assessment & Plan:  Last PFT in 2019 showed mild obstruction.  Patient now only has mild intermittent symptoms.  Refer to HPI    - advised patient to use either his Trelegy or Incruse inhaler every day.  Discussed with patient the nature of COPD and that he needs to be on inhaler daily.           Follow up in about 1 month (around 11/19/2023) for f/up  diabetes .  Spent time setting up patient's [facility] account and downloading his [facility] at.  Also spent time showing him how to use his lis on his phone.

## 2023-10-19 NOTE — ASSESSMENT & PLAN NOTE
Patient recently had audiometric testing.  Got new hearing aids bilaterally which cost 800 dollars each. He reports much improvement with these hearing aids

## 2023-10-19 NOTE — ASSESSMENT & PLAN NOTE
Last PFT in 2019 showed mild obstruction.  Patient now only has mild intermittent symptoms.  Refer to HPI    - advised patient to use either his Trelegy or Incruse inhaler every day.  Discussed with patient the nature of COPD and that he needs to be on inhaler daily.

## 2023-10-19 NOTE — ASSESSMENT & PLAN NOTE
Chronic, uncontrolled.  Patient did not start taking his metformin.  He has been measuring his sugars every day.  Refer to HPI    - advised patient to start taking his metformin 1000 mg twice a day.  Told him to inform me if his side effects recur.  If this does we will consider alternative therapy.  - advise him to measure his sugars 3 times a day before meals or at least once a day in the morning when fasting.  Asked him to write down his blood sugar readings and to bring it to his next follow-up in 1 month.  - discussed with patient dietary changes to his reduce his sugar intake

## 2023-10-24 ENCOUNTER — HOSPITAL ENCOUNTER (OUTPATIENT)
Dept: RADIOLOGY | Facility: HOSPITAL | Age: 68
Discharge: HOME | End: 2023-10-24
Attending: INTERNAL MEDICINE
Payer: MEDICARE

## 2023-10-24 DIAGNOSIS — Z13.6 SCREENING FOR AAA (AORTIC ABDOMINAL ANEURYSM): ICD-10-CM

## 2023-10-24 PROCEDURE — 76775 US EXAM ABDO BACK WALL LIM: CPT | Mod: TC

## 2023-10-24 PROCEDURE — 76775 US ABDOMINAL AORTA: ICD-10-PCS | Mod: 26,,, | Performed by: RADIOLOGY

## 2023-10-24 PROCEDURE — 76775 US EXAM ABDO BACK WALL LIM: CPT | Mod: 26,,, | Performed by: RADIOLOGY

## 2023-10-25 ENCOUNTER — OFFICE VISIT (OUTPATIENT)
Dept: PULMONOLOGY | Facility: CLINIC | Age: 68
End: 2023-10-25
Payer: MEDICARE

## 2023-10-25 VITALS
WEIGHT: 177.69 LBS | DIASTOLIC BLOOD PRESSURE: 82 MMHG | HEIGHT: 67 IN | SYSTOLIC BLOOD PRESSURE: 140 MMHG | OXYGEN SATURATION: 96 % | HEART RATE: 69 BPM | BODY MASS INDEX: 27.89 KG/M2

## 2023-10-25 DIAGNOSIS — R22.2 PLEURAL NODULE: ICD-10-CM

## 2023-10-25 DIAGNOSIS — R59.0 HILAR ADENOPATHY: ICD-10-CM

## 2023-10-25 DIAGNOSIS — R91.1 PULMONARY NODULE: ICD-10-CM

## 2023-10-25 DIAGNOSIS — Z85.118 HISTORY OF LUNG CANCER: ICD-10-CM

## 2023-10-25 DIAGNOSIS — C34.90 MALIGNANT NEOPLASM OF UNSPECIFIED PART OF UNSPECIFIED BRONCHUS OR LUNG: Primary | ICD-10-CM

## 2023-10-25 DIAGNOSIS — R91.8 LUNG MASS: ICD-10-CM

## 2023-10-25 PROCEDURE — 1157F PR ADVANCE CARE PLAN OR EQUIV PRESENT IN MEDICAL RECORD: ICD-10-PCS | Mod: CPTII,S$GLB,, | Performed by: EMERGENCY MEDICINE

## 2023-10-25 PROCEDURE — 1159F PR MEDICATION LIST DOCUMENTED IN MEDICAL RECORD: ICD-10-PCS | Mod: CPTII,S$GLB,, | Performed by: EMERGENCY MEDICINE

## 2023-10-25 PROCEDURE — 1126F PR PAIN SEVERITY QUANTIFIED, NO PAIN PRESENT: ICD-10-PCS | Mod: CPTII,S$GLB,, | Performed by: EMERGENCY MEDICINE

## 2023-10-25 PROCEDURE — 1160F PR REVIEW ALL MEDS BY PRESCRIBER/CLIN PHARMACIST DOCUMENTED: ICD-10-PCS | Mod: CPTII,S$GLB,, | Performed by: EMERGENCY MEDICINE

## 2023-10-25 PROCEDURE — 3066F PR DOCUMENTATION OF TREATMENT FOR NEPHROPATHY: ICD-10-PCS | Mod: CPTII,S$GLB,, | Performed by: EMERGENCY MEDICINE

## 2023-10-25 PROCEDURE — 3077F SYST BP >= 140 MM HG: CPT | Mod: CPTII,S$GLB,, | Performed by: EMERGENCY MEDICINE

## 2023-10-25 PROCEDURE — 1126F AMNT PAIN NOTED NONE PRSNT: CPT | Mod: CPTII,S$GLB,, | Performed by: EMERGENCY MEDICINE

## 2023-10-25 PROCEDURE — 3008F PR BODY MASS INDEX (BMI) DOCUMENTED: ICD-10-PCS | Mod: CPTII,S$GLB,, | Performed by: EMERGENCY MEDICINE

## 2023-10-25 PROCEDURE — 3008F BODY MASS INDEX DOCD: CPT | Mod: CPTII,S$GLB,, | Performed by: EMERGENCY MEDICINE

## 2023-10-25 PROCEDURE — 3061F PR NEG MICROALBUMINURIA RESULT DOCUMENTED/REVIEW: ICD-10-PCS | Mod: CPTII,S$GLB,, | Performed by: EMERGENCY MEDICINE

## 2023-10-25 PROCEDURE — 3079F DIAST BP 80-89 MM HG: CPT | Mod: CPTII,S$GLB,, | Performed by: EMERGENCY MEDICINE

## 2023-10-25 PROCEDURE — 3066F NEPHROPATHY DOC TX: CPT | Mod: CPTII,S$GLB,, | Performed by: EMERGENCY MEDICINE

## 2023-10-25 PROCEDURE — 3077F PR MOST RECENT SYSTOLIC BLOOD PRESSURE >= 140 MM HG: ICD-10-PCS | Mod: CPTII,S$GLB,, | Performed by: EMERGENCY MEDICINE

## 2023-10-25 PROCEDURE — 99204 OFFICE O/P NEW MOD 45 MIN: CPT | Mod: S$GLB,,, | Performed by: EMERGENCY MEDICINE

## 2023-10-25 PROCEDURE — 3052F PR MOST RECENT HEMOGLOBIN A1C LEVEL 8.0 - < 9.0%: ICD-10-PCS | Mod: CPTII,S$GLB,, | Performed by: EMERGENCY MEDICINE

## 2023-10-25 PROCEDURE — 3288F PR FALLS RISK ASSESSMENT DOCUMENTED: ICD-10-PCS | Mod: CPTII,S$GLB,, | Performed by: EMERGENCY MEDICINE

## 2023-10-25 PROCEDURE — 99999 PR PBB SHADOW E&M-EST. PATIENT-LVL III: ICD-10-PCS | Mod: PBBFAC,,, | Performed by: EMERGENCY MEDICINE

## 2023-10-25 PROCEDURE — 3079F PR MOST RECENT DIASTOLIC BLOOD PRESSURE 80-89 MM HG: ICD-10-PCS | Mod: CPTII,S$GLB,, | Performed by: EMERGENCY MEDICINE

## 2023-10-25 PROCEDURE — 1159F MED LIST DOCD IN RCRD: CPT | Mod: CPTII,S$GLB,, | Performed by: EMERGENCY MEDICINE

## 2023-10-25 PROCEDURE — 99999 PR PBB SHADOW E&M-EST. PATIENT-LVL III: CPT | Mod: PBBFAC,,, | Performed by: EMERGENCY MEDICINE

## 2023-10-25 PROCEDURE — 1160F RVW MEDS BY RX/DR IN RCRD: CPT | Mod: CPTII,S$GLB,, | Performed by: EMERGENCY MEDICINE

## 2023-10-25 PROCEDURE — 1157F ADVNC CARE PLAN IN RCRD: CPT | Mod: CPTII,S$GLB,, | Performed by: EMERGENCY MEDICINE

## 2023-10-25 PROCEDURE — 1101F PR PT FALLS ASSESS DOC 0-1 FALLS W/OUT INJ PAST YR: ICD-10-PCS | Mod: CPTII,S$GLB,, | Performed by: EMERGENCY MEDICINE

## 2023-10-25 PROCEDURE — 99204 PR OFFICE/OUTPT VISIT, NEW, LEVL IV, 45-59 MIN: ICD-10-PCS | Mod: S$GLB,,, | Performed by: EMERGENCY MEDICINE

## 2023-10-25 PROCEDURE — 3288F FALL RISK ASSESSMENT DOCD: CPT | Mod: CPTII,S$GLB,, | Performed by: EMERGENCY MEDICINE

## 2023-10-25 PROCEDURE — 1101F PT FALLS ASSESS-DOCD LE1/YR: CPT | Mod: CPTII,S$GLB,, | Performed by: EMERGENCY MEDICINE

## 2023-10-25 PROCEDURE — 3052F HG A1C>EQUAL 8.0%<EQUAL 9.0%: CPT | Mod: CPTII,S$GLB,, | Performed by: EMERGENCY MEDICINE

## 2023-10-25 PROCEDURE — 3061F NEG MICROALBUMINURIA REV: CPT | Mod: CPTII,S$GLB,, | Performed by: EMERGENCY MEDICINE
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
## 2023-10-26 ENCOUNTER — OFFICE VISIT (OUTPATIENT)
Dept: OPTOMETRY | Facility: CLINIC | Age: 68
End: 2023-10-26
Payer: MEDICARE

## 2023-10-26 DIAGNOSIS — Z96.1 PSEUDOPHAKIA: ICD-10-CM

## 2023-10-26 DIAGNOSIS — H52.7 REFRACTIVE ERROR: ICD-10-CM

## 2023-10-26 DIAGNOSIS — H25.12 NUCLEAR SCLEROSIS OF LEFT EYE: ICD-10-CM

## 2023-10-26 DIAGNOSIS — E11.8 TYPE 2 DIABETES MELLITUS WITH COMPLICATION, WITHOUT LONG-TERM CURRENT USE OF INSULIN: Primary | ICD-10-CM

## 2023-10-26 PROBLEM — H25.13 NUCLEAR SCLEROSIS OF BOTH EYES: Status: RESOLVED | Noted: 2020-09-04 | Resolved: 2023-10-26

## 2023-10-26 PROCEDURE — 92004 COMPRE OPH EXAM NEW PT 1/>: CPT | Mod: S$GLB,,, | Performed by: OPTOMETRIST

## 2023-10-26 PROCEDURE — 1101F PT FALLS ASSESS-DOCD LE1/YR: CPT | Mod: CPTII,S$GLB,, | Performed by: OPTOMETRIST

## 2023-10-26 PROCEDURE — 1159F PR MEDICATION LIST DOCUMENTED IN MEDICAL RECORD: ICD-10-PCS | Mod: CPTII,S$GLB,, | Performed by: OPTOMETRIST

## 2023-10-26 PROCEDURE — 3288F PR FALLS RISK ASSESSMENT DOCUMENTED: ICD-10-PCS | Mod: CPTII,S$GLB,, | Performed by: OPTOMETRIST

## 2023-10-26 PROCEDURE — 3288F FALL RISK ASSESSMENT DOCD: CPT | Mod: CPTII,S$GLB,, | Performed by: OPTOMETRIST

## 2023-10-26 PROCEDURE — 3061F PR NEG MICROALBUMINURIA RESULT DOCUMENTED/REVIEW: ICD-10-PCS | Mod: CPTII,S$GLB,, | Performed by: OPTOMETRIST

## 2023-10-26 PROCEDURE — 1157F PR ADVANCE CARE PLAN OR EQUIV PRESENT IN MEDICAL RECORD: ICD-10-PCS | Mod: CPTII,S$GLB,, | Performed by: OPTOMETRIST

## 2023-10-26 PROCEDURE — 1159F MED LIST DOCD IN RCRD: CPT | Mod: CPTII,S$GLB,, | Performed by: OPTOMETRIST

## 2023-10-26 PROCEDURE — 92004 PR EYE EXAM, NEW PATIENT,COMPREHESV: ICD-10-PCS | Mod: S$GLB,,, | Performed by: OPTOMETRIST

## 2023-10-26 PROCEDURE — 1101F PR PT FALLS ASSESS DOC 0-1 FALLS W/OUT INJ PAST YR: ICD-10-PCS | Mod: CPTII,S$GLB,, | Performed by: OPTOMETRIST

## 2023-10-26 PROCEDURE — 1160F RVW MEDS BY RX/DR IN RCRD: CPT | Mod: CPTII,S$GLB,, | Performed by: OPTOMETRIST

## 2023-10-26 PROCEDURE — 1126F PR PAIN SEVERITY QUANTIFIED, NO PAIN PRESENT: ICD-10-PCS | Mod: CPTII,S$GLB,, | Performed by: OPTOMETRIST

## 2023-10-26 PROCEDURE — 2023F PR DILATED RETINAL EXAM W/O EVID OF RETINOPATHY: ICD-10-PCS | Mod: CPTII,S$GLB,, | Performed by: OPTOMETRIST

## 2023-10-26 PROCEDURE — 1160F PR REVIEW ALL MEDS BY PRESCRIBER/CLIN PHARMACIST DOCUMENTED: ICD-10-PCS | Mod: CPTII,S$GLB,, | Performed by: OPTOMETRIST

## 2023-10-26 PROCEDURE — 3061F NEG MICROALBUMINURIA REV: CPT | Mod: CPTII,S$GLB,, | Performed by: OPTOMETRIST

## 2023-10-26 PROCEDURE — 3052F HG A1C>EQUAL 8.0%<EQUAL 9.0%: CPT | Mod: CPTII,S$GLB,, | Performed by: OPTOMETRIST

## 2023-10-26 PROCEDURE — 3066F PR DOCUMENTATION OF TREATMENT FOR NEPHROPATHY: ICD-10-PCS | Mod: CPTII,S$GLB,, | Performed by: OPTOMETRIST

## 2023-10-26 PROCEDURE — 1157F ADVNC CARE PLAN IN RCRD: CPT | Mod: CPTII,S$GLB,, | Performed by: OPTOMETRIST

## 2023-10-26 PROCEDURE — 99999 PR PBB SHADOW E&M-EST. PATIENT-LVL II: CPT | Mod: PBBFAC,,, | Performed by: OPTOMETRIST

## 2023-10-26 PROCEDURE — 92015 PR REFRACTION: ICD-10-PCS | Mod: S$GLB,,, | Performed by: OPTOMETRIST

## 2023-10-26 PROCEDURE — 92015 DETERMINE REFRACTIVE STATE: CPT | Mod: S$GLB,,, | Performed by: OPTOMETRIST

## 2023-10-26 PROCEDURE — 3066F NEPHROPATHY DOC TX: CPT | Mod: CPTII,S$GLB,, | Performed by: OPTOMETRIST

## 2023-10-26 PROCEDURE — 2023F DILAT RTA XM W/O RTNOPTHY: CPT | Mod: CPTII,S$GLB,, | Performed by: OPTOMETRIST

## 2023-10-26 PROCEDURE — 1126F AMNT PAIN NOTED NONE PRSNT: CPT | Mod: CPTII,S$GLB,, | Performed by: OPTOMETRIST

## 2023-10-26 PROCEDURE — 3052F PR MOST RECENT HEMOGLOBIN A1C LEVEL 8.0 - < 9.0%: ICD-10-PCS | Mod: CPTII,S$GLB,, | Performed by: OPTOMETRIST

## 2023-10-26 PROCEDURE — 99999 PR PBB SHADOW E&M-EST. PATIENT-LVL II: ICD-10-PCS | Mod: PBBFAC,,, | Performed by: OPTOMETRIST

## 2023-10-26 NOTE — PROGRESS NOTES
Subjective:       Patient ID: Hernan Engel is a 68 y.o. male      Chief Complaint   Patient presents with    Concerns About Ocular Health    Diabetic Eye Exam     History of Present Illness   Dls: 2021     69 y/o male presents today for diabetic eye exam.  Pt c/o blurry vision at distance os.   Pt does not wear any glasses.     LBS ?     No tearing  No itching   No burning  No pain  No ha's  + ou off/on floaters  No flashes    Eye meds  None    Pohx:   S/p CE OD 2021     Fohx:  None    Hemoglobin A1C       Date                     Value               Ref Range             Status                10/02/2023               8.1 (H)             4.0 - 5.6 %           Final                  03/15/2021               5.5                 4.0 - 5.6 %           Final                   07/16/2020               5.9 (H)             4.0 - 5.6 %           Final                 Assessment/Plan:     1. Type 2 diabetes mellitus with complication, without long-term current use of insulin  No diabetic retinopathy. Discussed with pt the effects of diabetes on vision, importance of good blood sugar control, compliance with meds, and follow up care with PCP. Return in 1 year for dilated eye exam, sooner PRN.  - Ambulatory referral/consult to Optometry    2. Nuclear sclerosis of left eye  Educated pt on presence of cataracts and effects on vision. No surgery at this time. Recheck in one year, sooner PRN.    3. Pseudophakia  Well-centered. Clear.     4. Refractive error  Optional Rx. Readers PRN.     Eyeglass Final Rx       Eyeglass Final Rx         Sphere Cylinder Axis Add    Right Elmer Sphere  +2.50    Left -0.25 +0.75 075 +2.50      Expiration Date: 10/26/2024                      Follow up in about 1 year (around 10/26/2024) for Diabetic Eye Exam.

## 2023-10-30 ENCOUNTER — TELEPHONE (OUTPATIENT)
Dept: PULMONOLOGY | Facility: CLINIC | Age: 68
End: 2023-10-30
Payer: MEDICARE

## 2023-10-30 NOTE — TELEPHONE ENCOUNTER
How Severe Is Your Skin Lesion?: mild Spoke with patient, rescheduled bronch for Thursday 11/2/23 at 0630, instructions given, NPO after MN the night before procedure, may take meds with small sips of water, not currently on blood thinners, will arrive at [facility] 2nd floor at 0630 with designated , patient verbalizes understanding of instructions.  Grace Sanchez RN.           Have Your Skin Lesions Been Treated?: not been treated Is This A New Presentation, Or A Follow-Up?: Skin Lesions Which Family Member (Optional)?: Father and brother PAIN

## 2023-11-02 ENCOUNTER — HOSPITAL ENCOUNTER (OUTPATIENT)
Facility: HOSPITAL | Age: 68
Discharge: HOME | End: 2023-11-02
Attending: INTERNAL MEDICINE | Admitting: INTERNAL MEDICINE
Payer: MEDICARE

## 2023-11-02 ENCOUNTER — PATIENT MESSAGE (OUTPATIENT)
Dept: FAMILY MEDICINE | Facility: CLINIC | Age: 68
End: 2023-11-02
Payer: MEDICARE

## 2023-11-02 VITALS
HEIGHT: 67 IN | WEIGHT: 170 LBS | BODY MASS INDEX: 26.68 KG/M2 | OXYGEN SATURATION: 95 % | RESPIRATION RATE: 18 BRPM | SYSTOLIC BLOOD PRESSURE: 135 MMHG | DIASTOLIC BLOOD PRESSURE: 69 MMHG | TEMPERATURE: 98 F | HEART RATE: 73 BPM

## 2023-11-02 DIAGNOSIS — R91.8 ABNORMAL CT SCAN OF LUNG: ICD-10-CM

## 2023-11-02 DIAGNOSIS — R91.8 MASS OF LOWER LOBE OF LEFT LUNG: Primary | ICD-10-CM

## 2023-11-02 LAB — POCT GLUCOSE: 201 MG/DL (ref 70–110)

## 2023-11-02 PROCEDURE — 31625 BRONCHOSCOPY W/BIOPSY(S): CPT | Performed by: INTERNAL MEDICINE

## 2023-11-02 PROCEDURE — 88341 PR IHC OR ICC EACH ADD'L SINGLE ANTIBODY  STAINPR: ICD-10-PCS | Mod: 26,,, | Performed by: PATHOLOGY

## 2023-11-02 PROCEDURE — 99153 MOD SED SAME PHYS/QHP EA: CPT | Performed by: INTERNAL MEDICINE

## 2023-11-02 PROCEDURE — 27201011 HC FORCEPS DISPOSABLE: Performed by: INTERNAL MEDICINE

## 2023-11-02 PROCEDURE — 71000015 HC POSTOP RECOV 1ST HR

## 2023-11-02 PROCEDURE — 31625 BRONCHOSCOPY W/BIOPSY(S): CPT | Mod: LT,,, | Performed by: INTERNAL MEDICINE

## 2023-11-02 PROCEDURE — 31625 PR BRONCHOSCOPY,BIOPSY: ICD-10-PCS | Mod: LT,,, | Performed by: INTERNAL MEDICINE

## 2023-11-02 PROCEDURE — 88112 PR  CYTOPATH, CELL ENHANCE TECH: ICD-10-PCS | Mod: 26,,, | Performed by: PATHOLOGY

## 2023-11-02 PROCEDURE — 63600175 PHARM REV CODE 636 W HCPCS: Performed by: INTERNAL MEDICINE

## 2023-11-02 PROCEDURE — 25000003 PHARM REV CODE 250: Performed by: INTERNAL MEDICINE

## 2023-11-02 PROCEDURE — 88112 CYTOPATH CELL ENHANCE TECH: CPT | Performed by: PATHOLOGY

## 2023-11-02 PROCEDURE — 88305 TISSUE EXAM BY PATHOLOGIST: CPT | Mod: 59 | Performed by: PATHOLOGY

## 2023-11-02 PROCEDURE — 31624 DX BRONCHOSCOPE/LAVAGE: CPT | Performed by: INTERNAL MEDICINE

## 2023-11-02 PROCEDURE — 88305 TISSUE EXAM BY PATHOLOGIST: CPT | Mod: 26,,, | Performed by: PATHOLOGY

## 2023-11-02 PROCEDURE — 81445 SO NEO GSAP 5-50DNA/DNA&RNA: CPT | Performed by: PATHOLOGY

## 2023-11-02 PROCEDURE — 99152 MOD SED SAME PHYS/QHP 5/>YRS: CPT | Performed by: INTERNAL MEDICINE

## 2023-11-02 PROCEDURE — 88360 TUMOR IMMUNOHISTOCHEM/MANUAL: CPT | Performed by: PATHOLOGY

## 2023-11-02 PROCEDURE — 88341 IMHCHEM/IMCYTCHM EA ADD ANTB: CPT | Mod: 26,,, | Performed by: PATHOLOGY

## 2023-11-02 PROCEDURE — 88305 TISSUE EXAM BY PATHOLOGIST: ICD-10-PCS | Mod: 26,,, | Performed by: PATHOLOGY

## 2023-11-02 PROCEDURE — 88381 MICRODISSECTION MANUAL: CPT | Performed by: PATHOLOGY

## 2023-11-02 PROCEDURE — 88342 IMHCHEM/IMCYTCHM 1ST ANTB: CPT | Mod: 59 | Performed by: PATHOLOGY

## 2023-11-02 PROCEDURE — 31622 DX BRONCHOSCOPE/WASH: CPT | Performed by: INTERNAL MEDICINE

## 2023-11-02 PROCEDURE — 82962 GLUCOSE BLOOD TEST: CPT

## 2023-11-02 PROCEDURE — 88112 CYTOPATH CELL ENHANCE TECH: CPT | Mod: 26,,, | Performed by: PATHOLOGY

## 2023-11-02 PROCEDURE — 88342 CHG IMMUNOCYTOCHEMISTRY: ICD-10-PCS | Mod: 26,,, | Performed by: PATHOLOGY

## 2023-11-02 PROCEDURE — 88341 IMHCHEM/IMCYTCHM EA ADD ANTB: CPT | Mod: 59 | Performed by: PATHOLOGY

## 2023-11-02 PROCEDURE — 88342 IMHCHEM/IMCYTCHM 1ST ANTB: CPT | Mod: 26,,, | Performed by: PATHOLOGY

## 2023-11-02 RX ORDER — LIDOCAINE HYDROCHLORIDE 10 MG/ML
INJECTION INFILTRATION; PERINEURAL CODE/TRAUMA/SEDATION MEDICATION
Status: COMPLETED | OUTPATIENT
Start: 2023-11-02 | End: 2023-11-02

## 2023-11-02 RX ORDER — MIDAZOLAM HYDROCHLORIDE 5 MG/ML
INJECTION INTRAMUSCULAR; INTRAVENOUS CODE/TRAUMA/SEDATION MEDICATION
Status: COMPLETED | OUTPATIENT
Start: 2023-11-02 | End: 2023-11-02

## 2023-11-02 RX ORDER — LIDOCAINE HYDROCHLORIDE 20 MG/ML
INJECTION, SOLUTION INFILTRATION; PERINEURAL CODE/TRAUMA/SEDATION MEDICATION
Status: COMPLETED | OUTPATIENT
Start: 2023-11-02 | End: 2023-11-02

## 2023-11-02 RX ORDER — FENTANYL CITRATE 50 UG/ML
INJECTION, SOLUTION INTRAMUSCULAR; INTRAVENOUS CODE/TRAUMA/SEDATION MEDICATION
Status: COMPLETED | OUTPATIENT
Start: 2023-11-02 | End: 2023-11-02

## 2023-11-02 RX ADMIN — FENTANYL CITRATE 50 MCG: 50 INJECTION, SOLUTION INTRAMUSCULAR; INTRAVENOUS at 07:11

## 2023-11-02 RX ADMIN — LIDOCAINE HYDROCHLORIDE 8 ML: 10 INJECTION, SOLUTION INFILTRATION; PERINEURAL at 08:11

## 2023-11-02 RX ADMIN — MIDAZOLAM 2 MG: 5 INJECTION INTRAMUSCULAR; INTRAVENOUS at 07:11

## 2023-11-02 RX ADMIN — LIDOCAINE HYDROCHLORIDE 6 ML: 20 INJECTION, SOLUTION INFILTRATION; PERINEURAL at 08:11

## 2023-11-02 NOTE — INTERVAL H&P NOTE
The patient has been examined and the H&P has been reviewed:    I concur with the findings and no changes have occurred since H&P was written.    Procedure and anesthesia risks, benefits and alternative options discussed and understood by patient/family.    Patient has been NPO from yesterday.    Patient agreed to proceed with the procedure.    Patient has a ride home after the procedure.      There are no hospital problems to display for this patient.

## 2023-11-02 NOTE — ED NOTES
Specimens obtained during Bronchoscopy:  LLL EBBX obtained. LLL Bronchiial washed obtained.  Verbal report will be given to DOSC RN at bedside to include documentation charted in procedural sedation documentation.  Patient to be in NPO 1 hour post procedure and place in PO tolerance at  920am.  The patient tolerated the procedure well.

## 2023-11-02 NOTE — DISCHARGE SUMMARY
[facility] - [facility] (2nd Fl)  Discharge Note  Short Stay    Procedure(s) (LRB):  BRONCHOSCOPY, WITH FLUOROSCOPY (N/A)      OUTCOME: Patient tolerated treatment/procedure well without complication and is now ready for discharge.    DISPOSITION: Home or Self Care    FINAL DIAGNOSIS:  Left lower Lobe Mass    FOLLOWUP: In clinic    DISCHARGE INSTRUCTIONS:    Discharge Procedure Orders   Diet general     Call MD for:  temperature >101     Call MD for:  coughing up blood greater than 3 tablespoons in volume     Call MD for:  chest pain     Call MD for:  difficulty breathing or shortness of breath     Call MD for:  development of yellow/green sputum        TIME SPENT ON DISCHARGE: 15 minutes

## 2023-11-02 NOTE — PLAN OF CARE
Discharge instructions given and explained to patient and family with verbalization of understanding all instructions. Patients v/s stable, denies n/v and tolerating po, rates pain level tolerable, IV removed, and family at bedside for patient discharge home.

## 2023-11-02 NOTE — ED NOTES
H and P updated-yes, patient placed on cardiac monitor, anesthesia Plan:  Conscious sedation, ASA verified-yes, Airway exam performed-yes, Personal or Family history of anesthesia complications-No  Consent signed and witnessed, Lina Wilder RN

## 2023-11-03 ENCOUNTER — TELEPHONE (OUTPATIENT)
Dept: FAMILY MEDICINE | Facility: CLINIC | Age: 68
End: 2023-11-03

## 2023-11-03 ENCOUNTER — TELEPHONE (OUTPATIENT)
Dept: FAMILY MEDICINE | Facility: CLINIC | Age: 68
End: 2023-11-03
Payer: MEDICARE

## 2023-11-03 ENCOUNTER — PATIENT MESSAGE (OUTPATIENT)
Dept: FAMILY MEDICINE | Facility: CLINIC | Age: 68
End: 2023-11-03
Payer: MEDICARE

## 2023-11-03 NOTE — TELEPHONE ENCOUNTER
----- Message from Braxton Jose sent at 11/3/2023  1:43 PM CDT -----  Regarding: self  Type: Patient Call Back    Who called:self    What is the request in detail:Pt had a biopsy on 11/2 and pt is asking what to take for infection, Want to know if he needs to take antibiotics. Pt stated that you can text him regarding this information or call    Can the clinic reply by [facility]?no    Would the patient rather a call back or a response via [facility]? Call back    Best call back number:[phone]    Additional Information:

## 2023-11-03 NOTE — TELEPHONE ENCOUNTER
----- Message from Ryan Ferreira sent at 11/3/2023  9:34 AM CDT -----  Type: Patient Call Back    Who called:Self    What is the request in detail:In regards to biopsy     Can the clinic reply by [facility]?No    Would the patient rather a call back or a response via [facility]? Call    Best call back number:.[phone] (home)     Additional Information:

## 2023-11-06 ENCOUNTER — DOCUMENTATION ONLY (OUTPATIENT)
Dept: PULMONOLOGY | Facility: HOSPITAL | Age: 68
End: 2023-11-06
Payer: MEDICARE

## 2023-11-06 ENCOUNTER — HOSPITAL ENCOUNTER (OUTPATIENT)
Dept: RADIOLOGY | Facility: HOSPITAL | Age: 68
Discharge: HOME | End: 2023-11-06
Attending: EMERGENCY MEDICINE
Payer: MEDICARE

## 2023-11-06 ENCOUNTER — PATIENT MESSAGE (OUTPATIENT)
Dept: ADMINISTRATIVE | Facility: HOSPITAL | Age: 68
End: 2023-11-06
Payer: MEDICARE

## 2023-11-06 DIAGNOSIS — C34.90 SQUAMOUS CELL CARCINOMA OF LUNG, UNSPECIFIED LATERALITY: Primary | ICD-10-CM

## 2023-11-06 DIAGNOSIS — C34.90 MALIGNANT NEOPLASM OF UNSPECIFIED PART OF UNSPECIFIED BRONCHUS OR LUNG: ICD-10-CM

## 2023-11-06 LAB — POCT GLUCOSE: 181 MG/DL (ref 70–110)

## 2023-11-06 PROCEDURE — 78815 NM PET CT ROUTINE: ICD-10-PCS | Mod: 26,PS,, | Performed by: STUDENT IN AN ORGANIZED HEALTH CARE EDUCATION/TRAINING PROGRAM

## 2023-11-06 PROCEDURE — 78815 PET IMAGE W/CT SKULL-THIGH: CPT | Mod: 26,PS,, | Performed by: STUDENT IN AN ORGANIZED HEALTH CARE EDUCATION/TRAINING PROGRAM

## 2023-11-06 PROCEDURE — A9552 F18 FDG: HCPCS

## 2023-11-06 NOTE — PROGRESS NOTES
Biopsy results reviewed- Bronchoscopy endobronchial biopsies and BAL + for NSCLC (Squamous). NM PET pending. Discussed results with patient and wife by phone. Oncology referral placed.     Hernandez Brewer MD   [facility]

## 2023-11-06 NOTE — PROGRESS NOTES
Attempted to call both patient and wife regarding biopsy results from bronchoscopy. No answer and no voice mail at all numbers.. Will attempt again.     Hernandez Brewer MD   [facility]

## 2023-11-07 ENCOUNTER — TELEPHONE (OUTPATIENT)
Dept: HEMATOLOGY/ONCOLOGY | Facility: CLINIC | Age: 68
End: 2023-11-07
Payer: MEDICARE

## 2023-11-07 NOTE — TELEPHONE ENCOUNTER
----- Message from Hernandez Brewer MD sent at 11/6/2023  4:07 PM CST -----  Can we please get him set up with oncology. Biopsy of LLL mass + NSCLC (squamous)     Order placed. NM PET is pending.     Thanks,   DV

## 2023-11-10 ENCOUNTER — OFFICE VISIT (OUTPATIENT)
Dept: HEMATOLOGY/ONCOLOGY | Facility: CLINIC | Age: 68
End: 2023-11-10
Payer: MEDICARE

## 2023-11-10 VITALS
TEMPERATURE: 98 F | HEIGHT: 67 IN | WEIGHT: 176.81 LBS | OXYGEN SATURATION: 100 % | SYSTOLIC BLOOD PRESSURE: 124 MMHG | BODY MASS INDEX: 27.75 KG/M2 | HEART RATE: 66 BPM | RESPIRATION RATE: 18 BRPM | DIASTOLIC BLOOD PRESSURE: 75 MMHG

## 2023-11-10 DIAGNOSIS — C34.90 SQUAMOUS CELL CARCINOMA OF LUNG, UNSPECIFIED LATERALITY: ICD-10-CM

## 2023-11-10 DIAGNOSIS — C34.32 SQUAMOUS CELL CARCINOMA OF BRONCHUS IN LEFT LOWER LOBE: Primary | ICD-10-CM

## 2023-11-10 PROCEDURE — 1101F PR PT FALLS ASSESS DOC 0-1 FALLS W/OUT INJ PAST YR: ICD-10-PCS | Mod: CPTII,S$GLB,, | Performed by: HOSPITALIST

## 2023-11-10 PROCEDURE — 99214 PR OFFICE/OUTPT VISIT, EST, LEVL IV, 30-39 MIN: ICD-10-PCS | Mod: S$GLB,,, | Performed by: HOSPITALIST

## 2023-11-10 PROCEDURE — 3074F SYST BP LT 130 MM HG: CPT | Mod: CPTII,S$GLB,, | Performed by: HOSPITALIST

## 2023-11-10 PROCEDURE — 3052F HG A1C>EQUAL 8.0%<EQUAL 9.0%: CPT | Mod: CPTII,S$GLB,, | Performed by: HOSPITALIST

## 2023-11-10 PROCEDURE — 99999 PR PBB SHADOW E&M-EST. PATIENT-LVL IV: ICD-10-PCS | Mod: PBBFAC,,, | Performed by: HOSPITALIST

## 2023-11-10 PROCEDURE — 1159F MED LIST DOCD IN RCRD: CPT | Mod: CPTII,S$GLB,, | Performed by: HOSPITALIST

## 2023-11-10 PROCEDURE — 1101F PT FALLS ASSESS-DOCD LE1/YR: CPT | Mod: CPTII,S$GLB,, | Performed by: HOSPITALIST

## 2023-11-10 PROCEDURE — 3061F NEG MICROALBUMINURIA REV: CPT | Mod: CPTII,S$GLB,, | Performed by: HOSPITALIST

## 2023-11-10 PROCEDURE — 1126F AMNT PAIN NOTED NONE PRSNT: CPT | Mod: CPTII,S$GLB,, | Performed by: HOSPITALIST

## 2023-11-10 PROCEDURE — 3052F PR MOST RECENT HEMOGLOBIN A1C LEVEL 8.0 - < 9.0%: ICD-10-PCS | Mod: CPTII,S$GLB,, | Performed by: HOSPITALIST

## 2023-11-10 PROCEDURE — 99214 OFFICE O/P EST MOD 30 MIN: CPT | Mod: S$GLB,,, | Performed by: HOSPITALIST

## 2023-11-10 PROCEDURE — 1157F PR ADVANCE CARE PLAN OR EQUIV PRESENT IN MEDICAL RECORD: ICD-10-PCS | Mod: CPTII,S$GLB,, | Performed by: HOSPITALIST

## 2023-11-10 PROCEDURE — 3078F DIAST BP <80 MM HG: CPT | Mod: CPTII,S$GLB,, | Performed by: HOSPITALIST

## 2023-11-10 PROCEDURE — 1157F ADVNC CARE PLAN IN RCRD: CPT | Mod: CPTII,S$GLB,, | Performed by: HOSPITALIST

## 2023-11-10 PROCEDURE — 3008F BODY MASS INDEX DOCD: CPT | Mod: CPTII,S$GLB,, | Performed by: HOSPITALIST

## 2023-11-10 PROCEDURE — 99999 PR PBB SHADOW E&M-EST. PATIENT-LVL IV: CPT | Mod: PBBFAC,,, | Performed by: HOSPITALIST

## 2023-11-10 PROCEDURE — 3288F FALL RISK ASSESSMENT DOCD: CPT | Mod: CPTII,S$GLB,, | Performed by: HOSPITALIST

## 2023-11-10 PROCEDURE — 3008F PR BODY MASS INDEX (BMI) DOCUMENTED: ICD-10-PCS | Mod: CPTII,S$GLB,, | Performed by: HOSPITALIST

## 2023-11-10 PROCEDURE — 3066F NEPHROPATHY DOC TX: CPT | Mod: CPTII,S$GLB,, | Performed by: HOSPITALIST

## 2023-11-10 PROCEDURE — 3288F PR FALLS RISK ASSESSMENT DOCUMENTED: ICD-10-PCS | Mod: CPTII,S$GLB,, | Performed by: HOSPITALIST

## 2023-11-10 PROCEDURE — 3066F PR DOCUMENTATION OF TREATMENT FOR NEPHROPATHY: ICD-10-PCS | Mod: CPTII,S$GLB,, | Performed by: HOSPITALIST

## 2023-11-10 PROCEDURE — 3078F PR MOST RECENT DIASTOLIC BLOOD PRESSURE < 80 MM HG: ICD-10-PCS | Mod: CPTII,S$GLB,, | Performed by: HOSPITALIST

## 2023-11-10 PROCEDURE — 3061F PR NEG MICROALBUMINURIA RESULT DOCUMENTED/REVIEW: ICD-10-PCS | Mod: CPTII,S$GLB,, | Performed by: HOSPITALIST

## 2023-11-10 PROCEDURE — 1159F PR MEDICATION LIST DOCUMENTED IN MEDICAL RECORD: ICD-10-PCS | Mod: CPTII,S$GLB,, | Performed by: HOSPITALIST

## 2023-11-10 PROCEDURE — 3074F PR MOST RECENT SYSTOLIC BLOOD PRESSURE < 130 MM HG: ICD-10-PCS | Mod: CPTII,S$GLB,, | Performed by: HOSPITALIST

## 2023-11-10 PROCEDURE — 1126F PR PAIN SEVERITY QUANTIFIED, NO PAIN PRESENT: ICD-10-PCS | Mod: CPTII,S$GLB,, | Performed by: HOSPITALIST

## 2023-11-10 NOTE — Clinical Note
Good evening all,   This patient was recently found to have a biopsy-proven SqCC of the LLL on 11/2/23. On review of CT and PET imaging, there is a concerning pleural based nodule in the R lung base. The patient had an adenocarcinoma of the RUL previously (received a lobectomy in 2014). I'm concerned that this may represent either metastatic SqCC or recurrent adenocarcinoma, both of which would be treated differently.   He also has a cavitary lesion in the ELDER which I am concerned may be ipsilateral squamous cell carcinoma (though it is not solid like the primary mass).   Do you think the R lesion is reachable by robotic bronch? I think I see an airway leading there - if so, perhaps both that and the ELDER lesion could be biopsied? If not, perhaps the R pleural nodule could be accessed by CT guided biopsy?   If this is a more appropriate question for tumor board, please let me know and I will discuss there instead.   Thanks! Wolfgang Grove

## 2023-11-10 NOTE — PROGRESS NOTES
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   10/6/2023 Imaging Significant Findings    CT C, Non-Con  - 6.6 x 5.9 cm LLL mass  - 2 x 1.2 cm cavitary lesion in lingula  - 0.9 x 0.8 cm pleural based nodule in R lung base  - Trace L pleural effusion  - Suspect hilar LAD  - Calcified hilar LN     11/2/2023 Procedure    Bronchoscopy  LLL, Endobronchial Biopsy  - Squamous cell carcinoma (CDK 5/6, p63 positive, CK7 and TTF negative)     11/6/2023 Imaging Significant Findings    PET CT  - 7.6 x 6.0 cm soft tissue mass in superior segment of LLL, SUV 7.2  - 2.5 x 1.6 cm cavitary lesion in ELDER (neoplastic vs infectious/inflammatory), SUV max 1.8  - No hypermetabolic LAD  - 0.9 cm L hypoattenuating L adrenal nodule, likely adenoma     11/10/2023 Initial Diagnosis    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 male with pmh significant for COPD, T2DM, HLD, Stage IB ([id number]) moderately differentiated adenocarcinoma of RUL, initially dx'd 3/2014, s/p R upper lobectomy (3/2014, Dr. 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     Pt states that he feels generally well. He has an intermittent cough which is productive of yellow/brown mucus and little specks of blood; this is since he had the biopsy and has been gradually decreasing. He says that he can walk 2-3 miles without stopping. He is able to complete all ADLs and iADLs without assistance. His appetite is robust and his weight has stable. He denies any other new or bothersome symptoms at this time.     Living Situation: He lives in [place] with his wife in a one story house.     Tobacco Use: He started  smoking at age 12 and never smoked a pack a day. He quit in 2014.     EtOH Use: No longer drinks EtOH, as of 2 months ago (states that he was not a heavy drinker).    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     Family Cancer History: His father had colon cancer, his sister had a cancer of uncertain type, and he otherwise denies a family history of cancer.     History has been obtained by chart review and discussion with the patient.    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    Lung cancer     Normocytic anemia 9/3/2014    Nuclear sclerosis of both eyes 9/4/2020    Seizure 2005    single event - not controlled by medication    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    T2DM (type 2 diabetes mellitus) 2014    DKA 9/2014        Past Surgical History:   Past Surgical History:   Procedure Laterality Date    BRONCHOSCOPY WITH FLUOROSCOPY N/A 11/2/2023    Procedure: BRONCHOSCOPY, WITH FLUOROSCOPY;  Surgeon: Provider, [facility];  Location: [facility] OR [address] [place] [address];  Service: Endoscopy;  Laterality: N/A;    CATARACT EXTRACTION Right 2021    COLONOSCOPY      LUNG REMOVAL, PARTIAL          Family History:   Family History   Problem Relation Age of Onset    No Known Problems Mother     Cancer Father         colon cancer    Colon cancer Father     Cataracts Father     Colon cancer Sister     Cancer Sister         colon or lung cancer     No Known Problems Sister     No Known Problems Sister     No Known Problems Brother     No Known Problems Maternal Aunt     No Known Problems Maternal  Uncle     No Known Problems Paternal Aunt     No Known Problems Paternal Uncle     No Known Problems Maternal Grandmother     No Known Problems Maternal Grandfather     No Known Problems Paternal Grandmother     No Known Problems Paternal Grandfather     No Known Problems Other     Amblyopia Neg Hx     Blindness Neg Hx     Diabetes Neg Hx     Glaucoma Neg Hx     Hypertension Neg Hx     Macular degeneration Neg Hx     Retinal detachment Neg Hx     Strabismus Neg Hx     Stroke Neg Hx     Thyroid disease Neg Hx         Social History:   Social History     Tobacco Use    Smoking status: Former     Current packs/day: 0.00     Average packs/day: 1 pack/day for 30.0 years (30.0 ttl pk-yrs)     Types: Cigarettes     Start date: 1/15/1984     Quit date: 1/15/2014     Years since quittin.8    Smokeless tobacco: Never   Substance Use Topics    Alcohol use: Yes     Alcohol/week: 5.7 standard drinks of alcohol     Types: 4 Cans of beer, 2 Standard drinks or equivalent per week     Comment: every other day beer drinker        I have reviewed and updated the patient's past medical, surgical, family and social histories.      ROS:   As per HPI.     Allergies:   Review of patient's allergies indicates:   Allergen Reactions    Adhesive Itching, Rash, Other (See Comments) and Dermatitis     Blister        Medications:   Current Outpatient Medications   Medication Sig Dispense Refill    albuterol (PROVENTIL HFA) 90 mcg/actuation inhaler Inhale 2 puffs into the lungs every 6 (six) hours as needed for Wheezing. Rescue 18 g 0    blood-glucose meter kit Use as instructed 1 each 0    ciclopirox (PENLAC) 8 % Soln Apply topically nightly. 6.6 mL 0    metFORMIN (GLUCOPHAGE-XR) 500 MG ER 24hr tablet Take 2 tablets (1,000 mg total) by mouth 2 (two) times daily with meals. 60 tablet 1    rosuvastatin (CRESTOR) 5 MG tablet Take 4 tablets (20 mg total) by mouth once daily. 90 tablet 3    umeclidinium (INCRUSE ELLIPTA) 62.5 mcg/actuation  "inhalation capsule Inhale 62.5 mcg into the lungs once daily. Controller 30 each 0     No current facility-administered medications for this visit.          Physical Exam:       /75 (BP Location: Right arm, Patient Position: Sitting, BP Method: Medium (Automatic))   Pulse 66   Temp 97.6 °F (36.4 °C) (Oral)   Resp 18   Ht 5' 7" (1.702 m)   Wt 80.2 kg (176 lb 12.9 oz)   SpO2 100%   BMI 27.69 kg/m²                Physical Exam  Constitutional:       Appearance: Normal appearance.   HENT:      Head: Normocephalic and atraumatic.   Eyes:      Extraocular Movements: Extraocular movements intact.      Conjunctiva/sclera: Conjunctivae normal.      Pupils: Pupils are equal, round, and reactive to light.   Cardiovascular:      Rate and Rhythm: Normal rate and regular rhythm.      Heart sounds: No murmur heard.     No friction rub. No gallop.   Pulmonary:      Effort: Pulmonary effort is normal.      Breath sounds: Wheezing (Slight expiratory wheeze in posterior lung fields bilaterally) present. No rhonchi or rales.   Musculoskeletal:         General: Normal range of motion.      Right lower leg: No edema.      Left lower leg: No edema.   Skin:     General: Skin is warm and dry.   Neurological:      Mental Status: He is alert and oriented to person, place, and time.   Psychiatric:         Mood and Affect: Mood normal.         Thought Content: Thought content normal.         Judgment: Judgment normal.           Labs:   No results found for this or any previous visit (from the past 48 hour(s)).     Imaging:    See oncologic history as above.     Path:  See oncologic history above.      Assessment and Plan:     Hernan Engel is a 68 y.o. male with pmh significant for COPD, T2DM, HLD, Stage IB ([id number]) moderately differentiated adenocarcinoma of RUL, initially dx'd 3/2014, s/p R upper lobectomy (3/27/2014, Dr. 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     Squamous Cell Carcinoma of the LLL  ECOG PS 0.  Patient presents today with a new diagnosis of squamous cell carcinoma of the left lower lobe.  PET demonstrates a 7.6 cm lesion in the left lower lobe, a 2.5 cm cavitary lesion left upper lobe, and an opacity in the right lower lobe which is deemed most likely to be atelectasis/scarring.  He has not yet received an MRI brain to complete staging scans.  It does not appear that NGS or PDL1 testing has been sent.  I reviewed the patient's diagnosis and went over his scans with him and his wife.  I discussed that the nodule in the right lung is unclear etiology (he did have a right upper lobe adenocarcinoma resected by lobectomy in 03/27/2014) and the cavitary lesion in the left upper lobe is likewise concerning for a focus of cancer.  Discussed that the etiology of the right-sided lesion in particular will help dictate treatment options as this would be the difference between stage III and stage IV disease. Will message IR and interventional pulmonology to determine if R sided lesion is amenable to biopsy.     PLAN:   -- MRI brain ordered  -- Message sent to IR and interventional pulmonology regarding biopsy of R sided lesion  -- NGS and PD-L1 requested on pulmonary biopsy  -- Liquid biopsy at next lab draw  -- RTC in 1-2 weeks, or after biopsy    The above information has been reviewed with the patient and all questions have been answered to their apparent satisfaction.  They understand that they can call the clinic with any questions.    Guanako Grove MD  Hematology/Oncology  [facility] [place]        Med Onc Chart Routing      Follow up with physician . RTC with me in 2 weeks   Follow up with SHARRI    Infusion scheduling note    Injection scheduling note    Labs   Scheduling:  Preferred lab:  Lab interval:  Liquid biopsy at next available   Imaging    Pharmacy appointment    Other referrals

## 2023-11-21 ENCOUNTER — OFFICE VISIT (OUTPATIENT)
Dept: FAMILY MEDICINE | Facility: CLINIC | Age: 68
End: 2023-11-21
Payer: MEDICARE

## 2023-11-21 VITALS
SYSTOLIC BLOOD PRESSURE: 118 MMHG | HEART RATE: 60 BPM | BODY MASS INDEX: 27.41 KG/M2 | RESPIRATION RATE: 16 BRPM | HEIGHT: 67 IN | DIASTOLIC BLOOD PRESSURE: 80 MMHG | OXYGEN SATURATION: 98 % | TEMPERATURE: 98 F | WEIGHT: 174.63 LBS

## 2023-11-21 DIAGNOSIS — B35.3 TINEA PEDIS OF BOTH FEET: ICD-10-CM

## 2023-11-21 DIAGNOSIS — J42 CHRONIC BRONCHITIS, UNSPECIFIED CHRONIC BRONCHITIS TYPE: ICD-10-CM

## 2023-11-21 DIAGNOSIS — E11.8 TYPE 2 DIABETES MELLITUS WITH COMPLICATION, WITHOUT LONG-TERM CURRENT USE OF INSULIN: ICD-10-CM

## 2023-11-21 DIAGNOSIS — Z23 NEED FOR PNEUMOCOCCAL VACCINE: ICD-10-CM

## 2023-11-21 DIAGNOSIS — R91.8 MASS OF LOWER LOBE OF LEFT LUNG: ICD-10-CM

## 2023-11-21 DIAGNOSIS — E11.8 TYPE 2 DIABETES MELLITUS WITH COMPLICATION, WITHOUT LONG-TERM CURRENT USE OF INSULIN: Primary | ICD-10-CM

## 2023-11-21 PROCEDURE — 3008F PR BODY MASS INDEX (BMI) DOCUMENTED: ICD-10-PCS | Mod: CPTII,S$GLB,, | Performed by: INTERNAL MEDICINE

## 2023-11-21 PROCEDURE — 3052F PR MOST RECENT HEMOGLOBIN A1C LEVEL 8.0 - < 9.0%: ICD-10-PCS | Mod: CPTII,S$GLB,, | Performed by: INTERNAL MEDICINE

## 2023-11-21 PROCEDURE — 3061F NEG MICROALBUMINURIA REV: CPT | Mod: CPTII,S$GLB,, | Performed by: INTERNAL MEDICINE

## 2023-11-21 PROCEDURE — G0009 PNEUMOCOCCAL CONJUGATE VACCINE 20-VALENT: ICD-10-PCS | Mod: S$GLB,,, | Performed by: INTERNAL MEDICINE

## 2023-11-21 PROCEDURE — 3066F PR DOCUMENTATION OF TREATMENT FOR NEPHROPATHY: ICD-10-PCS | Mod: CPTII,S$GLB,, | Performed by: INTERNAL MEDICINE

## 2023-11-21 PROCEDURE — 99999 PR PBB SHADOW E&M-EST. PATIENT-LVL IV: CPT | Mod: PBBFAC,,, | Performed by: INTERNAL MEDICINE

## 2023-11-21 PROCEDURE — 3061F PR NEG MICROALBUMINURIA RESULT DOCUMENTED/REVIEW: ICD-10-PCS | Mod: CPTII,S$GLB,, | Performed by: INTERNAL MEDICINE

## 2023-11-21 PROCEDURE — 3052F HG A1C>EQUAL 8.0%<EQUAL 9.0%: CPT | Mod: CPTII,S$GLB,, | Performed by: INTERNAL MEDICINE

## 2023-11-21 PROCEDURE — 90677 PNEUMOCOCCAL CONJUGATE VACCINE 20-VALENT: ICD-10-PCS | Mod: S$GLB,,, | Performed by: INTERNAL MEDICINE

## 2023-11-21 PROCEDURE — 1159F MED LIST DOCD IN RCRD: CPT | Mod: CPTII,S$GLB,, | Performed by: INTERNAL MEDICINE

## 2023-11-21 PROCEDURE — 3066F NEPHROPATHY DOC TX: CPT | Mod: CPTII,S$GLB,, | Performed by: INTERNAL MEDICINE

## 2023-11-21 PROCEDURE — 3079F PR MOST RECENT DIASTOLIC BLOOD PRESSURE 80-89 MM HG: ICD-10-PCS | Mod: CPTII,S$GLB,, | Performed by: INTERNAL MEDICINE

## 2023-11-21 PROCEDURE — 1126F AMNT PAIN NOTED NONE PRSNT: CPT | Mod: CPTII,S$GLB,, | Performed by: INTERNAL MEDICINE

## 2023-11-21 PROCEDURE — 1159F PR MEDICATION LIST DOCUMENTED IN MEDICAL RECORD: ICD-10-PCS | Mod: CPTII,S$GLB,, | Performed by: INTERNAL MEDICINE

## 2023-11-21 PROCEDURE — 1157F PR ADVANCE CARE PLAN OR EQUIV PRESENT IN MEDICAL RECORD: ICD-10-PCS | Mod: CPTII,S$GLB,, | Performed by: INTERNAL MEDICINE

## 2023-11-21 PROCEDURE — 1101F PR PT FALLS ASSESS DOC 0-1 FALLS W/OUT INJ PAST YR: ICD-10-PCS | Mod: CPTII,S$GLB,, | Performed by: INTERNAL MEDICINE

## 2023-11-21 PROCEDURE — 99999 PR PBB SHADOW E&M-EST. PATIENT-LVL IV: ICD-10-PCS | Mod: PBBFAC,,, | Performed by: INTERNAL MEDICINE

## 2023-11-21 PROCEDURE — 1160F RVW MEDS BY RX/DR IN RCRD: CPT | Mod: CPTII,S$GLB,, | Performed by: INTERNAL MEDICINE

## 2023-11-21 PROCEDURE — 99214 OFFICE O/P EST MOD 30 MIN: CPT | Mod: S$GLB,,, | Performed by: INTERNAL MEDICINE

## 2023-11-21 PROCEDURE — 3074F SYST BP LT 130 MM HG: CPT | Mod: CPTII,S$GLB,, | Performed by: INTERNAL MEDICINE

## 2023-11-21 PROCEDURE — 3288F FALL RISK ASSESSMENT DOCD: CPT | Mod: CPTII,S$GLB,, | Performed by: INTERNAL MEDICINE

## 2023-11-21 PROCEDURE — 1160F PR REVIEW ALL MEDS BY PRESCRIBER/CLIN PHARMACIST DOCUMENTED: ICD-10-PCS | Mod: CPTII,S$GLB,, | Performed by: INTERNAL MEDICINE

## 2023-11-21 PROCEDURE — 1101F PT FALLS ASSESS-DOCD LE1/YR: CPT | Mod: CPTII,S$GLB,, | Performed by: INTERNAL MEDICINE

## 2023-11-21 PROCEDURE — 3008F BODY MASS INDEX DOCD: CPT | Mod: CPTII,S$GLB,, | Performed by: INTERNAL MEDICINE

## 2023-11-21 PROCEDURE — G0009 ADMIN PNEUMOCOCCAL VACCINE: HCPCS | Mod: S$GLB,,, | Performed by: INTERNAL MEDICINE

## 2023-11-21 PROCEDURE — 1126F PR PAIN SEVERITY QUANTIFIED, NO PAIN PRESENT: ICD-10-PCS | Mod: CPTII,S$GLB,, | Performed by: INTERNAL MEDICINE

## 2023-11-21 PROCEDURE — 3079F DIAST BP 80-89 MM HG: CPT | Mod: CPTII,S$GLB,, | Performed by: INTERNAL MEDICINE

## 2023-11-21 PROCEDURE — 1157F ADVNC CARE PLAN IN RCRD: CPT | Mod: CPTII,S$GLB,, | Performed by: INTERNAL MEDICINE

## 2023-11-21 PROCEDURE — 3074F PR MOST RECENT SYSTOLIC BLOOD PRESSURE < 130 MM HG: ICD-10-PCS | Mod: CPTII,S$GLB,, | Performed by: INTERNAL MEDICINE

## 2023-11-21 PROCEDURE — 90677 PCV20 VACCINE IM: CPT | Mod: S$GLB,,, | Performed by: INTERNAL MEDICINE

## 2023-11-21 PROCEDURE — 99214 PR OFFICE/OUTPT VISIT, EST, LEVL IV, 30-39 MIN: ICD-10-PCS | Mod: S$GLB,,, | Performed by: INTERNAL MEDICINE

## 2023-11-21 PROCEDURE — 3288F PR FALLS RISK ASSESSMENT DOCUMENTED: ICD-10-PCS | Mod: CPTII,S$GLB,, | Performed by: INTERNAL MEDICINE

## 2023-11-21 RX ORDER — INSULIN PUMP SYRINGE, 3 ML
EACH MISCELLANEOUS
Qty: 1 EACH | Refills: 0 | Status: SHIPPED | OUTPATIENT
Start: 2023-11-21 | End: 2023-11-21 | Stop reason: SDUPTHER

## 2023-11-21 RX ORDER — INSULIN PUMP SYRINGE, 3 ML
EACH MISCELLANEOUS
Qty: 1 EACH | Refills: 0 | Status: SHIPPED | OUTPATIENT
Start: 2023-11-21 | End: 2024-02-28 | Stop reason: SDUPTHER

## 2023-11-21 NOTE — ASSESSMENT & PLAN NOTE
Chronic, unresolved. Confirmed adenocarcinoma. Refer to HPI     - continue follow up with Heme Onc. Advised patient to take ensure for weight gain

## 2023-11-21 NOTE — ASSESSMENT & PLAN NOTE
Chronic, uncontrolled. Refer to HPI     - advised patient to continue metformin. To measure his BG ideally twice a day. Dietary advise given, cam about cutting down on desserts. Will follow up in one week on BG readings   - A1c in two months. Will consider starting Januvia if still high

## 2023-11-21 NOTE — PROGRESS NOTES
Health Maintenance Due   Topic     RSV Vaccine (Age 60+ and Pregnant patients) (1 - 1-dose 60+ series) Not offered at this office    Shingles Vaccine (2 of 2)  hx chicken pox. Notified pt can get vaccine at pharmacy hx chicken pox. Notified pt can get vaccine at pharmacy    Colorectal Cancer Screening  Consult pcp    Lipid Panel  Consult pcp    Pneumococcal Vaccines (Age 65+) (4 - PPSV23 or PCV20)     COVID-19 Vaccine (5 - 2023-24 season) Not offered at this officeNot offered at this office

## 2023-11-21 NOTE — ASSESSMENT & PLAN NOTE
Acute, improving. Refer to HPI     - Jublia solution is covered. Counseled about keeping feet clean and dry and inspecting everyday

## 2023-11-21 NOTE — PROGRESS NOTES
Chief Complaint: Follow-up (1 month f/u)      Hernan Engel  is a 68 y.o. year old  has a past medical history of Anxiety, Aortic atherosclerosis (12/6/2017), BPH with urinary obstruction (2/20/2019), Colon polyp (1/16/2014), COPD (chronic obstructive pulmonary disease), Depression, Disorder of kidney and ureter, ED (erectile dysfunction) (1/16/2014), Family history of colon cancer (1/16/2014), Hearing loss in left ear (34 years), Lung cancer, Normocytic anemia (9/3/2014), Nuclear sclerosis of both eyes (9/4/2020), Seizure (2005), Status post lobectomy of lung (4/15/2014), and T2DM (type 2 diabetes mellitus) (2014). who presents today for    Diabetes: has not been taking metformin since 11/10 as he thought he has to stop it before his biopsy. His BG was 260 this morning. Reports he ate cake yesterday.     Fungal infection feet has improved slightly. Did not start ciclopirox due to cost.     Lung cancer: saw heme onc, biopsy confirmed adenocarcinoma. Awaiting biopsy with IR for other lesion and genetic results before starting treatment. Has been losing weight.     Bronchitis: no issues with breathing. Has not been using inhaler daily. Reports going for 2 mile walks with no issues.     Past Surgical History:   Procedure Laterality Date    BRONCHOSCOPY WITH FLUOROSCOPY N/A 11/2/2023    Procedure: BRONCHOSCOPY, WITH FLUOROSCOPY;  Surgeon: Provider, [facility];  Location: [facility] OR [address] [place] [address];  Service: Endoscopy;  Laterality: N/A;    CATARACT EXTRACTION Right 2021    COLONOSCOPY      LUNG REMOVAL, PARTIAL          Family History   Problem Relation Age of Onset    No Known Problems Mother     Cancer Father         colon cancer    Colon cancer Father     Cataracts Father     Colon cancer Sister     Cancer Sister         colon or lung cancer     No Known Problems Sister     No Known Problems Sister     No Known Problems Brother     No Known Problems Maternal Aunt     No Known Problems Maternal Uncle     No Known Problems  Paternal Aunt     No Known Problems Paternal Uncle     No Known Problems Maternal Grandmother     No Known Problems Maternal Grandfather     No Known Problems Paternal Grandmother     No Known Problems Paternal Grandfather     No Known Problems Other     Amblyopia Neg Hx     Blindness Neg Hx     Diabetes Neg Hx     Glaucoma Neg Hx     Hypertension Neg Hx     Macular degeneration Neg Hx     Retinal detachment Neg Hx     Strabismus Neg Hx     Stroke Neg Hx     Thyroid disease Neg Hx         Social History     Socioeconomic History    Marital status:    Occupational History     Employer: [employer]   Tobacco Use    Smoking status: Former     Current packs/day: 0.00     Average packs/day: 1 pack/day for 30.0 years (30.0 ttl pk-yrs)     Types: Cigarettes     Start date: 1/15/1984     Quit date: 1/15/2014     Years since quittin.8    Smokeless tobacco: Never   Substance and Sexual Activity    Alcohol use: Yes     Alcohol/week: 5.7 standard drinks of alcohol     Types: 4 Cans of beer, 2 Standard drinks or equivalent per week     Comment: every other day beer drinker    Drug use: No    Sexual activity: Not Currently     Partners: Female     Birth control/protection: None         Current Outpatient Medications:     albuterol (PROVENTIL HFA) 90 mcg/actuation inhaler, Inhale 2 puffs into the lungs every 6 (six) hours as needed for Wheezing. Rescue, Disp: 18 g, Rfl: 0    metFORMIN (GLUCOPHAGE-XR) 500 MG ER 24hr tablet, Take 2 tablets (1,000 mg total) by mouth 2 (two) times daily with meals., Disp: 60 tablet, Rfl: 1    rosuvastatin (CRESTOR) 5 MG tablet, Take 4 tablets (20 mg total) by mouth once daily., Disp: 90 tablet, Rfl: 3    umeclidinium (INCRUSE ELLIPTA) 62.5 mcg/actuation inhalation capsule, Inhale 62.5 mcg into the lungs once daily. Controller, Disp: 30 each, Rfl: 0    blood-glucose meter kit, Use as instructed, Disp: 1 each, Rfl: 0    efinaconazole (JUBLIA) 10 % Crow, Apply 1 Application topically once  daily., Disp: 8 mL, Rfl: 0     Review of Systems   Constitutional:  Positive for weight loss. Negative for malaise/fatigue.   Eyes:  Negative for blurred vision.   Respiratory:  Positive for cough and sputum production. Negative for shortness of breath and wheezing.    Cardiovascular:  Negative for chest pain.   Gastrointestinal:  Negative for abdominal pain, constipation, diarrhea and nausea.   Neurological:  Negative for tingling and sensory change.        Objective:      Vitals:    11/21/23 0923   BP: 118/80   Pulse: 60   Resp: 16   Temp: 97.5 °F (36.4 °C)       Physical Exam  Vitals and nursing note reviewed.   Constitutional:       Appearance: Normal appearance.   HENT:      Head: Normocephalic and atraumatic.   Cardiovascular:      Rate and Rhythm: Normal rate and regular rhythm.   Pulmonary:      Effort: Pulmonary effort is normal.      Breath sounds: Rales (bilaterally) present. No wheezing.   Skin:     General: Skin is warm and dry.   Neurological:      General: No focal deficit present.      Mental Status: He is alert and oriented to person, place, and time.   Psychiatric:         Mood and Affect: Mood normal.         Behavior: Behavior normal.          Assessment:       1. Type 2 diabetes mellitus with complication, without long-term current use of insulin    2. Tinea pedis of both feet    3. Need for pneumococcal vaccine    4. Mass of lower lobe of left lung    5. Chronic bronchitis, unspecified chronic bronchitis type          Plan:   1. Type 2 diabetes mellitus with complication, without long-term current use of insulin  Assessment & Plan:  Chronic, uncontrolled. Refer to HPI     - advised patient to continue metformin. To measure his BG ideally twice a day. Dietary advise given, cam about cutting down on desserts. Will follow up in one week on BG readings   - A1c in two months. Will consider starting Januvia if still high    Orders:  -     HEMOGLOBIN A1C; Future; Expected date: 01/02/2024  -      blood-glucose meter kit; Use as instructed  Dispense: 1 each; Refill: 0    2. Tinea pedis of both feet  Assessment & Plan:  Acute, improving. Refer to HPI     - Jublia solution is covered. Counseled about keeping feet clean and dry and inspecting everyday    Orders:  -     efinaconazole (JUBLIA) 10 % Crow; Apply 1 Application topically once daily.  Dispense: 8 mL; Refill: 0    3. Need for pneumococcal vaccine  -     Pneumococcal Conjugate Vaccine (20 Valent) (IM)(Preferred)    4. Mass of lower lobe of left lung  Assessment & Plan:  Chronic, unresolved. Confirmed adenocarcinoma. Refer to HPI     - continue follow up with Heme Onc. Advised patient to take ensure for weight gain      5. Chronic bronchitis, unspecified chronic bronchitis type  Assessment & Plan:  Chronic, improved. Only intermittent symptoms. Continue incruse as needed for now.            Follow up in about 3 months (around 2/21/2024) for DM.

## 2023-11-22 LAB
FINAL PATHOLOGIC DIAGNOSIS: ABNORMAL
Lab: ABNORMAL
SUPPLEMENTAL DIAGNOSIS: ABNORMAL

## 2023-11-28 DIAGNOSIS — C34.32 SQUAMOUS CELL CARCINOMA OF BRONCHUS IN LEFT LOWER LOBE: Primary | ICD-10-CM

## 2023-11-29 ENCOUNTER — TELEPHONE (OUTPATIENT)
Dept: HEMATOLOGY/ONCOLOGY | Facility: CLINIC | Age: 68
End: 2023-11-29
Payer: MEDICARE

## 2023-11-29 ENCOUNTER — LAB VISIT (OUTPATIENT)
Dept: LAB | Facility: HOSPITAL | Age: 68
End: 2023-11-29
Attending: HOSPITALIST
Payer: MEDICARE

## 2023-11-29 DIAGNOSIS — C34.32 SQUAMOUS CELL CARCINOMA OF BRONCHUS IN LEFT LOWER LOBE: ICD-10-CM

## 2023-11-29 LAB
ALBUMIN SERPL BCP-MCNC: 4.2 G/DL (ref 3.5–5.2)
ALP SERPL-CCNC: 116 U/L (ref 55–135)
ALT SERPL W/O P-5'-P-CCNC: 16 U/L (ref 10–44)
ANION GAP SERPL CALC-SCNC: 8 MMOL/L (ref 8–16)
AST SERPL-CCNC: 13 U/L (ref 10–40)
BASOPHILS # BLD AUTO: 0.05 K/UL (ref 0–0.2)
BASOPHILS NFR BLD: 1 % (ref 0–1.9)
BILIRUB SERPL-MCNC: 1.3 MG/DL (ref 0.1–1)
BUN SERPL-MCNC: 14 MG/DL (ref 8–23)
CALCIUM SERPL-MCNC: 10.4 MG/DL (ref 8.7–10.5)
CHLORIDE SERPL-SCNC: 103 MMOL/L (ref 95–110)
CO2 SERPL-SCNC: 25 MMOL/L (ref 23–29)
CREAT SERPL-MCNC: 1 MG/DL (ref 0.5–1.4)
DIFFERENTIAL METHOD: ABNORMAL
EOSINOPHIL # BLD AUTO: 0.1 K/UL (ref 0–0.5)
EOSINOPHIL NFR BLD: 2.1 % (ref 0–8)
ERYTHROCYTE [DISTWIDTH] IN BLOOD BY AUTOMATED COUNT: 12.6 % (ref 11.5–14.5)
EST. GFR  (NO RACE VARIABLE): >60 ML/MIN/1.73 M^2
GLUCOSE SERPL-MCNC: 228 MG/DL (ref 70–110)
HCT VFR BLD AUTO: 40.3 % (ref 40–54)
HGB BLD-MCNC: 13.5 G/DL (ref 14–18)
IMM GRANULOCYTES # BLD AUTO: 0 K/UL (ref 0–0.04)
IMM GRANULOCYTES NFR BLD AUTO: 0 % (ref 0–0.5)
LYMPHOCYTES # BLD AUTO: 1.7 K/UL (ref 1–4.8)
LYMPHOCYTES NFR BLD: 34.1 % (ref 18–48)
MCH RBC QN AUTO: 26.8 PG (ref 27–31)
MCHC RBC AUTO-ENTMCNC: 33.5 G/DL (ref 32–36)
MCV RBC AUTO: 80 FL (ref 82–98)
MONOCYTES # BLD AUTO: 0.4 K/UL (ref 0.3–1)
MONOCYTES NFR BLD: 7.6 % (ref 4–15)
NEUTROPHILS # BLD AUTO: 2.7 K/UL (ref 1.8–7.7)
NEUTROPHILS NFR BLD: 55.2 % (ref 38–73)
NRBC BLD-RTO: 0 /100 WBC
PLATELET # BLD AUTO: 271 K/UL (ref 150–450)
PMV BLD AUTO: 9.4 FL (ref 9.2–12.9)
POTASSIUM SERPL-SCNC: 4.2 MMOL/L (ref 3.5–5.1)
PROT SERPL-MCNC: 8.3 G/DL (ref 6–8.4)
RBC # BLD AUTO: 5.03 M/UL (ref 4.6–6.2)
SODIUM SERPL-SCNC: 136 MMOL/L (ref 136–145)
WBC # BLD AUTO: 4.87 K/UL (ref 3.9–12.7)

## 2023-11-29 PROCEDURE — 36415 COLL VENOUS BLD VENIPUNCTURE: CPT | Performed by: HOSPITALIST

## 2023-11-29 PROCEDURE — 85025 COMPLETE CBC W/AUTO DIFF WBC: CPT | Performed by: HOSPITALIST

## 2023-11-29 PROCEDURE — 80053 COMPREHEN METABOLIC PANEL: CPT | Performed by: HOSPITALIST

## 2023-11-30 ENCOUNTER — HOSPITAL ENCOUNTER (OUTPATIENT)
Dept: RADIOLOGY | Facility: HOSPITAL | Age: 68
Discharge: HOME | End: 2023-11-30
Attending: INTERNAL MEDICINE
Payer: MEDICARE

## 2023-11-30 DIAGNOSIS — C34.32 SQUAMOUS CELL CARCINOMA OF BRONCHUS IN LEFT LOWER LOBE: ICD-10-CM

## 2023-11-30 PROCEDURE — A9585 GADOBUTROL INJECTION: HCPCS | Performed by: INTERNAL MEDICINE

## 2023-11-30 PROCEDURE — 70553 MRI BRAIN W WO CONTRAST: ICD-10-PCS | Mod: 26,,, | Performed by: RADIOLOGY

## 2023-11-30 PROCEDURE — 70553 MRI BRAIN STEM W/O & W/DYE: CPT | Mod: 26,,, | Performed by: RADIOLOGY

## 2023-11-30 PROCEDURE — 70553 MRI BRAIN STEM W/O & W/DYE: CPT | Mod: TC

## 2023-11-30 PROCEDURE — 25500020 PHARM REV CODE 255: Performed by: INTERNAL MEDICINE

## 2023-11-30 RX ORDER — GADOBUTROL 604.72 MG/ML
7.5 INJECTION INTRAVENOUS
Status: COMPLETED | OUTPATIENT
Start: 2023-11-30 | End: 2023-11-30

## 2023-11-30 RX ADMIN — GADOBUTROL 7.5 ML: 604.72 INJECTION INTRAVENOUS at 11:11

## 2023-12-01 ENCOUNTER — OFFICE VISIT (OUTPATIENT)
Dept: HEMATOLOGY/ONCOLOGY | Facility: CLINIC | Age: 68
End: 2023-12-01
Payer: MEDICARE

## 2023-12-01 VITALS
DIASTOLIC BLOOD PRESSURE: 72 MMHG | HEIGHT: 67 IN | SYSTOLIC BLOOD PRESSURE: 125 MMHG | HEART RATE: 68 BPM | OXYGEN SATURATION: 98 % | BODY MASS INDEX: 27.86 KG/M2 | RESPIRATION RATE: 18 BRPM | TEMPERATURE: 98 F | WEIGHT: 177.5 LBS

## 2023-12-01 DIAGNOSIS — C34.32 SQUAMOUS CELL CARCINOMA OF BRONCHUS IN LEFT LOWER LOBE: Primary | ICD-10-CM

## 2023-12-01 PROCEDURE — 3052F PR MOST RECENT HEMOGLOBIN A1C LEVEL 8.0 - < 9.0%: ICD-10-PCS | Mod: CPTII,S$GLB,, | Performed by: HOSPITALIST

## 2023-12-01 PROCEDURE — 3061F NEG MICROALBUMINURIA REV: CPT | Mod: CPTII,S$GLB,, | Performed by: HOSPITALIST

## 2023-12-01 PROCEDURE — 3066F PR DOCUMENTATION OF TREATMENT FOR NEPHROPATHY: ICD-10-PCS | Mod: CPTII,S$GLB,, | Performed by: HOSPITALIST

## 2023-12-01 PROCEDURE — 3078F PR MOST RECENT DIASTOLIC BLOOD PRESSURE < 80 MM HG: ICD-10-PCS | Mod: CPTII,S$GLB,, | Performed by: HOSPITALIST

## 2023-12-01 PROCEDURE — 1126F AMNT PAIN NOTED NONE PRSNT: CPT | Mod: CPTII,S$GLB,, | Performed by: HOSPITALIST

## 2023-12-01 PROCEDURE — 3008F PR BODY MASS INDEX (BMI) DOCUMENTED: ICD-10-PCS | Mod: CPTII,S$GLB,, | Performed by: HOSPITALIST

## 2023-12-01 PROCEDURE — 3288F FALL RISK ASSESSMENT DOCD: CPT | Mod: CPTII,S$GLB,, | Performed by: HOSPITALIST

## 2023-12-01 PROCEDURE — 1159F MED LIST DOCD IN RCRD: CPT | Mod: CPTII,S$GLB,, | Performed by: HOSPITALIST

## 2023-12-01 PROCEDURE — 3078F DIAST BP <80 MM HG: CPT | Mod: CPTII,S$GLB,, | Performed by: HOSPITALIST

## 2023-12-01 PROCEDURE — 3074F PR MOST RECENT SYSTOLIC BLOOD PRESSURE < 130 MM HG: ICD-10-PCS | Mod: CPTII,S$GLB,, | Performed by: HOSPITALIST

## 2023-12-01 PROCEDURE — 1159F PR MEDICATION LIST DOCUMENTED IN MEDICAL RECORD: ICD-10-PCS | Mod: CPTII,S$GLB,, | Performed by: HOSPITALIST

## 2023-12-01 PROCEDURE — 3008F BODY MASS INDEX DOCD: CPT | Mod: CPTII,S$GLB,, | Performed by: HOSPITALIST

## 2023-12-01 PROCEDURE — 3288F PR FALLS RISK ASSESSMENT DOCUMENTED: ICD-10-PCS | Mod: CPTII,S$GLB,, | Performed by: HOSPITALIST

## 2023-12-01 PROCEDURE — 3066F NEPHROPATHY DOC TX: CPT | Mod: CPTII,S$GLB,, | Performed by: HOSPITALIST

## 2023-12-01 PROCEDURE — 1126F PR PAIN SEVERITY QUANTIFIED, NO PAIN PRESENT: ICD-10-PCS | Mod: CPTII,S$GLB,, | Performed by: HOSPITALIST

## 2023-12-01 PROCEDURE — 3074F SYST BP LT 130 MM HG: CPT | Mod: CPTII,S$GLB,, | Performed by: HOSPITALIST

## 2023-12-01 PROCEDURE — 3061F PR NEG MICROALBUMINURIA RESULT DOCUMENTED/REVIEW: ICD-10-PCS | Mod: CPTII,S$GLB,, | Performed by: HOSPITALIST

## 2023-12-01 PROCEDURE — 1157F ADVNC CARE PLAN IN RCRD: CPT | Mod: CPTII,S$GLB,, | Performed by: HOSPITALIST

## 2023-12-01 PROCEDURE — 99214 PR OFFICE/OUTPT VISIT, EST, LEVL IV, 30-39 MIN: ICD-10-PCS | Mod: S$GLB,,, | Performed by: HOSPITALIST

## 2023-12-01 PROCEDURE — 99214 OFFICE O/P EST MOD 30 MIN: CPT | Mod: S$GLB,,, | Performed by: HOSPITALIST

## 2023-12-01 PROCEDURE — 1157F PR ADVANCE CARE PLAN OR EQUIV PRESENT IN MEDICAL RECORD: ICD-10-PCS | Mod: CPTII,S$GLB,, | Performed by: HOSPITALIST

## 2023-12-01 PROCEDURE — 3052F HG A1C>EQUAL 8.0%<EQUAL 9.0%: CPT | Mod: CPTII,S$GLB,, | Performed by: HOSPITALIST

## 2023-12-01 PROCEDURE — 1101F PR PT FALLS ASSESS DOC 0-1 FALLS W/OUT INJ PAST YR: ICD-10-PCS | Mod: CPTII,S$GLB,, | Performed by: HOSPITALIST

## 2023-12-01 PROCEDURE — 99999 PR PBB SHADOW E&M-EST. PATIENT-LVL III: CPT | Mod: PBBFAC,,, | Performed by: HOSPITALIST

## 2023-12-01 PROCEDURE — 1101F PT FALLS ASSESS-DOCD LE1/YR: CPT | Mod: CPTII,S$GLB,, | Performed by: HOSPITALIST

## 2023-12-01 PROCEDURE — 99999 PR PBB SHADOW E&M-EST. PATIENT-LVL III: ICD-10-PCS | Mod: PBBFAC,,, | Performed by: HOSPITALIST

## 2023-12-01 NOTE — PROGRESS NOTES
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   9/15/2023 Imaging Significant Findings    CXR (URI)  - Masslike focus along L hilar region     10/6/2023 Imaging Significant Findings    CT C, Non-Con  - 6.6 x 5.9 cm LLL mass  - 2 x 1.2 cm cavitary lesion in lingula  - 0.9 x 0.8 cm pleural based nodule in R lung base  - Trace L pleural effusion  - Suspect hilar LAD  - Calcified hilar LN     11/2/2023 Procedure    Bronchoscopy  LLL, Endobronchial Biopsy  - Squamous cell carcinoma (CDK 5/6, p63 positive, CK7 and TTF negative)     11/6/2023 Imaging Significant Findings    PET CT  - 7.6 x 6.0 cm soft tissue mass in superior segment of LLL, SUV 7.2  - 2.5 x 1.6 cm cavitary lesion in ELDER (neoplastic vs infectious/inflammatory), SUV max 1.8  - No hypermetabolic LAD  - 0.9 cm L hypoattenuating L adrenal nodule, likely adenoma     11/29/2023 Tumor Conference    Tumor Board  - Recommend staging EBUS and L robotic bronchoscopy for sampling of ELDER cavitary nodule.  The nodule is highly suspicious for possible adenocarcinoma as it has evolved from a ground-glass opacity to a part solid and cavitary nodule.  Obtain brain MRI to complete staging.  The clinical picture suggests a possible T3 or T4 primary lung cancer or two separate primary lung cancers.        11/30/2023 Imaging Significant Findings    MRI Brain  - JAN            HPI:     Hernan Engel is a 68 y.o. male with pmh significant for COPD, T2DM, HLD, Stage IB ([id number]) moderately differentiated adenocarcinoma of RUL, initially dx'd 3/2014, s/p R upper lobectomy (3/2014, Dr. 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     Interval History:   - 11/29/23: Tumor Board, as  above  - 11/30/23: MRI Brain, JAN    Pt states that he is doing well today. He denies any new or bothersome symptoms at this time. He ocnitnues to do yardwork and denies TURPIN, SOB, or cough.      History has been obtained by chart review and discussion with the patient.    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    Lung cancer     Normocytic anemia 9/3/2014    Nuclear sclerosis of both eyes 9/4/2020    Seizure 2005    single event - not controlled by medication    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    T2DM (type 2 diabetes mellitus) 2014    DKA 9/2014        Past Surgical History:   Past Surgical History:   Procedure Laterality Date    BRONCHOSCOPY WITH FLUOROSCOPY N/A 11/2/2023    Procedure: BRONCHOSCOPY, WITH FLUOROSCOPY;  [facility];  Location: [facility] OR [address] [place] [address];  Service: Endoscopy;  Laterality: N/A;    CATARACT EXTRACTION Right 2021    COLONOSCOPY      LUNG REMOVAL, PARTIAL          Family History:   Family History   Problem Relation Age of Onset    No Known Problems Mother     Cancer Father         colon cancer    Colon cancer Father     Cataracts Father     Colon cancer Sister     Cancer Sister         colon or lung cancer     No Known Problems Sister     No Known Problems Sister     No Known Problems Brother     No Known Problems Maternal Aunt     No Known Problems Maternal Uncle     No Known Problems Paternal Aunt     No Known Problems Paternal Uncle     No Known Problems Maternal Grandmother     No Known Problems Maternal Grandfather     No Known Problems Paternal Grandmother     No Known Problems Paternal Grandfather     No Known  Problems Other     Amblyopia Neg Hx     Blindness Neg Hx     Diabetes Neg Hx     Glaucoma Neg Hx     Hypertension Neg Hx     Macular degeneration Neg Hx     Retinal detachment Neg Hx     Strabismus Neg Hx     Stroke Neg Hx     Thyroid disease Neg Hx         Social History:   Social History     Tobacco Use    Smoking status: Former     Current packs/day: 0.00     Average packs/day: 1 pack/day for 30.0 years (30.0 ttl pk-yrs)     Types: Cigarettes     Start date: 1/15/1984     Quit date: 1/15/2014     Years since quittin.8    Smokeless tobacco: Never   Substance Use Topics    Alcohol use: Yes     Alcohol/week: 5.7 standard drinks of alcohol     Types: 4 Cans of beer, 2 Standard drinks or equivalent per week     Comment: every other day beer drinker        I have reviewed and updated the patient's past medical, surgical, family and social histories.      ROS:   As per HPI.     Allergies:   Review of patient's allergies indicates:   Allergen Reactions    Adhesive Itching, Rash, Other (See Comments) and Dermatitis     Blister        Medications:   Current Outpatient Medications   Medication Sig Dispense Refill    albuterol (PROVENTIL HFA) 90 mcg/actuation inhaler Inhale 2 puffs into the lungs every 6 (six) hours as needed for Wheezing. Rescue 18 g 0    blood-glucose meter kit Use as instructed 1 each 0    efinaconazole (JUBLIA) 10 % Crow Apply 1 Application topically once daily. 8 mL 0    metFORMIN (GLUCOPHAGE-XR) 500 MG ER 24hr tablet Take 2 tablets (1,000 mg total) by mouth 2 (two) times daily with meals. 60 tablet 1    rosuvastatin (CRESTOR) 5 MG tablet Take 4 tablets (20 mg total) by mouth once daily. 90 tablet 3    umeclidinium (INCRUSE ELLIPTA) 62.5 mcg/actuation inhalation capsule Inhale 62.5 mcg into the lungs once daily. Controller 30 each 0     No current facility-administered medications for this visit.          Physical Exam:       /72 (BP Location: Left arm, Patient Position: Sitting, BP Method:  "Medium (Automatic))   Pulse 68   Temp 97.5 °F (36.4 °C) (Oral)   Resp 18   Ht 5' 7" (1.702 m)   Wt 80.5 kg (177 lb 7.5 oz)   SpO2 98%   BMI 27.80 kg/m²                Physical Exam  Constitutional:       Appearance: Normal appearance.   HENT:      Head: Normocephalic and atraumatic.   Eyes:      Extraocular Movements: Extraocular movements intact.      Conjunctiva/sclera: Conjunctivae normal.      Pupils: Pupils are equal, round, and reactive to light.   Cardiovascular:      Rate and Rhythm: Normal rate and regular rhythm.      Heart sounds: No murmur heard.     No friction rub. No gallop.   Pulmonary:      Effort: Pulmonary effort is normal.      Breath sounds: No wheezing (Slight expiratory wheeze in posterior lung fields bilaterally), rhonchi or rales.   Musculoskeletal:         General: Normal range of motion.      Right lower leg: No edema.      Left lower leg: No edema.   Skin:     General: Skin is warm and dry.   Neurological:      Mental Status: He is alert and oriented to person, place, and time.   Psychiatric:         Mood and Affect: Mood normal.         Thought Content: Thought content normal.         Judgment: Judgment normal.           Labs:   Recent Results (from the past 48 hour(s))   CBC w/ DIFF    Collection Time: 11/29/23  9:09 AM   Result Value Ref Range    WBC 4.87 3.90 - 12.70 K/uL    RBC 5.03 4.60 - 6.20 M/uL    Hemoglobin 13.5 (L) 14.0 - 18.0 g/dL    Hematocrit 40.3 40.0 - 54.0 %    MCV 80 (L) 82 - 98 fL    MCH 26.8 (L) 27.0 - 31.0 pg    MCHC 33.5 32.0 - 36.0 g/dL    RDW 12.6 11.5 - 14.5 %    Platelets 271 150 - 450 K/uL    MPV 9.4 9.2 - 12.9 fL    Immature Granulocytes 0.0 0.0 - 0.5 %    Gran # (ANC) 2.7 1.8 - 7.7 K/uL    Immature Grans (Abs) 0.00 0.00 - 0.04 K/uL    Lymph # 1.7 1.0 - 4.8 K/uL    Mono # 0.4 0.3 - 1.0 K/uL    Eos # 0.1 0.0 - 0.5 K/uL    Baso # 0.05 0.00 - 0.20 K/uL    nRBC 0 0 /100 WBC    Gran % 55.2 38.0 - 73.0 %    Lymph % 34.1 18.0 - 48.0 %    Mono % 7.6 4.0 - 15.0 " %    Eosinophil % 2.1 0.0 - 8.0 %    Basophil % 1.0 0.0 - 1.9 %    Differential Method Automated    CMP    Collection Time: 11/29/23  9:09 AM   Result Value Ref Range    Sodium 136 136 - 145 mmol/L    Potassium 4.2 3.5 - 5.1 mmol/L    Chloride 103 95 - 110 mmol/L    CO2 25 23 - 29 mmol/L    Glucose 228 (H) 70 - 110 mg/dL    BUN 14 8 - 23 mg/dL    Creatinine 1.0 0.5 - 1.4 mg/dL    Calcium 10.4 8.7 - 10.5 mg/dL    Total Protein 8.3 6.0 - 8.4 g/dL    Albumin 4.2 3.5 - 5.2 g/dL    Total Bilirubin 1.3 (H) 0.1 - 1.0 mg/dL    Alkaline Phosphatase 116 55 - 135 U/L    AST 13 10 - 40 U/L    ALT 16 10 - 44 U/L    eGFR >60 >60 mL/min/1.73 m^2    Anion Gap 8 8 - 16 mmol/L        Imaging:    See oncologic history as above.     Path:  See oncologic history above.      Assessment and Plan:     Hernan nEgel is a 68 y.o. male with pmh significant for COPD, T2DM, HLD, Stage IB ([id number]) moderately differentiated adenocarcinoma of RUL, initially dx'd 3/2014, s/p R upper lobectomy (3/27/2014, Dr. 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     Squamous Cell Carcinoma of the LLL  ECOG PS 0.  Patient presents today for follow up. Staging scans are complete. PET demonstrates a 7.6 cm lesion in the left lower lobe, a 2.5 cm cavitary lesion left upper lobe, and an opacity in the right lower lobe which is deemed most likely to be atelectasis/scarring. Pt was discussed at tumor board (11/29/23) where it was determined that the RLL lesion is likely atelectasis/scarring and that the ELDER may represent a second primary (adenocarcinoma) given slow growth and cavitary appearance. Plan per tumor board is for a bronchoscopy w/ EBUS for mediastinal LN sampling and biopsy of the ELDER lesion. I discussed this with the patient today who is in agreement with the plan. Will follow up after procedure to determine ultimate treatment plan.      PLAN:   -- Referral for bronchoscopy with EBUS for mediastinal LN sampling and biopsy of ELDER lesion  -- NGS and PD-L1 pending  -- Liquid biopsy pending  -- RTC after biopsy    The above information has been reviewed with the patient and all questions have been answered to their apparent satisfaction.  They understand that they can call the clinic with any questions.    Guanako Grove MD  Hematology/Oncology  [facility] [place]        Med Onc Chart Routing      Follow up with physician . Pt needs bronchoscopy with EBUS, f/u with me afterwards   Follow up with SHARRI    Infusion scheduling note    Injection scheduling note    Labs    Imaging    Pharmacy appointment    Other referrals

## 2023-12-01 NOTE — Clinical Note
Good morning all!  This pt was discussed at TB on 11/29/23 and plan is for bronch w/ EBUS for mediastinal sampling and ELDER biopsy (concern this lesion may be a second primary).   Is there anything I can do on my end to help get this arranged (a new referral, etc)?  Thanks! Wolfgang

## 2023-12-06 ENCOUNTER — TELEPHONE (OUTPATIENT)
Dept: PULMONOLOGY | Facility: CLINIC | Age: 68
End: 2023-12-06
Payer: MEDICARE

## 2023-12-06 ENCOUNTER — TELEPHONE (OUTPATIENT)
Dept: HEMATOLOGY/ONCOLOGY | Facility: CLINIC | Age: 68
End: 2023-12-06
Payer: MEDICARE

## 2023-12-06 DIAGNOSIS — R91.8 LUNG MASS: Primary | ICD-10-CM

## 2023-12-06 DIAGNOSIS — C34.32 SQUAMOUS CELL CARCINOMA OF BRONCHUS IN LEFT LOWER LOBE: Primary | ICD-10-CM

## 2023-12-06 NOTE — TELEPHONE ENCOUNTER
Spoke with Ms Jen Rolle and advised her that pt already have the soonest appointment at this current time. Ms Rolle verbalized that she understand.

## 2023-12-06 NOTE — TELEPHONE ENCOUNTER
----- Message from Faye Augustin sent at 12/6/2023  9:27 AM CST -----  Dr Grove's office is calling to see about the patient getting a sooner appt states it is URGENT that he see Dr Guevara sooner than 12/27 she states you can message her in teams Jen Rolle phone number [phone] she states if another provider that can see him is fine. He needs a Bronchoscopy with ebus

## 2023-12-06 NOTE — TELEPHONE ENCOUNTER
I called Dr. Guevara's office to see if the appt can be rescheduled to an urgent one. As of now he is scheduled on 12/27. He needs a bronchoscopy with EBUS sooner than this.  is going to reach out to the . Also check the availability of any other physician in that practice.

## 2023-12-06 NOTE — PROGRESS NOTES
Patient known to me from clinic eval in October. NSCLC LLL on FOB. Presented in tumor board and EBUS + biopsy of ELDER nodule requested. Discussed with him by phone and amenable.. Will plan for robotic + staging EBUS on 12/19  Needs repeat CT chest for planning. Orders placed.   Will have staff call with instructions + mail instructions.     Hernandez Brewer MD   [facility]

## 2023-12-06 NOTE — NURSING
Lung Cancer Screening Navigation   Intake  Diagnosis to Treat  Date of Assessment: 12/06/23  Cancer Type: Thoracic  Internal /Referral: Internal  Referral Source: [facility]  Date of Referral: 12/06/23  Initial Nurse Navigator Contact: 12/06/23  Referral to Initial Contact Timeline (days): 0  First Appointment Available: 12/19/23  Appointment Date: 12/19/23  First Available Date vs. Scheduled Date (days): 0  Reason if booked > 7 days after scheduling: Specific provider / access     Treatment  Navigation  Current Status: Lung Nodule  Date Presented to Tumor Board: 11/29/23  Presentation Notes: 11/23/23--BOARD RECOMMENDATIONS:   Recommend staging EBUS and L robotic bronchoscopy for sampling of ELDER cavitary nodule.  The nodule is highly suspicious for possible adenocarcinoma as it has evolved from a ground-glass opacity to a part solid and cavitary nodule.  Obtain brain MRI to complete staging.  The clinical picture suggests a possible T3 or T4 primary lung cancer or two separate primary lung cancers.  Procedure   Oncologist: Dr. Reza  Type of Procedure: EBUS-with Dr. Brewer  Consult Date: 12/12/23  Surgery Schedule Date: 12/12/23  Referrals  General Referrals: Oncology  Support  Support Systems: Spouse/significant other; Children; Family members     Follow Up  No follow-ups on file.   Called and spoke with pt to confirm scheduled surgery date of 12/12 with Dr. Brewer.    Instructions given for NPO the night before, instructions given to have CT scan done on  12/12, pt verbalized all instructions.

## 2023-12-07 ENCOUNTER — TELEPHONE (OUTPATIENT)
Dept: PULMONOLOGY | Facility: CLINIC | Age: 68
End: 2023-12-07
Payer: MEDICARE

## 2023-12-07 LAB
DNA RANGE(S) EXAMINED NAR: NORMAL
GENE DIS ANL INTERP-IMP: NORMAL
GENE DIS ASSESSED: NORMAL
REASON FOR STUDY: NORMAL
TEMPUS PORTAL: NORMAL
TEMPUS TRIAL1: NORMAL
TEMPUS TRIALCOUNT: 1

## 2023-12-07 NOTE — TELEPHONE ENCOUNTER
Spoke with patient, informed him that I have received his message. I advised pt that he was contacted by Nurse Mejia. I switched pt call over to Nurse Mejia and she took care of it.

## 2023-12-07 NOTE — TELEPHONE ENCOUNTER
Phone call to patient to confirm appointment and instructions given in clinic for EBUS/Robotic procedure scheduled for 12/19/23 .   Patient instructed to arrive to the 2nd floor [facility] check in desk at 0900 on  12/19/23 with designated .  NPO after midnight the night prior to scheduled procedure. Patient stated not currently taking any GLP1 or antiplatelet/anticoags.  Patient verbalizes understanding of all above instructions given.

## 2023-12-07 NOTE — TELEPHONE ENCOUNTER
----- Message from Jenna Ortiz sent at 12/7/2023 10:14 AM CST -----  Regarding: Ambulatory referral/consult to Pulmonology  Good morning,     Pt returned a call to schedule a referral.  No appointments populated to schedule Pt.  Pt is requesting a call back from clinical staff, in hopes to be scheduled.    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

## 2023-12-08 ENCOUNTER — PATIENT MESSAGE (OUTPATIENT)
Dept: HEMATOLOGY/ONCOLOGY | Facility: CLINIC | Age: 68
End: 2023-12-08
Payer: MEDICARE

## 2023-12-12 ENCOUNTER — HOSPITAL ENCOUNTER (OUTPATIENT)
Dept: RADIOLOGY | Facility: HOSPITAL | Age: 68
Discharge: HOME | End: 2023-12-12
Attending: EMERGENCY MEDICINE
Payer: MEDICARE

## 2023-12-12 DIAGNOSIS — R91.8 LUNG MASS: ICD-10-CM

## 2023-12-12 PROCEDURE — 71250 CT CHEST WITHOUT CONTRAST: ICD-10-PCS | Mod: 26,,, | Performed by: STUDENT IN AN ORGANIZED HEALTH CARE EDUCATION/TRAINING PROGRAM

## 2023-12-12 PROCEDURE — 71250 CT THORAX DX C-: CPT | Mod: TC

## 2023-12-12 PROCEDURE — 71250 CT THORAX DX C-: CPT | Mod: 26,,, | Performed by: STUDENT IN AN ORGANIZED HEALTH CARE EDUCATION/TRAINING PROGRAM

## 2023-12-13 LAB
DNA RANGE(S) EXAMINED NAR: NORMAL
GENE DIS ANL INTERP-IMP: NORMAL
GENE DIS ASSESSED: NORMAL
REASON FOR STUDY: NORMAL
TEMPUS PORTAL: NORMAL

## 2023-12-14 ENCOUNTER — TELEPHONE (OUTPATIENT)
Dept: PULMONOLOGY | Facility: CLINIC | Age: 68
End: 2023-12-14
Payer: MEDICARE

## 2023-12-14 NOTE — TELEPHONE ENCOUNTER
Spoke with patient, informed him that I have received his message. Pt states that he will like to speak with Dr Brewer in regards to his test results. I verbalized to pt that I understand and advised pt that I will forward his message to Dr Brewer to review/advise. Pt verbalized that he understand.

## 2023-12-18 ENCOUNTER — HOSPITAL ENCOUNTER (EMERGENCY)
Facility: HOSPITAL | Age: 68
Discharge: HOME | End: 2023-12-18
Attending: STUDENT IN AN ORGANIZED HEALTH CARE EDUCATION/TRAINING PROGRAM
Payer: MEDICARE

## 2023-12-18 ENCOUNTER — TELEPHONE (OUTPATIENT)
Dept: FAMILY MEDICINE | Facility: CLINIC | Age: 68
End: 2023-12-18
Payer: MEDICARE

## 2023-12-18 ENCOUNTER — TELEPHONE (OUTPATIENT)
Dept: PULMONOLOGY | Facility: CLINIC | Age: 68
End: 2023-12-18
Payer: MEDICARE

## 2023-12-18 VITALS
DIASTOLIC BLOOD PRESSURE: 69 MMHG | HEIGHT: 67 IN | OXYGEN SATURATION: 99 % | SYSTOLIC BLOOD PRESSURE: 123 MMHG | BODY MASS INDEX: 26.37 KG/M2 | RESPIRATION RATE: 10 BRPM | WEIGHT: 168 LBS | HEART RATE: 62 BPM | TEMPERATURE: 98 F

## 2023-12-18 DIAGNOSIS — K62.5 RECTAL BLEEDING: Primary | ICD-10-CM

## 2023-12-18 LAB
ABO + RH BLD: NORMAL
ALBUMIN SERPL BCP-MCNC: 3.8 G/DL (ref 3.5–5.2)
ALP SERPL-CCNC: 112 U/L (ref 55–135)
ALT SERPL W/O P-5'-P-CCNC: 21 U/L (ref 10–44)
ANION GAP SERPL CALC-SCNC: 11 MMOL/L (ref 8–16)
AST SERPL-CCNC: 13 U/L (ref 10–40)
BASOPHILS # BLD AUTO: 0.05 K/UL (ref 0–0.2)
BASOPHILS NFR BLD: 1.1 % (ref 0–1.9)
BILIRUB SERPL-MCNC: 0.9 MG/DL (ref 0.1–1)
BLD GP AB SCN CELLS X3 SERPL QL: NORMAL
BUN SERPL-MCNC: 12 MG/DL (ref 8–23)
CALCIUM SERPL-MCNC: 9.7 MG/DL (ref 8.7–10.5)
CHLORIDE SERPL-SCNC: 105 MMOL/L (ref 95–110)
CO2 SERPL-SCNC: 26 MMOL/L (ref 23–29)
CREAT SERPL-MCNC: 0.9 MG/DL (ref 0.5–1.4)
DIFFERENTIAL METHOD: ABNORMAL
EOSINOPHIL # BLD AUTO: 0.1 K/UL (ref 0–0.5)
EOSINOPHIL NFR BLD: 2.4 % (ref 0–8)
ERYTHROCYTE [DISTWIDTH] IN BLOOD BY AUTOMATED COUNT: 13.2 % (ref 11.5–14.5)
EST. GFR  (NO RACE VARIABLE): >60 ML/MIN/1.73 M^2
GLUCOSE SERPL-MCNC: 206 MG/DL (ref 70–110)
HCT VFR BLD AUTO: 35.3 % (ref 40–54)
HGB BLD-MCNC: 11.8 G/DL (ref 14–18)
IMM GRANULOCYTES # BLD AUTO: 0.01 K/UL (ref 0–0.04)
IMM GRANULOCYTES NFR BLD AUTO: 0.2 % (ref 0–0.5)
LYMPHOCYTES # BLD AUTO: 1.3 K/UL (ref 1–4.8)
LYMPHOCYTES NFR BLD: 28.8 % (ref 18–48)
MCH RBC QN AUTO: 26.8 PG (ref 27–31)
MCHC RBC AUTO-ENTMCNC: 33.4 G/DL (ref 32–36)
MCV RBC AUTO: 80 FL (ref 82–98)
MONOCYTES # BLD AUTO: 0.4 K/UL (ref 0.3–1)
MONOCYTES NFR BLD: 8.9 % (ref 4–15)
NEUTROPHILS # BLD AUTO: 2.7 K/UL (ref 1.8–7.7)
NEUTROPHILS NFR BLD: 58.6 % (ref 38–73)
NRBC BLD-RTO: 0 /100 WBC
PLATELET # BLD AUTO: 238 K/UL (ref 150–450)
PMV BLD AUTO: 9.5 FL (ref 9.2–12.9)
POTASSIUM SERPL-SCNC: 4.3 MMOL/L (ref 3.5–5.1)
PROT SERPL-MCNC: 7.5 G/DL (ref 6–8.4)
RBC # BLD AUTO: 4.4 M/UL (ref 4.6–6.2)
SODIUM SERPL-SCNC: 142 MMOL/L (ref 136–145)
SPECIMEN OUTDATE: NORMAL
WBC # BLD AUTO: 4.59 K/UL (ref 3.9–12.7)

## 2023-12-18 PROCEDURE — 99284 EMERGENCY DEPT VISIT MOD MDM: CPT | Mod: 25

## 2023-12-18 PROCEDURE — 85025 COMPLETE CBC W/AUTO DIFF WBC: CPT

## 2023-12-18 PROCEDURE — 80053 COMPREHEN METABOLIC PANEL: CPT

## 2023-12-18 PROCEDURE — 25500020 PHARM REV CODE 255: Performed by: STUDENT IN AN ORGANIZED HEALTH CARE EDUCATION/TRAINING PROGRAM

## 2023-12-18 PROCEDURE — 86850 RBC ANTIBODY SCREEN: CPT | Performed by: STUDENT IN AN ORGANIZED HEALTH CARE EDUCATION/TRAINING PROGRAM

## 2023-12-18 RX ADMIN — IOHEXOL 100 ML: 350 INJECTION, SOLUTION INTRAVENOUS at 01:12

## 2023-12-18 NOTE — ED PROVIDER NOTES
Encounter Date: 12/18/2023       History     Chief Complaint   Patient presents with    Rectal Bleeding     Rectal bleeding since tues,      HPI    Patient is a 67yo male with hx of DM, COPD, SCC of lung presenting due to rectal bleeding for approximately one month. Blood is intermittently dark and bright red. He states he notices it with food. He recognizes it in the toilet as well as on the tissue paper. He denies abdominal pain. No hematemesis or hemoptysis.  States he first noticed it after his lung biopsy on 11/6. He is scheduled for an additional biopsy tomorrow, concerned about cancer.  Last colonoscopy was when patient turned 50 (18 years ago), states they found polyps.    Review of patient's allergies indicates:   Allergen Reactions    Adhesive Itching, Rash, Other (See Comments) and Dermatitis     Blister     Past Medical History:   Diagnosis Date    Anxiety     Aortic atherosclerosis 12/6/2017    BPH with urinary obstruction 2/20/2019    Colon polyp 1/16/2014    COPD (chronic obstructive pulmonary disease)     Depression     Disorder of kidney and ureter     ED (erectile dysfunction) 1/16/2014    Family history of colon cancer 1/16/2014    Hearing loss in left ear 34 years    Lung cancer     Normocytic anemia 9/3/2014    Nuclear sclerosis of both eyes 9/4/2020    Seizure 2005    single event - not controlled by medication    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    T2DM (type 2 diabetes mellitus) 2014    DKA 9/2014     Past Surgical History:   Procedure Laterality Date    BRONCHOSCOPY WITH FLUOROSCOPY N/A 11/2/2023    Procedure: BRONCHOSCOPY, WITH FLUOROSCOPY;  Surgeon: Provider, Dosc Diagnostic;  Location: [facility] OR [address] [place] [address];  Service: Endoscopy;  Laterality: N/A;    CATARACT EXTRACTION Right 2021    COLONOSCOPY      LUNG REMOVAL, PARTIAL       Family History   Problem Relation Age of Onset    No Known Problems Mother     Cancer Father         colon cancer    Colon cancer Father     Cataracts Father      Colon cancer Sister     Cancer Sister         colon or lung cancer     No Known Problems Sister     No Known Problems Sister     No Known Problems Brother     No Known Problems Maternal Aunt     No Known Problems Maternal Uncle     No Known Problems Paternal Aunt     No Known Problems Paternal Uncle     No Known Problems Maternal Grandmother     No Known Problems Maternal Grandfather     No Known Problems Paternal Grandmother     No Known Problems Paternal Grandfather     No Known Problems Other     Amblyopia Neg Hx     Blindness Neg Hx     Diabetes Neg Hx     Glaucoma Neg Hx     Hypertension Neg Hx     Macular degeneration Neg Hx     Retinal detachment Neg Hx     Strabismus Neg Hx     Stroke Neg Hx     Thyroid disease Neg Hx      Social History     Tobacco Use    Smoking status: Former     Current packs/day: 0.00     Average packs/day: 1 pack/day for 30.0 years (30.0 ttl pk-yrs)     Types: Cigarettes     Start date: 1/15/1984     Quit date: 1/15/2014     Years since quittin.9    Smokeless tobacco: Never   Substance Use Topics    Alcohol use: Yes     Alcohol/week: 5.7 standard drinks of alcohol     Types: 4 Cans of beer, 2 Standard drinks or equivalent per week     Comment: every other day beer drinker    Drug use: No         Physical Exam     Initial Vitals [23 0845]   BP Pulse Resp Temp SpO2   130/67 74 18 97.9 °F (36.6 °C) 99 %      MAP       --         Physical Exam    Constitutional: He appears well-developed and well-nourished. He is not diaphoretic. No distress.   HENT:   Head: Normocephalic and atraumatic.   Cardiovascular:  Normal rate, regular rhythm and normal heart sounds.           Pulmonary/Chest: Breath sounds normal. No respiratory distress. He has no wheezes. He has no rales.   Abdominal: Abdomen is soft. He exhibits no distension. There is no abdominal tenderness.   Genitourinary: : Acceptable.   Genitourinary Comments: RITCHIE: no external hemorrhoids appreciated; no dark or  bright red blood visualized on glove post exam       Neurological: He is alert.   Skin: Skin is warm.         ED Course   Procedures  Labs Reviewed   CBC W/ AUTO DIFFERENTIAL - Abnormal; Notable for the following components:       Result Value    RBC 4.40 (*)     Hemoglobin 11.8 (*)     Hematocrit 35.3 (*)     MCV 80 (*)     MCH 26.8 (*)     All other components within normal limits   COMPREHENSIVE METABOLIC PANEL - Abnormal; Notable for the following components:    Glucose 206 (*)     All other components within normal limits   TYPE & SCREEN          Imaging Results    None          Medications   iohexoL (OMNIPAQUE 350) injection 100 mL (has no administration in time range)     Medical Decision Making  Patient is a 69yo male with hx of DM, COPD, SCC of lung presenting due to rectal bleeding for approximately one month.  Patient hemodynamically stable upon presentation.  CBC, CMP ordered.  Due to patient's history of SCC, colonoscopy history, concern for bleeding, CT abdomen and pelvis ordered.  RITCHIE not revealing external hemorrhoids and stool was guaiac negative.  Stool on glove not appearing to contain blood.  CBC revealing gap of hemoglobin to 11.8 from 13 approximately 1 month ago. Patient able to provide stool sample, stool appearing loose and with mucus, bright red blood appreciated with stool and on tissue paper. Patient signed out to oncoming team pending CT abd/pelvis with plan to discharge home with GI follow-up if unremarkable.     Amount and/or Complexity of Data Reviewed  Labs: ordered. Decision-making details documented in ED Course.  Radiology: ordered. Decision-making details documented in ED Course.    Risk  Prescription drug management.               ED Course as of 12/18/23 1526   Mon Dec 18, 2023   1124 Hemoglobin(!): 11.8  Baseline 1 month ago of 13 [LF]   1407 CTA Acute GI [place], Abdomen and Pelvis []      ED Course User Index  [DR] Brianne Maynard DO  [LF] Rylee Cervantes MD                            Clinical Impression:  Final diagnoses:  [K92.2] Gastrointestinal hemorrhage, unspecified gastrointestinal hemorrhage type (Primary)                 Brianne Maynard DO  Resident  12/18/23 0501

## 2023-12-18 NOTE — ED TRIAGE NOTES
Patient identifiers verified verbally with patient and correct for Hernan Engel.    Hernan Engel, a 68 y.o. male presents to the ED via personal transportation with CC rectal bleeding onset Thursday, dark and bright red blood. No other complaints on arrival. Hx of lung cancer, scheduled for a lung biopsy tomorrow.

## 2023-12-18 NOTE — DISCHARGE INSTRUCTIONS
Please follow up with your primary care provider as well as gastroenterology to further evaluate your rectal bleed and anemia. If you begin passing large amounts of blood in stool, vomiting blood, develop abdominal pain, please return to the emergency department.

## 2023-12-18 NOTE — TELEPHONE ENCOUNTER
----- Message from Rosita Barr sent at 12/18/2023  7:17 AM CST -----  Type: Patient Call Back    Who called: self     What is the request in detail: patient stated that he will be going to the ED this morning due to blood in stool. Please call    Can the clinic reply by [facility]? No     Would the patient rather a call back or a response via [facility]?  call    Best call back number: .[phone] (home)      Additional Information:

## 2023-12-18 NOTE — TELEPHONE ENCOUNTER
----- Message from Faye Augustin sent at 12/18/2023  9:53 AM CST -----  Contact: [phone]  PATIENTCALL     Pt is calling to speak with someone in provider office he is currently in the ED states that he is bleeding every time he eat something he wants to know if he will still be able to have his procedure on 12/19  is asking for a return call please call pt at   [phone]

## 2023-12-18 NOTE — TELEPHONE ENCOUNTER
Spoke with pt, informed him that I have received his message. Pt states that every time he eats something, he notices that he has blood in his stool. Pt wants to know if he will be able to complete the procedure on 12/19/23. I verbalized to pt that I understand and will forward his message to Dr Brewer to review/advise. Pt verbalized that he understand.

## 2023-12-19 ENCOUNTER — ANESTHESIA EVENT (OUTPATIENT)
Dept: SURGERY | Facility: HOSPITAL | Age: 68
End: 2023-12-19
Payer: MEDICARE

## 2023-12-19 ENCOUNTER — TELEPHONE (OUTPATIENT)
Dept: PULMONOLOGY | Facility: CLINIC | Age: 68
End: 2023-12-19
Payer: MEDICARE

## 2023-12-19 ENCOUNTER — ANESTHESIA (OUTPATIENT)
Dept: SURGERY | Facility: HOSPITAL | Age: 68
End: 2023-12-19
Payer: MEDICARE

## 2023-12-19 ENCOUNTER — NURSE TRIAGE (OUTPATIENT)
Dept: ADMINISTRATIVE | Facility: CLINIC | Age: 68
End: 2023-12-19
Payer: MEDICARE

## 2023-12-19 ENCOUNTER — HOSPITAL ENCOUNTER (OUTPATIENT)
Facility: HOSPITAL | Age: 68
Discharge: HOME | End: 2023-12-19
Attending: EMERGENCY MEDICINE | Admitting: EMERGENCY MEDICINE
Payer: MEDICARE

## 2023-12-19 VITALS
RESPIRATION RATE: 21 BRPM | HEART RATE: 84 BPM | SYSTOLIC BLOOD PRESSURE: 119 MMHG | TEMPERATURE: 98 F | OXYGEN SATURATION: 95 % | DIASTOLIC BLOOD PRESSURE: 63 MMHG

## 2023-12-19 DIAGNOSIS — R91.8 LUNG MASS: ICD-10-CM

## 2023-12-19 DIAGNOSIS — R91.8 LUNG MASS: Primary | ICD-10-CM

## 2023-12-19 LAB
GRAM STN SPEC: NORMAL
GRAM STN SPEC: NORMAL
POCT GLUCOSE: 159 MG/DL (ref 70–110)
POCT GLUCOSE: 160 MG/DL (ref 70–110)

## 2023-12-19 PROCEDURE — 31652 BRONCH EBUS SAMPLNG 1/2 NODE: CPT | Mod: ,,, | Performed by: EMERGENCY MEDICINE

## 2023-12-19 PROCEDURE — 37000009 HC ANESTHESIA EA ADD 15 MINS: Performed by: EMERGENCY MEDICINE

## 2023-12-19 PROCEDURE — 36000708 HC OR TIME LEV III 1ST 15 MIN: Performed by: EMERGENCY MEDICINE

## 2023-12-19 PROCEDURE — 31629 BRONCHOSCOPY/NEEDLE BX EACH: CPT | Mod: 59,LT,, | Performed by: EMERGENCY MEDICINE

## 2023-12-19 PROCEDURE — 87070 CULTURE OTHR SPECIMN AEROBIC: CPT | Performed by: EMERGENCY MEDICINE

## 2023-12-19 PROCEDURE — 63600175 PHARM REV CODE 636 W HCPCS

## 2023-12-19 PROCEDURE — 87205 SMEAR GRAM STAIN: CPT | Performed by: EMERGENCY MEDICINE

## 2023-12-19 PROCEDURE — 87075 CULTR BACTERIA EXCEPT BLOOD: CPT | Performed by: EMERGENCY MEDICINE

## 2023-12-19 PROCEDURE — 31624 DX BRONCHOSCOPE/LAVAGE: CPT | Mod: 51,LT,, | Performed by: EMERGENCY MEDICINE

## 2023-12-19 PROCEDURE — 71000016 HC POSTOP RECOV ADDL HR: Performed by: EMERGENCY MEDICINE

## 2023-12-19 PROCEDURE — 31629 PR BRONCHOSCOPY,TRANSBRON ASPIR BX: ICD-10-PCS | Mod: 59,LT,, | Performed by: EMERGENCY MEDICINE

## 2023-12-19 PROCEDURE — 31627 PR BRONCHOSCOPY,COMPUTER ASSIST/IMAGE-GUIDED NAVIGATION: ICD-10-PCS | Mod: ,,, | Performed by: EMERGENCY MEDICINE

## 2023-12-19 PROCEDURE — 31628 BRONCHOSCOPY/LUNG BX EACH: CPT | Mod: 51,LT,, | Performed by: EMERGENCY MEDICINE

## 2023-12-19 PROCEDURE — 82962 GLUCOSE BLOOD TEST: CPT | Performed by: EMERGENCY MEDICINE

## 2023-12-19 PROCEDURE — 25000003 PHARM REV CODE 250

## 2023-12-19 PROCEDURE — 25000003 PHARM REV CODE 250: Performed by: EMERGENCY MEDICINE

## 2023-12-19 PROCEDURE — C1726 CATH, BAL DIL, NON-VASCULAR: HCPCS | Performed by: EMERGENCY MEDICINE

## 2023-12-19 PROCEDURE — D9220A PRA ANESTHESIA: ICD-10-PCS | Mod: ,,, | Performed by: ANESTHESIOLOGY

## 2023-12-19 PROCEDURE — 31654 BRONCH EBUS IVNTJ PERPH LES: CPT | Mod: ,,, | Performed by: EMERGENCY MEDICINE

## 2023-12-19 PROCEDURE — 71000044 HC DOSC ROUTINE RECOVERY FIRST HOUR: Performed by: EMERGENCY MEDICINE

## 2023-12-19 PROCEDURE — 31652 PR BRONCH W/ EBUS, SAMPLING 1 OR 2 NODES, INCL GUIDE: ICD-10-PCS | Mod: ,,, | Performed by: EMERGENCY MEDICINE

## 2023-12-19 PROCEDURE — 31627 NAVIGATIONAL BRONCHOSCOPY: CPT | Mod: ,,, | Performed by: EMERGENCY MEDICINE

## 2023-12-19 PROCEDURE — 27201423 OPTIME MED/SURG SUP & DEVICES STERILE SUPPLY: Performed by: EMERGENCY MEDICINE

## 2023-12-19 PROCEDURE — 37000008 HC ANESTHESIA 1ST 15 MINUTES: Performed by: EMERGENCY MEDICINE

## 2023-12-19 PROCEDURE — 31628 PR BRONCHOSCOPY,TRANSBRONCH BIOPSY: ICD-10-PCS | Mod: 51,LT,, | Performed by: EMERGENCY MEDICINE

## 2023-12-19 PROCEDURE — 31624 PR BRONCHOSCOPY,DIAG2STIC W LAVAGE: ICD-10-PCS | Mod: 51,LT,, | Performed by: EMERGENCY MEDICINE

## 2023-12-19 PROCEDURE — 36000709 HC OR TIME LEV III EA ADD 15 MIN: Performed by: EMERGENCY MEDICINE

## 2023-12-19 PROCEDURE — 71000015 HC POSTOP RECOV 1ST HR: Performed by: EMERGENCY MEDICINE

## 2023-12-19 PROCEDURE — 87206 SMEAR FLUORESCENT/ACID STAI: CPT | Performed by: EMERGENCY MEDICINE

## 2023-12-19 PROCEDURE — 87116 MYCOBACTERIA CULTURE: CPT | Performed by: EMERGENCY MEDICINE

## 2023-12-19 PROCEDURE — D9220A PRA ANESTHESIA: Mod: ,,, | Performed by: ANESTHESIOLOGY

## 2023-12-19 PROCEDURE — 31654 PR BRONCH W/ EBUS, DIAG OR THERA INTERVENTION PERIPHERAL LESION(S), INCL GUID, ADD ON CODE: ICD-10-PCS | Mod: ,,, | Performed by: EMERGENCY MEDICINE

## 2023-12-19 PROCEDURE — 87102 FUNGUS ISOLATION CULTURE: CPT | Performed by: EMERGENCY MEDICINE

## 2023-12-19 RX ORDER — ONDANSETRON 2 MG/ML
INJECTION INTRAMUSCULAR; INTRAVENOUS
Status: DISCONTINUED | OUTPATIENT
Start: 2023-12-19 | End: 2023-12-19

## 2023-12-19 RX ORDER — LIDOCAINE HYDROCHLORIDE 20 MG/ML
INJECTION INTRAVENOUS
Status: DISCONTINUED | OUTPATIENT
Start: 2023-12-19 | End: 2023-12-19

## 2023-12-19 RX ORDER — PROPOFOL 10 MG/ML
VIAL (ML) INTRAVENOUS CONTINUOUS PRN
Status: DISCONTINUED | OUTPATIENT
Start: 2023-12-19 | End: 2023-12-19

## 2023-12-19 RX ORDER — ROCURONIUM BROMIDE 10 MG/ML
INJECTION, SOLUTION INTRAVENOUS
Status: DISCONTINUED | OUTPATIENT
Start: 2023-12-19 | End: 2023-12-19

## 2023-12-19 RX ORDER — SODIUM CHLORIDE 9 MG/ML
INJECTION, SOLUTION INTRAVENOUS CONTINUOUS PRN
Status: DISCONTINUED | OUTPATIENT
Start: 2023-12-19 | End: 2023-12-19

## 2023-12-19 RX ORDER — VASOPRESSIN 20 [USP'U]/ML
INJECTION, SOLUTION INTRAMUSCULAR; SUBCUTANEOUS
Status: DISCONTINUED | OUTPATIENT
Start: 2023-12-19 | End: 2023-12-19

## 2023-12-19 RX ORDER — FENTANYL CITRATE 50 UG/ML
INJECTION, SOLUTION INTRAMUSCULAR; INTRAVENOUS
Status: DISCONTINUED | OUTPATIENT
Start: 2023-12-19 | End: 2023-12-19

## 2023-12-19 RX ORDER — DEXAMETHASONE SODIUM PHOSPHATE 4 MG/ML
INJECTION, SOLUTION INTRA-ARTICULAR; INTRALESIONAL; INTRAMUSCULAR; INTRAVENOUS; SOFT TISSUE
Status: DISCONTINUED | OUTPATIENT
Start: 2023-12-19 | End: 2023-12-19

## 2023-12-19 RX ORDER — LIDOCAINE HYDROCHLORIDE 10 MG/ML
INJECTION, SOLUTION EPIDURAL; INFILTRATION; INTRACAUDAL; PERINEURAL
Status: DISCONTINUED | OUTPATIENT
Start: 2023-12-19 | End: 2023-12-19 | Stop reason: HOSPADM

## 2023-12-19 RX ORDER — FENTANYL CITRATE 50 UG/ML
25 INJECTION, SOLUTION INTRAMUSCULAR; INTRAVENOUS EVERY 5 MIN PRN
Status: DISCONTINUED | OUTPATIENT
Start: 2023-12-19 | End: 2023-12-19 | Stop reason: HOSPADM

## 2023-12-19 RX ORDER — MIDAZOLAM HYDROCHLORIDE 1 MG/ML
INJECTION INTRAMUSCULAR; INTRAVENOUS
Status: DISCONTINUED | OUTPATIENT
Start: 2023-12-19 | End: 2023-12-19

## 2023-12-19 RX ORDER — HALOPERIDOL 5 MG/ML
0.5 INJECTION INTRAMUSCULAR EVERY 10 MIN PRN
Status: DISCONTINUED | OUTPATIENT
Start: 2023-12-19 | End: 2023-12-19 | Stop reason: HOSPADM

## 2023-12-19 RX ORDER — PHENYLEPHRINE HYDROCHLORIDE 10 MG/ML
INJECTION INTRAVENOUS
Status: DISCONTINUED | OUTPATIENT
Start: 2023-12-19 | End: 2023-12-19

## 2023-12-19 RX ORDER — PROPOFOL 10 MG/ML
VIAL (ML) INTRAVENOUS
Status: DISCONTINUED | OUTPATIENT
Start: 2023-12-19 | End: 2023-12-19

## 2023-12-19 RX ORDER — SODIUM CHLORIDE 0.9 % (FLUSH) 0.9 %
10 SYRINGE (ML) INJECTION
Status: DISCONTINUED | OUTPATIENT
Start: 2023-12-19 | End: 2023-12-19 | Stop reason: HOSPADM

## 2023-12-19 RX ADMIN — SODIUM CHLORIDE 0.5 MCG/KG/MIN: 9 INJECTION, SOLUTION INTRAVENOUS at 01:12

## 2023-12-19 RX ADMIN — VASOPRESSIN 1 UNITS: 20 INJECTION INTRAVENOUS at 01:12

## 2023-12-19 RX ADMIN — MIDAZOLAM HYDROCHLORIDE 2 MG: 1 INJECTION INTRAMUSCULAR; INTRAVENOUS at 12:12

## 2023-12-19 RX ADMIN — PROPOFOL 175 MCG/KG/MIN: 10 INJECTION, EMULSION INTRAVENOUS at 12:12

## 2023-12-19 RX ADMIN — LIDOCAINE HYDROCHLORIDE 50 MG: 20 INJECTION INTRAVENOUS at 12:12

## 2023-12-19 RX ADMIN — PHENYLEPHRINE HYDROCHLORIDE 200 MCG: 10 INJECTION INTRAVENOUS at 01:12

## 2023-12-19 RX ADMIN — ROCURONIUM BROMIDE 30 MG: 10 INJECTION, SOLUTION INTRAVENOUS at 12:12

## 2023-12-19 RX ADMIN — DEXAMETHASONE SODIUM PHOSPHATE 4 MG: 4 INJECTION, SOLUTION INTRAMUSCULAR; INTRAVENOUS at 01:12

## 2023-12-19 RX ADMIN — SODIUM CHLORIDE: 0.9 INJECTION, SOLUTION INTRAVENOUS at 12:12

## 2023-12-19 RX ADMIN — PROPOFOL 170 MG: 10 INJECTION, EMULSION INTRAVENOUS at 12:12

## 2023-12-19 RX ADMIN — VASOPRESSIN 3 UNITS: 20 INJECTION INTRAVENOUS at 01:12

## 2023-12-19 RX ADMIN — FENTANYL CITRATE 100 MCG: 50 INJECTION, SOLUTION INTRAMUSCULAR; INTRAVENOUS at 01:12

## 2023-12-19 RX ADMIN — SODIUM CHLORIDE: 0.9 INJECTION, SOLUTION INTRAVENOUS at 01:12

## 2023-12-19 RX ADMIN — VASOPRESSIN 2 UNITS: 20 INJECTION INTRAVENOUS at 01:12

## 2023-12-19 RX ADMIN — ONDANSETRON 4 MG: 2 INJECTION INTRAMUSCULAR; INTRAVENOUS at 01:12
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
## 2023-12-19 NOTE — ANESTHESIA POSTPROCEDURE EVALUATION
Anesthesia Post Evaluation    Patient: Hernan Engel    Procedure(s) Performed: Procedure(s) (LRB):  ROBOTIC BRONCHOSCOPY (N/A)    Final Anesthesia Type: general      Patient location during evaluation: PACU  Patient participation: Yes- Able to Participate  Level of consciousness: awake and alert  Post-procedure vital signs: reviewed and stable  Pain management: adequate  Airway patency: patent    PONV status at discharge: No PONV  Anesthetic complications: no      Cardiovascular status: hemodynamically stable  Respiratory status: spontaneous ventilation  Hydration status: euvolemic  Follow-up not needed.              Vitals Value Taken Time   /67 12/19/23 1532   Temp 36.4 °C (97.5 °F) 12/19/23 1515   Pulse 84 12/19/23 1537   Resp 21 12/19/23 1537   SpO2 96 % 12/19/23 1537   Vitals shown include unvalidated device data.      No case tracking events are documented in the log.      Pain/Rosanna Score: Rosanna Score: 9 (12/19/2023  2:30 PM)

## 2023-12-19 NOTE — ANESTHESIA PREPROCEDURE EVALUATION
12/19/2023  Hernan Engel is a 68 y.o., male.  Pre-operative evaluation for Procedure(s) (LRB):  ROBOTIC BRONCHOSCOPY (Bilateral)    Hernan Engel is a 68 y.o. 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     Current Outpatient Medications on File Prior to Encounter   Medication Sig Dispense Refill    albuterol (PROVENTIL HFA) 90 mcg/actuation inhaler Inhale 2 puffs into the lungs every 6 (six) hours as needed for Wheezing. Rescue 18 g 0    blood-glucose meter kit Use as instructed 1 each 0    efinaconazole (JUBLIA) 10 % Crow Apply 1 Application topically once daily. 8 mL 0    metFORMIN (GLUCOPHAGE-XR) 500 MG ER 24hr tablet Take 2 tablets (1,000 mg total) by mouth 2 (two) times daily with meals. 60 tablet 1    rosuvastatin (CRESTOR) 5 MG tablet Take 4 tablets (20 mg total) by mouth once daily. 90 tablet 3    umeclidinium (INCRUSE ELLIPTA) 62.5 mcg/actuation inhalation capsule Inhale 62.5 mcg into the lungs once daily. Controller 30 each 0       Past Surgical History:   Procedure Laterality Date    BRONCHOSCOPY WITH FLUOROSCOPY N/A  "2023    Procedure: BRONCHOSCOPY, WITH FLUOROSCOPY;  Surgeon: Provider, Dosc Diagnostic;  Location: [facility] OR [address] [place] [address];  Service: Endoscopy;  Laterality: N/A;    CATARACT EXTRACTION Right     COLONOSCOPY      LUNG REMOVAL, PARTIAL         Social History     Socioeconomic History    Marital status:    Occupational History     Employer: [employer]   Tobacco Use    Smoking status: Former     Current packs/day: 0.00     Average packs/day: 1 pack/day for 30.0 years (30.0 ttl pk-yrs)     Types: Cigarettes     Start date: 1/15/1984     Quit date: 1/15/2014     Years since quittin.9    Smokeless tobacco: Never   Substance and Sexual Activity    Alcohol use: Yes     Alcohol/week: 5.7 standard drinks of alcohol     Types: 4 Cans of beer, 2 Standard drinks or equivalent per week     Comment: every other day beer drinker    Drug use: No    Sexual activity: Not Currently     Partners: Female     Birth control/protection: None         CBC:   Recent Labs     23  1053   WBC 4.59   RBC 4.40*   HGB 11.8*   HCT 35.3*      MCV 80*   MCH 26.8*   MCHC 33.4       CMP:   Recent Labs     23  1053      K 4.3      CO2 26   BUN 12   CREATININE 0.9   *   CALCIUM 9.7   ALBUMIN 3.8   PROT 7.5   ALKPHOS 112   ALT 21   AST 13   BILITOT 0.9       INR  No results for input(s): "PT", "INR", "PROTIME", "APTT" in the last 72 hours.        Diagnostic Studies:      EKD Echo:  Results for orders placed or performed during the hospital encounter of 14   Echo doppler color flow   Result Value Ref Range    EF + QEF 55     Diastolic Dysfunction No          Pre-op Assessment    I have reviewed the Patient Summary Reports.     I have reviewed the Nursing Notes. I have reviewed the NPO Status.   I have reviewed the Medications.     Review of Systems  Anesthesia Hx:  No problems with previous Anesthesia   History of prior surgery of interest to airway management or planning:          Denies " Family Hx of Anesthesia complications.    Denies Personal Hx of Anesthesia complications.                    Social:  Former Smoker       Hematology/Oncology:       -- Anemia:                  Current/Recent Cancer.            Oncology Comments: lung     EENT/Dental:   Hearing loss          Cardiovascular:                hyperlipidemia    Aortic atherosclerosis                          Pulmonary:   COPD                     Renal/:  Chronic Renal Disease                Hepatic/GI:  Hepatic/GI Normal                 Musculoskeletal:  Arthritis               Neurological:           Tinea pedis of both feet                            Endocrine:  Diabetes, using insulin           Psych:   anxiety                 Physical Exam  General: Cooperative, Alert and Oriented    Airway:  Mallampati: II   Mouth Opening: Normal  TM Distance: Normal  Tongue: Normal  Neck ROM: Normal ROM    Dental:  Dentures    Chest/Lungs:  Normal Respiratory Rate    Heart:  Rate: Normal  Rhythm: Regular Rhythm        Anesthesia Plan  Type of Anesthesia, risks & benefits discussed:    Anesthesia Type: Gen Supraglottic Airway, Gen ETT, Gen Natural Airway  Intra-op Monitoring Plan: Standard ASA Monitors  Post Op Pain Control Plan: multimodal analgesia and IV/PO Opioids PRN  Induction:  IV  Airway Plan: Direct, Post-Induction  Informed Consent: Informed consent signed with the Patient and all parties understand the risks and agree with anesthesia plan.  All questions answered.   ASA Score: 3  Day of Surgery Review of History & Physical: H&P Update referred to the surgeon/provider.    Ready For Surgery From Anesthesia Perspective.     .

## 2023-12-19 NOTE — TELEPHONE ENCOUNTER
Spoke with Mrs. Engel who states the [facility]  Wife states she is being told that the patient is not on the schedule. Patient went to ED last night as he was bleeding. Patient is not having symptoms per wife. Spok with Taylor in Dr. Brewer's office who states the patient is scheduled to have procedure at 11:40 and he is in the right place.  [facility] informed Taylor that a admit order is needed.  Taylor is contacting Dr. Brewer to obtain order.  No further assistance needed from nurse triage line.  Patient wants to speak with Dr. Jorge before biopsy. Taylor states Dr. Brewer is in a procedure at this time.    Reason for Disposition   Question about upcoming scheduled test, no triage required and triager able to answer question    Protocols used: Information Only Call - No Triage-A-

## 2023-12-19 NOTE — PLAN OF CARE
Patient discharged to home via wheelchair, escorted by [facility] nurse. Pt alert and talkative, vitals stable on room air, tolerating PO intake. Discharge instructions (written and verbal) and follow-up information given to patient and spouse, who verbalized understanding, as well as a readiness for discharge. [facility] contact info provided for additional questions following discharge. RAOUL

## 2023-12-19 NOTE — BRIEF OP NOTE
Bronchoscopy completed. + endobronchial lesion LLL (previously biopsied)  RUL stump from prior lobectomy well healed.   No additional endobronchial lesions or secretions identified.     Robotic- ELDER (lingula cavitary nodule)- + eccentric signal. FNA x 5 + Tbbx x 6 + BAL     EBUS- Station 7 1cm.- FNA x 3   -- Also evaluated station 11R, 4R, 4L- No pathologic LN enlargement was identified. Unable to evaluate 11L as endobronchial lesion prevents     No immediate complications. All bleeding controlled. To recovery with anesthesia. Full dictation to follow.     Hernandez Brewer MD   [facility]

## 2023-12-19 NOTE — TELEPHONE ENCOUNTER
"Pt calling to clarify upcoming procedure, states ER paperwork from 12/18/23 "is confusing me, says 12/22." Per pt chart, procedure scheduled for 12/19/23 at 1140. Pt states has questions about risks and benefits of procedure. Advised pt to speak to MD in preop for all questions or concerns related to procedure including risks and benefits. Pt verbalizes understanding, has no further questions. Advised to call back for any other questions or concerns.     Reason for Disposition   Question about upcoming scheduled test, no triage required and triager able to answer question    Protocols used: Information Only Call - No Triage-A-     "

## 2023-12-19 NOTE — TELEPHONE ENCOUNTER
Attempted to reach out to pt in regards to message but no one answered pt telephone and pt voicemail has not been yet set up to leave pt a message.

## 2023-12-19 NOTE — DISCHARGE SUMMARY
[facility] - [facility] (2nd Fl)  Discharge Note  Short Stay    Procedure(s) (LRB):  ROBOTIC BRONCHOSCOPY (N/A)      OUTCOME: Patient tolerated treatment/procedure well without complication and is now ready for discharge.    DISPOSITION: Home or Self Care    FINAL DIAGNOSIS:  1. ELDER Pulmonary nodule. 2. LLL Lung mass (NSCLC)     FOLLOWUP: In clinic    DISCHARGE INSTRUCTIONS:  Written and verbal instructions provided.     TIME SPENT ON DISCHARGE: 25 minutes      Hernandez Brewer MD   [facility]

## 2023-12-19 NOTE — TRANSFER OF CARE
Anesthesia Transfer of Care Note    Patient: Hernan Engel    Procedure(s) Performed: Procedure(s) (LRB):  ROBOTIC BRONCHOSCOPY (N/A)    Patient location: PACU    Anesthesia Type: general    Transport from OR: Transported from OR on 6-10 L/min O2 by face mask with adequate spontaneous ventilation    Post pain: adequate analgesia    Post assessment: no apparent anesthetic complications    Post vital signs: stable    Level of consciousness: awake    Nausea/Vomiting: no nausea/vomiting    Complications: none    Transfer of care protocol was followed      Last vitals: Visit Vitals  /64 (BP Location: Left arm, Patient Position: Lying)   Pulse 73   Temp 36.7 °C (98.1 °F) (Temporal)   Resp 20   SpO2 99%

## 2023-12-22 ENCOUNTER — DOCUMENTATION ONLY (OUTPATIENT)
Dept: PULMONOLOGY | Facility: HOSPITAL | Age: 68
End: 2023-12-22
Payer: MEDICARE

## 2023-12-22 LAB
BACTERIA SPEC AEROBE CULT: NO GROWTH
FINAL PATHOLOGIC DIAGNOSIS: ABNORMAL
Lab: ABNORMAL
MICROSCOPIC EXAM: ABNORMAL

## 2023-12-22 NOTE — PROGRESS NOTES
Robotic bronchoscopy pathology resulted-     -- LEFT UL Cavitary opacity + adenocarcinoma.   -- EBUS negative   -- Known LLL mass + NSCLC on past biopsy.     Established with Dr. Grove in oncology. I discussed results with patient by phone and messaged oncologist.     Hernandez Brewer MD   [facility]

## 2023-12-26 DIAGNOSIS — C34.12 ADENOCARCINOMA OF UPPER LOBE OF LEFT LUNG: Primary | ICD-10-CM

## 2023-12-28 LAB — BACTERIA SPEC ANAEROBE CULT: NORMAL

## 2023-12-29 ENCOUNTER — TELEPHONE (OUTPATIENT)
Dept: HEMATOLOGY/ONCOLOGY | Facility: CLINIC | Age: 68
End: 2023-12-29

## 2023-12-29 ENCOUNTER — OFFICE VISIT (OUTPATIENT)
Dept: HEMATOLOGY/ONCOLOGY | Facility: CLINIC | Age: 68
End: 2023-12-29
Payer: MEDICARE

## 2023-12-29 VITALS
SYSTOLIC BLOOD PRESSURE: 118 MMHG | TEMPERATURE: 98 F | WEIGHT: 176.38 LBS | RESPIRATION RATE: 14 BRPM | HEART RATE: 76 BPM | HEIGHT: 67 IN | DIASTOLIC BLOOD PRESSURE: 68 MMHG | BODY MASS INDEX: 27.68 KG/M2 | OXYGEN SATURATION: 98 %

## 2023-12-29 DIAGNOSIS — C34.32 SQUAMOUS CELL CARCINOMA OF BRONCHUS IN LEFT LOWER LOBE: ICD-10-CM

## 2023-12-29 DIAGNOSIS — C34.12 ADENOCARCINOMA OF UPPER LOBE OF LEFT LUNG: Primary | ICD-10-CM

## 2023-12-29 PROBLEM — C34.10: Status: ACTIVE | Noted: 2023-12-29

## 2023-12-29 PROCEDURE — 1101F PT FALLS ASSESS-DOCD LE1/YR: CPT | Mod: CPTII,S$GLB,, | Performed by: HOSPITALIST

## 2023-12-29 PROCEDURE — 3066F NEPHROPATHY DOC TX: CPT | Mod: CPTII,S$GLB,, | Performed by: HOSPITALIST

## 2023-12-29 PROCEDURE — 3078F DIAST BP <80 MM HG: CPT | Mod: CPTII,S$GLB,, | Performed by: HOSPITALIST

## 2023-12-29 PROCEDURE — 3074F SYST BP LT 130 MM HG: CPT | Mod: CPTII,S$GLB,, | Performed by: HOSPITALIST

## 2023-12-29 PROCEDURE — 99214 PR OFFICE/OUTPT VISIT, EST, LEVL IV, 30-39 MIN: ICD-10-PCS | Mod: S$GLB,,, | Performed by: HOSPITALIST

## 2023-12-29 PROCEDURE — 3078F PR MOST RECENT DIASTOLIC BLOOD PRESSURE < 80 MM HG: ICD-10-PCS | Mod: CPTII,S$GLB,, | Performed by: HOSPITALIST

## 2023-12-29 PROCEDURE — 3288F FALL RISK ASSESSMENT DOCD: CPT | Mod: CPTII,S$GLB,, | Performed by: HOSPITALIST

## 2023-12-29 PROCEDURE — 3052F PR MOST RECENT HEMOGLOBIN A1C LEVEL 8.0 - < 9.0%: ICD-10-PCS | Mod: CPTII,S$GLB,, | Performed by: HOSPITALIST

## 2023-12-29 PROCEDURE — 3288F PR FALLS RISK ASSESSMENT DOCUMENTED: ICD-10-PCS | Mod: CPTII,S$GLB,, | Performed by: HOSPITALIST

## 2023-12-29 PROCEDURE — 1101F PR PT FALLS ASSESS DOC 0-1 FALLS W/OUT INJ PAST YR: ICD-10-PCS | Mod: CPTII,S$GLB,, | Performed by: HOSPITALIST

## 2023-12-29 PROCEDURE — 3008F BODY MASS INDEX DOCD: CPT | Mod: CPTII,S$GLB,, | Performed by: HOSPITALIST

## 2023-12-29 PROCEDURE — 3052F HG A1C>EQUAL 8.0%<EQUAL 9.0%: CPT | Mod: CPTII,S$GLB,, | Performed by: HOSPITALIST

## 2023-12-29 PROCEDURE — 99999 PR PBB SHADOW E&M-EST. PATIENT-LVL IV: ICD-10-PCS | Mod: PBBFAC,,, | Performed by: HOSPITALIST

## 2023-12-29 PROCEDURE — 3066F PR DOCUMENTATION OF TREATMENT FOR NEPHROPATHY: ICD-10-PCS | Mod: CPTII,S$GLB,, | Performed by: HOSPITALIST

## 2023-12-29 PROCEDURE — 1126F PR PAIN SEVERITY QUANTIFIED, NO PAIN PRESENT: ICD-10-PCS | Mod: CPTII,S$GLB,, | Performed by: HOSPITALIST

## 2023-12-29 PROCEDURE — 99999 PR PBB SHADOW E&M-EST. PATIENT-LVL IV: CPT | Mod: PBBFAC,,, | Performed by: HOSPITALIST

## 2023-12-29 PROCEDURE — 3061F NEG MICROALBUMINURIA REV: CPT | Mod: CPTII,S$GLB,, | Performed by: HOSPITALIST

## 2023-12-29 PROCEDURE — 1157F ADVNC CARE PLAN IN RCRD: CPT | Mod: CPTII,S$GLB,, | Performed by: HOSPITALIST

## 2023-12-29 PROCEDURE — 3008F PR BODY MASS INDEX (BMI) DOCUMENTED: ICD-10-PCS | Mod: CPTII,S$GLB,, | Performed by: HOSPITALIST

## 2023-12-29 PROCEDURE — 1157F PR ADVANCE CARE PLAN OR EQUIV PRESENT IN MEDICAL RECORD: ICD-10-PCS | Mod: CPTII,S$GLB,, | Performed by: HOSPITALIST

## 2023-12-29 PROCEDURE — 3061F PR NEG MICROALBUMINURIA RESULT DOCUMENTED/REVIEW: ICD-10-PCS | Mod: CPTII,S$GLB,, | Performed by: HOSPITALIST

## 2023-12-29 PROCEDURE — 1126F AMNT PAIN NOTED NONE PRSNT: CPT | Mod: CPTII,S$GLB,, | Performed by: HOSPITALIST

## 2023-12-29 PROCEDURE — 3074F PR MOST RECENT SYSTOLIC BLOOD PRESSURE < 130 MM HG: ICD-10-PCS | Mod: CPTII,S$GLB,, | Performed by: HOSPITALIST

## 2023-12-29 PROCEDURE — 1159F PR MEDICATION LIST DOCUMENTED IN MEDICAL RECORD: ICD-10-PCS | Mod: CPTII,S$GLB,, | Performed by: HOSPITALIST

## 2023-12-29 PROCEDURE — 99214 OFFICE O/P EST MOD 30 MIN: CPT | Mod: S$GLB,,, | Performed by: HOSPITALIST

## 2023-12-29 PROCEDURE — 1159F MED LIST DOCD IN RCRD: CPT | Mod: CPTII,S$GLB,, | Performed by: HOSPITALIST

## 2023-12-29 NOTE — PROGRESS NOTES
[facility] Consult Note    Referring provider: Guanako Grove IV, MD    Assessment:  Hernan Engel is a 68 y.o. male with synchronous:  group stage IIIA (T4N0M0) squamous cell carcinoma of the LLL  group stage IA3 (V2cJ3O4) adenocarcinoma of the ELDER  History of group IB ([id number]) adenocarcinoma of the RUL s/p RUL lobectomy 3/2014.  ECOG: (1) Restricted in physically strenuous activity, ambulatory and able to do work of light nature        Plan:  Treatment options were discussed with the patient including surgery, systemic therapy, radiotherapy and some combination thereof.  We discussed the goals of treatment.  The risks, benefits, scheduling, alternatives to and rationale of radiation therapy were explained in detail.    Consent was obtained and all questions were answered to the best of my ability. Given prior lobectomy and now synchronous lung primaries, discussed potential risk of both SBRT and CRT, notably risk of pneumonitis, worsening shortness of breath, and potential fatal lung toxicity.   After this discussion, he elected to proceed with:    SBRT to the IA lingular adenocarcinoma  CRT to the group stage III LLL squamous cell carcinoma.   Will plan for SBRT while setting up CRT to the LLL  A 4D CT simulation in body fix will be performed today to begin the planning process for the patient's SBRT. Plan for 4D CT in vac kd next week for CRT  He was given our contact information, and he was told that he could call our clinic at any time if he has any questions or concerns.    Radiation Treatment Details:   60-66 Gy in 30 fractions to the LLL, using IMRT, with concurrent chemotherapy  50-55 Gy in 5 fractions using SBRT technique to the adenoCA of the lingula      Oncologic History:  He has a history of stage IB ([id number]) moderately differentiated adenocarcinoma of RUL, initially dx'd 3/2014, s/p R upper lobectomy (3/2014, Dr. Mendez).  11/22/22: CT chest ([facility] - report only) with mild  progression in the GGO in the anterior left lingula now with cavitary component. New development of diffuse tree in bud nodularity in the superior segment of the LLL. Both suggested to be infectious in etiology.   11/2/23: Bronch   A completely obstructing mass was found proximally, at the orifice in the superior segment of the left lower lobe. Biopsied.  Path: squamous cell carcinoma of the LLL  11/6/23: PET/CT with 7.6 cm LLL superior segmetn mass. 2.5 cm lingular cavitary lesion. Bandlike uptake in the RLL, presumed atelectasis. No suspicious adenopathy. No distant mets. Likely left adrenal adenoma without uptake  11/30/23: MRI Brain with JAN  12/19/23: Bronch and EBUS  Op Note: A completely obstructing mass was found proximally, at the orifice in the left lower lobe with an endobronchial lesion. No additional abnormalities on left and no significant secretions. Evidence of previous surgery was found in the right upper lobe. The bronchial stump is well healed. Otherwise,        right anatomy is normal. No endobronchial lesions or masses. Superior lingula lesion biopsied. 11R, 4R, 4L- No pathologic LN enlargement visualized at these locations. Unable to evaluate station 11L as unable to wedge EBUS scope 2/2 obstructing LLL endobronchial mass   Path: ELDER with adenocarcinoma. Station 7 benign with lymphoid tissue present      Possibility of pregnancy: N/A  History of prior irradiation: No  History of prior systemic anti-cancer therapy: No  History of collagen vascular disease: No  Implanted electronic device (pacer/defib/nerve stimulator): No     History of Present Illness:  Hernan Engel presents today to discuss the role of radiotherapy    He had hemoptysis following his biopsy. Denies, rib pain, CP, worsening SOB or cough. He is able to walk 2 miles continues to work as a . Can go up a flight of stairs. Denies weight loss. He presents with his wife. No prior RT or chemotherapy.     Review of Systems:  ROS as  above    Social History:  Social History     Tobacco Use    Smoking status: Former     Current packs/day: 0.00     Average packs/day: 1 pack/day for 30.0 years (30.0 ttl pk-yrs)     Types: Cigarettes     Start date: 1/15/1984     Quit date: 1/15/2014     Years since quittin.9    Smokeless tobacco: Never   Substance Use Topics    Alcohol use: Yes     Alcohol/week: 5.7 standard drinks of alcohol     Types: 4 Cans of beer, 2 Standard drinks or equivalent per week     Comment: every other day beer drinker    Drug use: No       Past Medical History:  Past Medical History:   Diagnosis Date    Anxiety     Aortic atherosclerosis 2017    BPH with urinary obstruction 2019    Colon polyp 2014    COPD (chronic obstructive pulmonary disease)     Depression     Disorder of kidney and ureter     ED (erectile dysfunction) 2014    Family history of colon cancer 2014    Hearing loss in left ear 34 years    Lung cancer     Normocytic anemia 9/3/2014    Nuclear sclerosis of both eyes 2020    Seizure 2005    single event - not controlled by medication    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    T2DM (type 2 diabetes mellitus)     DKA 2014       Past Surgical History:   Procedure Laterality Date    BRONCHOSCOPY WITH FLUOROSCOPY N/A 2023    Procedure: BRONCHOSCOPY, WITH FLUOROSCOPY;  Surgeon: Provider, [facility];  Location: [facility] OR [address] [place] [address];  Service: Endoscopy;  Laterality: N/A;    CATARACT EXTRACTION Right     COLONOSCOPY      LUNG REMOVAL, PARTIAL      ROBOTIC BRONCHOSCOPY N/A 2023    Procedure: ROBOTIC BRONCHOSCOPY;  Surgeon: Hernandez Brewer MD;  Location: [facility] OR [address] [place] [address];  Service: Pulmonary;  Laterality: N/A;         Medications:  Current Outpatient Medications on File Prior to Visit   Medication Sig Dispense Refill    albuterol (PROVENTIL HFA) 90 mcg/actuation inhaler Inhale 2 puffs into the lungs every 6 (six) hours as needed for Wheezing. Rescue 18 g 0     blood-glucose meter kit Use as instructed 1 each 0    dextrin (FIBER POWDER ORAL) Take 1 Package by mouth once.      efinaconazole (JUBLIA) 10 % Crow Apply 1 Application topically once daily. 8 mL 0    metFORMIN (GLUCOPHAGE-XR) 500 MG ER 24hr tablet Take 2 tablets (1,000 mg total) by mouth 2 (two) times daily with meals. 60 tablet 1    rosuvastatin (CRESTOR) 5 MG tablet Take 4 tablets (20 mg total) by mouth once daily. (Patient not taking: Reported on 12/29/2023) 90 tablet 3    umeclidinium (INCRUSE ELLIPTA) 62.5 mcg/actuation inhalation capsule Inhale 62.5 mcg into the lungs once daily. Controller (Patient not taking: Reported on 12/29/2023) 30 each 0     No current facility-administered medications on file prior to visit.       Allergies:  Review of patient's allergies indicates:   Allergen Reactions    Adhesive Itching, Rash, Other (See Comments) and Dermatitis     Blister       Exam:  There were no vitals filed for this visit.  Constitutional: Pleasant 68 y.o. male in no acute distress.  Well nourished. Well groomed.   HEENT: Normocephalic and atraumatic   Cardiovascular: Upper extremities warm to touch  Lungs: No audible wheezing.  Normal effort.   Musculoskeletal: No gross MSK deformities. Ambulates well  Skin: No rashes appreciated.  Psych: Alert and oriented with appropriate mood and affect.  Neuro:   Grossly normal.    Data Review:  Information obtained from Hernan Engel and via chart review.     Independent Interpretation of Test(s): 12/12/23 CT chest was personally reviewed  Stable ELDER nodule 3:137 compared to 2021  Lingular cavitary lesion measuring 2.5 cm, progressed from 2021  LLL mass > 7cm in size. Possible mediastinal invasion  Peripheral RLL nodule, presumably atelectasis per PET      Patient discussed at Tumor Board on 1/3/24      Osman Arora MD  Radiation Oncology

## 2023-12-29 NOTE — Clinical Note
Hi!  Another one for tumor board next week. Discuss two primaries, likely SBRT to one and CRT to another but rule out surgery, in setting of prior lobectomy for a third cancer.   Thanks!

## 2023-12-29 NOTE — PROGRESS NOTES
[facility] MEDICAL ONCOLOGY - FOLLOW UP VISIT    Reason for visit: Follow up squamous cell carcinoma of the lung      Oncology History   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   9/15/2023 Imaging Significant Findings    CXR (URI)  - Masslike focus along L hilar region     10/6/2023 Imaging Significant Findings    CT C, Non-Con  - 6.6 x 5.9 cm LLL mass  - 2 x 1.2 cm cavitary lesion in lingula  - 0.9 x 0.8 cm pleural based nodule in R lung base  - Trace L pleural effusion  - Suspect hilar LAD  - Calcified hilar LN     11/2/2023 Procedure    Bronchoscopy  LLL, Endobronchial Biopsy  - Squamous cell carcinoma (CDK 5/6, p63 positive, CK7 and TTF negative)     11/6/2023 Imaging Significant Findings    PET CT  - 7.6 x 6.0 cm soft tissue mass in superior segment of LLL, SUV 7.2  - 2.5 x 1.6 cm cavitary lesion in ELDER (neoplastic vs infectious/inflammatory), SUV max 1.8  - No hypermetabolic LAD  - 0.9 cm L hypoattenuating L adrenal nodule, likely adenoma     11/29/2023 Tumor Conference    Tumor Board  - Recommend staging EBUS and L robotic bronchoscopy for sampling of ELDER cavitary nodule.  The nodule is highly suspicious for possible adenocarcinoma as it has evolved from a ground-glass opacity to a part solid and cavitary nodule.  Obtain brain MRI to complete staging.  The clinical picture suggests a possible T3 or T4 primary lung cancer or two separate primary lung cancers.        11/30/2023 Imaging Significant Findings    MRI Brain  - JAN     12/12/2023 Imaging Significant Findings    CT C, Non-Con  - 6.7 x 6.2 cm LLL mass (previously 6.6 x 5.9 cm), abutting and narrowing the adjacent LLL bronchus  - Enlarging lingular cavitary lesion, now 2.5 x 1.6 cm  - Slightly decreased size of RLL pleural based nodule     Adenocarcinoma of Lingula   10/6/2023 Imaging Significant Findings    CT C, Non-Con  - 6.6 x 5.9 cm LLL mass  - 2 x 1.2 cm cavitary lesion in lingula  - 0.9  x 0.8 cm pleural based nodule in R lung base  - Trace L pleural effusion  - Suspect hilar LAD  - Calcified hilar LN     11/6/2023 Imaging Significant Findings    PET CT  - 7.6 x 6.0 cm soft tissue mass in superior segment of LLL, SUV 7.2  - 2.5 x 1.6 cm cavitary lesion in ELDER (neoplastic vs infectious/inflammatory), SUV max 1.8  - No hypermetabolic LAD  - 0.9 cm L hypoattenuating L adrenal nodule, likely adenoma     11/29/2023 Tumor Conference    Tumor Board  - Recommend staging EBUS and L robotic bronchoscopy for sampling of ELDER cavitary nodule.  The nodule is highly suspicious for possible adenocarcinoma as it has evolved from a ground-glass opacity to a part solid and cavitary nodule.  Obtain brain MRI to complete staging.  The clinical picture suggests a possible T3 or T4 primary lung cancer or two separate primary lung cancers.        11/30/2023 Imaging Significant Findings    MRI Brain  - JAN     12/12/2023 Imaging Significant Findings    CT C, Non-Con  - 6.7 x 6.2 cm LLL mass (previously 6.6 x 5.9 cm), abutting and narrowing the adjacent LLL bronchus  - Enlarging lingular cavitary lesion, now 2.5 x 1.6 cm  - Slightly decreased size of RLL pleural based nodule     12/19/2023 Procedure    Bronch with EBUS  ELDER, FNA and TBBx  - Adenocarcinoma (TTF1 positive, p40 negative)    LN  - Negative: Station 7  - Report: No pathologic enlargement of 4R, 4L, or 11R. Unable to evaluated 11L due to obstructing LLL endobronchial mass            HPI:     Hernan Engel is a 68 y.o. male with pmh significant for COPD, T2DM, HLD, Stage IB ([id number]) moderately differentiated adenocarcinoma of RUL, initially dx'd 3/2014, s/p R upper lobectomy (3/2014, Dr. Mendez), and now two newly diagnosed concurrent lung cancers as below who presents for follow up:     1) Stage IIIA (T4N0M0) squamous cell carcinoma of the LLL (insufficient sample for PD-L1, no targetable mutations on liquid biopsy), initially dx'd 11/2/23, currently treatment  naive    2) Stage IA3 ([id number]) adenocarcinoma of the lingula (NGS and PD-L1 pending), initially dx'd 12/19/23, currently treatment naive    *Notably, pt has a RLL pleural based nodule, reviewed at TB, appears linear and unlikely malignant    Interval History:   12/19/23: Bronchoscopy w/ EBUS, lingula w/ adenocarcinoma    Patient states that he has been doing well since his last visit.  He continues to work in [employer] and as a  and also continues his hobby as a .  He walked 2 miles a couple of days ago and continues to exercise as such.  He continues to do all the things that he wants to do and completes all ADLs and IADLs without assistance.  He notes ongoing left-sided chest pain since his biopsy but says that this is manageable and he does not need to take medications for this; it is described as aching.  He denies any change in his breathing including new or worsening shortness of breath.  He and his wife are eager to discuss potential treatment options after his recent biopsy.    Of note, his wife states that she is on the heart transplant list.      History has been obtained by chart review and discussion with the patient.    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    Lung cancer     Normocytic anemia 9/3/2014    Nuclear sclerosis of both eyes 9/4/2020    Seizure 2005    single event - not controlled by medication    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    T2DM (type 2 diabetes mellitus) 2014    DKA 9/2014        Past Surgical History:   Past Surgical History:   Procedure Laterality Date    BRONCHOSCOPY WITH FLUOROSCOPY N/A 11/2/2023     Procedure: BRONCHOSCOPY, WITH FLUOROSCOPY;  Surgeon: Provider, [facility];  Location: [facility] OR [address] [place] [address];  Service: Endoscopy;  Laterality: N/A;    CATARACT EXTRACTION Right     COLONOSCOPY      LUNG REMOVAL, PARTIAL      ROBOTIC BRONCHOSCOPY N/A 2023    Procedure: ROBOTIC BRONCHOSCOPY;  Surgeon: Hernandez Breewr MD;  [facility];  Service: Pulmonary;  Laterality: N/A;        Family History:   Family History   Problem Relation Age of Onset    No Known Problems Mother     Cancer Father         colon cancer    Colon cancer Father     Cataracts Father     Colon cancer Sister     Cancer Sister         colon or lung cancer     No Known Problems Sister     No Known Problems Sister     No Known Problems Brother     No Known Problems Maternal Aunt     No Known Problems Maternal Uncle     No Known Problems Paternal Aunt     No Known Problems Paternal Uncle     No Known Problems Maternal Grandmother     No Known Problems Maternal Grandfather     No Known Problems Paternal Grandmother     No Known Problems Paternal Grandfather     No Known Problems Other     Amblyopia Neg Hx     Blindness Neg Hx     Diabetes Neg Hx     Glaucoma Neg Hx     Hypertension Neg Hx     Macular degeneration Neg Hx     Retinal detachment Neg Hx     Strabismus Neg Hx     Stroke Neg Hx     Thyroid disease Neg Hx         Social History:   Social History     Tobacco Use    Smoking status: Former     Current packs/day: 0.00     Average packs/day: 1 pack/day for 30.0 years (30.0 ttl pk-yrs)     Types: Cigarettes     Start date: 1/15/1984     Quit date: 1/15/2014     Years since quittin.9    Smokeless tobacco: Never   Substance Use Topics    Alcohol use: Yes     Alcohol/week: 5.7 standard drinks of alcohol     Types: 4 Cans of beer, 2 Standard drinks or equivalent per week     Comment: every other day beer drinker        I have reviewed and updated the patient's past medical, surgical, family and social histories.      ROS:  "  As per HPI.     Allergies:   Review of patient's allergies indicates:   Allergen Reactions    Adhesive Itching, Rash, Other (See Comments) and Dermatitis     Blister        Medications:   Current Outpatient Medications   Medication Sig Dispense Refill    albuterol (PROVENTIL HFA) 90 mcg/actuation inhaler Inhale 2 puffs into the lungs every 6 (six) hours as needed for Wheezing. Rescue 18 g 0    blood-glucose meter kit Use as instructed 1 each 0    dextrin (FIBER POWDER ORAL) Take 1 Package by mouth once.      efinaconazole (JUBLIA) 10 % Crow Apply 1 Application topically once daily. 8 mL 0    metFORMIN (GLUCOPHAGE-XR) 500 MG ER 24hr tablet Take 2 tablets (1,000 mg total) by mouth 2 (two) times daily with meals. 60 tablet 1    rosuvastatin (CRESTOR) 5 MG tablet Take 4 tablets (20 mg total) by mouth once daily. 90 tablet 3    umeclidinium (INCRUSE ELLIPTA) 62.5 mcg/actuation inhalation capsule Inhale 62.5 mcg into the lungs once daily. Controller 30 each 0     No current facility-administered medications for this visit.          Physical Exam:       Ht 5' 7" (1.702 m)   BMI 26.31 kg/m²                Physical Exam  Constitutional:       Appearance: Normal appearance.   HENT:      Head: Normocephalic and atraumatic.   Eyes:      Extraocular Movements: Extraocular movements intact.      Conjunctiva/sclera: Conjunctivae normal.      Pupils: Pupils are equal, round, and reactive to light.   Cardiovascular:      Rate and Rhythm: Normal rate and regular rhythm.      Heart sounds:      No friction rub.   Pulmonary:      Effort: Pulmonary effort is normal.      Breath sounds: Wheezes: Slight expiratory wheeze in posterior lung fields bilaterally.   Musculoskeletal:         General: Normal range of motion.   Skin:     General: Skin is warm and dry.   Neurological:      Mental Status: He is alert and oriented to person, place, and time.   Psychiatric:         Mood and Affect: Mood normal.         Thought Content: Thought " content normal.         Judgment: Judgment normal.           Labs:   No results found for this or any previous visit (from the past 48 hour(s)).       Imaging:    See oncologic history as above.     Path:  See oncologic history above.      Assessment and Plan:     Hernan Engel is a 68 y.o. male with pmh significant for COPD, T2DM, HLD, Stage IB ([id number]) moderately differentiated adenocarcinoma of RUL, initially dx'd 3/2014, s/p R upper lobectomy (3/2014, Dr. Mendez), and now two newly diagnosed concurrent lung cancers as below who presents for follow up:     1) Stage IIIA (T4N0M0) squamous cell carcinoma of the LLL (insufficient sample for PD-L1, no targetable mutations on liquid biopsy), initially dx'd 11/2/23, currently treatment naive    2) Stage IA3 ([id number]) adenocarcinoma of the lingula (NGS and PD-L1 pending), initially dx'd 12/19/23, currently treatment naive    *Notably, pt has a RLL pleural based nodule, reviewed at TB, appears linear and unlikely malignant    Stage IIIA Squamous Cell Carcinoma of the LLL, No PD-L1, No Targetable Mutations  ECOG PS 0.  Patient presents today for follow up. On review of his most recent imaging (CT C, 12/12/23), this lesion appears to be 8 cm in the craniocaudal dimension. Recent bronchoscopy again did not identify any LAD, making this at Stage IIIA (T4N0M0) lesion. I anticipate that he will not be a surgical candidate in the setting of a previous right upper lobectomy and possible mediastinal encouraged of this disease and instead will be a candidate for CRT followed by durvalumab consolidation, however in the setting of a concurrent second primary adenocarcinoma of the lingula (see below), we will discuss this patient at tumor board.  Patient has been briefly discussed with radiation oncology as well.  -- Discuss at tumor board on 1/03/24  -- RTC on 1/04/24      Stage IA3 Adenocarcinoma of the Lingula, NGS and PD-L1 Pending  Since last visit, bronchoscopy demonstrates  adenocarcinoma of the lingula.  This is a T1c lesion which, given lack of lymphadenopathy, makes this a Stage IA3 disease.  Will discuss patient at tumor board as above, however anticipate that this lesion will be amenable to SBRT (discussed with radiation oncology).   -- Discuss at tumor board on 1/03/24  -- NGS and PD-L1 pending, unlikely to affect treatment planning        The above information has been reviewed with the patient and all questions have been answered to their apparent satisfaction.  They understand that they can call the clinic with any questions.    Guanako Grove MD  Hematology/Oncology  [facility]        Med Onc Chart Routing      Follow up with physician . RTC on 1/04/24   Follow up with SHARRI    Infusion scheduling note    Injection scheduling note    Labs    Imaging    Pharmacy appointment    Other referrals

## 2023-12-29 NOTE — TELEPHONE ENCOUNTER
Pt had missed a call. He thinks it was from Dr. Grove. MD is not available at this time. He also wants to make sure pain med has been called in for him.

## 2024-01-03 ENCOUNTER — TELEPHONE (OUTPATIENT)
Dept: HEMATOLOGY/ONCOLOGY | Facility: CLINIC | Age: 69
End: 2024-01-03
Payer: MEDICARE

## 2024-01-05 ENCOUNTER — OFFICE VISIT (OUTPATIENT)
Dept: RADIATION ONCOLOGY | Facility: CLINIC | Age: 69
End: 2024-01-05
Payer: MEDICARE

## 2024-01-05 ENCOUNTER — OFFICE VISIT (OUTPATIENT)
Dept: HEMATOLOGY/ONCOLOGY | Facility: CLINIC | Age: 69
End: 2024-01-05
Payer: MEDICARE

## 2024-01-05 ENCOUNTER — HOSPITAL ENCOUNTER (OUTPATIENT)
Dept: RADIATION THERAPY | Facility: HOSPITAL | Age: 69
Discharge: HOME | End: 2024-01-05
Attending: STUDENT IN AN ORGANIZED HEALTH CARE EDUCATION/TRAINING PROGRAM
Payer: MEDICARE

## 2024-01-05 VITALS
WEIGHT: 174.38 LBS | SYSTOLIC BLOOD PRESSURE: 131 MMHG | DIASTOLIC BLOOD PRESSURE: 76 MMHG | HEART RATE: 78 BPM | BODY MASS INDEX: 27.37 KG/M2 | TEMPERATURE: 97 F | OXYGEN SATURATION: 99 % | RESPIRATION RATE: 18 BRPM | HEIGHT: 67 IN

## 2024-01-05 VITALS
HEIGHT: 67 IN | SYSTOLIC BLOOD PRESSURE: 131 MMHG | OXYGEN SATURATION: 99 % | BODY MASS INDEX: 27.37 KG/M2 | HEART RATE: 78 BPM | TEMPERATURE: 97 F | WEIGHT: 174.38 LBS | DIASTOLIC BLOOD PRESSURE: 76 MMHG

## 2024-01-05 DIAGNOSIS — R05.9 COUGH, UNSPECIFIED TYPE: Primary | ICD-10-CM

## 2024-01-05 DIAGNOSIS — C34.12 ADENOCARCINOMA OF UPPER LOBE OF LEFT LUNG: Chronic | ICD-10-CM

## 2024-01-05 DIAGNOSIS — C34.12 ADENOCARCINOMA OF UPPER LOBE OF LEFT LUNG: ICD-10-CM

## 2024-01-05 DIAGNOSIS — C34.32 SQUAMOUS CELL CARCINOMA OF BRONCHUS IN LEFT LOWER LOBE: ICD-10-CM

## 2024-01-05 DIAGNOSIS — C34.32 SQUAMOUS CELL CARCINOMA OF BRONCHUS IN LEFT LOWER LOBE: Primary | Chronic | ICD-10-CM

## 2024-01-05 PROCEDURE — 77014 PR  CT GUIDANCE PLACEMENT RAD THERAPY FIELDS: CPT | Mod: 26,,, | Performed by: STUDENT IN AN ORGANIZED HEALTH CARE EDUCATION/TRAINING PROGRAM

## 2024-01-05 PROCEDURE — 77014 HC CT GUIDANCE RADIATION THERAPY FLDS PLACEMENT: CPT | Mod: TC | Performed by: STUDENT IN AN ORGANIZED HEALTH CARE EDUCATION/TRAINING PROGRAM

## 2024-01-05 PROCEDURE — 77334 RADIATION TREATMENT AID(S): CPT | Mod: 26,,, | Performed by: STUDENT IN AN ORGANIZED HEALTH CARE EDUCATION/TRAINING PROGRAM

## 2024-01-05 PROCEDURE — 99999 PR PBB SHADOW E&M-EST. PATIENT-LVL III: CPT | Mod: PBBFAC,,, | Performed by: STUDENT IN AN ORGANIZED HEALTH CARE EDUCATION/TRAINING PROGRAM

## 2024-01-05 PROCEDURE — 77334 RADIATION TREATMENT AID(S): CPT | Mod: TC | Performed by: STUDENT IN AN ORGANIZED HEALTH CARE EDUCATION/TRAINING PROGRAM

## 2024-01-05 PROCEDURE — 99215 OFFICE O/P EST HI 40 MIN: CPT | Mod: S$GLB,,, | Performed by: HOSPITALIST

## 2024-01-05 PROCEDURE — 99999 PR PBB SHADOW E&M-EST. PATIENT-LVL III: CPT | Mod: PBBFAC,,, | Performed by: HOSPITALIST

## 2024-01-05 PROCEDURE — 77263 THER RADIOLOGY TX PLNG CPLX: CPT | Mod: ,,, | Performed by: STUDENT IN AN ORGANIZED HEALTH CARE EDUCATION/TRAINING PROGRAM

## 2024-01-05 PROCEDURE — 99205 OFFICE O/P NEW HI 60 MIN: CPT | Mod: S$GLB,,, | Performed by: STUDENT IN AN ORGANIZED HEALTH CARE EDUCATION/TRAINING PROGRAM

## 2024-01-05 RX ORDER — GUAIFENESIN 200 MG/1
400 TABLET ORAL EVERY 4 HOURS PRN
Qty: 30 TABLET | Refills: 1 | Status: SHIPPED | OUTPATIENT
Start: 2024-01-05 | End: 2024-01-15

## 2024-01-05 NOTE — PLAN OF CARE
START ON PATHWAY REGIMEN - Non-Small Cell Lung    [id number]        Paclitaxel       Carboplatin           Additional Orders: Chemotherapy is given concurrently with radiation.    **Always confirm dose/schedule in your pharmacy ordering system**    Patient Characteristics:  Preoperative or Nonsurgical Candidate (Clinical Staging), Stage III -   Nonsurgical Candidate (Nonsquamous and Squamous), PS = 0, 1  Therapeutic Status: Preoperative or Nonsurgical Candidate (Clinical Staging)  AJCC T Category: cT4  AJCC N Category: cN0  AJCC M Category: cM0  AJCC 8 Stage Grouping: IIIA  ECOG Performance Status: 1  Intent of Therapy:  Curative Intent, Discussed with Patient

## 2024-01-05 NOTE — Clinical Note
Don: Treatment plan placed and pending authorization, it will likely be upwards of 2 weeks for authorization and chair time on our end.   Emily: I think this patient and his wife will benefit from a dedicated chemo teaching session. We went over the meds (carbo/taxol) and general logistics but I think a second formal review will be beneficial, so I've requested he get on your schedule for this.

## 2024-01-09 PROCEDURE — 77293 RESPIRATOR MOTION MGMT SIMUL: CPT | Mod: TC | Performed by: STUDENT IN AN ORGANIZED HEALTH CARE EDUCATION/TRAINING PROGRAM

## 2024-01-09 PROCEDURE — 77301 RADIOTHERAPY DOSE PLAN IMRT: CPT | Mod: TC | Performed by: STUDENT IN AN ORGANIZED HEALTH CARE EDUCATION/TRAINING PROGRAM

## 2024-01-09 PROCEDURE — 77301 RADIOTHERAPY DOSE PLAN IMRT: CPT | Mod: 26,,, | Performed by: STUDENT IN AN ORGANIZED HEALTH CARE EDUCATION/TRAINING PROGRAM

## 2024-01-10 PROCEDURE — 77300 RADIATION THERAPY DOSE PLAN: CPT | Mod: 26,,, | Performed by: STUDENT IN AN ORGANIZED HEALTH CARE EDUCATION/TRAINING PROGRAM

## 2024-01-10 PROCEDURE — 77300 RADIATION THERAPY DOSE PLAN: CPT | Mod: TC | Performed by: STUDENT IN AN ORGANIZED HEALTH CARE EDUCATION/TRAINING PROGRAM

## 2024-01-10 PROCEDURE — 77338 DESIGN MLC DEVICE FOR IMRT: CPT | Mod: 26,,, | Performed by: STUDENT IN AN ORGANIZED HEALTH CARE EDUCATION/TRAINING PROGRAM

## 2024-01-10 PROCEDURE — 77338 DESIGN MLC DEVICE FOR IMRT: CPT | Mod: TC | Performed by: STUDENT IN AN ORGANIZED HEALTH CARE EDUCATION/TRAINING PROGRAM

## 2024-01-11 ENCOUNTER — NURSE TRIAGE (OUTPATIENT)
Dept: ADMINISTRATIVE | Facility: CLINIC | Age: 69
End: 2024-01-11
Payer: MEDICARE

## 2024-01-11 ENCOUNTER — HOSPITAL ENCOUNTER (OUTPATIENT)
Dept: RADIATION THERAPY | Facility: HOSPITAL | Age: 69
Discharge: HOME | End: 2024-01-11
Payer: MEDICARE

## 2024-01-11 DIAGNOSIS — C34.92 SQUAMOUS CELL LUNG CANCER, LEFT: Primary | ICD-10-CM

## 2024-01-11 LAB
DNA RANGE(S) EXAMINED NAR: NORMAL
GENE DIS ANL INTERP-IMP: POSITIVE
GENE DIS ASSESSED: NORMAL
GENE MUT TESTED BLD/T: 8.4 M/MB
MSI CA SPEC-IMP: NORMAL
PD-L1 BY 22C3 TISS IMSTN DOC: POSITIVE
REASON FOR STUDY: NORMAL
TEMPUS FUSIONADDENDUM: NORMAL
TEMPUS PD-L1 (22C3) COMBINED POSITIVE SCORE: 10
TEMPUS PD-L1 (22C3) TUMOR PROPORTION SCORE: 10 %
TEMPUS PERTINENTNEGATIVES: NORMAL
TEMPUS PORTAL: NORMAL
TEMPUS THERAPY1: NORMAL
TEMPUS THERAPY2: NORMAL
TEMPUS THERAPYCOUNT: 2
TEMPUS TRIAL1: NORMAL
TEMPUS TRIAL2: NORMAL
TEMPUS TRIAL3: NORMAL
TEMPUS TRIALCOUNT: 3

## 2024-01-11 PROCEDURE — 77014 HC CT GUIDANCE RADIATION THERAPY FLDS PLACEMENT: CPT | Mod: TC | Performed by: STUDENT IN AN ORGANIZED HEALTH CARE EDUCATION/TRAINING PROGRAM

## 2024-01-11 PROCEDURE — 77290 THER RAD SIMULAJ FIELD CPLX: CPT | Mod: TC | Performed by: STUDENT IN AN ORGANIZED HEALTH CARE EDUCATION/TRAINING PROGRAM

## 2024-01-11 PROCEDURE — 77014 PR  CT GUIDANCE PLACEMENT RAD THERAPY FIELDS: CPT | Mod: 26,,, | Performed by: STUDENT IN AN ORGANIZED HEALTH CARE EDUCATION/TRAINING PROGRAM

## 2024-01-11 PROCEDURE — 77470 SPECIAL RADIATION TREATMENT: CPT | Mod: 26,59,, | Performed by: STUDENT IN AN ORGANIZED HEALTH CARE EDUCATION/TRAINING PROGRAM

## 2024-01-11 PROCEDURE — 77470 SPECIAL RADIATION TREATMENT: CPT | Mod: 59,TC | Performed by: STUDENT IN AN ORGANIZED HEALTH CARE EDUCATION/TRAINING PROGRAM

## 2024-01-11 NOTE — TELEPHONE ENCOUNTER
Pt is schedled for his 1st chemo today at 2 pm. Cg wants to know if he can drank a cup of coffee. Informed caregiver I do not see any dietary restrictions listed in the chart. Cg stated they were only told to not to take any blood thinners. Please call and advise pt and spouse. They are awaiting a return call.   Reason for Disposition   [1] Follow-up call from patient regarding patient's clinical status AND [2] information NON-URGENT    Protocols used: PCP Call - No Triage-A-AH

## 2024-01-12 ENCOUNTER — TELEPHONE (OUTPATIENT)
Dept: PULMONOLOGY | Facility: CLINIC | Age: 69
End: 2024-01-12
Payer: MEDICARE

## 2024-01-12 NOTE — TELEPHONE ENCOUNTER
Spoke with pt, informed him that I have received his message. Pt states that he will like for Dr Brewer to give him a call because he has some questions about Neftali and Radiation. I verbalized to pt that I understand and advised pt that I will forward his message to Dr Brewer to review/advise. Pt verbalized that he understand.

## 2024-01-12 NOTE — TELEPHONE ENCOUNTER
----- Message from Johana Brower sent at 1/12/2024  3:01 PM CST -----  Regarding: pt advice  Contact: [phone]  Pt is requesting to speak with the provider,would like to discuss the pt biopsy which was preformed by the provider. Please call

## 2024-01-17 ENCOUNTER — TELEPHONE (OUTPATIENT)
Dept: HEMATOLOGY/ONCOLOGY | Facility: CLINIC | Age: 69
End: 2024-01-17
Payer: MEDICARE

## 2024-01-17 DIAGNOSIS — C34.32 SQUAMOUS CELL CARCINOMA OF BRONCHUS IN LEFT LOWER LOBE: Primary | ICD-10-CM

## 2024-01-17 NOTE — TELEPHONE ENCOUNTER
I checked with the Infusion center at the [facility]. They would not be able to fit him in before 1/29.

## 2024-01-18 ENCOUNTER — TELEPHONE (OUTPATIENT)
Dept: HEMATOLOGY/ONCOLOGY | Facility: CLINIC | Age: 69
End: 2024-01-18
Payer: MEDICARE

## 2024-01-18 PROCEDURE — 77300 RADIATION THERAPY DOSE PLAN: CPT | Mod: TC | Performed by: STUDENT IN AN ORGANIZED HEALTH CARE EDUCATION/TRAINING PROGRAM

## 2024-01-18 PROCEDURE — 77338 DESIGN MLC DEVICE FOR IMRT: CPT | Mod: 26,,, | Performed by: STUDENT IN AN ORGANIZED HEALTH CARE EDUCATION/TRAINING PROGRAM

## 2024-01-18 PROCEDURE — 77300 RADIATION THERAPY DOSE PLAN: CPT | Mod: 26,,, | Performed by: STUDENT IN AN ORGANIZED HEALTH CARE EDUCATION/TRAINING PROGRAM

## 2024-01-18 PROCEDURE — 77338 DESIGN MLC DEVICE FOR IMRT: CPT | Mod: TC | Performed by: STUDENT IN AN ORGANIZED HEALTH CARE EDUCATION/TRAINING PROGRAM

## 2024-01-18 NOTE — TELEPHONE ENCOUNTER
I informed the pt of the appts for tomorrow for labs and appt for chemo ed with Dr. Grove. I also let him know about the infusion appt at [facility] on 1/22. Pt verbalized an understanding.

## 2024-01-19 ENCOUNTER — LAB VISIT (OUTPATIENT)
Dept: LAB | Facility: HOSPITAL | Age: 69
End: 2024-01-19
Attending: HOSPITALIST
Payer: MEDICARE

## 2024-01-19 ENCOUNTER — CLINICAL SUPPORT (OUTPATIENT)
Dept: HEMATOLOGY/ONCOLOGY | Facility: CLINIC | Age: 69
End: 2024-01-19
Payer: MEDICARE

## 2024-01-19 VITALS
WEIGHT: 171.06 LBS | OXYGEN SATURATION: 99 % | RESPIRATION RATE: 14 BRPM | SYSTOLIC BLOOD PRESSURE: 121 MMHG | TEMPERATURE: 98 F | HEART RATE: 80 BPM | DIASTOLIC BLOOD PRESSURE: 72 MMHG | HEIGHT: 67 IN | BODY MASS INDEX: 26.85 KG/M2

## 2024-01-19 DIAGNOSIS — C34.12 ADENOCARCINOMA OF UPPER LOBE OF LEFT LUNG: ICD-10-CM

## 2024-01-19 DIAGNOSIS — C34.32 SQUAMOUS CELL CARCINOMA OF BRONCHUS IN LEFT LOWER LOBE: Primary | ICD-10-CM

## 2024-01-19 DIAGNOSIS — C34.32 SQUAMOUS CELL CARCINOMA OF BRONCHUS IN LEFT LOWER LOBE: ICD-10-CM

## 2024-01-19 LAB
ALBUMIN SERPL BCP-MCNC: 3.8 G/DL (ref 3.5–5.2)
ALP SERPL-CCNC: 117 U/L (ref 55–135)
ALT SERPL W/O P-5'-P-CCNC: 33 U/L (ref 10–44)
ANION GAP SERPL CALC-SCNC: 9 MMOL/L (ref 8–16)
AST SERPL-CCNC: 25 U/L (ref 10–40)
BASOPHILS # BLD AUTO: 0.05 K/UL (ref 0–0.2)
BASOPHILS NFR BLD: 1 % (ref 0–1.9)
BILIRUB SERPL-MCNC: 1.7 MG/DL (ref 0.1–1)
BUN SERPL-MCNC: 13 MG/DL (ref 8–23)
CALCIUM SERPL-MCNC: 10 MG/DL (ref 8.7–10.5)
CHLORIDE SERPL-SCNC: 108 MMOL/L (ref 95–110)
CO2 SERPL-SCNC: 22 MMOL/L (ref 23–29)
CREAT SERPL-MCNC: 1 MG/DL (ref 0.5–1.4)
DIFFERENTIAL METHOD BLD: ABNORMAL
EOSINOPHIL # BLD AUTO: 0.1 K/UL (ref 0–0.5)
EOSINOPHIL NFR BLD: 2.4 % (ref 0–8)
ERYTHROCYTE [DISTWIDTH] IN BLOOD BY AUTOMATED COUNT: 12.8 % (ref 11.5–14.5)
EST. GFR  (NO RACE VARIABLE): >60 ML/MIN/1.73 M^2
GLUCOSE SERPL-MCNC: 221 MG/DL (ref 70–110)
HCT VFR BLD AUTO: 39.4 % (ref 40–54)
HGB BLD-MCNC: 12.9 G/DL (ref 14–18)
IMM GRANULOCYTES # BLD AUTO: 0.01 K/UL (ref 0–0.04)
IMM GRANULOCYTES NFR BLD AUTO: 0.2 % (ref 0–0.5)
LYMPHOCYTES # BLD AUTO: 1.1 K/UL (ref 1–4.8)
LYMPHOCYTES NFR BLD: 20.8 % (ref 18–48)
MCH RBC QN AUTO: 26.6 PG (ref 27–31)
MCHC RBC AUTO-ENTMCNC: 32.7 G/DL (ref 32–36)
MCV RBC AUTO: 81 FL (ref 82–98)
MONOCYTES # BLD AUTO: 0.4 K/UL (ref 0.3–1)
MONOCYTES NFR BLD: 7.1 % (ref 4–15)
NEUTROPHILS # BLD AUTO: 3.5 K/UL (ref 1.8–7.7)
NEUTROPHILS NFR BLD: 68.5 % (ref 38–73)
NRBC BLD-RTO: 0 /100 WBC
PLATELET # BLD AUTO: 282 K/UL (ref 150–450)
PMV BLD AUTO: 9.2 FL (ref 9.2–12.9)
POTASSIUM SERPL-SCNC: 3.9 MMOL/L (ref 3.5–5.1)
PROT SERPL-MCNC: 7.8 G/DL (ref 6–8.4)
RBC # BLD AUTO: 4.85 M/UL (ref 4.6–6.2)
SODIUM SERPL-SCNC: 139 MMOL/L (ref 136–145)
WBC # BLD AUTO: 5.09 K/UL (ref 3.9–12.7)

## 2024-01-19 PROCEDURE — 36415 COLL VENOUS BLD VENIPUNCTURE: CPT | Performed by: HOSPITALIST

## 2024-01-19 PROCEDURE — 99999 PR PBB SHADOW E&M-EST. PATIENT-LVL III: CPT | Mod: PBBFAC,,, | Performed by: HOSPITALIST

## 2024-01-19 PROCEDURE — 80053 COMPREHEN METABOLIC PANEL: CPT | Performed by: HOSPITALIST

## 2024-01-19 PROCEDURE — 85025 COMPLETE CBC W/AUTO DIFF WBC: CPT | Performed by: HOSPITALIST

## 2024-01-19 PROCEDURE — 99214 OFFICE O/P EST MOD 30 MIN: CPT | Mod: S$GLB,,, | Performed by: HOSPITALIST

## 2024-01-19 RX ORDER — OLANZAPINE 5 MG/1
TABLET ORAL
Qty: 18 TABLET | Refills: 0 | Status: SHIPPED | OUTPATIENT
Start: 2024-01-19 | End: 2024-02-28

## 2024-01-19 RX ORDER — HEPARIN 100 UNIT/ML
500 SYRINGE INTRAVENOUS
Status: CANCELLED | OUTPATIENT
Start: 2024-01-19

## 2024-01-19 RX ORDER — SODIUM CHLORIDE 0.9 % (FLUSH) 0.9 %
10 SYRINGE (ML) INJECTION
Status: CANCELLED | OUTPATIENT
Start: 2024-01-19

## 2024-01-19 RX ORDER — FAMOTIDINE 10 MG/ML
20 INJECTION INTRAVENOUS
Status: CANCELLED | OUTPATIENT
Start: 2024-01-19

## 2024-01-19 RX ORDER — PROCHLORPERAZINE EDISYLATE 5 MG/ML
5 INJECTION INTRAMUSCULAR; INTRAVENOUS ONCE AS NEEDED
Status: CANCELLED
Start: 2024-01-19

## 2024-01-19 RX ORDER — PROCHLORPERAZINE MALEATE 5 MG
5 TABLET ORAL EVERY 6 HOURS PRN
Qty: 30 TABLET | Refills: 1 | Status: SHIPPED | OUTPATIENT
Start: 2024-01-19 | End: 2024-02-28 | Stop reason: ALTCHOICE

## 2024-01-19 RX ORDER — EPINEPHRINE 0.3 MG/.3ML
0.3 INJECTION SUBCUTANEOUS ONCE AS NEEDED
Status: CANCELLED | OUTPATIENT
Start: 2024-01-19

## 2024-01-19 RX ORDER — DIPHENHYDRAMINE HYDROCHLORIDE 50 MG/ML
50 INJECTION INTRAMUSCULAR; INTRAVENOUS ONCE AS NEEDED
Status: CANCELLED | OUTPATIENT
Start: 2024-01-19

## 2024-01-19 NOTE — PROGRESS NOTES
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   9/15/2023 Imaging Significant Findings    CXR (URI)  - Masslike focus along L hilar region     10/6/2023 Imaging Significant Findings    CT C, Non-Con  - 6.6 x 5.9 cm LLL mass  - 2 x 1.2 cm cavitary lesion in lingula  - 0.9 x 0.8 cm pleural based nodule in R lung base  - Trace L pleural effusion  - Suspect hilar LAD  - Calcified hilar LN     11/2/2023 Procedure    Bronchoscopy  LLL, Endobronchial Biopsy  - Squamous cell carcinoma (CDK 5/6, p63 positive, CK7 and TTF negative)    [facility] NGS  - Insufficient tissue     11/6/2023 Imaging Significant Findings    PET CT  - 7.6 x 6.0 cm soft tissue mass in superior segment of LLL, SUV 7.2  - 2.5 x 1.6 cm cavitary lesion in ELDER (neoplastic vs infectious/inflammatory), SUV max 1.8  - No hypermetabolic LAD  - 0.9 cm L hypoattenuating L adrenal nodule, likely adenoma     11/29/2023 Tumor Conference    Tumor Board  - Recommend staging EBUS and L robotic bronchoscopy for sampling of ELDER cavitary nodule.  The nodule is highly suspicious for possible adenocarcinoma as it has evolved from a ground-glass opacity to a part solid and cavitary nodule.  Obtain brain MRI to complete staging.  The clinical picture suggests a possible T3 or T4 primary lung cancer or two separate primary lung cancers.        11/29/2023 Tumor Genotyping    Liquid Biopsy, [facility]  - CKDN2A, TP53 (three separate LOF mutations)     11/30/2023 Imaging Significant Findings    MRI Brain  - JAN     12/12/2023 Imaging Significant Findings    CT C, Non-Con  - 6.7 x 6.2 cm LLL mass (previously 6.6 x 5.9 cm), abutting and narrowing the adjacent LLL bronchus  - Enlarging lingular cavitary lesion, now 2.5 x 1.6 cm  - Slightly decreased size of RLL pleural based nodule     1/3/2024 Tumor  Conference    Tumor Board  - Agree with plan for SBRT of lingular lesion and chemoRT to LLL mass.      1/5/2024 Cancer Staged    Staging form: Lung, AJCC 8th Edition  - Clinical: Stage IIIA (cT4, cN0, cM0)     1/19/2024 -  Chemotherapy    Treatment Summary   Plan Name: OP NSCLC PACLITAXEL + CARBOPLATIN (AUC) QW + RADIATION  Treatment Goal: Curative  Status: Active  Start Date: 1/19/2024  End Date: 2/19/2024 (Planned)  Provider: Guanako Grove IV, MD  Chemotherapy: CARBOplatin (PARAPLATIN) in sodium chloride 0.9% 250 mL chemo infusion,  (original dose ), Intravenous, Clinic/HOD 1 time, 1 of 6 cycles  Dose modification:   (Cycle 1)  PACLitaxeL (TAXOL) 45 mg/m2 = 84 mg in sodium chloride 0.9% 250 mL chemo infusion, 45 mg/m2 = 84 mg, Intravenous, Clinic/HOD 1 time, 1 of 6 cycles     Adenocarcinoma of Lingula   10/6/2023 Imaging Significant Findings    CT C, Non-Con  - 6.6 x 5.9 cm LLL mass  - 2 x 1.2 cm cavitary lesion in lingula  - 0.9 x 0.8 cm pleural based nodule in R lung base  - Trace L pleural effusion  - Suspect hilar LAD  - Calcified hilar LN     11/6/2023 Imaging Significant Findings    PET CT  - 7.6 x 6.0 cm soft tissue mass in superior segment of LLL, SUV 7.2  - 2.5 x 1.6 cm cavitary lesion in ELDER (neoplastic vs infectious/inflammatory), SUV max 1.8  - No hypermetabolic LAD  - 0.9 cm L hypoattenuating L adrenal nodule, likely adenoma     11/29/2023 Tumor Conference    Tumor Board  - Recommend staging EBUS and L robotic bronchoscopy for sampling of ELDER cavitary nodule.  The nodule is highly suspicious for possible adenocarcinoma as it has evolved from a ground-glass opacity to a part solid and cavitary nodule.  Obtain brain MRI to complete staging.  The clinical picture suggests a possible T3 or T4 primary lung cancer or two separate primary lung cancers.        11/29/2023 Tumor Genotyping    Liquid Biopsy, [facility]  - CKDN2A, TP53 (three separate LOF mutations)     11/30/2023 Imaging Significant Findings     MRI Brain  - JAN     12/12/2023 Imaging Significant Findings    CT C, Non-Con  - 6.7 x 6.2 cm LLL mass (previously 6.6 x 5.9 cm), abutting and narrowing the adjacent LLL bronchus  - Enlarging lingular cavitary lesion, now 2.5 x 1.6 cm  - Slightly decreased size of RLL pleural based nodule     12/19/2023 Procedure    Bronch with EBUS  ELDER, FNA and TBBx  - Adenocarcinoma (TTF1 positive, p40 negative)    LN  - Negative: Station 7  - Report: No pathologic enlargement of 4R, 4L, or 11R. Unable to evaluated 11L due to obstructing LLL endobronchial mass    [facility] NGS  - KRAS G12C  - TP53  - Negative: EGFR, ALK, BRAF, ROS1, RET, MET, ERBB2  - PD-L1: 10%       1/5/2024 Cancer Staged    Staging form: Lung, AJCC 8th Edition  - Clinical: Stage IA3 (cT1c, cN0, cM0)            HPI:     Hernan Engel is a 68 y.o. male with pmh significant for COPD, T2DM, HLD, Stage IB ([id number]) moderately differentiated adenocarcinoma of RUL, initially dx'd 3/2014, s/p R upper lobectomy (3/2014, Dr. 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   - Discussion with radiation oncology, SBRT would add high doses in the chest wall and lungs, so plan to treat both lesions with chemoradiation.     Pt states that he has been doing well since his last visit.  He presents today for chemo education advanced starting CRT on 01/22/2024.  He is  "some questions regarding his treatment otherwise has no questions in particular.  He presents today with his wife, who notably was admitted overnight for "sleep apnea .      History has been obtained by chart review and discussion with the patient.    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    Lung cancer     Normocytic anemia 9/3/2014    Nuclear sclerosis of both eyes 9/4/2020    Seizure 2005    single event - not controlled by medication    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    T2DM (type 2 diabetes mellitus) 2014    DKA 9/2014        Past Surgical History:   Past Surgical History:   Procedure Laterality Date    BRONCHOSCOPY WITH FLUOROSCOPY N/A 11/2/2023    Procedure: BRONCHOSCOPY, WITH FLUOROSCOPY;  Surgeon: Provider, [facility];  Location: [facility] OR [address] [place] [address];  Service: Endoscopy;  Laterality: N/A;    CATARACT EXTRACTION Right 2021    COLONOSCOPY      LUNG REMOVAL, PARTIAL      ROBOTIC BRONCHOSCOPY N/A 12/19/2023    Procedure: ROBOTIC BRONCHOSCOPY;  Surgeon: Hernandez Brewer MD;  Location: [facility] OR [address] [place] [address];  Service: Pulmonary;  Laterality: N/A;        Family History:   Family History   Problem Relation Age of Onset    No Known Problems Mother     Cancer Father         colon cancer    Colon cancer Father     Cataracts Father     Colon cancer Sister     Cancer Sister         colon or lung cancer     No Known Problems Sister     No Known Problems Sister     No Known Problems Brother     No Known Problems Maternal Aunt     No Known Problems Maternal Uncle     No Known Problems Paternal Aunt     No Known Problems Paternal Uncle     No Known Problems " Maternal Grandmother     No Known Problems Maternal Grandfather     No Known Problems Paternal Grandmother     No Known Problems Paternal Grandfather     No Known Problems Other     Amblyopia Neg Hx     Blindness Neg Hx     Diabetes Neg Hx     Glaucoma Neg Hx     Hypertension Neg Hx     Macular degeneration Neg Hx     Retinal detachment Neg Hx     Strabismus Neg Hx     Stroke Neg Hx     Thyroid disease Neg Hx         Social History:   Social History     Tobacco Use    Smoking status: Former     Current packs/day: 0.00     Average packs/day: 1 pack/day for 30.0 years (30.0 ttl pk-yrs)     Types: Cigarettes     Start date: 1/15/1984     Quit date: 1/15/2014     Years since quitting: 10.0    Smokeless tobacco: Never   Substance Use Topics    Alcohol use: Yes     Alcohol/week: 5.7 standard drinks of alcohol     Types: 4 Cans of beer, 2 Standard drinks or equivalent per week     Comment: every other day beer drinker        I have reviewed and updated the patient's past medical, surgical, family and social histories.      ROS:   As per HPI.     Allergies:   Review of patient's allergies indicates:   Allergen Reactions    Adhesive Itching, Rash, Other (See Comments) and Dermatitis     Blister        Medications:   Current Outpatient Medications   Medication Sig Dispense Refill    albuterol (PROVENTIL HFA) 90 mcg/actuation inhaler Inhale 2 puffs into the lungs every 6 (six) hours as needed for Wheezing. Rescue 18 g 0    blood-glucose meter kit Use as instructed 1 each 0    dextrin (FIBER POWDER ORAL) Take 1 Package by mouth once.      efinaconazole (JUBLIA) 10 % Crow Apply 1 Application topically once daily. 8 mL 0    metFORMIN (GLUCOPHAGE-XR) 500 MG ER 24hr tablet Take 2 tablets (1,000 mg total) by mouth 2 (two) times daily with meals. 60 tablet 1    rosuvastatin (CRESTOR) 5 MG tablet Take 4 tablets (20 mg total) by mouth once daily. 90 tablet 3    umeclidinium (INCRUSE ELLIPTA) 62.5 mcg/actuation inhalation capsule Inhale  "62.5 mcg into the lungs once daily. Controller 30 each 0    OLANZapine (ZYPREXA) 5 MG tablet Take nights 1, 2, and 3 of each chemotherapy dose. 18 tablet 0    prochlorperazine (COMPAZINE) 5 MG tablet Take 1 tablet (5 mg total) by mouth every 6 (six) hours as needed for Nausea. 30 tablet 1     No current facility-administered medications for this visit.          Physical Exam:       /72 (BP Location: Right arm, Patient Position: Sitting, BP Method: Medium (Automatic))   Pulse 80   Temp 97.5 °F (36.4 °C) (Oral)   Resp 14   Ht 5' 7" (1.702 m)   Wt 77.6 kg (171 lb 1.2 oz)   SpO2 99%   BMI 26.79 kg/m²                Physical Exam  Constitutional:       Appearance: Normal appearance.   HENT:      Head: Normocephalic and atraumatic.   Eyes:      Extraocular Movements: Extraocular movements intact.      Conjunctiva/sclera: Conjunctivae normal.      Pupils: Pupils are equal, round, and reactive to light.   Cardiovascular:      Rate and Rhythm: Normal rate and regular rhythm.      Heart sounds:      No friction rub.   Pulmonary:      Effort: Pulmonary effort is normal.      Breath sounds: Wheezes: Slight expiratory wheeze in posterior lung fields bilaterally.   Musculoskeletal:         General: Normal range of motion.   Skin:     General: Skin is warm and dry.   Neurological:      Mental Status: He is alert and oriented to person, place, and time.   Psychiatric:         Mood and Affect: Mood normal.         Thought Content: Thought content normal.         Judgment: Judgment normal.           Labs:   Recent Results (from the past 48 hour(s))   CBC w/ DIFF    Collection Time: 01/19/24  9:24 AM   Result Value Ref Range    WBC 5.09 3.90 - 12.70 K/uL    RBC 4.85 4.60 - 6.20 M/uL    Hemoglobin 12.9 (L) 14.0 - 18.0 g/dL    Hematocrit 39.4 (L) 40.0 - 54.0 %    MCV 81 (L) 82 - 98 fL    MCH 26.6 (L) 27.0 - 31.0 pg    MCHC 32.7 32.0 - 36.0 g/dL    RDW 12.8 11.5 - 14.5 %    Platelets 282 150 - 450 K/uL    MPV 9.2 9.2 - 12.9 " fL    Immature Granulocytes 0.2 0.0 - 0.5 %    Gran # (ANC) 3.5 1.8 - 7.7 K/uL    Immature Grans (Abs) 0.01 0.00 - 0.04 K/uL    Lymph # 1.1 1.0 - 4.8 K/uL    Mono # 0.4 0.3 - 1.0 K/uL    Eos # 0.1 0.0 - 0.5 K/uL    Baso # 0.05 0.00 - 0.20 K/uL    nRBC 0 0 /100 WBC    Gran % 68.5 38.0 - 73.0 %    Lymph % 20.8 18.0 - 48.0 %    Mono % 7.1 4.0 - 15.0 %    Eosinophil % 2.4 0.0 - 8.0 %    Basophil % 1.0 0.0 - 1.9 %    Differential Method Automated    CMP    Collection Time: 01/19/24  9:24 AM   Result Value Ref Range    Sodium 139 136 - 145 mmol/L    Potassium 3.9 3.5 - 5.1 mmol/L    Chloride 108 95 - 110 mmol/L    CO2 22 (L) 23 - 29 mmol/L    Glucose 221 (H) 70 - 110 mg/dL    BUN 13 8 - 23 mg/dL    Creatinine 1.0 0.5 - 1.4 mg/dL    Calcium 10.0 8.7 - 10.5 mg/dL    Total Protein 7.8 6.0 - 8.4 g/dL    Albumin 3.8 3.5 - 5.2 g/dL    Total Bilirubin 1.7 (H) 0.1 - 1.0 mg/dL    Alkaline Phosphatase 117 55 - 135 U/L    AST 25 10 - 40 U/L    ALT 33 10 - 44 U/L    eGFR >60.0 >60 mL/min/1.73 m^2    Anion Gap 9 8 - 16 mmol/L          Imaging:    See oncologic history as above.     Path:  See oncologic history above.      Assessment and Plan:     Hernan Engel is a 68 y.o. male with pmh significant for COPD, T2DM, HLD, Stage IB ([id number]) moderately differentiated adenocarcinoma of RUL, initially dx'd 3/2014, s/p R upper lobectomy (3/2014, Dr. Mendez), and now two newly diagnosed concurrent lung cancers as below who presents for follow up:     1) Stage IIIA (T4N0M0) squamous cell carcinoma of the LLL (insufficient sample for PD-L1, no targetable mutations on liquid biopsy), initially dx'd 11/2/23, currently treatment naive    2) Stage IA3 ([id number]) adenocarcinoma of the lingula (NGS and PD-L1 pending), initially dx'd 12/19/23, currently treatment naive    *Notably, pt has a RLL pleural based nodule, reviewed at TB, appears linear and unlikely malignant    Stage IIIA Squamous Cell Carcinoma of the LLL, No PD-L1, No Targetable  Mutations  ECOG PS 0.  Patient presents today for follow up. On review of his most recent imaging (CT C, 12/12/23), this lesion appears to be 8 cm in the craniocaudal dimension. Recent bronchoscopy again did not identify any LAD, making this at Stage IIIA (T4N0M0) lesion. He was discussed at tumor board on 1/03/24 w/ plan for SBRT to the stage IA3 lesion (as below) and CRT to the LLL lesion; on radiation planning, decision made to treat both lesions with CRT given dosing to chest wall and lung. I previously discussed this plan with him and his wife, including use of weekly carboplatin and paclitaxel for 6 weeks in concert with radiation therapy as well as 1 year of immunotherapy subsequent to this. I previously discussed the mechanism of action and potential side effects of the chemotherapy portion of his treatment. Today, we again discussed the potential side effects of the chemotherapy, as well as the logistics of chemotherapy administration. Handouts on the treatment and on infusion therapy in general were provided and the patient signed consent. Take home meds also written down and reviewed.     PLAN:   -- Start CRT with weekly carboplatin/paclitaxel, first infusion on 1/22/24. Labs acceptable (T bili slightly elevated at 1.7, other LFTs okay) and treatment signed  -- RTC weekly with Emily Olivares (I will be in [place] on Mondays and Tuesdays) or with me on Fridays before treatment  -- Repeat imaging following completion of CRT  -- Message sent to  regarding transportation needs      Stage IA3 Adenocarcinoma of the Lingula, NGS and PD-L1 Pending  Bronchoscopy demonstrates adenocarcinoma of the lingula, separate from the squamous cell carcinoma in the LLL.  This is a T1c lesion which, given lack of lymphadenopathy, makes this a Stage IA3 disease.  Plan for treatment w/ CRT, as above  -- Treatment as above      The above information has been reviewed with the patient and all questions have been answered to their  apparent satisfaction.  They understand that they can call the clinic with any questions.    Guanako Grove MD  Hematology/Oncology  [facility] [place]        Med Onc Chart Routing      Follow up with physician    Follow up with SHARRI . Pt will be getting weekly therapy on Mondays. RTC with Emily Olivares for C2D1, labs same day. Can also have him follow with me on Fridays in advance of his cycles.   Infusion scheduling note    Injection scheduling note    Labs CBC and CMP   Scheduling:  Preferred lab:  Lab interval:     Imaging    Pharmacy appointment    Other referrals

## 2024-01-22 ENCOUNTER — INFUSION (OUTPATIENT)
Dept: INFUSION THERAPY | Facility: OTHER | Age: 69
End: 2024-01-22
Attending: INTERNAL MEDICINE
Payer: MEDICARE

## 2024-01-22 VITALS
BODY MASS INDEX: 27.3 KG/M2 | OXYGEN SATURATION: 100 % | HEIGHT: 67 IN | DIASTOLIC BLOOD PRESSURE: 69 MMHG | WEIGHT: 173.94 LBS | SYSTOLIC BLOOD PRESSURE: 131 MMHG | RESPIRATION RATE: 16 BRPM | HEART RATE: 68 BPM | TEMPERATURE: 98 F

## 2024-01-22 DIAGNOSIS — C34.32 SQUAMOUS CELL CARCINOMA OF BRONCHUS IN LEFT LOWER LOBE: Primary | ICD-10-CM

## 2024-01-22 LAB — FUNGUS SPEC CULT: NORMAL

## 2024-01-22 PROCEDURE — 63600175 PHARM REV CODE 636 W HCPCS: Performed by: HOSPITALIST

## 2024-01-22 PROCEDURE — 25000003 PHARM REV CODE 250: Performed by: HOSPITALIST

## 2024-01-22 PROCEDURE — 96375 TX/PRO/DX INJ NEW DRUG ADDON: CPT

## 2024-01-22 PROCEDURE — 96417 CHEMO IV INFUS EACH ADDL SEQ: CPT

## 2024-01-22 PROCEDURE — 96413 CHEMO IV INFUSION 1 HR: CPT

## 2024-01-22 PROCEDURE — 96367 TX/PROPH/DG ADDL SEQ IV INF: CPT

## 2024-01-22 PROCEDURE — 96415 CHEMO IV INFUSION ADDL HR: CPT

## 2024-01-22 RX ORDER — DIPHENHYDRAMINE HYDROCHLORIDE 50 MG/ML
50 INJECTION INTRAMUSCULAR; INTRAVENOUS ONCE AS NEEDED
Status: DISCONTINUED | OUTPATIENT
Start: 2024-01-22 | End: 2024-01-22 | Stop reason: HOSPADM

## 2024-01-22 RX ORDER — PROCHLORPERAZINE EDISYLATE 5 MG/ML
5 INJECTION INTRAMUSCULAR; INTRAVENOUS ONCE AS NEEDED
Status: DISCONTINUED | OUTPATIENT
Start: 2024-01-22 | End: 2024-01-22 | Stop reason: HOSPADM

## 2024-01-22 RX ORDER — EPINEPHRINE 0.3 MG/.3ML
0.3 INJECTION SUBCUTANEOUS ONCE AS NEEDED
Status: DISCONTINUED | OUTPATIENT
Start: 2024-01-22 | End: 2024-01-22 | Stop reason: HOSPADM

## 2024-01-22 RX ORDER — HEPARIN 100 UNIT/ML
500 SYRINGE INTRAVENOUS
Status: DISCONTINUED | OUTPATIENT
Start: 2024-01-22 | End: 2024-01-22 | Stop reason: HOSPADM

## 2024-01-22 RX ORDER — FAMOTIDINE 10 MG/ML
20 INJECTION INTRAVENOUS
Status: COMPLETED | OUTPATIENT
Start: 2024-01-22 | End: 2024-01-22

## 2024-01-22 RX ORDER — SODIUM CHLORIDE 0.9 % (FLUSH) 0.9 %
10 SYRINGE (ML) INJECTION
Status: DISCONTINUED | OUTPATIENT
Start: 2024-01-22 | End: 2024-01-22 | Stop reason: HOSPADM

## 2024-01-22 RX ADMIN — DIPHENHYDRAMINE HYDROCHLORIDE 50 MG: 50 INJECTION, SOLUTION INTRAMUSCULAR; INTRAVENOUS at 11:01

## 2024-01-22 RX ADMIN — DEXAMETHASONE SODIUM PHOSPHATE 0.25 MG: 4 INJECTION, SOLUTION INTRA-ARTICULAR; INTRALESIONAL; INTRAMUSCULAR; INTRAVENOUS; SOFT TISSUE at 11:01

## 2024-01-22 RX ADMIN — CARBOPLATIN 210 MG: 10 INJECTION, SOLUTION INTRAVENOUS at 01:01

## 2024-01-22 RX ADMIN — FAMOTIDINE 20 MG: 10 INJECTION INTRAVENOUS at 11:01

## 2024-01-22 RX ADMIN — SODIUM CHLORIDE: 9 INJECTION, SOLUTION INTRAVENOUS at 10:01

## 2024-01-22 RX ADMIN — PACLITAXEL 84 MG: 6 INJECTION, SOLUTION INTRAVENOUS at 12:01

## 2024-01-22 NOTE — PLAN OF CARE
Vital Signs Stable, No apparent distress noted; Pt tolerated __Taxol/Carbo__ infusion w/o difficulty.  24g piv removed;No bleeding,swelling or drainage noted to the site.  AVS/Discharge instructions reviewed;all questions answered ;Pt verbalizes understanding. Next appointments scheduled and printed for patient; ambulated from clinic in [facility], accompanied by wife

## 2024-01-23 PROCEDURE — 77014 PR  CT GUIDANCE PLACEMENT RAD THERAPY FIELDS: CPT | Mod: 26,,, | Performed by: STUDENT IN AN ORGANIZED HEALTH CARE EDUCATION/TRAINING PROGRAM

## 2024-01-23 PROCEDURE — 77386 HC IMRT, COMPLEX: CPT | Performed by: STUDENT IN AN ORGANIZED HEALTH CARE EDUCATION/TRAINING PROGRAM

## 2024-01-23 PROCEDURE — 77427 RADIATION TX MANAGEMENT X5: CPT | Mod: ,,, | Performed by: STUDENT IN AN ORGANIZED HEALTH CARE EDUCATION/TRAINING PROGRAM

## 2024-01-24 ENCOUNTER — TELEPHONE (OUTPATIENT)
Dept: HEMATOLOGY/ONCOLOGY | Facility: CLINIC | Age: 69
End: 2024-01-24
Payer: MEDICARE

## 2024-01-24 PROCEDURE — 77386 HC IMRT, COMPLEX: CPT | Performed by: STUDENT IN AN ORGANIZED HEALTH CARE EDUCATION/TRAINING PROGRAM

## 2024-01-24 PROCEDURE — 77014 PR  CT GUIDANCE PLACEMENT RAD THERAPY FIELDS: CPT | Mod: 26,,, | Performed by: STUDENT IN AN ORGANIZED HEALTH CARE EDUCATION/TRAINING PROGRAM

## 2024-01-25 PROCEDURE — 77014 PR  CT GUIDANCE PLACEMENT RAD THERAPY FIELDS: CPT | Mod: 26,,, | Performed by: STUDENT IN AN ORGANIZED HEALTH CARE EDUCATION/TRAINING PROGRAM

## 2024-01-25 PROCEDURE — 77386 HC IMRT, COMPLEX: CPT | Performed by: STUDENT IN AN ORGANIZED HEALTH CARE EDUCATION/TRAINING PROGRAM

## 2024-01-26 ENCOUNTER — LAB VISIT (OUTPATIENT)
Dept: LAB | Facility: HOSPITAL | Age: 69
End: 2024-01-26
Attending: HOSPITALIST
Payer: MEDICARE

## 2024-01-26 ENCOUNTER — OFFICE VISIT (OUTPATIENT)
Dept: HEMATOLOGY/ONCOLOGY | Facility: CLINIC | Age: 69
End: 2024-01-26
Payer: MEDICARE

## 2024-01-26 ENCOUNTER — DOCUMENTATION ONLY (OUTPATIENT)
Dept: HEMATOLOGY/ONCOLOGY | Facility: CLINIC | Age: 69
End: 2024-01-26
Payer: MEDICARE

## 2024-01-26 VITALS
OXYGEN SATURATION: 99 % | SYSTOLIC BLOOD PRESSURE: 129 MMHG | RESPIRATION RATE: 16 BRPM | TEMPERATURE: 98 F | HEIGHT: 67 IN | WEIGHT: 170.63 LBS | BODY MASS INDEX: 26.78 KG/M2 | HEART RATE: 102 BPM | DIASTOLIC BLOOD PRESSURE: 81 MMHG

## 2024-01-26 DIAGNOSIS — C34.32 SQUAMOUS CELL CARCINOMA OF BRONCHUS IN LEFT LOWER LOBE: Primary | ICD-10-CM

## 2024-01-26 DIAGNOSIS — C34.12 ADENOCARCINOMA OF UPPER LOBE OF LEFT LUNG: ICD-10-CM

## 2024-01-26 DIAGNOSIS — C34.32 SQUAMOUS CELL CARCINOMA OF BRONCHUS IN LEFT LOWER LOBE: ICD-10-CM

## 2024-01-26 DIAGNOSIS — K59.00 CONSTIPATION, UNSPECIFIED CONSTIPATION TYPE: ICD-10-CM

## 2024-01-26 LAB
ALBUMIN SERPL BCP-MCNC: 3.9 G/DL (ref 3.5–5.2)
ALP SERPL-CCNC: 110 U/L (ref 55–135)
ALT SERPL W/O P-5'-P-CCNC: 14 U/L (ref 10–44)
ANION GAP SERPL CALC-SCNC: 7 MMOL/L (ref 8–16)
AST SERPL-CCNC: 11 U/L (ref 10–40)
BASOPHILS # BLD AUTO: 0.03 K/UL (ref 0–0.2)
BASOPHILS NFR BLD: 0.5 % (ref 0–1.9)
BILIRUB SERPL-MCNC: 1.6 MG/DL (ref 0.1–1)
BUN SERPL-MCNC: 18 MG/DL (ref 8–23)
CALCIUM SERPL-MCNC: 10.2 MG/DL (ref 8.7–10.5)
CHLORIDE SERPL-SCNC: 102 MMOL/L (ref 95–110)
CO2 SERPL-SCNC: 26 MMOL/L (ref 23–29)
CREAT SERPL-MCNC: 1 MG/DL (ref 0.5–1.4)
DIFFERENTIAL METHOD BLD: ABNORMAL
EOSINOPHIL # BLD AUTO: 0.1 K/UL (ref 0–0.5)
EOSINOPHIL NFR BLD: 1 % (ref 0–8)
ERYTHROCYTE [DISTWIDTH] IN BLOOD BY AUTOMATED COUNT: 12.7 % (ref 11.5–14.5)
EST. GFR  (NO RACE VARIABLE): >60 ML/MIN/1.73 M^2
GLUCOSE SERPL-MCNC: 278 MG/DL (ref 70–110)
HCT VFR BLD AUTO: 38.6 % (ref 40–54)
HGB BLD-MCNC: 13.2 G/DL (ref 14–18)
IMM GRANULOCYTES # BLD AUTO: 0.01 K/UL (ref 0–0.04)
IMM GRANULOCYTES NFR BLD AUTO: 0.2 % (ref 0–0.5)
LYMPHOCYTES # BLD AUTO: 0.8 K/UL (ref 1–4.8)
LYMPHOCYTES NFR BLD: 14.5 % (ref 18–48)
MCH RBC QN AUTO: 27.3 PG (ref 27–31)
MCHC RBC AUTO-ENTMCNC: 34.2 G/DL (ref 32–36)
MCV RBC AUTO: 80 FL (ref 82–98)
MONOCYTES # BLD AUTO: 0.2 K/UL (ref 0.3–1)
MONOCYTES NFR BLD: 4.2 % (ref 4–15)
NEUTROPHILS # BLD AUTO: 4.6 K/UL (ref 1.8–7.7)
NEUTROPHILS NFR BLD: 79.6 % (ref 38–73)
NRBC BLD-RTO: 0 /100 WBC
PLATELET # BLD AUTO: 262 K/UL (ref 150–450)
PMV BLD AUTO: 9.7 FL (ref 9.2–12.9)
POTASSIUM SERPL-SCNC: 4.2 MMOL/L (ref 3.5–5.1)
PROT SERPL-MCNC: 7.9 G/DL (ref 6–8.4)
RBC # BLD AUTO: 4.83 M/UL (ref 4.6–6.2)
SODIUM SERPL-SCNC: 135 MMOL/L (ref 136–145)
WBC # BLD AUTO: 5.73 K/UL (ref 3.9–12.7)

## 2024-01-26 PROCEDURE — 99999 PR PBB SHADOW E&M-EST. PATIENT-LVL III: CPT | Mod: PBBFAC,,, | Performed by: HOSPITALIST

## 2024-01-26 PROCEDURE — 85025 COMPLETE CBC W/AUTO DIFF WBC: CPT | Performed by: HOSPITALIST

## 2024-01-26 PROCEDURE — 99215 OFFICE O/P EST HI 40 MIN: CPT | Mod: S$GLB,,, | Performed by: HOSPITALIST

## 2024-01-26 PROCEDURE — 77386 HC IMRT, COMPLEX: CPT | Performed by: STUDENT IN AN ORGANIZED HEALTH CARE EDUCATION/TRAINING PROGRAM

## 2024-01-26 PROCEDURE — 80053 COMPREHEN METABOLIC PANEL: CPT | Performed by: HOSPITALIST

## 2024-01-26 PROCEDURE — 36415 COLL VENOUS BLD VENIPUNCTURE: CPT | Performed by: HOSPITALIST

## 2024-01-26 PROCEDURE — 77014 PR  CT GUIDANCE PLACEMENT RAD THERAPY FIELDS: CPT | Mod: 26,,, | Performed by: STUDENT IN AN ORGANIZED HEALTH CARE EDUCATION/TRAINING PROGRAM

## 2024-01-26 RX ORDER — POLYETHYLENE GLYCOL 3350 17 G/17G
17 POWDER, FOR SOLUTION ORAL DAILY
Qty: 510 G | Refills: 0 | Status: SHIPPED | OUTPATIENT
Start: 2024-01-26

## 2024-01-26 RX ORDER — HEPARIN 100 UNIT/ML
500 SYRINGE INTRAVENOUS
Status: CANCELLED | OUTPATIENT
Start: 2024-01-29

## 2024-01-26 RX ORDER — SENNOSIDES 8.6 MG/1
1 TABLET ORAL 2 TIMES DAILY
Qty: 60 TABLET | Refills: 2 | Status: SHIPPED | OUTPATIENT
Start: 2024-01-26 | End: 2024-02-28 | Stop reason: ALTCHOICE

## 2024-01-26 RX ORDER — SODIUM CHLORIDE 0.9 % (FLUSH) 0.9 %
10 SYRINGE (ML) INJECTION
Status: CANCELLED | OUTPATIENT
Start: 2024-01-29

## 2024-01-26 RX ORDER — DIPHENHYDRAMINE HYDROCHLORIDE 50 MG/ML
50 INJECTION INTRAMUSCULAR; INTRAVENOUS ONCE AS NEEDED
Status: CANCELLED | OUTPATIENT
Start: 2024-01-29

## 2024-01-26 RX ORDER — PROCHLORPERAZINE EDISYLATE 5 MG/ML
5 INJECTION INTRAMUSCULAR; INTRAVENOUS ONCE AS NEEDED
Status: CANCELLED
Start: 2024-01-29

## 2024-01-26 RX ORDER — EPINEPHRINE 0.3 MG/.3ML
0.3 INJECTION SUBCUTANEOUS ONCE AS NEEDED
Status: CANCELLED | OUTPATIENT
Start: 2024-01-29

## 2024-01-26 RX ORDER — FAMOTIDINE 10 MG/ML
20 INJECTION INTRAVENOUS
Status: CANCELLED | OUTPATIENT
Start: 2024-01-29

## 2024-01-26 NOTE — PROGRESS NOTES
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   9/15/2023 Imaging Significant Findings    CXR (URI)  - Masslike focus along L hilar region     10/6/2023 Imaging Significant Findings    CT C, Non-Con  - 6.6 x 5.9 cm LLL mass  - 2 x 1.2 cm cavitary lesion in lingula  - 0.9 x 0.8 cm pleural based nodule in R lung base  - Trace L pleural effusion  - Suspect hilar LAD  - Calcified hilar LN     11/2/2023 Procedure    Bronchoscopy  LLL, Endobronchial Biopsy  - Squamous cell carcinoma (CDK 5/6, p63 positive, CK7 and TTF negative)    [facility] NGS  - Insufficient tissue     11/6/2023 Imaging Significant Findings    PET CT  - 7.6 x 6.0 cm soft tissue mass in superior segment of LLL, SUV 7.2  - 2.5 x 1.6 cm cavitary lesion in ELDER (neoplastic vs infectious/inflammatory), SUV max 1.8  - No hypermetabolic LAD  - 0.9 cm L hypoattenuating L adrenal nodule, likely adenoma     11/29/2023 Tumor Conference    Tumor Board  - Recommend staging EBUS and L robotic bronchoscopy for sampling of ELDER cavitary nodule.  The nodule is highly suspicious for possible adenocarcinoma as it has evolved from a ground-glass opacity to a part solid and cavitary nodule.  Obtain brain MRI to complete staging.  The clinical picture suggests a possible T3 or T4 primary lung cancer or two separate primary lung cancers.        11/29/2023 Tumor Genotyping    Liquid Biopsy, [facility]  - CKDN2A, TP53 (three separate LOF mutations)     11/30/2023 Imaging Significant Findings    MRI Brain  - JAN     12/12/2023 Imaging Significant Findings    CT C, Non-Con  - 6.7 x 6.2 cm LLL mass (previously 6.6 x 5.9 cm), abutting and narrowing the adjacent LLL bronchus  - Enlarging lingular cavitary lesion, now 2.5 x 1.6 cm  - Slightly decreased size of RLL pleural based nodule     1/3/2024 Tumor  Conference    Tumor Board  - Agree with plan for SBRT of lingular lesion and chemoRT to LLL mass.      1/5/2024 Cancer Staged    Staging form: Lung, AJCC 8th Edition  - Clinical: Stage IIIA (cT4, cN0, cM0)     1/19/2024 -  Chemotherapy    Treatment Summary   Plan Name: OP NSCLC PACLITAXEL + CARBOPLATIN (AUC) QW + RADIATION  Treatment Goal: Curative  Status: Active  Start Date: 1/19/2024  End Date: 2/26/2024 (Planned)  Provider: Guanako Grove IV, MD  Chemotherapy: CARBOplatin (PARAPLATIN) 210 mg in sodium chloride 0.9% 136 mL chemo infusion, 210 mg (100 % of original dose 208.2 mg), Intravenous, Clinic/HOD 1 time, 1 of 6 cycles  Dose modification:   (original dose 208.2 mg, Cycle 1)  Administration: 210 mg (1/22/2024)  PACLitaxeL (TAXOL) 45 mg/m2 = 84 mg in sodium chloride 0.9% 250 mL chemo infusion, 45 mg/m2 = 84 mg, Intravenous, Clinic/HOD 1 time, 1 of 6 cycles  Administration: 84 mg (1/22/2024)     Adenocarcinoma of Lingula   10/6/2023 Imaging Significant Findings    CT C, Non-Con  - 6.6 x 5.9 cm LLL mass  - 2 x 1.2 cm cavitary lesion in lingula  - 0.9 x 0.8 cm pleural based nodule in R lung base  - Trace L pleural effusion  - Suspect hilar LAD  - Calcified hilar LN     11/6/2023 Imaging Significant Findings    PET CT  - 7.6 x 6.0 cm soft tissue mass in superior segment of LLL, SUV 7.2  - 2.5 x 1.6 cm cavitary lesion in ELDER (neoplastic vs infectious/inflammatory), SUV max 1.8  - No hypermetabolic LAD  - 0.9 cm L hypoattenuating L adrenal nodule, likely adenoma     11/29/2023 Tumor Conference    Tumor Board  - Recommend staging EBUS and L robotic bronchoscopy for sampling of ELDER cavitary nodule.  The nodule is highly suspicious for possible adenocarcinoma as it has evolved from a ground-glass opacity to a part solid and cavitary nodule.  Obtain brain MRI to complete staging.  The clinical picture suggests a possible T3 or T4 primary lung cancer or two separate primary lung cancers.        11/29/2023 Tumor  Genotyping    Liquid Biopsy, [facility]  - CKDN2A, TP53 (three separate LOF mutations)     11/30/2023 Imaging Significant Findings    MRI Brain  - JAN     12/12/2023 Imaging Significant Findings    CT C, Non-Con  - 6.7 x 6.2 cm LLL mass (previously 6.6 x 5.9 cm), abutting and narrowing the adjacent LLL bronchus  - Enlarging lingular cavitary lesion, now 2.5 x 1.6 cm  - Slightly decreased size of RLL pleural based nodule     12/19/2023 Procedure    Bronch with EBUS  ELDER, FNA and TBBx  - Adenocarcinoma (TTF1 positive, p40 negative)    LN  - Negative: Station 7  - Report: No pathologic enlargement of 4R, 4L, or 11R. Unable to evaluated 11L due to obstructing LLL endobronchial mass    [facility] NGS  - KRAS G12C  - TP53  - Negative: EGFR, ALK, BRAF, ROS1, RET, MET, ERBB2  - PD-L1: 10%       1/3/2024 Tumor Conference    Tumor Board  - Agree with plan for SBRT of lingular lesion and chemoRT to LLL mass.      1/5/2024 Cancer Staged    Staging form: Lung, AJCC 8th Edition  - Clinical: Stage IA3 (cT1c, cN0, cM0)            HPI:     Hernan Engel is a 68 y.o. male with pmh significant for COPD, T2DM, HLD, Stage IB ([id number]) moderately differentiated adenocarcinoma of RUL, initially dx'd 3/2014, s/p R upper lobectomy (3/2014, Dr. Mendez), and now two newly diagnosed concurrent lung cancers as below who presents for follow up:     1) Stage IIIA (T4N0M0) squamous cell carcinoma of the LLL (insufficient sample for PD-L1, no targetable mutations on liquid biopsy), initially dx'd 11/2/23, currently on CRT (1/23/24 - present) w/ weekly carboplatin/paclitaxel (1/22/24 - present)    2) Stage IA3 ([id number]) adenocarcinoma of the lingula (KRAS G12C, PD-L1 10%), initially dx'd 12/19/23, currently on CRT (1/23/24 - present) w/ weekly carboplatin/paclitaxel (1/22/24 - present)    *Notably, pt has a RLL pleural based nodule, reviewed at TB, appears linear and unlikely malignant    Interval History:   - 1/22/24: C1D1 carboplatin/paclitaxel  -  1/23/24: Started radiation therapy    Pt states that he feels well today. He notes that his elevated this week; there was a day that he ate 2 donuts and his BG went into the 400's. He has been taking ground cinnamon for this. He also describes constipation, stating that he normally goes 1-2x a day but hasn't had a BM in the past 2 days. He has previously taking milk of magnesia but was holding this while waiting start of chemotherapy. He notes that he was a little tired yesterday for the first time. denies any other new or bothersome symptoms, including N/V, diarrhea, rash.     History has been obtained by chart review and discussion with the patient.    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    Lung cancer     Normocytic anemia 9/3/2014    Nuclear sclerosis of both eyes 9/4/2020    Seizure 2005    single event - not controlled by medication    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    T2DM (type 2 diabetes mellitus) 2014    DKA 9/2014        Past Surgical History:   Past Surgical History:   Procedure Laterality Date    BRONCHOSCOPY WITH FLUOROSCOPY N/A 11/2/2023    Procedure: BRONCHOSCOPY, WITH FLUOROSCOPY;  Surgeon: Provider, [facility];  Location: [address];  Service: Endoscopy;  Laterality: N/A;    CATARACT EXTRACTION Right 2021    COLONOSCOPY      LUNG REMOVAL, PARTIAL      ROBOTIC BRONCHOSCOPY N/A 12/19/2023    Procedure: ROBOTIC BRONCHOSCOPY;  Surgeon: Hrenandez Brewer MD;  Location: [address];  Service: Pulmonary;  Laterality: N/A;        Family History:   Family History   Problem Relation Age of Onset    No Known Problems Mother      Cancer Father         colon cancer    Colon cancer Father     Cataracts Father     Colon cancer Sister     Cancer Sister         colon or lung cancer     No Known Problems Sister     No Known Problems Sister     No Known Problems Brother     No Known Problems Maternal Aunt     No Known Problems Maternal Uncle     No Known Problems Paternal Aunt     No Known Problems Paternal Uncle     No Known Problems Maternal Grandmother     No Known Problems Maternal Grandfather     No Known Problems Paternal Grandmother     No Known Problems Paternal Grandfather     No Known Problems Other     Amblyopia Neg Hx     Blindness Neg Hx     Diabetes Neg Hx     Glaucoma Neg Hx     Hypertension Neg Hx     Macular degeneration Neg Hx     Retinal detachment Neg Hx     Strabismus Neg Hx     Stroke Neg Hx     Thyroid disease Neg Hx         Social History:   Social History     Tobacco Use    Smoking status: Former     Current packs/day: 0.00     Average packs/day: 1 pack/day for 30.0 years (30.0 ttl pk-yrs)     Types: Cigarettes     Start date: 1/15/1984     Quit date: 1/15/2014     Years since quitting: 10.0    Smokeless tobacco: Never   Substance Use Topics    Alcohol use: Yes     Alcohol/week: 5.7 standard drinks of alcohol     Types: 4 Cans of beer, 2 Standard drinks or equivalent per week     Comment: every other day beer drinker        I have reviewed and updated the patient's past medical, surgical, family and social histories.      ROS:   As per HPI.     Allergies:   Review of patient's allergies indicates:   Allergen Reactions    Adhesive Itching, Rash, Other (See Comments) and Dermatitis     Blister        Medications:   Current Outpatient Medications   Medication Sig Dispense Refill    albuterol (PROVENTIL HFA) 90 mcg/actuation inhaler Inhale 2 puffs into the lungs every 6 (six) hours as needed for Wheezing. Rescue 18 g 0    blood-glucose meter kit Use as instructed 1 each 0    dextrin (FIBER POWDER ORAL) Take 1 Package by mouth  "once.      efinaconazole (JUBLIA) 10 % Crow Apply 1 Application topically once daily. 8 mL 0    metFORMIN (GLUCOPHAGE-XR) 500 MG ER 24hr tablet Take 2 tablets (1,000 mg total) by mouth 2 (two) times daily with meals. 60 tablet 1    OLANZapine (ZYPREXA) 5 MG tablet Take nights 1, 2, and 3 of each chemotherapy dose. 18 tablet 0    prochlorperazine (COMPAZINE) 5 MG tablet Take 1 tablet (5 mg total) by mouth every 6 (six) hours as needed for Nausea. 30 tablet 1    rosuvastatin (CRESTOR) 5 MG tablet Take 4 tablets (20 mg total) by mouth once daily. 90 tablet 3    umeclidinium (INCRUSE ELLIPTA) 62.5 mcg/actuation inhalation capsule Inhale 62.5 mcg into the lungs once daily. Controller 30 each 0     No current facility-administered medications for this visit.          Physical Exam:       /81 (BP Location: Left arm, Patient Position: Sitting, BP Method: Medium (Automatic))   Pulse 102   Temp 98.1 °F (36.7 °C) (Oral)   Resp 16   Ht 5' 7" (1.702 m)   Wt 77.4 kg (170 lb 10.2 oz)   SpO2 99%   BMI 26.73 kg/m²                Physical Exam  Constitutional:       Appearance: Normal appearance.   HENT:      Head: Normocephalic and atraumatic.   Eyes:      Extraocular Movements: Extraocular movements intact.      Conjunctiva/sclera: Conjunctivae normal.      Pupils: Pupils are equal, round, and reactive to light.   Cardiovascular:      Rate and Rhythm: Normal rate and regular rhythm.      Heart sounds: No murmur heard.     No friction rub. No gallop.   Pulmonary:      Effort: Pulmonary effort is normal.      Breath sounds: No rhonchi or rales. Wheezes: Slight expiratory wheeze in posterior lung fields bilaterally.  Musculoskeletal:         General: Normal range of motion.      Right lower leg: No edema.      Left lower leg: No edema.   Skin:     General: Skin is warm and dry.   Neurological:      Mental Status: He is alert and oriented to person, place, and time.   Psychiatric:         Mood and Affect: Mood normal.    "      Thought Content: Thought content normal.         Judgment: Judgment normal.           Labs:   Recent Results (from the past 48 hour(s))   CBC w/ DIFF    Collection Time: 01/26/24 10:00 AM   Result Value Ref Range    WBC 5.73 3.90 - 12.70 K/uL    RBC 4.83 4.60 - 6.20 M/uL    Hemoglobin 13.2 (L) 14.0 - 18.0 g/dL    Hematocrit 38.6 (L) 40.0 - 54.0 %    MCV 80 (L) 82 - 98 fL    MCH 27.3 27.0 - 31.0 pg    MCHC 34.2 32.0 - 36.0 g/dL    RDW 12.7 11.5 - 14.5 %    Platelets 262 150 - 450 K/uL    MPV 9.7 9.2 - 12.9 fL    Immature Granulocytes 0.2 0.0 - 0.5 %    Gran # (ANC) 4.6 1.8 - 7.7 K/uL    Immature Grans (Abs) 0.01 0.00 - 0.04 K/uL    Lymph # 0.8 (L) 1.0 - 4.8 K/uL    Mono # 0.2 (L) 0.3 - 1.0 K/uL    Eos # 0.1 0.0 - 0.5 K/uL    Baso # 0.03 0.00 - 0.20 K/uL    nRBC 0 0 /100 WBC    Gran % 79.6 (H) 38.0 - 73.0 %    Lymph % 14.5 (L) 18.0 - 48.0 %    Mono % 4.2 4.0 - 15.0 %    Eosinophil % 1.0 0.0 - 8.0 %    Basophil % 0.5 0.0 - 1.9 %    Differential Method Automated           Imaging:    See oncologic history as above.     Path:  See oncologic history above.      Assessment and Plan:     Hernan Engel is a 68 y.o. male with pmh significant for COPD, T2DM, HLD, Stage IB (rH6gR5T1) moderately differentiated adenocarcinoma of RUL, initially dx'd 3/2014, s/p R upper lobectomy (3/2014, Dr. Mendez), and now two newly diagnosed concurrent lung cancers as below who presents for follow up:     1) Stage IIIA (T4N0M0) squamous cell carcinoma of the LLL (insufficient sample for PD-L1, no targetable mutations on liquid biopsy), initially dx'd 11/2/23, currently on CRT (1/23/24 - present) w/ weekly carboplatin/paclitaxel (1/22/24 - present)    2) Stage IA3 (V7vN8H2) adenocarcinoma of the lingula (KRAS G12C, PD-L1 10%), initially dx'd 12/19/23, currently on CRT (1/23/24 - present) w/ weekly carboplatin/paclitaxel (1/22/24 - present)    *Notably, pt has a RLL pleural based nodule, reviewed at TB, appears linear and unlikely  malignant    Stage IIIA Squamous Cell Carcinoma of the LLL, No PD-L1, No Targetable Mutations  ECOG PS 0.  Pt is currently on CRT with weekly carboplatin/paclitaxel, tolerating without significant issue. Most recent imaging (CT C, 12/12/23) is from prior to treatment start. Pt is currently in his first week of treatment. Will proceed with C2 of carboplatin/paclitaxel given good tolerance.      PLAN:   -- Continue CRT, C2D1 carboplatin/paclitaxel on 1/29/24. Labs acceptable (T bili slightly elevated at 1.6, other LFTs okay) and treatment signed  -- Take home meds reviewed  -- RTC on 2/2/24, pt prefers Friday follow ups in advance of Monday infusions  -- Repeat imaging following completion of CRT  -- Message previously sent to  regarding transportation needs      Stage IA3 Adenocarcinoma of the Lingula, NGS and PD-L1 Pending  Bronchoscopy demonstrates adenocarcinoma of the lingula, separate from the squamous cell carcinoma in the LLL.  This is a T1c lesion which, given lack of lymphadenopathy, makes this a Stage IA3 disease.  Plan for treatment w/ CRT, as above  -- Treatment as above      Constipation  Pt with baseline constipation. Previously taking milk of magnesia, held in setting of new chemo start. He notes 2 days without a bowel movement which is very bothersome. Will start miralax and senna  -- Miralax 17 gm daily  -- Senna 8.6 mg BID      The above information has been reviewed with the patient and all questions have been answered to their apparent satisfaction.  They understand that they can call the clinic with any questions.    Guanako Grove MD  Hematology/Oncology  [facility]        Med Onc Chart Routing      Follow up with physician . As scheduled   Follow up with SHARRI    Infusion scheduling note    Injection scheduling note    Labs    Imaging    Pharmacy appointment    Other referrals

## 2024-01-26 NOTE — PROGRESS NOTES
TORIE covering for Ysabel Torres LCSW. Patient is looking for his upcoming radiation schedule. TORIE spoke to radiation, who explained his appointment today is 11:45a, next two weeks will be 1:45p, after that 1:30p, all M-F.    TORIE called patient, no answer, VM not yet set up. TORIE spoke to patients wife who explained he's at radiation right now, as scheduled. SW provided her with schedule.

## 2024-01-29 ENCOUNTER — INFUSION (OUTPATIENT)
Dept: INFUSION THERAPY | Facility: HOSPITAL | Age: 69
End: 2024-01-29
Attending: INTERNAL MEDICINE
Payer: MEDICARE

## 2024-01-29 VITALS
WEIGHT: 169.06 LBS | HEIGHT: 67 IN | HEART RATE: 90 BPM | DIASTOLIC BLOOD PRESSURE: 68 MMHG | BODY MASS INDEX: 26.53 KG/M2 | RESPIRATION RATE: 18 BRPM | SYSTOLIC BLOOD PRESSURE: 128 MMHG | TEMPERATURE: 98 F

## 2024-01-29 DIAGNOSIS — C34.32 SQUAMOUS CELL CARCINOMA OF BRONCHUS IN LEFT LOWER LOBE: Primary | ICD-10-CM

## 2024-01-29 PROCEDURE — 77014 PR  CT GUIDANCE PLACEMENT RAD THERAPY FIELDS: CPT | Mod: 26,,, | Performed by: STUDENT IN AN ORGANIZED HEALTH CARE EDUCATION/TRAINING PROGRAM

## 2024-01-29 PROCEDURE — 77336 RADIATION PHYSICS CONSULT: CPT | Performed by: STUDENT IN AN ORGANIZED HEALTH CARE EDUCATION/TRAINING PROGRAM

## 2024-01-29 PROCEDURE — 25000003 PHARM REV CODE 250: Performed by: HOSPITALIST

## 2024-01-29 PROCEDURE — 96417 CHEMO IV INFUS EACH ADDL SEQ: CPT

## 2024-01-29 PROCEDURE — 77386 HC IMRT, COMPLEX: CPT | Performed by: STUDENT IN AN ORGANIZED HEALTH CARE EDUCATION/TRAINING PROGRAM

## 2024-01-29 PROCEDURE — 63600175 PHARM REV CODE 636 W HCPCS: Performed by: HOSPITALIST

## 2024-01-29 PROCEDURE — 96367 TX/PROPH/DG ADDL SEQ IV INF: CPT

## 2024-01-29 PROCEDURE — 96413 CHEMO IV INFUSION 1 HR: CPT

## 2024-01-29 PROCEDURE — 96375 TX/PRO/DX INJ NEW DRUG ADDON: CPT

## 2024-01-29 RX ORDER — GUAIFENESIN 600 MG/1
600 TABLET, EXTENDED RELEASE ORAL EVERY 12 HOURS PRN
Qty: 30 TABLET | Refills: 0 | Status: SHIPPED | OUTPATIENT
Start: 2024-01-29 | End: 2024-02-28 | Stop reason: ALTCHOICE

## 2024-01-29 RX ORDER — SODIUM CHLORIDE 0.9 % (FLUSH) 0.9 %
10 SYRINGE (ML) INJECTION
Status: DISCONTINUED | OUTPATIENT
Start: 2024-01-29 | End: 2024-01-29 | Stop reason: HOSPADM

## 2024-01-29 RX ORDER — EPINEPHRINE 0.3 MG/.3ML
0.3 INJECTION SUBCUTANEOUS ONCE AS NEEDED
Status: DISCONTINUED | OUTPATIENT
Start: 2024-01-29 | End: 2024-01-29 | Stop reason: HOSPADM

## 2024-01-29 RX ORDER — DIPHENHYDRAMINE HYDROCHLORIDE 50 MG/ML
50 INJECTION INTRAMUSCULAR; INTRAVENOUS ONCE AS NEEDED
Status: DISCONTINUED | OUTPATIENT
Start: 2024-01-29 | End: 2024-01-29 | Stop reason: HOSPADM

## 2024-01-29 RX ORDER — PROCHLORPERAZINE EDISYLATE 5 MG/ML
5 INJECTION INTRAMUSCULAR; INTRAVENOUS ONCE AS NEEDED
Status: DISCONTINUED | OUTPATIENT
Start: 2024-01-29 | End: 2024-01-29 | Stop reason: HOSPADM

## 2024-01-29 RX ORDER — FAMOTIDINE 10 MG/ML
20 INJECTION INTRAVENOUS
Status: COMPLETED | OUTPATIENT
Start: 2024-01-29 | End: 2024-01-29

## 2024-01-29 RX ORDER — HEPARIN 100 UNIT/ML
500 SYRINGE INTRAVENOUS
Status: DISCONTINUED | OUTPATIENT
Start: 2024-01-29 | End: 2024-01-29 | Stop reason: HOSPADM

## 2024-01-29 RX ADMIN — PACLITAXEL 84 MG: 6 INJECTION, SOLUTION INTRAVENOUS at 09:01

## 2024-01-29 RX ADMIN — CARBOPLATIN 210 MG: 10 INJECTION, SOLUTION INTRAVENOUS at 10:01

## 2024-01-29 RX ADMIN — DIPHENHYDRAMINE HYDROCHLORIDE 50 MG: 50 INJECTION, SOLUTION INTRAMUSCULAR; INTRAVENOUS at 08:01

## 2024-01-29 RX ADMIN — DEXAMETHASONE SODIUM PHOSPHATE 0.25 MG: 4 INJECTION, SOLUTION INTRA-ARTICULAR; INTRALESIONAL; INTRAMUSCULAR; INTRAVENOUS; SOFT TISSUE at 08:01

## 2024-01-29 RX ADMIN — SODIUM CHLORIDE: 9 INJECTION, SOLUTION INTRAVENOUS at 07:01

## 2024-01-29 RX ADMIN — FAMOTIDINE 20 MG: 10 INJECTION INTRAVENOUS at 08:01

## 2024-01-29 NOTE — PLAN OF CARE
Problem: Adult Inpatient Plan of Care  Goal: Plan of Care Review  Outcome: Ongoing, Progressing   Patient tolerated D1C2 Taxol/Carbo infusions. NAD noted. VSS. PIV + blood return upon deaccess. Discharged home

## 2024-01-30 PROCEDURE — 77014 PR  CT GUIDANCE PLACEMENT RAD THERAPY FIELDS: CPT | Mod: 26,,, | Performed by: STUDENT IN AN ORGANIZED HEALTH CARE EDUCATION/TRAINING PROGRAM

## 2024-01-30 PROCEDURE — 77427 RADIATION TX MANAGEMENT X5: CPT | Mod: ,,, | Performed by: STUDENT IN AN ORGANIZED HEALTH CARE EDUCATION/TRAINING PROGRAM

## 2024-01-30 PROCEDURE — 77386 HC IMRT, COMPLEX: CPT | Performed by: STUDENT IN AN ORGANIZED HEALTH CARE EDUCATION/TRAINING PROGRAM

## 2024-01-31 PROCEDURE — 77014 PR  CT GUIDANCE PLACEMENT RAD THERAPY FIELDS: CPT | Mod: 26,,, | Performed by: STUDENT IN AN ORGANIZED HEALTH CARE EDUCATION/TRAINING PROGRAM

## 2024-01-31 PROCEDURE — 77386 HC IMRT, COMPLEX: CPT | Performed by: STUDENT IN AN ORGANIZED HEALTH CARE EDUCATION/TRAINING PROGRAM

## 2024-02-01 ENCOUNTER — HOSPITAL ENCOUNTER (OUTPATIENT)
Dept: RADIATION THERAPY | Facility: HOSPITAL | Age: 69
Discharge: HOME | End: 2024-02-01
Attending: STUDENT IN AN ORGANIZED HEALTH CARE EDUCATION/TRAINING PROGRAM
Payer: MEDICARE

## 2024-02-01 PROCEDURE — 77014 PR  CT GUIDANCE PLACEMENT RAD THERAPY FIELDS: CPT | Mod: 26,,, | Performed by: STUDENT IN AN ORGANIZED HEALTH CARE EDUCATION/TRAINING PROGRAM

## 2024-02-01 PROCEDURE — 77386 HC IMRT, COMPLEX: CPT | Performed by: STUDENT IN AN ORGANIZED HEALTH CARE EDUCATION/TRAINING PROGRAM

## 2024-02-02 ENCOUNTER — LAB VISIT (OUTPATIENT)
Dept: LAB | Facility: HOSPITAL | Age: 69
End: 2024-02-02
Attending: HOSPITALIST
Payer: MEDICARE

## 2024-02-02 ENCOUNTER — DOCUMENTATION ONLY (OUTPATIENT)
Dept: HEMATOLOGY/ONCOLOGY | Facility: CLINIC | Age: 69
End: 2024-02-02

## 2024-02-02 ENCOUNTER — OFFICE VISIT (OUTPATIENT)
Dept: HEMATOLOGY/ONCOLOGY | Facility: CLINIC | Age: 69
End: 2024-02-02
Payer: MEDICARE

## 2024-02-02 VITALS
HEART RATE: 99 BPM | HEIGHT: 67 IN | WEIGHT: 170.19 LBS | DIASTOLIC BLOOD PRESSURE: 69 MMHG | TEMPERATURE: 98 F | SYSTOLIC BLOOD PRESSURE: 114 MMHG | OXYGEN SATURATION: 98 % | RESPIRATION RATE: 18 BRPM | BODY MASS INDEX: 26.71 KG/M2

## 2024-02-02 DIAGNOSIS — C34.32 SQUAMOUS CELL CARCINOMA OF BRONCHUS IN LEFT LOWER LOBE: ICD-10-CM

## 2024-02-02 DIAGNOSIS — C34.32 SQUAMOUS CELL CARCINOMA OF BRONCHUS IN LEFT LOWER LOBE: Primary | ICD-10-CM

## 2024-02-02 DIAGNOSIS — C34.12 ADENOCARCINOMA OF UPPER LOBE OF LEFT LUNG: ICD-10-CM

## 2024-02-02 DIAGNOSIS — K59.00 CONSTIPATION, UNSPECIFIED CONSTIPATION TYPE: ICD-10-CM

## 2024-02-02 LAB
ALBUMIN SERPL BCP-MCNC: 3.5 G/DL (ref 3.5–5.2)
ALP SERPL-CCNC: 125 U/L (ref 55–135)
ALT SERPL W/O P-5'-P-CCNC: 17 U/L (ref 10–44)
ANION GAP SERPL CALC-SCNC: 6 MMOL/L (ref 8–16)
AST SERPL-CCNC: 13 U/L (ref 10–40)
BASOPHILS # BLD AUTO: 0.03 K/UL (ref 0–0.2)
BASOPHILS NFR BLD: 0.7 % (ref 0–1.9)
BILIRUB SERPL-MCNC: 0.8 MG/DL (ref 0.1–1)
BUN SERPL-MCNC: 21 MG/DL (ref 8–23)
CALCIUM SERPL-MCNC: 10 MG/DL (ref 8.7–10.5)
CHLORIDE SERPL-SCNC: 105 MMOL/L (ref 95–110)
CO2 SERPL-SCNC: 25 MMOL/L (ref 23–29)
CREAT SERPL-MCNC: 1.2 MG/DL (ref 0.5–1.4)
DIFFERENTIAL METHOD BLD: ABNORMAL
EOSINOPHIL # BLD AUTO: 0.1 K/UL (ref 0–0.5)
EOSINOPHIL NFR BLD: 1.6 % (ref 0–8)
ERYTHROCYTE [DISTWIDTH] IN BLOOD BY AUTOMATED COUNT: 13.1 % (ref 11.5–14.5)
EST. GFR  (NO RACE VARIABLE): >60 ML/MIN/1.73 M^2
GLUCOSE SERPL-MCNC: 394 MG/DL (ref 70–110)
HCT VFR BLD AUTO: 33.8 % (ref 40–54)
HGB BLD-MCNC: 11.4 G/DL (ref 14–18)
IMM GRANULOCYTES # BLD AUTO: 0.02 K/UL (ref 0–0.04)
IMM GRANULOCYTES NFR BLD AUTO: 0.5 % (ref 0–0.5)
LYMPHOCYTES # BLD AUTO: 0.3 K/UL (ref 1–4.8)
LYMPHOCYTES NFR BLD: 7.4 % (ref 18–48)
MCH RBC QN AUTO: 27.4 PG (ref 27–31)
MCHC RBC AUTO-ENTMCNC: 33.7 G/DL (ref 32–36)
MCV RBC AUTO: 81 FL (ref 82–98)
MONOCYTES # BLD AUTO: 0.2 K/UL (ref 0.3–1)
MONOCYTES NFR BLD: 3.5 % (ref 4–15)
NEUTROPHILS # BLD AUTO: 3.7 K/UL (ref 1.8–7.7)
NEUTROPHILS NFR BLD: 86.3 % (ref 38–73)
NRBC BLD-RTO: 0 /100 WBC
PLATELET # BLD AUTO: 212 K/UL (ref 150–450)
PMV BLD AUTO: 10.5 FL (ref 9.2–12.9)
POTASSIUM SERPL-SCNC: 4.3 MMOL/L (ref 3.5–5.1)
PROT SERPL-MCNC: 7.1 G/DL (ref 6–8.4)
RBC # BLD AUTO: 4.16 M/UL (ref 4.6–6.2)
SODIUM SERPL-SCNC: 136 MMOL/L (ref 136–145)
WBC # BLD AUTO: 4.31 K/UL (ref 3.9–12.7)

## 2024-02-02 PROCEDURE — 99215 OFFICE O/P EST HI 40 MIN: CPT | Mod: S$GLB,,, | Performed by: HOSPITALIST

## 2024-02-02 PROCEDURE — 80053 COMPREHEN METABOLIC PANEL: CPT | Performed by: HOSPITALIST

## 2024-02-02 PROCEDURE — 77014 PR  CT GUIDANCE PLACEMENT RAD THERAPY FIELDS: CPT | Mod: 26,,, | Performed by: STUDENT IN AN ORGANIZED HEALTH CARE EDUCATION/TRAINING PROGRAM

## 2024-02-02 PROCEDURE — 77386 HC IMRT, COMPLEX: CPT | Performed by: STUDENT IN AN ORGANIZED HEALTH CARE EDUCATION/TRAINING PROGRAM

## 2024-02-02 PROCEDURE — 99999 PR PBB SHADOW E&M-EST. PATIENT-LVL III: CPT | Mod: PBBFAC,,, | Performed by: HOSPITALIST

## 2024-02-02 PROCEDURE — 85025 COMPLETE CBC W/AUTO DIFF WBC: CPT | Performed by: HOSPITALIST

## 2024-02-02 PROCEDURE — 36415 COLL VENOUS BLD VENIPUNCTURE: CPT | Performed by: HOSPITALIST

## 2024-02-02 NOTE — PROGRESS NOTES
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   9/15/2023 Imaging Significant Findings    CXR (URI)  - Masslike focus along L hilar region     10/6/2023 Imaging Significant Findings    CT C, Non-Con  - 6.6 x 5.9 cm LLL mass  - 2 x 1.2 cm cavitary lesion in lingula  - 0.9 x 0.8 cm pleural based nodule in R lung base  - Trace L pleural effusion  - Suspect hilar LAD  - Calcified hilar LN     11/2/2023 Procedure    Bronchoscopy  LLL, Endobronchial Biopsy  - Squamous cell carcinoma (CDK 5/6, p63 positive, CK7 and TTF negative)    [facility] NGS  - Insufficient tissue     11/6/2023 Imaging Significant Findings    PET CT  - 7.6 x 6.0 cm soft tissue mass in superior segment of LLL, SUV 7.2  - 2.5 x 1.6 cm cavitary lesion in ELDER (neoplastic vs infectious/inflammatory), SUV max 1.8  - No hypermetabolic LAD  - 0.9 cm L hypoattenuating L adrenal nodule, likely adenoma     11/29/2023 Tumor Conference    Tumor Board  - Recommend staging EBUS and L robotic bronchoscopy for sampling of ELDER cavitary nodule.  The nodule is highly suspicious for possible adenocarcinoma as it has evolved from a ground-glass opacity to a part solid and cavitary nodule.  Obtain brain MRI to complete staging.  The clinical picture suggests a possible T3 or T4 primary lung cancer or two separate primary lung cancers.        11/29/2023 Tumor Genotyping    Liquid Biopsy, [facility]  - CKDN2A, TP53 (three separate LOF mutations)     11/30/2023 Imaging Significant Findings    MRI Brain  - JAN     12/12/2023 Imaging Significant Findings    CT C, Non-Con  - 6.7 x 6.2 cm LLL mass (previously 6.6 x 5.9 cm), abutting and narrowing the adjacent LLL bronchus  - Enlarging lingular cavitary lesion, now 2.5 x 1.6 cm  - Slightly decreased size of RLL pleural based nodule     1/3/2024 Tumor  Conference    Tumor Board  - Agree with plan for SBRT of lingular lesion and chemoRT to LLL mass.      1/5/2024 Cancer Staged    Staging form: Lung, AJCC 8th Edition  - Clinical: Stage IIIA (cT4, cN0, cM0)     1/19/2024 -  Chemotherapy    Treatment Summary   Plan Name: OP NSCLC PACLITAXEL + CARBOPLATIN (AUC) QW + RADIATION  Treatment Goal: Curative  Status: Active  Start Date: 1/19/2024  End Date: 2/26/2024 (Planned)  Provider: Guanako Grove IV, MD  Chemotherapy: CARBOplatin (PARAPLATIN) 210 mg in sodium chloride 0.9% 136 mL chemo infusion, 210 mg (100 % of original dose 208.2 mg), Intravenous, Clinic/HOD 1 time, 2 of 6 cycles  Dose modification:   (original dose 208.2 mg, Cycle 1)  Administration: 210 mg (1/22/2024), 210 mg (1/29/2024)  PACLitaxeL (TAXOL) 45 mg/m2 = 84 mg in sodium chloride 0.9% 250 mL chemo infusion, 45 mg/m2 = 84 mg, Intravenous, Clinic/HOD 1 time, 2 of 6 cycles  Administration: 84 mg (1/22/2024), 84 mg (1/29/2024)     Adenocarcinoma of Lingula   10/6/2023 Imaging Significant Findings    CT C, Non-Con  - 6.6 x 5.9 cm LLL mass  - 2 x 1.2 cm cavitary lesion in lingula  - 0.9 x 0.8 cm pleural based nodule in R lung base  - Trace L pleural effusion  - Suspect hilar LAD  - Calcified hilar LN     11/6/2023 Imaging Significant Findings    PET CT  - 7.6 x 6.0 cm soft tissue mass in superior segment of LLL, SUV 7.2  - 2.5 x 1.6 cm cavitary lesion in ELDER (neoplastic vs infectious/inflammatory), SUV max 1.8  - No hypermetabolic LAD  - 0.9 cm L hypoattenuating L adrenal nodule, likely adenoma     11/29/2023 Tumor Conference    Tumor Board  - Recommend staging EBUS and L robotic bronchoscopy for sampling of ELDER cavitary nodule.  The nodule is highly suspicious for possible adenocarcinoma as it has evolved from a ground-glass opacity to a part solid and cavitary nodule.  Obtain brain MRI to complete staging.  The clinical picture suggests a possible T3 or T4 primary lung cancer or two separate primary lung  cancers.        11/29/2023 Tumor Genotyping    Liquid Biopsy, [facility]  - CKDN2A, TP53 (three separate LOF mutations)     11/30/2023 Imaging Significant Findings    MRI Brain  - JAN     12/12/2023 Imaging Significant Findings    CT C, Non-Con  - 6.7 x 6.2 cm LLL mass (previously 6.6 x 5.9 cm), abutting and narrowing the adjacent LLL bronchus  - Enlarging lingular cavitary lesion, now 2.5 x 1.6 cm  - Slightly decreased size of RLL pleural based nodule     12/19/2023 Procedure    Bronch with EBUS  ELDER, FNA and TBBx  - Adenocarcinoma (TTF1 positive, p40 negative)    LN  - Negative: Station 7  - Report: No pathologic enlargement of 4R, 4L, or 11R. Unable to evaluated 11L due to obstructing LLL endobronchial mass    [facility] NGS  - KRAS G12C  - TP53  - Negative: EGFR, ALK, BRAF, ROS1, RET, MET, ERBB2  - PD-L1: 10%       1/3/2024 Tumor Conference    Tumor Board  - Agree with plan for SBRT of lingular lesion and chemoRT to LLL mass.      1/5/2024 Cancer Staged    Staging form: Lung, AJCC 8th Edition  - Clinical: Stage IA3 (cT1c, cN0, cM0)            HPI:     Hernan Engel is a 68 y.o. male with pmh significant for COPD, T2DM, HLD, Stage IB ([id number]) moderately differentiated adenocarcinoma of RUL, initially dx'd 3/2014, s/p R upper lobectomy (3/2014, Dr. 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 He describes some soreness over his R clavicle, but otherwise denies pain anywhere. He continues to drink ensure and feels this is helping with his constipation. He confirms that his bowel regimen is working well. He endorses some progressive fatigue. He denies N/V, dirrhea, rash, or other new or worsening symptoms good at this time. His appetite remains good.     History has been obtained by chart review and discussion with the patient.    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    Lung cancer     Normocytic anemia 9/3/2014    Nuclear sclerosis of both eyes 9/4/2020    Seizure 2005    single event - not controlled by medication    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    T2DM (type 2 diabetes mellitus) 2014    DKA 9/2014        Past Surgical History:   Past Surgical History:   Procedure Laterality Date    BRONCHOSCOPY WITH FLUOROSCOPY N/A 11/2/2023    Procedure: BRONCHOSCOPY, WITH FLUOROSCOPY;  Surgeon: Provider, [facility];  Location: [facility] OR [address] [place] [address];  Service: Endoscopy;  Laterality: N/A;    CATARACT EXTRACTION Right 2021    COLONOSCOPY      LUNG REMOVAL, PARTIAL      ROBOTIC BRONCHOSCOPY N/A 12/19/2023    Procedure: ROBOTIC BRONCHOSCOPY;  Surgeon: Hernandez Brewer MD;  Location: [facility] OR [address] [place] [address];  Service: Pulmonary;  Laterality: N/A;        Family History:   Family History   Problem Relation Age of Onset    No Known Problems Mother     Cancer Father         colon cancer    Colon cancer Father     Cataracts Father     Colon cancer Sister     Cancer Sister         colon or lung cancer      No Known Problems Sister     No Known Problems Sister     No Known Problems Brother     No Known Problems Maternal Aunt     No Known Problems Maternal Uncle     No Known Problems Paternal Aunt     No Known Problems Paternal Uncle     No Known Problems Maternal Grandmother     No Known Problems Maternal Grandfather     No Known Problems Paternal Grandmother     No Known Problems Paternal Grandfather     No Known Problems Other     Amblyopia Neg Hx     Blindness Neg Hx     Diabetes Neg Hx     Glaucoma Neg Hx     Hypertension Neg Hx     Macular degeneration Neg Hx     Retinal detachment Neg Hx     Strabismus Neg Hx     Stroke Neg Hx     Thyroid disease Neg Hx         Social History:   Social History     Tobacco Use    Smoking status: Former     Current packs/day: 0.00     Average packs/day: 1 pack/day for 30.0 years (30.0 ttl pk-yrs)     Types: Cigarettes     Start date: 1/15/1984     Quit date: 1/15/2014     Years since quitting: 10.0    Smokeless tobacco: Never   Substance Use Topics    Alcohol use: Yes     Alcohol/week: 5.7 standard drinks of alcohol     Types: 4 Cans of beer, 2 Standard drinks or equivalent per week     Comment: every other day beer drinker        I have reviewed and updated the patient's past medical, surgical, family and social histories.      ROS:   As per HPI.     Allergies:   Review of patient's allergies indicates:   Allergen Reactions    Adhesive Itching, Rash, Other (See Comments) and Dermatitis     Blister        Medications:   Current Outpatient Medications   Medication Sig Dispense Refill    albuterol (PROVENTIL HFA) 90 mcg/actuation inhaler Inhale 2 puffs into the lungs every 6 (six) hours as needed for Wheezing. Rescue 18 g 0    blood-glucose meter kit Use as instructed 1 each 0    dextrin (FIBER POWDER ORAL) Take 1 Package by mouth once.      efinaconazole (JUBLIA) 10 % Crow Apply 1 Application topically once daily. 8 mL 0    guaiFENesin (MUCINEX) 600 mg 12 hr tablet Take 1 tablet  (600 mg total) by mouth every 12 (twelve) hours as needed for Congestion (cough). 30 tablet 0    metFORMIN (GLUCOPHAGE-XR) 500 MG ER 24hr tablet Take 2 tablets (1,000 mg total) by mouth 2 (two) times daily with meals. 60 tablet 1    OLANZapine (ZYPREXA) 5 MG tablet Take nights 1, 2, and 3 of each chemotherapy dose. 18 tablet 0    polyethylene glycol (GLYCOLAX) 17 gram/dose powder Mix 1 capful (17 g) and dissolve in glass of water to take by mouth once daily. 510 g 0    prochlorperazine (COMPAZINE) 5 MG tablet Take 1 tablet (5 mg total) by mouth every 6 (six) hours as needed for Nausea. 30 tablet 1    rosuvastatin (CRESTOR) 5 MG tablet Take 4 tablets (20 mg total) by mouth once daily. 90 tablet 3    senna (SENOKOT) 8.6 mg tablet Take 1 tablet by mouth 2 (two) times daily. 60 tablet 2    umeclidinium (INCRUSE ELLIPTA) 62.5 mcg/actuation inhalation capsule Inhale 62.5 mcg into the lungs once daily. Controller 30 each 0     No current facility-administered medications for this visit.          Physical Exam:       There were no vitals taken for this visit.               Physical Exam  Constitutional:       Appearance: Normal appearance.   HENT:      Head: Normocephalic and atraumatic.   Eyes:      Extraocular Movements: Extraocular movements intact.      Conjunctiva/sclera: Conjunctivae normal.      Pupils: Pupils are equal, round, and reactive to light.   Cardiovascular:      Rate and Rhythm: Normal rate and regular rhythm.      Heart sounds: No murmur heard.     No friction rub. No gallop.   Pulmonary:      Effort: Pulmonary effort is normal.      Breath sounds: No rhonchi or rales. Wheezes: Slight expiratory wheeze in posterior lung fields bilaterally.  Musculoskeletal:         General: Normal range of motion.      Right lower leg: No edema.      Left lower leg: No edema.   Skin:     General: Skin is warm and dry.   Neurological:      Mental Status: He is alert and oriented to person, place, and time.   Psychiatric:          Mood and Affect: Mood normal.         Thought Content: Thought content normal.         Judgment: Judgment normal.           Labs:   No results found for this or any previous visit (from the past 48 hour(s)).         Imaging:    See oncologic history as above.     Path:  See oncologic history above.      Assessment and Plan:     Hernan Engel is a 68 y.o. male with pmh significant for COPD, T2DM, HLD, Stage IB ([id number]) moderately differentiated adenocarcinoma of RUL, initially dx'd 3/2014, s/p R upper lobectomy (3/2014, Dr. Mendez), and now two newly diagnosed concurrent lung cancers as below who presents for follow up:     1) Stage IIIA (T4N0M0) squamous cell carcinoma of the LLL (insufficient sample for PD-L1, no targetable mutations on liquid biopsy), initially dx'd 11/2/23, currently on CRT (1/23/24 - present) w/ weekly carboplatin/paclitaxel (1/22/24 - present)    2) Stage IA3 (O4xM4U1) adenocarcinoma of the lingula (KRAS G12C, PD-L1 10%), initially dx'd 12/19/23, currently on CRT (1/23/24 - present) w/ weekly carboplatin/paclitaxel (1/22/24 - present)    *Notably, pt has a RLL pleural based nodule, reviewed at , appears linear and unlikely malignant    Stage IIIA Squamous Cell Carcinoma of the LLL, No PD-L1, No Targetable Mutations  ECOG PS 0.  Pt is currently on CRT with weekly carboplatin/paclitaxel, tolerating without significant issue. Most recent imaging (CT C, 12/12/23) is from prior to treatment start. Pt is currently in his first week of treatment. Will proceed with C2 of carboplatin/paclitaxel given good tolerance.      PLAN:   -- Continue CRT, C3D1 carboplatin/paclitaxel on 2/5/23. Labs pending, will sign treatment pending results  -- RTC on 2/8/24, pt prefers Friday follow ups in advance of Monday infusions  -- Repeat imaging following completion of CRT  -- Message previously sent to  regarding transportation needs      Stage IA3 Adenocarcinoma of the Lingula, NGS and PD-L1  Pending  Bronchoscopy demonstrates adenocarcinoma of the lingula, separate from the squamous cell carcinoma in the LLL.  This is a T1c lesion which, given lack of lymphadenopathy, makes this a Stage IA3 disease.  Plan for treatment w/ CRT, as above  -- Treatment as above      Constipation  Pt with baseline constipation. Previously taking milk of magnesia, held in setting of new chemo start. Started on miralax and senna to good effect, will continue  -- Miralax 17 gm daily  -- Senna 8.6 mg BID      The above information has been reviewed with the patient and all questions have been answered to their apparent satisfaction.  They understand that they can call the clinic with any questions.    Guanako Grove MD  Hematology/Oncology  [facility]        Med Onc Chart Routing      Follow up with physician . F/u as scheduled (RTC on 2/8)   Follow up with SHARRI    Infusion scheduling note    Injection scheduling note    Labs    Imaging    Pharmacy appointment    Other referrals

## 2024-02-02 NOTE — PROGRESS NOTES
Called Jacob with [employer]  to set up transportation for radiation for week of 2/5-2/9. Set up pickup for 7:00 am on 2/5, 12:45 for 2/6 and 2/7, 7:30 am for 2/8 and 12:30 for 2/9.

## 2024-02-05 ENCOUNTER — PATIENT MESSAGE (OUTPATIENT)
Dept: HEMATOLOGY/ONCOLOGY | Facility: CLINIC | Age: 69
End: 2024-02-05
Payer: MEDICARE

## 2024-02-05 ENCOUNTER — PATIENT MESSAGE (OUTPATIENT)
Dept: RADIATION ONCOLOGY | Facility: CLINIC | Age: 69
End: 2024-02-05
Payer: MEDICARE

## 2024-02-05 ENCOUNTER — INFUSION (OUTPATIENT)
Dept: INFUSION THERAPY | Facility: HOSPITAL | Age: 69
End: 2024-02-05
Attending: INTERNAL MEDICINE
Payer: MEDICARE

## 2024-02-05 ENCOUNTER — TELEPHONE (OUTPATIENT)
Dept: HEMATOLOGY/ONCOLOGY | Facility: CLINIC | Age: 69
End: 2024-02-05
Payer: MEDICARE

## 2024-02-05 VITALS
HEART RATE: 96 BPM | TEMPERATURE: 98 F | HEIGHT: 67 IN | WEIGHT: 170.19 LBS | DIASTOLIC BLOOD PRESSURE: 64 MMHG | BODY MASS INDEX: 26.71 KG/M2 | OXYGEN SATURATION: 96 % | RESPIRATION RATE: 18 BRPM | SYSTOLIC BLOOD PRESSURE: 115 MMHG

## 2024-02-05 DIAGNOSIS — C34.32 SQUAMOUS CELL CARCINOMA OF BRONCHUS IN LEFT LOWER LOBE: ICD-10-CM

## 2024-02-05 DIAGNOSIS — E11.8 TYPE 2 DIABETES MELLITUS WITH COMPLICATION, WITHOUT LONG-TERM CURRENT USE OF INSULIN: ICD-10-CM

## 2024-02-05 DIAGNOSIS — E11.8 TYPE 2 DIABETES MELLITUS WITH COMPLICATION, WITHOUT LONG-TERM CURRENT USE OF INSULIN: Primary | ICD-10-CM

## 2024-02-05 DIAGNOSIS — C34.32 SQUAMOUS CELL CARCINOMA OF BRONCHUS IN LEFT LOWER LOBE: Primary | ICD-10-CM

## 2024-02-05 DIAGNOSIS — C34.12 ADENOCARCINOMA OF UPPER LOBE OF LEFT LUNG: Primary | ICD-10-CM

## 2024-02-05 PROCEDURE — 96417 CHEMO IV INFUS EACH ADDL SEQ: CPT

## 2024-02-05 PROCEDURE — 63600175 PHARM REV CODE 636 W HCPCS: Performed by: HOSPITALIST

## 2024-02-05 PROCEDURE — 96367 TX/PROPH/DG ADDL SEQ IV INF: CPT

## 2024-02-05 PROCEDURE — 96413 CHEMO IV INFUSION 1 HR: CPT

## 2024-02-05 PROCEDURE — 77014 PR  CT GUIDANCE PLACEMENT RAD THERAPY FIELDS: CPT | Mod: 26,,, | Performed by: STUDENT IN AN ORGANIZED HEALTH CARE EDUCATION/TRAINING PROGRAM

## 2024-02-05 PROCEDURE — 77336 RADIATION PHYSICS CONSULT: CPT | Performed by: STUDENT IN AN ORGANIZED HEALTH CARE EDUCATION/TRAINING PROGRAM

## 2024-02-05 PROCEDURE — 25000003 PHARM REV CODE 250: Performed by: HOSPITALIST

## 2024-02-05 PROCEDURE — 96375 TX/PRO/DX INJ NEW DRUG ADDON: CPT

## 2024-02-05 PROCEDURE — 77386 HC IMRT, COMPLEX: CPT | Performed by: STUDENT IN AN ORGANIZED HEALTH CARE EDUCATION/TRAINING PROGRAM

## 2024-02-05 RX ORDER — FAMOTIDINE 10 MG/ML
20 INJECTION INTRAVENOUS
Status: COMPLETED | OUTPATIENT
Start: 2024-02-05 | End: 2024-02-05

## 2024-02-05 RX ORDER — DIPHENHYDRAMINE HYDROCHLORIDE 50 MG/ML
50 INJECTION INTRAMUSCULAR; INTRAVENOUS ONCE AS NEEDED
Status: DISCONTINUED | OUTPATIENT
Start: 2024-02-05 | End: 2024-02-05 | Stop reason: HOSPADM

## 2024-02-05 RX ORDER — SODIUM CHLORIDE 0.9 % (FLUSH) 0.9 %
10 SYRINGE (ML) INJECTION
Status: CANCELLED | OUTPATIENT
Start: 2024-02-05

## 2024-02-05 RX ORDER — EPINEPHRINE 0.3 MG/.3ML
0.3 INJECTION SUBCUTANEOUS ONCE AS NEEDED
Status: DISCONTINUED | OUTPATIENT
Start: 2024-02-05 | End: 2024-02-05 | Stop reason: HOSPADM

## 2024-02-05 RX ORDER — HEPARIN 100 UNIT/ML
500 SYRINGE INTRAVENOUS
Status: CANCELLED | OUTPATIENT
Start: 2024-02-05

## 2024-02-05 RX ORDER — DIPHENHYDRAMINE HYDROCHLORIDE 50 MG/ML
50 INJECTION INTRAMUSCULAR; INTRAVENOUS ONCE AS NEEDED
Status: CANCELLED | OUTPATIENT
Start: 2024-02-05

## 2024-02-05 RX ORDER — FAMOTIDINE 10 MG/ML
20 INJECTION INTRAVENOUS
Status: CANCELLED | OUTPATIENT
Start: 2024-02-05

## 2024-02-05 RX ORDER — PROCHLORPERAZINE EDISYLATE 5 MG/ML
5 INJECTION INTRAMUSCULAR; INTRAVENOUS ONCE AS NEEDED
Status: CANCELLED
Start: 2024-02-05

## 2024-02-05 RX ORDER — SODIUM CHLORIDE 0.9 % (FLUSH) 0.9 %
10 SYRINGE (ML) INJECTION
Status: DISCONTINUED | OUTPATIENT
Start: 2024-02-05 | End: 2024-02-05 | Stop reason: HOSPADM

## 2024-02-05 RX ORDER — HEPARIN 100 UNIT/ML
500 SYRINGE INTRAVENOUS
Status: DISCONTINUED | OUTPATIENT
Start: 2024-02-05 | End: 2024-02-05 | Stop reason: HOSPADM

## 2024-02-05 RX ORDER — PROCHLORPERAZINE EDISYLATE 5 MG/ML
5 INJECTION INTRAMUSCULAR; INTRAVENOUS ONCE AS NEEDED
Status: DISCONTINUED | OUTPATIENT
Start: 2024-02-05 | End: 2024-02-05 | Stop reason: HOSPADM

## 2024-02-05 RX ORDER — EPINEPHRINE 0.3 MG/.3ML
0.3 INJECTION SUBCUTANEOUS ONCE AS NEEDED
Status: CANCELLED | OUTPATIENT
Start: 2024-02-05

## 2024-02-05 RX ADMIN — FAMOTIDINE 20 MG: 10 INJECTION INTRAVENOUS at 09:02

## 2024-02-05 RX ADMIN — CARBOPLATIN 180 MG: 10 INJECTION, SOLUTION INTRAVENOUS at 11:02

## 2024-02-05 RX ADMIN — DEXAMETHASONE SODIUM PHOSPHATE 0.25 MG: 4 INJECTION, SOLUTION INTRA-ARTICULAR; INTRALESIONAL; INTRAMUSCULAR; INTRAVENOUS; SOFT TISSUE at 09:02

## 2024-02-05 RX ADMIN — PACLITAXEL 84 MG: 6 INJECTION, SOLUTION INTRAVENOUS at 10:02

## 2024-02-05 RX ADMIN — SODIUM CHLORIDE: 9 INJECTION, SOLUTION INTRAVENOUS at 09:02

## 2024-02-05 RX ADMIN — DIPHENHYDRAMINE HYDROCHLORIDE 50 MG: 50 INJECTION, SOLUTION INTRAMUSCULAR; INTRAVENOUS at 09:02

## 2024-02-05 NOTE — TELEPHONE ENCOUNTER
I attempted to return the pt's call. I lm on his and his wife's voice mailboxes to contact the clinic tomorrow when we reopen. Should it be emergent proceed to an [facility] ER.

## 2024-02-06 ENCOUNTER — TELEPHONE (OUTPATIENT)
Dept: FAMILY MEDICINE | Facility: CLINIC | Age: 69
End: 2024-02-06
Payer: MEDICARE

## 2024-02-06 LAB
ACID FAST MOD KINY STN SPEC: NORMAL
MYCOBACTERIUM SPEC QL CULT: NORMAL

## 2024-02-06 PROCEDURE — 77014 PR  CT GUIDANCE PLACEMENT RAD THERAPY FIELDS: CPT | Mod: 26,,, | Performed by: STUDENT IN AN ORGANIZED HEALTH CARE EDUCATION/TRAINING PROGRAM

## 2024-02-06 PROCEDURE — 77386 HC IMRT, COMPLEX: CPT | Performed by: STUDENT IN AN ORGANIZED HEALTH CARE EDUCATION/TRAINING PROGRAM

## 2024-02-06 PROCEDURE — 77427 RADIATION TX MANAGEMENT X5: CPT | Mod: ,,, | Performed by: STUDENT IN AN ORGANIZED HEALTH CARE EDUCATION/TRAINING PROGRAM

## 2024-02-06 NOTE — TELEPHONE ENCOUNTER
----- Message from Lorrie Calvin MD sent at 2/5/2024  4:44 PM CST -----  Regarding: A1c  Please schedule his A1c prior to his f/up this month

## 2024-02-07 ENCOUNTER — DOCUMENTATION ONLY (OUTPATIENT)
Dept: RADIATION ONCOLOGY | Facility: CLINIC | Age: 69
End: 2024-02-07
Payer: MEDICARE

## 2024-02-07 ENCOUNTER — NURSE TRIAGE (OUTPATIENT)
Dept: ADMINISTRATIVE | Facility: CLINIC | Age: 69
End: 2024-02-07
Payer: MEDICARE

## 2024-02-07 PROCEDURE — 77014 PR  CT GUIDANCE PLACEMENT RAD THERAPY FIELDS: CPT | Mod: 26,,, | Performed by: STUDENT IN AN ORGANIZED HEALTH CARE EDUCATION/TRAINING PROGRAM

## 2024-02-07 PROCEDURE — 77386 HC IMRT, COMPLEX: CPT | Performed by: STUDENT IN AN ORGANIZED HEALTH CARE EDUCATION/TRAINING PROGRAM

## 2024-02-07 NOTE — TELEPHONE ENCOUNTER
Patient's wife states that the paramedics came out and they were told that his blood sugar may have been low and he stood up too fast. Patient's wife states that he feels better.

## 2024-02-07 NOTE — TELEPHONE ENCOUNTER
Pass out in the bathroom about 5 min ago. His wife was able to get him in the bed. Unconscious for about minute. He broke out into sweat before he fell. His skin is wet, he is too weak stand and feels lightheaded. Care advice recommend pt call 911. Cg verbalized understanding.   Reason for Disposition   Shock suspected (e.g., cold/pale/clammy skin, too weak to stand, low BP, rapid pulse)    Additional Information   Negative: Still unconscious   Negative: Difficult to awaken or acting confused (e.g., disoriented, slurred speech)    Protocols used: [id number]

## 2024-02-07 NOTE — PROGRESS NOTES
"Mr. Engel presented to RT today noting that he had to call EMS this am. He was having a bowel movement, with some straining due to constipation. Shortly after got very fatigued, lightheaded and sweaty. He was able to make it to the bed and became very weak. ? LOC. 911 was called. EMS arrived with "normal" vital signs and blood glucose 240s per patient. He elected not to go to the ER. He just restarted his metformin yesterday.   He denies head trauma and says he feels fine now. Does not feel like he needs to go to ER.     Vitals:   97.9 temp  HR 87  /70  96% pulse ox    PERRL, no facial asymmetry.  push pull 5/5 bilaterally. Hip flexion 5/5 bilaterally. Is ambulating with a cane    Offered ER but he does not want to go as he feels fine. instructed to go to ER if returns.   Have reached out to PCP about better optimization of blood sugar.     Osman Arora MD  Radiation Oncology    "

## 2024-02-08 ENCOUNTER — OFFICE VISIT (OUTPATIENT)
Dept: HEMATOLOGY/ONCOLOGY | Facility: CLINIC | Age: 69
End: 2024-02-08
Payer: MEDICARE

## 2024-02-08 ENCOUNTER — LAB VISIT (OUTPATIENT)
Dept: LAB | Facility: HOSPITAL | Age: 69
End: 2024-02-08
Attending: HOSPITALIST
Payer: MEDICARE

## 2024-02-08 ENCOUNTER — OFFICE VISIT (OUTPATIENT)
Dept: PULMONOLOGY | Facility: CLINIC | Age: 69
End: 2024-02-08
Payer: MEDICARE

## 2024-02-08 VITALS
SYSTOLIC BLOOD PRESSURE: 108 MMHG | DIASTOLIC BLOOD PRESSURE: 75 MMHG | TEMPERATURE: 98 F | BODY MASS INDEX: 26.44 KG/M2 | OXYGEN SATURATION: 96 % | HEIGHT: 67 IN | RESPIRATION RATE: 16 BRPM | WEIGHT: 168.44 LBS | HEART RATE: 73 BPM

## 2024-02-08 VITALS
HEART RATE: 79 BPM | HEIGHT: 67 IN | DIASTOLIC BLOOD PRESSURE: 62 MMHG | BODY MASS INDEX: 26.44 KG/M2 | WEIGHT: 168.44 LBS | OXYGEN SATURATION: 98 % | SYSTOLIC BLOOD PRESSURE: 118 MMHG

## 2024-02-08 DIAGNOSIS — C34.12 ADENOCARCINOMA OF UPPER LOBE OF LEFT LUNG: ICD-10-CM

## 2024-02-08 DIAGNOSIS — R55 VASOVAGAL EPISODE: ICD-10-CM

## 2024-02-08 DIAGNOSIS — K59.00 CONSTIPATION, UNSPECIFIED CONSTIPATION TYPE: ICD-10-CM

## 2024-02-08 DIAGNOSIS — C34.32 SQUAMOUS CELL CARCINOMA OF BRONCHUS IN LEFT LOWER LOBE: Primary | ICD-10-CM

## 2024-02-08 DIAGNOSIS — C34.32 SQUAMOUS CELL CARCINOMA OF BRONCHUS IN LEFT LOWER LOBE: ICD-10-CM

## 2024-02-08 LAB
ALBUMIN SERPL BCP-MCNC: 3.6 G/DL (ref 3.5–5.2)
ALP SERPL-CCNC: 117 U/L (ref 55–135)
ALT SERPL W/O P-5'-P-CCNC: 16 U/L (ref 10–44)
ANION GAP SERPL CALC-SCNC: 8 MMOL/L (ref 8–16)
AST SERPL-CCNC: 12 U/L (ref 10–40)
BASOPHILS # BLD AUTO: 0.02 K/UL (ref 0–0.2)
BASOPHILS NFR BLD: 0.7 % (ref 0–1.9)
BILIRUB SERPL-MCNC: 0.7 MG/DL (ref 0.1–1)
BUN SERPL-MCNC: 18 MG/DL (ref 8–23)
CALCIUM SERPL-MCNC: 10 MG/DL (ref 8.7–10.5)
CHLORIDE SERPL-SCNC: 102 MMOL/L (ref 95–110)
CO2 SERPL-SCNC: 24 MMOL/L (ref 23–29)
CREAT SERPL-MCNC: 0.9 MG/DL (ref 0.5–1.4)
DIFFERENTIAL METHOD BLD: ABNORMAL
EOSINOPHIL # BLD AUTO: 0 K/UL (ref 0–0.5)
EOSINOPHIL NFR BLD: 1.4 % (ref 0–8)
ERYTHROCYTE [DISTWIDTH] IN BLOOD BY AUTOMATED COUNT: 13.6 % (ref 11.5–14.5)
EST. GFR  (NO RACE VARIABLE): >60 ML/MIN/1.73 M^2
GLUCOSE SERPL-MCNC: 295 MG/DL (ref 70–110)
HCT VFR BLD AUTO: 35.7 % (ref 40–54)
HGB BLD-MCNC: 11.7 G/DL (ref 14–18)
IMM GRANULOCYTES # BLD AUTO: 0.01 K/UL (ref 0–0.04)
IMM GRANULOCYTES NFR BLD AUTO: 0.3 % (ref 0–0.5)
LYMPHOCYTES # BLD AUTO: 0.3 K/UL (ref 1–4.8)
LYMPHOCYTES NFR BLD: 8.7 % (ref 18–48)
MCH RBC QN AUTO: 26.7 PG (ref 27–31)
MCHC RBC AUTO-ENTMCNC: 32.8 G/DL (ref 32–36)
MCV RBC AUTO: 82 FL (ref 82–98)
MONOCYTES # BLD AUTO: 0.2 K/UL (ref 0.3–1)
MONOCYTES NFR BLD: 8 % (ref 4–15)
NEUTROPHILS # BLD AUTO: 2.3 K/UL (ref 1.8–7.7)
NEUTROPHILS NFR BLD: 80.9 % (ref 38–73)
NRBC BLD-RTO: 1 /100 WBC
PLATELET # BLD AUTO: 220 K/UL (ref 150–450)
PMV BLD AUTO: 10 FL (ref 9.2–12.9)
POTASSIUM SERPL-SCNC: 4.1 MMOL/L (ref 3.5–5.1)
PROT SERPL-MCNC: 7.3 G/DL (ref 6–8.4)
RBC # BLD AUTO: 4.38 M/UL (ref 4.6–6.2)
SODIUM SERPL-SCNC: 134 MMOL/L (ref 136–145)
WBC # BLD AUTO: 2.86 K/UL (ref 3.9–12.7)

## 2024-02-08 PROCEDURE — 85025 COMPLETE CBC W/AUTO DIFF WBC: CPT | Performed by: HOSPITALIST

## 2024-02-08 PROCEDURE — 77014 PR  CT GUIDANCE PLACEMENT RAD THERAPY FIELDS: CPT | Mod: 26,,, | Performed by: STUDENT IN AN ORGANIZED HEALTH CARE EDUCATION/TRAINING PROGRAM

## 2024-02-08 PROCEDURE — 99215 OFFICE O/P EST HI 40 MIN: CPT | Mod: S$GLB,,, | Performed by: HOSPITALIST

## 2024-02-08 PROCEDURE — 80053 COMPREHEN METABOLIC PANEL: CPT | Performed by: HOSPITALIST

## 2024-02-08 PROCEDURE — 99499 UNLISTED E&M SERVICE: CPT | Mod: S$GLB,,, | Performed by: INTERNAL MEDICINE

## 2024-02-08 PROCEDURE — 77386 HC IMRT, COMPLEX: CPT | Performed by: STUDENT IN AN ORGANIZED HEALTH CARE EDUCATION/TRAINING PROGRAM

## 2024-02-08 PROCEDURE — 36415 COLL VENOUS BLD VENIPUNCTURE: CPT | Performed by: HOSPITALIST

## 2024-02-08 PROCEDURE — 99999 PR PBB SHADOW E&M-EST. PATIENT-LVL III: CPT | Mod: PBBFAC,,, | Performed by: HOSPITALIST

## 2024-02-08 RX ORDER — FAMOTIDINE 10 MG/ML
20 INJECTION INTRAVENOUS
Status: CANCELLED | OUTPATIENT
Start: 2024-02-12

## 2024-02-08 RX ORDER — EPINEPHRINE 0.3 MG/.3ML
0.3 INJECTION SUBCUTANEOUS ONCE AS NEEDED
Status: CANCELLED | OUTPATIENT
Start: 2024-02-12

## 2024-02-08 RX ORDER — HEPARIN 100 UNIT/ML
500 SYRINGE INTRAVENOUS
Status: CANCELLED | OUTPATIENT
Start: 2024-02-12

## 2024-02-08 RX ORDER — SODIUM CHLORIDE 0.9 % (FLUSH) 0.9 %
10 SYRINGE (ML) INJECTION
Status: CANCELLED | OUTPATIENT
Start: 2024-02-12

## 2024-02-08 RX ORDER — PROCHLORPERAZINE EDISYLATE 5 MG/ML
5 INJECTION INTRAMUSCULAR; INTRAVENOUS ONCE AS NEEDED
Status: CANCELLED
Start: 2024-02-12

## 2024-02-08 RX ORDER — DIPHENHYDRAMINE HYDROCHLORIDE 50 MG/ML
50 INJECTION INTRAMUSCULAR; INTRAVENOUS ONCE AS NEEDED
Status: CANCELLED | OUTPATIENT
Start: 2024-02-12

## 2024-02-08 NOTE — PROGRESS NOTES
"Subjective:       Patient ID: Hernan Engel is a 68 y.o. male.    Chief Complaint: No chief complaint on file.        In 2014:    LUNG, RIGHT UPPER LOBECTOMY: SINGLE, 3.8 CENTIMETER, INFILTRATING, MODERATELY DIFFERENTIATED ADENOCARCINOMA. NO LYMPHOVASCULAR INVASION IDENTIFIED. SURGICAL MARGINS AND VISCERAL PLEURA NEGATIVE FOR MALIGNANCY. SIX (6) LYMPH NODES NEGATIVE FOR MALIGNANCY    Review of Systems    Objective:      Physical Exam  Personal Diagnostic Review  {Pulmonary diagnostics:95287}      2/8/2024    10:37 AM 2/8/2024     9:15 AM 2/5/2024     9:00 AM 2/2/2024     8:13 AM 1/29/2024     7:47 AM 1/26/2024    10:06 AM 1/22/2024     1:00 PM   Pulmonary Function Tests   SpO2 98 % 96 % 96 % 98 %  99 % 100 %   Height 5' 7" (1.702 m) 5' 7" (1.702 m) 5' 7" (1.702 m) 5' 7" (1.702 m) 5' 7" (1.702 m) 5' 7" (1.702 m)    Weight 76.4 kg (168 lb 6.9 oz) 76.4 kg (168 lb 6.9 oz) 77.2 kg (170 lb 3.1 oz) 77.2 kg (170 lb 3.1 oz) 76.7 kg (169 lb 1.5 oz) 77.4 kg (170 lb 10.2 oz)    BMI (Calculated) 26.4 26.4 26.7 26.7 26.5 26.7          Assessment:       1. 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 Rescue 18 g 0    blood-glucose meter kit Use as instructed 1 each 0    dextrin (FIBER POWDER ORAL) Take 1 Package by mouth once.      efinaconazole (JUBLIA) 10 % Crow Apply 1 Application topically once daily. 8 mL 0    guaiFENesin (MUCINEX) 600 mg 12 hr tablet Take 1 tablet (600 mg total) by mouth every 12 (twelve) hours as needed for Congestion (cough). 30 tablet 0    metFORMIN (GLUCOPHAGE-XR) 500 MG ER 24hr tablet Take 2 tablets (1,000 mg total) by mouth 2 (two) times daily with meals. 60 tablet 1    OLANZapine (ZYPREXA) 5 MG tablet Take nights 1, 2, and 3 of each chemotherapy dose. 18 tablet 0    " polyethylene glycol (GLYCOLAX) 17 gram/dose powder Mix 1 capful (17 g) and dissolve in glass of water to take by mouth once daily. 510 g 0    prochlorperazine (COMPAZINE) 5 MG tablet Take 1 tablet (5 mg total) by mouth every 6 (six) hours as needed for Nausea. 30 tablet 1    rosuvastatin (CRESTOR) 5 MG tablet Take 4 tablets (20 mg total) by mouth once daily. 90 tablet 3    senna (SENOKOT) 8.6 mg tablet Take 1 tablet by mouth 2 (two) times daily. 60 tablet 2    umeclidinium (INCRUSE ELLIPTA) 62.5 mcg/actuation inhalation capsule Inhale 62.5 mcg into the lungs once daily. Controller 30 each 0    [] guaiFENesin 200 mg tablet Take 2 tablets (400 mg total) by mouth every 4 (four) hours as needed for Congestion. 30 tablet 1     No facility-administered encounter medications on file as of 2024.     No orders of the defined types were placed in this encounter.    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

## 2024-02-08 NOTE — PROGRESS NOTES
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   9/15/2023 Imaging Significant Findings    CXR (URI)  - Masslike focus along L hilar region     10/6/2023 Imaging Significant Findings    CT C, Non-Con  - 6.6 x 5.9 cm LLL mass  - 2 x 1.2 cm cavitary lesion in lingula  - 0.9 x 0.8 cm pleural based nodule in R lung base  - Trace L pleural effusion  - Suspect hilar LAD  - Calcified hilar LN     11/2/2023 Procedure    Bronchoscopy  LLL, Endobronchial Biopsy  - Squamous cell carcinoma (CDK 5/6, p63 positive, CK7 and TTF negative)    [facility] NGS  - Insufficient tissue     11/6/2023 Imaging Significant Findings    PET CT  - 7.6 x 6.0 cm soft tissue mass in superior segment of LLL, SUV 7.2  - 2.5 x 1.6 cm cavitary lesion in ELDER (neoplastic vs infectious/inflammatory), SUV max 1.8  - No hypermetabolic LAD  - 0.9 cm L hypoattenuating L adrenal nodule, likely adenoma     11/29/2023 Tumor Conference    Tumor Board  - Recommend staging EBUS and L robotic bronchoscopy for sampling of ELDER cavitary nodule.  The nodule is highly suspicious for possible adenocarcinoma as it has evolved from a ground-glass opacity to a part solid and cavitary nodule.  Obtain brain MRI to complete staging.  The clinical picture suggests a possible T3 or T4 primary lung cancer or two separate primary lung cancers.        11/29/2023 Tumor Genotyping    Liquid Biopsy, [facility]  - CKDN2A, TP53 (three separate LOF mutations)     11/30/2023 Imaging Significant Findings    MRI Brain  - JAN     12/12/2023 Imaging Significant Findings    CT C, Non-Con  - 6.7 x 6.2 cm LLL mass (previously 6.6 x 5.9 cm), abutting and narrowing the adjacent LLL bronchus  - Enlarging lingular cavitary lesion, now 2.5 x 1.6 cm  - Slightly decreased size of RLL pleural based nodule     1/3/2024 Tumor  Conference    Tumor Board  - Agree with plan for SBRT of lingular lesion and chemoRT to LLL mass.      1/5/2024 Cancer Staged    Staging form: Lung, AJCC 8th Edition  - Clinical: Stage IIIA (cT4, cN0, cM0)     1/19/2024 -  Chemotherapy    Treatment Summary   Plan Name: OP NSCLC PACLITAXEL + CARBOPLATIN (AUC) QW + RADIATION  Treatment Goal: Curative  Status: Active  Start Date: 1/19/2024  End Date: 2/26/2024 (Planned)  Provider: Guanako Grove IV, MD  Chemotherapy: CARBOplatin (PARAPLATIN) 210 mg in sodium chloride 0.9% 136 mL chemo infusion, 210 mg (100 % of original dose 208.2 mg), Intravenous, Clinic/HOD 1 time, 3 of 6 cycles  Dose modification:   (original dose 208.2 mg, Cycle 1)  Administration: 210 mg (1/22/2024), 210 mg (1/29/2024)  PACLitaxeL (TAXOL) 45 mg/m2 = 84 mg in sodium chloride 0.9% 250 mL chemo infusion, 45 mg/m2 = 84 mg, Intravenous, Clinic/HOD 1 time, 3 of 6 cycles  Administration: 84 mg (1/22/2024), 84 mg (1/29/2024)     Adenocarcinoma of Lingula   10/6/2023 Imaging Significant Findings    CT C, Non-Con  - 6.6 x 5.9 cm LLL mass  - 2 x 1.2 cm cavitary lesion in lingula  - 0.9 x 0.8 cm pleural based nodule in R lung base  - Trace L pleural effusion  - Suspect hilar LAD  - Calcified hilar LN     11/6/2023 Imaging Significant Findings    PET CT  - 7.6 x 6.0 cm soft tissue mass in superior segment of LLL, SUV 7.2  - 2.5 x 1.6 cm cavitary lesion in ELDER (neoplastic vs infectious/inflammatory), SUV max 1.8  - No hypermetabolic LAD  - 0.9 cm L hypoattenuating L adrenal nodule, likely adenoma     11/29/2023 Tumor Conference    Tumor Board  - Recommend staging EBUS and L robotic bronchoscopy for sampling of ELDER cavitary nodule.  The nodule is highly suspicious for possible adenocarcinoma as it has evolved from a ground-glass opacity to a part solid and cavitary nodule.  Obtain brain MRI to complete staging.  The clinical picture suggests a possible T3 or T4 primary lung cancer or two separate primary lung  cancers.        11/29/2023 Tumor Genotyping    Liquid Biopsy, [facility]  - CKDN2A, TP53 (three separate LOF mutations)     11/30/2023 Imaging Significant Findings    MRI Brain  - JAN     12/12/2023 Imaging Significant Findings    CT C, Non-Con  - 6.7 x 6.2 cm LLL mass (previously 6.6 x 5.9 cm), abutting and narrowing the adjacent LLL bronchus  - Enlarging lingular cavitary lesion, now 2.5 x 1.6 cm  - Slightly decreased size of RLL pleural based nodule     12/19/2023 Procedure    Bronch with EBUS  ELDER, FNA and TBBx  - Adenocarcinoma (TTF1 positive, p40 negative)    LN  - Negative: Station 7  - Report: No pathologic enlargement of 4R, 4L, or 11R. Unable to evaluated 11L due to obstructing LLL endobronchial mass    [facility] NGS  - KRAS G12C  - TP53  - Negative: EGFR, ALK, BRAF, ROS1, RET, MET, ERBB2  - PD-L1: 10%       1/3/2024 Tumor Conference    Tumor Board  - Agree with plan for SBRT of lingular lesion and chemoRT to LLL mass.      1/5/2024 Cancer Staged    Staging form: Lung, AJCC 8th Edition  - Clinical: Stage IA3 (cT1c, cN0, cM0)            HPI:     Hernan Engel is a 68 y.o. male with pmh significant for COPD, T2DM, HLD, Stage IB ([id number]) moderately differentiated adenocarcinoma of RUL, initially dx'd 3/2014, s/p R upper lobectomy (3/2014, Dr. Mendez), and now two newly diagnosed concurrent lung cancers as below who presents for follow up:     1) Stage IIIA (T4N0M0) squamous cell carcinoma of the LLL (insufficient sample for PD-L1, no targetable mutations on liquid biopsy), initially dx'd 11/2/23, currently on CRT (1/23/24 - present) w/ weekly carboplatin/paclitaxel (1/22/24 - present)    2) Stage IA3 (R6wG9W8) adenocarcinoma of the lingula (KRAS G12C, PD-L1 10%), initially dx'd 12/19/23, currently on CRT (1/23/24 - present) w/ weekly carboplatin/paclitaxel (1/22/24 - present)    *Notably, pt has a RLL pleural based nodule, reviewed at TB, appears linear and unlikely malignant    Interval History:   - 2/5/24:  "C3D1 carboplatin/paclitaxel  - 2/7/24: Pre-vagal episode, called EMS. See note from 2/7/24    Pt states that he "feels a little sluggish but otherwise alright". He endorses constipation. He describes an episode yesterday when he was straining to have a bowel movement when developed sudden weakness. He tried to get off the toiled and then fell onto the floor (he denies loss of consciousness). He felt very sweaty and crawled to the door. His wife put a fan on him and helped him to the bed. After laying down, he started to feel better. 911 was called and EMS evaluated his vitals. He says that his blood pressure was "kind of low" but doesn't know the specific number. He says that he has been feeling well since that time and presented to radiation therapy after this episode. Of note, he restarted his metformin a couple of nights ago.     History has been obtained by chart review and discussion with the patient.    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    Lung cancer     Normocytic anemia 9/3/2014    Nuclear sclerosis of both eyes 9/4/2020    Seizure 2005    single event - not controlled by medication    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    T2DM (type 2 diabetes mellitus) 2014    DKA 9/2014        Past Surgical History:   Past Surgical History:   Procedure Laterality Date    BRONCHOSCOPY WITH FLUOROSCOPY N/A 11/2/2023    Procedure: BRONCHOSCOPY, WITH FLUOROSCOPY;  Surgeon: Provider, [facility];  Location: [facility] OR [address] [place] [address];  Service: Endoscopy;  Laterality: N/A;    CATARACT EXTRACTION Right 2021    COLONOSCOPY      LUNG REMOVAL, PARTIAL      ROBOTIC BRONCHOSCOPY " N/A 12/19/2023    Procedure: ROBOTIC BRONCHOSCOPY;  Surgeon: Hernandez Brewer MD;  Location: [facility] OR [address] [place] [address];  Service: Pulmonary;  Laterality: N/A;        Family History:   Family History   Problem Relation Age of Onset    No Known Problems Mother     Cancer Father         colon cancer    Colon cancer Father     Cataracts Father     Colon cancer Sister     Cancer Sister         colon or lung cancer     No Known Problems Sister     No Known Problems Sister     No Known Problems Brother     No Known Problems Maternal Aunt     No Known Problems Maternal Uncle     No Known Problems Paternal Aunt     No Known Problems Paternal Uncle     No Known Problems Maternal Grandmother     No Known Problems Maternal Grandfather     No Known Problems Paternal Grandmother     No Known Problems Paternal Grandfather     No Known Problems Other     Amblyopia Neg Hx     Blindness Neg Hx     Diabetes Neg Hx     Glaucoma Neg Hx     Hypertension Neg Hx     Macular degeneration Neg Hx     Retinal detachment Neg Hx     Strabismus Neg Hx     Stroke Neg Hx     Thyroid disease Neg Hx         Social History:   Social History     Tobacco Use    Smoking status: Former     Current packs/day: 0.00     Average packs/day: 1 pack/day for 30.0 years (30.0 ttl pk-yrs)     Types: Cigarettes     Start date: 1/15/1984     Quit date: 1/15/2014     Years since quitting: 10.0    Smokeless tobacco: Never   Substance Use Topics    Alcohol use: Yes     Alcohol/week: 5.7 standard drinks of alcohol     Types: 4 Cans of beer, 2 Standard drinks or equivalent per week     Comment: every other day beer drinker        I have reviewed and updated the patient's past medical, surgical, family and social histories.      ROS:   As per HPI.     Allergies:   Review of patient's allergies indicates:   Allergen Reactions    Adhesive Itching, Rash, Other (See Comments) and Dermatitis     Blister        Medications:   Current Outpatient Medications   Medication Sig Dispense  "Refill    albuterol (PROVENTIL HFA) 90 mcg/actuation inhaler Inhale 2 puffs into the lungs every 6 (six) hours as needed for Wheezing. Rescue 18 g 0    blood-glucose meter kit Use as instructed 1 each 0    dextrin (FIBER POWDER ORAL) Take 1 Package by mouth once.      efinaconazole (JUBLIA) 10 % Crow Apply 1 Application topically once daily. 8 mL 0    guaiFENesin (MUCINEX) 600 mg 12 hr tablet Take 1 tablet (600 mg total) by mouth every 12 (twelve) hours as needed for Congestion (cough). 30 tablet 0    metFORMIN (GLUCOPHAGE-XR) 500 MG ER 24hr tablet Take 2 tablets (1,000 mg total) by mouth 2 (two) times daily with meals. 60 tablet 1    OLANZapine (ZYPREXA) 5 MG tablet Take nights 1, 2, and 3 of each chemotherapy dose. 18 tablet 0    polyethylene glycol (GLYCOLAX) 17 gram/dose powder Mix 1 capful (17 g) and dissolve in glass of water to take by mouth once daily. 510 g 0    prochlorperazine (COMPAZINE) 5 MG tablet Take 1 tablet (5 mg total) by mouth every 6 (six) hours as needed for Nausea. 30 tablet 1    rosuvastatin (CRESTOR) 5 MG tablet Take 4 tablets (20 mg total) by mouth once daily. 90 tablet 3    senna (SENOKOT) 8.6 mg tablet Take 1 tablet by mouth 2 (two) times daily. 60 tablet 2    umeclidinium (INCRUSE ELLIPTA) 62.5 mcg/actuation inhalation capsule Inhale 62.5 mcg into the lungs once daily. Controller 30 each 0     No current facility-administered medications for this visit.          Physical Exam:       /75 (BP Location: Right arm, Patient Position: Sitting, BP Method: Medium (Automatic))   Pulse 73   Temp 98.1 °F (36.7 °C) (Oral)   Resp 16   Ht 5' 7" (1.702 m)   Wt 76.4 kg (168 lb 6.9 oz)   SpO2 96%   BMI 26.38 kg/m²                Physical Exam  Constitutional:       Appearance: Normal appearance.   HENT:      Head: Normocephalic and atraumatic.   Eyes:      Extraocular Movements: Extraocular movements intact.      Conjunctiva/sclera: Conjunctivae normal.      Pupils: Pupils are equal, round, " and reactive to light.   Cardiovascular:      Rate and Rhythm: Normal rate and regular rhythm.      Heart sounds: No murmur heard.     No friction rub. No gallop.   Pulmonary:      Effort: Pulmonary effort is normal.      Breath sounds: Rhonchi (Scant b/l rhonchi in posterior lung fields) present. No rales. Wheezes: Slight expiratory wheeze in posterior lung fields bilaterally.  Musculoskeletal:         General: Normal range of motion.      Right lower leg: No edema.      Left lower leg: No edema.   Skin:     General: Skin is warm and dry.   Neurological:      Mental Status: He is alert and oriented to person, place, and time.   Psychiatric:         Mood and Affect: Mood normal.         Thought Content: Thought content normal.         Judgment: Judgment normal.           Labs:   Recent Results (from the past 48 hour(s))   CBC w/ DIFF    Collection Time: 02/08/24  7:22 AM   Result Value Ref Range    WBC 2.86 (L) 3.90 - 12.70 K/uL    RBC 4.38 (L) 4.60 - 6.20 M/uL    Hemoglobin 11.7 (L) 14.0 - 18.0 g/dL    Hematocrit 35.7 (L) 40.0 - 54.0 %    MCV 82 82 - 98 fL    MCH 26.7 (L) 27.0 - 31.0 pg    MCHC 32.8 32.0 - 36.0 g/dL    RDW 13.6 11.5 - 14.5 %    Platelets 220 150 - 450 K/uL    MPV 10.0 9.2 - 12.9 fL    Immature Granulocytes 0.3 0.0 - 0.5 %    Gran # (ANC) 2.3 1.8 - 7.7 K/uL    Immature Grans (Abs) 0.01 0.00 - 0.04 K/uL    Lymph # 0.3 (L) 1.0 - 4.8 K/uL    Mono # 0.2 (L) 0.3 - 1.0 K/uL    Eos # 0.0 0.0 - 0.5 K/uL    Baso # 0.02 0.00 - 0.20 K/uL    nRBC 1 (A) 0 /100 WBC    Gran % 80.9 (H) 38.0 - 73.0 %    Lymph % 8.7 (L) 18.0 - 48.0 %    Mono % 8.0 4.0 - 15.0 %    Eosinophil % 1.4 0.0 - 8.0 %    Basophil % 0.7 0.0 - 1.9 %    Differential Method Automated    CMP    Collection Time: 02/08/24  7:22 AM   Result Value Ref Range    Sodium 134 (L) 136 - 145 mmol/L    Potassium 4.1 3.5 - 5.1 mmol/L    Chloride 102 95 - 110 mmol/L    CO2 24 23 - 29 mmol/L    Glucose 295 (H) 70 - 110 mg/dL    BUN 18 8 - 23 mg/dL    Creatinine  0.9 0.5 - 1.4 mg/dL    Calcium 10.0 8.7 - 10.5 mg/dL    Total Protein 7.3 6.0 - 8.4 g/dL    Albumin 3.6 3.5 - 5.2 g/dL    Total Bilirubin 0.7 0.1 - 1.0 mg/dL    Alkaline Phosphatase 117 55 - 135 U/L    AST 12 10 - 40 U/L    ALT 16 10 - 44 U/L    eGFR >60.0 >60 mL/min/1.73 m^2    Anion Gap 8 8 - 16 mmol/L            Imaging:    See oncologic history as above.     Path:  See oncologic history above.      Assessment and Plan:     Hernan Engel is a 68 y.o. male with pmh significant for COPD, T2DM, HLD, Stage IB (tC8pV2O8) moderately differentiated adenocarcinoma of RUL, initially dx'd 3/2014, s/p R upper lobectomy (3/2014, Dr. Mendez), and now two newly diagnosed concurrent lung cancers as below who presents for follow up:     1) Stage IIIA (T4N0M0) squamous cell carcinoma of the LLL (insufficient sample for PD-L1, no targetable mutations on liquid biopsy), initially dx'd 11/2/23, currently on CRT (1/23/24 - present) w/ weekly carboplatin/paclitaxel (1/22/24 - present)    2) Stage IA3 (D3hG8A8) adenocarcinoma of the lingula (KRAS G12C, PD-L1 10%), initially dx'd 12/19/23, currently on CRT (1/23/24 - present) w/ weekly carboplatin/paclitaxel (1/22/24 - present)    *Notably, pt has a RLL pleural based nodule, reviewed at TB, appears linear and unlikely malignant    Stage IIIA Squamous Cell Carcinoma of the LLL, No PD-L1, No Targetable Mutations  ECOG PS 0.  Pt is currently on CRT with weekly carboplatin/paclitaxel, tolerating with mild fatigue as well as a vagal episode (see below). Most recent imaging (CT C, 12/12/23) is from prior to treatment start. Will proceed w/ C4 on 2/12/24.    PLAN:   -- Continue CRT, C4D1 carboplatin/paclitaxel on 2/12/23. Labs labs acceptable (WBC downtrending to 2.86 but ANC 2.3, Hgb downtrending to 11.7) and treatment signed  -- RTC on 2/16/24, pt prefers Friday follow ups in advance of Monday infusions  -- Repeat imaging following completion of CRT  -- Message previously sent to SW  regarding transportation needs      Stage IA3 Adenocarcinoma of the Lingula, NGS and PD-L1 Pending  Bronchoscopy demonstrates adenocarcinoma of the lingula, separate from the squamous cell carcinoma in the LLL.  This is a T1c lesion which, given lack of lymphadenopathy, makes this a Stage IA3 disease.  Plan for treatment w/ CRT, as above  -- Treatment as above      Constipation  Pt with baseline constipation. Previously taking milk of magnesia, held in setting of new chemo start. Started on miralax and senna initially to good effect, will continue though not less effective. Okay to resume milk of magnesia.   -- Miralax 17 gm daily  -- Senna 8.6 mg BID  -- Okay to resume milk of magnesia  -- Consider increasing regimen pending ongoing symptoms      Vagal Episode  See HPI for description. Favor that this was a vasovagal episode related to straining for a bowel movement, given characterization and subsequent improvement without recurrence. Notably, pt had recently resumed his metformin, however typically metformin does not cause hypoglycemia. Discussed with pt to reach out if subsequent events occur.   -- Continue to monitor      The above information has been reviewed with the patient and all questions have been answered to their apparent satisfaction.  They understand that they can call the clinic with any questions.    Guanako Grove MD  Hematology/Oncology  [facility]        Med Onc Chart Routing      Follow up with physician . As scheduled   Follow up with SHARRI    Infusion scheduling note    Injection scheduling note    Labs    Imaging    Pharmacy appointment    Other referrals

## 2024-02-09 ENCOUNTER — TELEPHONE (OUTPATIENT)
Dept: HEMATOLOGY/ONCOLOGY | Facility: CLINIC | Age: 69
End: 2024-02-09
Payer: MEDICARE

## 2024-02-09 PROCEDURE — 77014 PR  CT GUIDANCE PLACEMENT RAD THERAPY FIELDS: CPT | Mod: 26,,, | Performed by: STUDENT IN AN ORGANIZED HEALTH CARE EDUCATION/TRAINING PROGRAM

## 2024-02-09 PROCEDURE — 77386 HC IMRT, COMPLEX: CPT | Performed by: STUDENT IN AN ORGANIZED HEALTH CARE EDUCATION/TRAINING PROGRAM

## 2024-02-09 NOTE — TELEPHONE ENCOUNTER
1st attempt to reach pt in regards to nutrition referral placed by Dr. Arora. MA unable to leave voicemail due to mailbox not set up.    [place], [place] ext [phone]

## 2024-02-12 ENCOUNTER — INFUSION (OUTPATIENT)
Dept: INFUSION THERAPY | Facility: HOSPITAL | Age: 69
End: 2024-02-12
Attending: INTERNAL MEDICINE
Payer: MEDICARE

## 2024-02-12 ENCOUNTER — DOCUMENTATION ONLY (OUTPATIENT)
Dept: RADIATION ONCOLOGY | Facility: CLINIC | Age: 69
End: 2024-02-12
Payer: MEDICARE

## 2024-02-12 VITALS
SYSTOLIC BLOOD PRESSURE: 112 MMHG | BODY MASS INDEX: 26.44 KG/M2 | DIASTOLIC BLOOD PRESSURE: 61 MMHG | RESPIRATION RATE: 18 BRPM | TEMPERATURE: 98 F | HEIGHT: 67 IN | WEIGHT: 168.44 LBS | HEART RATE: 79 BPM

## 2024-02-12 DIAGNOSIS — C34.32 SQUAMOUS CELL CARCINOMA OF BRONCHUS IN LEFT LOWER LOBE: Primary | ICD-10-CM

## 2024-02-12 DIAGNOSIS — E11.8 TYPE 2 DIABETES MELLITUS WITH COMPLICATION, WITHOUT LONG-TERM CURRENT USE OF INSULIN: ICD-10-CM

## 2024-02-12 PROCEDURE — 77386 HC IMRT, COMPLEX: CPT | Performed by: STUDENT IN AN ORGANIZED HEALTH CARE EDUCATION/TRAINING PROGRAM

## 2024-02-12 PROCEDURE — A4216 STERILE WATER/SALINE, 10 ML: HCPCS | Performed by: HOSPITALIST

## 2024-02-12 PROCEDURE — 96375 TX/PRO/DX INJ NEW DRUG ADDON: CPT

## 2024-02-12 PROCEDURE — 63600175 PHARM REV CODE 636 W HCPCS: Performed by: HOSPITALIST

## 2024-02-12 PROCEDURE — 96413 CHEMO IV INFUSION 1 HR: CPT

## 2024-02-12 PROCEDURE — 25000003 PHARM REV CODE 250: Performed by: HOSPITALIST

## 2024-02-12 PROCEDURE — 96417 CHEMO IV INFUS EACH ADDL SEQ: CPT

## 2024-02-12 PROCEDURE — 77014 PR  CT GUIDANCE PLACEMENT RAD THERAPY FIELDS: CPT | Mod: 26,,, | Performed by: STUDENT IN AN ORGANIZED HEALTH CARE EDUCATION/TRAINING PROGRAM

## 2024-02-12 PROCEDURE — 96367 TX/PROPH/DG ADDL SEQ IV INF: CPT

## 2024-02-12 RX ORDER — HEPARIN 100 UNIT/ML
500 SYRINGE INTRAVENOUS
Status: DISCONTINUED | OUTPATIENT
Start: 2024-02-12 | End: 2024-02-12 | Stop reason: HOSPADM

## 2024-02-12 RX ORDER — DIPHENHYDRAMINE HYDROCHLORIDE 50 MG/ML
50 INJECTION INTRAMUSCULAR; INTRAVENOUS ONCE AS NEEDED
Status: DISCONTINUED | OUTPATIENT
Start: 2024-02-12 | End: 2024-02-12 | Stop reason: HOSPADM

## 2024-02-12 RX ORDER — SODIUM CHLORIDE 0.9 % (FLUSH) 0.9 %
10 SYRINGE (ML) INJECTION
Status: DISCONTINUED | OUTPATIENT
Start: 2024-02-12 | End: 2024-02-12 | Stop reason: HOSPADM

## 2024-02-12 RX ORDER — PROCHLORPERAZINE EDISYLATE 5 MG/ML
5 INJECTION INTRAMUSCULAR; INTRAVENOUS ONCE AS NEEDED
Status: DISCONTINUED | OUTPATIENT
Start: 2024-02-12 | End: 2024-02-12 | Stop reason: HOSPADM

## 2024-02-12 RX ORDER — FAMOTIDINE 10 MG/ML
20 INJECTION INTRAVENOUS
Status: COMPLETED | OUTPATIENT
Start: 2024-02-12 | End: 2024-02-12

## 2024-02-12 RX ORDER — METFORMIN HYDROCHLORIDE 500 MG/1
1000 TABLET, EXTENDED RELEASE ORAL 2 TIMES DAILY WITH MEALS
Qty: 60 TABLET | Refills: 0 | Status: SHIPPED | OUTPATIENT
Start: 2024-02-12 | End: 2024-02-28 | Stop reason: SDUPTHER

## 2024-02-12 RX ORDER — EPINEPHRINE 0.3 MG/.3ML
0.3 INJECTION SUBCUTANEOUS ONCE AS NEEDED
Status: DISCONTINUED | OUTPATIENT
Start: 2024-02-12 | End: 2024-02-12 | Stop reason: HOSPADM

## 2024-02-12 RX ADMIN — FAMOTIDINE 20 MG: 10 INJECTION INTRAVENOUS at 07:02

## 2024-02-12 RX ADMIN — PACLITAXEL 84 MG: 6 INJECTION, SOLUTION INTRAVENOUS at 08:02

## 2024-02-12 RX ADMIN — DIPHENHYDRAMINE HYDROCHLORIDE 50 MG: 50 INJECTION, SOLUTION INTRAMUSCULAR; INTRAVENOUS at 08:02

## 2024-02-12 RX ADMIN — CARBOPLATIN 225 MG: 10 INJECTION, SOLUTION INTRAVENOUS at 09:02

## 2024-02-12 RX ADMIN — SODIUM CHLORIDE: 9 INJECTION, SOLUTION INTRAVENOUS at 07:02

## 2024-02-12 RX ADMIN — DEXAMETHASONE SODIUM PHOSPHATE 0.25 MG: 4 INJECTION, SOLUTION INTRA-ARTICULAR; INTRALESIONAL; INTRAMUSCULAR; INTRAVENOUS; SOFT TISSUE at 07:02

## 2024-02-12 RX ADMIN — Medication 10 ML: at 10:02

## 2024-02-12 NOTE — PLAN OF CARE
1030-Pt tolerated Taxol and Carbo well today, no complaints or complications. VSS through duration of treatment. Pt aware to call provider with any questions or concerns and is aware of upcoming appts. Pt ambulatory from clinic with steady gait, no distress noted.

## 2024-02-12 NOTE — PLAN OF CARE
0700-Labs, hx, and medications reviewed, pt meets parameters for treatment today. Assessment completed and plan of care reviewed. Pt verbalized understanding. PIV accessed with no complications. Pt voices no new complaints or concerns, will continue to monitor for safety.

## 2024-02-13 PROCEDURE — 77336 RADIATION PHYSICS CONSULT: CPT | Performed by: STUDENT IN AN ORGANIZED HEALTH CARE EDUCATION/TRAINING PROGRAM

## 2024-02-14 PROCEDURE — 77014 PR  CT GUIDANCE PLACEMENT RAD THERAPY FIELDS: CPT | Mod: 26,,, | Performed by: STUDENT IN AN ORGANIZED HEALTH CARE EDUCATION/TRAINING PROGRAM

## 2024-02-14 PROCEDURE — 77386 HC IMRT, COMPLEX: CPT | Performed by: STUDENT IN AN ORGANIZED HEALTH CARE EDUCATION/TRAINING PROGRAM

## 2024-02-14 PROCEDURE — 77427 RADIATION TX MANAGEMENT X5: CPT | Mod: ,,, | Performed by: STUDENT IN AN ORGANIZED HEALTH CARE EDUCATION/TRAINING PROGRAM

## 2024-02-15 ENCOUNTER — TELEPHONE (OUTPATIENT)
Dept: HEMATOLOGY/ONCOLOGY | Facility: CLINIC | Age: 69
End: 2024-02-15
Payer: MEDICARE

## 2024-02-15 PROCEDURE — 77386 HC IMRT, COMPLEX: CPT | Performed by: STUDENT IN AN ORGANIZED HEALTH CARE EDUCATION/TRAINING PROGRAM

## 2024-02-15 PROCEDURE — 77014 PR  CT GUIDANCE PLACEMENT RAD THERAPY FIELDS: CPT | Mod: 26,,, | Performed by: STUDENT IN AN ORGANIZED HEALTH CARE EDUCATION/TRAINING PROGRAM

## 2024-02-16 ENCOUNTER — OFFICE VISIT (OUTPATIENT)
Dept: HEMATOLOGY/ONCOLOGY | Facility: CLINIC | Age: 69
End: 2024-02-16
Payer: MEDICARE

## 2024-02-16 ENCOUNTER — LAB VISIT (OUTPATIENT)
Dept: LAB | Facility: HOSPITAL | Age: 69
End: 2024-02-16
Attending: HOSPITALIST
Payer: MEDICARE

## 2024-02-16 VITALS
HEART RATE: 88 BPM | TEMPERATURE: 98 F | DIASTOLIC BLOOD PRESSURE: 84 MMHG | OXYGEN SATURATION: 97 % | BODY MASS INDEX: 26.44 KG/M2 | WEIGHT: 168.44 LBS | RESPIRATION RATE: 18 BRPM | HEIGHT: 67 IN | SYSTOLIC BLOOD PRESSURE: 128 MMHG

## 2024-02-16 DIAGNOSIS — C34.12 ADENOCARCINOMA OF UPPER LOBE OF LEFT LUNG: ICD-10-CM

## 2024-02-16 DIAGNOSIS — C34.32 SQUAMOUS CELL CARCINOMA OF BRONCHUS IN LEFT LOWER LOBE: Primary | ICD-10-CM

## 2024-02-16 DIAGNOSIS — C34.32 SQUAMOUS CELL CARCINOMA OF BRONCHUS IN LEFT LOWER LOBE: ICD-10-CM

## 2024-02-16 DIAGNOSIS — K59.00 CONSTIPATION, UNSPECIFIED CONSTIPATION TYPE: ICD-10-CM

## 2024-02-16 LAB
ALBUMIN SERPL BCP-MCNC: 3.8 G/DL (ref 3.5–5.2)
ALP SERPL-CCNC: 118 U/L (ref 55–135)
ALT SERPL W/O P-5'-P-CCNC: 19 U/L (ref 10–44)
ANION GAP SERPL CALC-SCNC: 8 MMOL/L (ref 8–16)
AST SERPL-CCNC: 14 U/L (ref 10–40)
BASOPHILS # BLD AUTO: 0.02 K/UL (ref 0–0.2)
BASOPHILS NFR BLD: 0.6 % (ref 0–1.9)
BILIRUB SERPL-MCNC: 1 MG/DL (ref 0.1–1)
BUN SERPL-MCNC: 18 MG/DL (ref 8–23)
CALCIUM SERPL-MCNC: 9.9 MG/DL (ref 8.7–10.5)
CHLORIDE SERPL-SCNC: 102 MMOL/L (ref 95–110)
CO2 SERPL-SCNC: 24 MMOL/L (ref 23–29)
CREAT SERPL-MCNC: 1 MG/DL (ref 0.5–1.4)
DIFFERENTIAL METHOD BLD: ABNORMAL
EOSINOPHIL # BLD AUTO: 0 K/UL (ref 0–0.5)
EOSINOPHIL NFR BLD: 1.1 % (ref 0–8)
ERYTHROCYTE [DISTWIDTH] IN BLOOD BY AUTOMATED COUNT: 14.2 % (ref 11.5–14.5)
EST. GFR  (NO RACE VARIABLE): >60 ML/MIN/1.73 M^2
GLUCOSE SERPL-MCNC: 375 MG/DL (ref 70–110)
HCT VFR BLD AUTO: 35.7 % (ref 40–54)
HGB BLD-MCNC: 11.9 G/DL (ref 14–18)
IMM GRANULOCYTES # BLD AUTO: 0.01 K/UL (ref 0–0.04)
IMM GRANULOCYTES NFR BLD AUTO: 0.3 % (ref 0–0.5)
LYMPHOCYTES # BLD AUTO: 0.2 K/UL (ref 1–4.8)
LYMPHOCYTES NFR BLD: 4.8 % (ref 18–48)
MCH RBC QN AUTO: 27.4 PG (ref 27–31)
MCHC RBC AUTO-ENTMCNC: 33.3 G/DL (ref 32–36)
MCV RBC AUTO: 82 FL (ref 82–98)
MONOCYTES # BLD AUTO: 0.3 K/UL (ref 0.3–1)
MONOCYTES NFR BLD: 8 % (ref 4–15)
NEUTROPHILS # BLD AUTO: 3 K/UL (ref 1.8–7.7)
NEUTROPHILS NFR BLD: 85.2 % (ref 38–73)
NRBC BLD-RTO: 1 /100 WBC
PLATELET # BLD AUTO: 199 K/UL (ref 150–450)
PMV BLD AUTO: 9.9 FL (ref 9.2–12.9)
POTASSIUM SERPL-SCNC: 4.3 MMOL/L (ref 3.5–5.1)
PROT SERPL-MCNC: 7.2 G/DL (ref 6–8.4)
RBC # BLD AUTO: 4.35 M/UL (ref 4.6–6.2)
SODIUM SERPL-SCNC: 134 MMOL/L (ref 136–145)
WBC # BLD AUTO: 3.51 K/UL (ref 3.9–12.7)

## 2024-02-16 PROCEDURE — 99999 PR PBB SHADOW E&M-EST. PATIENT-LVL III: CPT | Mod: PBBFAC,,, | Performed by: HOSPITALIST

## 2024-02-16 PROCEDURE — 99215 OFFICE O/P EST HI 40 MIN: CPT | Mod: S$GLB,,, | Performed by: HOSPITALIST

## 2024-02-16 PROCEDURE — 85025 COMPLETE CBC W/AUTO DIFF WBC: CPT | Performed by: HOSPITALIST

## 2024-02-16 PROCEDURE — 80053 COMPREHEN METABOLIC PANEL: CPT | Performed by: HOSPITALIST

## 2024-02-16 PROCEDURE — 36415 COLL VENOUS BLD VENIPUNCTURE: CPT | Performed by: HOSPITALIST

## 2024-02-16 PROCEDURE — 77386 HC IMRT, COMPLEX: CPT | Performed by: STUDENT IN AN ORGANIZED HEALTH CARE EDUCATION/TRAINING PROGRAM

## 2024-02-16 PROCEDURE — 77014 PR  CT GUIDANCE PLACEMENT RAD THERAPY FIELDS: CPT | Mod: 26,,, | Performed by: STUDENT IN AN ORGANIZED HEALTH CARE EDUCATION/TRAINING PROGRAM

## 2024-02-16 RX ORDER — HEPARIN 100 UNIT/ML
500 SYRINGE INTRAVENOUS
Status: CANCELLED | OUTPATIENT
Start: 2024-02-19

## 2024-02-16 RX ORDER — FAMOTIDINE 10 MG/ML
20 INJECTION INTRAVENOUS
Status: CANCELLED | OUTPATIENT
Start: 2024-02-19

## 2024-02-16 RX ORDER — EPINEPHRINE 0.3 MG/.3ML
0.3 INJECTION SUBCUTANEOUS ONCE AS NEEDED
Status: CANCELLED | OUTPATIENT
Start: 2024-02-19

## 2024-02-16 RX ORDER — DIPHENHYDRAMINE HYDROCHLORIDE 50 MG/ML
50 INJECTION INTRAMUSCULAR; INTRAVENOUS ONCE AS NEEDED
Status: CANCELLED | OUTPATIENT
Start: 2024-02-19

## 2024-02-16 RX ORDER — PROCHLORPERAZINE EDISYLATE 5 MG/ML
5 INJECTION INTRAMUSCULAR; INTRAVENOUS ONCE AS NEEDED
Status: CANCELLED
Start: 2024-02-19

## 2024-02-16 RX ORDER — SODIUM CHLORIDE 0.9 % (FLUSH) 0.9 %
10 SYRINGE (ML) INJECTION
Status: CANCELLED | OUTPATIENT
Start: 2024-02-19

## 2024-02-16 NOTE — PROGRESS NOTES
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   9/15/2023 Imaging Significant Findings    CXR (URI)  - Masslike focus along L hilar region     10/6/2023 Imaging Significant Findings    CT C, Non-Con  - 6.6 x 5.9 cm LLL mass  - 2 x 1.2 cm cavitary lesion in lingula  - 0.9 x 0.8 cm pleural based nodule in R lung base  - Trace L pleural effusion  - Suspect hilar LAD  - Calcified hilar LN     11/2/2023 Procedure    Bronchoscopy  LLL, Endobronchial Biopsy  - Squamous cell carcinoma (CDK 5/6, p63 positive, CK7 and TTF negative)    [facility] N[facility]  - Insufficient tissue     11/6/2023 Imaging Significant Findings    PET CT  - 7.6 x 6.0 cm soft tissue mass in superior segment of LLL, SUV 7.2  - 2.5 x 1.6 cm cavitary lesion in ELDER (neoplastic vs infectious/inflammatory), SUV max 1.8  - No hypermetabolic LAD  - 0.9 cm L hypoattenuating L adrenal nodule, likely adenoma     11/29/2023 Tumor Conference    Tumor Board  - Recommend staging EBUS and L robotic bronchoscopy for sampling of ELDER cavitary nodule.  The nodule is highly suspicious for possible adenocarcinoma as it has evolved from a ground-glass opacity to a part solid and cavitary nodule.  Obtain brain MRI to complete staging.  The clinical picture suggests a possible T3 or T4 primary lung cancer or two separate primary lung cancers.        11/29/2023 Tumor Genotyping    Liquid Biopsy, [facility]  - CKDN2A, TP53 (three separate LOF mutations)     11/30/2023 Imaging Significant Findings    MRI Brain  - JAN     12/12/2023 Imaging Significant Findings    CT C, Non-Con  - 6.7 x 6.2 cm LLL mass (previously 6.6 x 5.9 cm), abutting and narrowing the adjacent LLL bronchus  - Enlarging lingular cavitary lesion, now 2.5 x 1.6 cm  - Slightly decreased size of RLL pleural based nodule     1/3/2024 Tumor  Conference    Tumor Board  - Agree with plan for SBRT of lingular lesion and chemoRT to LLL mass.      1/5/2024 Cancer Staged    Staging form: Lung, AJCC 8th Edition  - Clinical: Stage IIIA (cT4, cN0, cM0)     1/19/2024 -  Chemotherapy    Treatment Summary   Plan Name: OP NSCLC PACLITAXEL + CARBOPLATIN (AUC) QW + RADIATION  Treatment Goal: Curative  Status: Active  Start Date: 1/19/2024  End Date: 2/26/2024 (Planned)  Provider: Guanako Grove IV, MD  Chemotherapy: CARBOplatin (PARAPLATIN) 210 mg in sodium chloride 0.9% 136 mL chemo infusion, 210 mg (100 % of original dose 208.2 mg), Intravenous, Clinic/HOD 1 time, 4 of 6 cycles  Dose modification:   (original dose 208.2 mg, Cycle 1)  Administration: 210 mg (1/22/2024), 210 mg (1/29/2024), 180 mg (2/5/2024), 225 mg (2/12/2024)  PACLitaxeL (TAXOL) 45 mg/m2 = 84 mg in sodium chloride 0.9% 250 mL chemo infusion, 45 mg/m2 = 84 mg, Intravenous, Clinic/HOD 1 time, 4 of 6 cycles  Administration: 84 mg (1/22/2024), 84 mg (1/29/2024), 84 mg (2/5/2024), 84 mg (2/12/2024)     Adenocarcinoma of Lingula   10/6/2023 Imaging Significant Findings    CT C, Non-Con  - 6.6 x 5.9 cm LLL mass  - 2 x 1.2 cm cavitary lesion in lingula  - 0.9 x 0.8 cm pleural based nodule in R lung base  - Trace L pleural effusion  - Suspect hilar LAD  - Calcified hilar LN     11/6/2023 Imaging Significant Findings    PET CT  - 7.6 x 6.0 cm soft tissue mass in superior segment of LLL, SUV 7.2  - 2.5 x 1.6 cm cavitary lesion in ELDER (neoplastic vs infectious/inflammatory), SUV max 1.8  - No hypermetabolic LAD  - 0.9 cm L hypoattenuating L adrenal nodule, likely adenoma     11/29/2023 Tumor Conference    Tumor Board  - Recommend staging EBUS and L robotic bronchoscopy for sampling of ELDER cavitary nodule.  The nodule is highly suspicious for possible adenocarcinoma as it has evolved from a ground-glass opacity to a part solid and cavitary nodule.  Obtain brain MRI to complete staging.  The clinical picture  suggests a possible T3 or T4 primary lung cancer or two separate primary lung cancers.        11/29/2023 Tumor Genotyping    Liquid Biopsy, [facility]  - CKDN2A, TP53 (three separate LOF mutations)     11/30/2023 Imaging Significant Findings    MRI Brain  - JAN     12/12/2023 Imaging Significant Findings    CT C, Non-Con  - 6.7 x 6.2 cm LLL mass (previously 6.6 x 5.9 cm), abutting and narrowing the adjacent LLL bronchus  - Enlarging lingular cavitary lesion, now 2.5 x 1.6 cm  - Slightly decreased size of RLL pleural based nodule     12/19/2023 Procedure    Bronch with EBUS  ELDER, FNA and TBBx  - Adenocarcinoma (TTF1 positive, p40 negative)    LN  - Negative: Station 7  - Report: No pathologic enlargement of 4R, 4L, or 11R. Unable to evaluated 11L due to obstructing LLL endobronchial mass    [facility] NGS  - KRAS G12C  - TP53  - Negative: EGFR, ALK, BRAF, ROS1, RET, MET, ERBB2  - PD-L1: 10%       1/3/2024 Tumor Conference    Tumor Board  - Agree with plan for SBRT of lingular lesion and chemoRT to LLL mass.      1/5/2024 Cancer Staged    Staging form: Lung, AJCC 8th Edition  - Clinical: Stage IA3 (cT1c, cN0, cM0)            HPI:     Hernan Engel is a 68 y.o. male with pmh significant for COPD, T2DM, HLD, Stage IB ([id number]) moderately differentiated adenocarcinoma of RUL, initially dx'd 3/2014, s/p R upper lobectomy (3/2014, Dr. Mendez), and now two newly diagnosed concurrent lung cancers as below who presents for follow up:     1) Stage IIIA (T4N0M0) squamous cell carcinoma of the LLL (insufficient sample for PD-L1, no targetable mutations on liquid biopsy), initially dx'd 11/2/23, currently on CRT (1/23/24 - present) w/ weekly carboplatin/paclitaxel (1/22/24 - present)    2) Stage IA3 ([id number]) adenocarcinoma of the lingula (KRAS G12C, PD-L1 10%), initially dx'd 12/19/23, currently on CRT (1/23/24 - present) w/ weekly carboplatin/paclitaxel (1/22/24 - present)    *Notably, pt has a RLL pleural based nodule, reviewed at  TB, appears linear and unlikely malignant    Interval History:   - 2/12/24: C4D1    Pt states that he has been feeling well since his last visit. He denies any further episodes of fall or sudden weakness (see previous note). He denies issue with constipation today. He continues on his metformin. He has started walking around 2 miles daily. He says that he does feel tired but he is working on fighting this (he continues to do household chores like working on cars and cutting grass). He says that he feels otherwise, and denies N/V, diarrhea, or other new or bothersome symptoms at this time.       History has been obtained by chart review and discussion with the patient.    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    Lung cancer     Normocytic anemia 9/3/2014    Nuclear sclerosis of both eyes 9/4/2020    Seizure 2005    single event - not controlled by medication    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    T2DM (type 2 diabetes mellitus) 2014    DKA 9/2014        Past Surgical History:   Past Surgical History:   Procedure Laterality Date    BRONCHOSCOPY WITH FLUOROSCOPY N/A 11/2/2023    Procedure: BRONCHOSCOPY, WITH FLUOROSCOPY;  Surgeon: Provider, [facility];  Location: [facility] OR [address] [place] [address];  Service: Endoscopy;  Laterality: N/A;    CATARACT EXTRACTION Right 2021    COLONOSCOPY      LUNG REMOVAL, PARTIAL      ROBOTIC BRONCHOSCOPY N/A 12/19/2023    Procedure: ROBOTIC BRONCHOSCOPY;  Surgeon: Hernandez Brewer MD;  Location: [facility] OR [address] [place] [address];  Service: Pulmonary;  Laterality: N/A;        Family History:   Family History   Problem Relation Age of Onset    No Known  Problems Mother     Cancer Father         colon cancer    Colon cancer Father     Cataracts Father     Colon cancer Sister     Cancer Sister         colon or lung cancer     No Known Problems Sister     No Known Problems Sister     No Known Problems Brother     No Known Problems Maternal Aunt     No Known Problems Maternal Uncle     No Known Problems Paternal Aunt     No Known Problems Paternal Uncle     No Known Problems Maternal Grandmother     No Known Problems Maternal Grandfather     No Known Problems Paternal Grandmother     No Known Problems Paternal Grandfather     No Known Problems Other     Amblyopia Neg Hx     Blindness Neg Hx     Diabetes Neg Hx     Glaucoma Neg Hx     Hypertension Neg Hx     Macular degeneration Neg Hx     Retinal detachment Neg Hx     Strabismus Neg Hx     Stroke Neg Hx     Thyroid disease Neg Hx         Social History:   Social History     Tobacco Use    Smoking status: Former     Current packs/day: 0.00     Average packs/day: 1 pack/day for 30.0 years (30.0 ttl pk-yrs)     Types: Cigarettes     Start date: 1/15/1984     Quit date: 1/15/2014     Years since quitting: 10.0    Smokeless tobacco: Never   Substance Use Topics    Alcohol use: Yes     Alcohol/week: 5.7 standard drinks of alcohol     Types: 4 Cans of beer, 2 Standard drinks or equivalent per week     Comment: every other day beer drinker        I have reviewed and updated the patient's past medical, surgical, family and social histories.      ROS:   As per HPI.     Allergies:   Review of patient's allergies indicates:   Allergen Reactions    Adhesive Itching, Rash, Other (See Comments) and Dermatitis     Blister        Medications:   Current Outpatient Medications   Medication Sig Dispense Refill    albuterol (PROVENTIL HFA) 90 mcg/actuation inhaler Inhale 2 puffs into the lungs every 6 (six) hours as needed for Wheezing. Rescue 18 g 0    blood-glucose meter kit Use as instructed 1 each 0    dextrin (FIBER POWDER ORAL) Take  "1 Package by mouth once.      efinaconazole (JUBLIA) 10 % Crow Apply 1 Application topically once daily. 8 mL 0    guaiFENesin (MUCINEX) 600 mg 12 hr tablet Take 1 tablet (600 mg total) by mouth every 12 (twelve) hours as needed for Congestion (cough). 30 tablet 0    metFORMIN (GLUCOPHAGE-XR) 500 MG ER 24hr tablet Take 2 tablets (1,000 mg total) by mouth 2 (two) times daily with meals. 60 tablet 0    OLANZapine (ZYPREXA) 5 MG tablet Take nights 1, 2, and 3 of each chemotherapy dose. 18 tablet 0    polyethylene glycol (GLYCOLAX) 17 gram/dose powder Mix 1 capful (17 g) and dissolve in glass of water to take by mouth once daily. 510 g 0    prochlorperazine (COMPAZINE) 5 MG tablet Take 1 tablet (5 mg total) by mouth every 6 (six) hours as needed for Nausea. 30 tablet 1    rosuvastatin (CRESTOR) 5 MG tablet Take 4 tablets (20 mg total) by mouth once daily. 90 tablet 3    senna (SENOKOT) 8.6 mg tablet Take 1 tablet by mouth 2 (two) times daily. 60 tablet 2    umeclidinium (INCRUSE ELLIPTA) 62.5 mcg/actuation inhalation capsule Inhale 62.5 mcg into the lungs once daily. Controller 30 each 0     No current facility-administered medications for this visit.          Physical Exam:       /84 (BP Location: Right arm, Patient Position: Sitting, BP Method: Medium (Automatic))   Pulse 88   Temp 98.4 °F (36.9 °C) (Oral)   Resp 18   Ht 5' 7" (1.702 m)   Wt 76.4 kg (168 lb 6.9 oz)   SpO2 97%   BMI 26.38 kg/m²                Physical Exam  Constitutional:       Appearance: Normal appearance.   HENT:      Head: Normocephalic and atraumatic.   Eyes:      Extraocular Movements: Extraocular movements intact.      Conjunctiva/sclera: Conjunctivae normal.      Pupils: Pupils are equal, round, and reactive to light.   Cardiovascular:      Rate and Rhythm: Normal rate and regular rhythm.      Heart sounds: No murmur heard.     No friction rub. No gallop.   Pulmonary:      Effort: Pulmonary effort is normal.      Breath sounds: No " rhonchi (Scant b/l rhonchi in posterior lung fields) or rales. Wheezes: Slight expiratory wheeze in posterior lung fields bilaterally.  Musculoskeletal:         General: Normal range of motion.      Right lower leg: No edema.      Left lower leg: No edema.   Skin:     General: Skin is warm and dry.   Neurological:      Mental Status: He is alert and oriented to person, place, and time.   Psychiatric:         Mood and Affect: Mood normal.         Thought Content: Thought content normal.         Judgment: Judgment normal.           Labs:   Recent Results (from the past 48 hour(s))   CBC w/ DIFF    Collection Time: 02/16/24  7:52 AM   Result Value Ref Range    WBC 3.51 (L) 3.90 - 12.70 K/uL    RBC 4.35 (L) 4.60 - 6.20 M/uL    Hemoglobin 11.9 (L) 14.0 - 18.0 g/dL    Hematocrit 35.7 (L) 40.0 - 54.0 %    MCV 82 82 - 98 fL    MCH 27.4 27.0 - 31.0 pg    MCHC 33.3 32.0 - 36.0 g/dL    RDW 14.2 11.5 - 14.5 %    Platelets 199 150 - 450 K/uL    MPV 9.9 9.2 - 12.9 fL    Immature Granulocytes 0.3 0.0 - 0.5 %    Gran # (ANC) 3.0 1.8 - 7.7 K/uL    Immature Grans (Abs) 0.01 0.00 - 0.04 K/uL    Lymph # 0.2 (L) 1.0 - 4.8 K/uL    Mono # 0.3 0.3 - 1.0 K/uL    Eos # 0.0 0.0 - 0.5 K/uL    Baso # 0.02 0.00 - 0.20 K/uL    nRBC 1 (A) 0 /100 WBC    Gran % 85.2 (H) 38.0 - 73.0 %    Lymph % 4.8 (L) 18.0 - 48.0 %    Mono % 8.0 4.0 - 15.0 %    Eosinophil % 1.1 0.0 - 8.0 %    Basophil % 0.6 0.0 - 1.9 %    Differential Method Automated    CMP    Collection Time: 02/16/24  7:52 AM   Result Value Ref Range    Sodium 134 (L) 136 - 145 mmol/L    Potassium 4.3 3.5 - 5.1 mmol/L    Chloride 102 95 - 110 mmol/L    CO2 24 23 - 29 mmol/L    Glucose 375 (H) 70 - 110 mg/dL    BUN 18 8 - 23 mg/dL    Creatinine 1.0 0.5 - 1.4 mg/dL    Calcium 9.9 8.7 - 10.5 mg/dL    Total Protein 7.2 6.0 - 8.4 g/dL    Albumin 3.8 3.5 - 5.2 g/dL    Total Bilirubin 1.0 0.1 - 1.0 mg/dL    Alkaline Phosphatase 118 55 - 135 U/L    AST 14 10 - 40 U/L    ALT 19 10 - 44 U/L    eGFR >60.0  >60 mL/min/1.73 m^2    Anion Gap 8 8 - 16 mmol/L            Imaging:    See oncologic history as above.     Path:  See oncologic history above.      Assessment and Plan:     Hernan Engel is a 68 y.o. male with pmh significant for COPD, T2DM, HLD, Stage IB ([id number]) moderately differentiated adenocarcinoma of RUL, initially dx'd 3/2014, s/p R upper lobectomy (3/2014, Dr. Mendez), and now two newly diagnosed concurrent lung cancers as below who presents for follow up:     1) Stage IIIA (T4N0M0) squamous cell carcinoma of the LLL (insufficient sample for PD-L1, no targetable mutations on liquid biopsy), initially dx'd 11/2/23, currently on CRT (1/23/24 - present) w/ weekly carboplatin/paclitaxel (1/22/24 - present)    2) Stage IA3 (A4nP9X4) adenocarcinoma of the lingula (KRAS G12C, PD-L1 10%), initially dx'd 12/19/23, currently on CRT (1/23/24 - present) w/ weekly carboplatin/paclitaxel (1/22/24 - present)    *Notably, pt has a RLL pleural based nodule, reviewed at TB, appears linear and unlikely malignant    Stage IIIA Squamous Cell Carcinoma of the LLL, No PD-L1, No Targetable Mutations  ECOG PS 0.  Pt is currently on CRT with weekly carboplatin/paclitaxel, tolerating with mild fatigue. Most recent imaging (CT C, 12/12/23) is from prior to treatment start. Will proceed w/ C5 on 2/19/24.    PLAN:   -- Continue CRT, C5D1 carboplatin/paclitaxel on 2/19/23. Labs labs acceptable (Hgb stable at 11.9) and treatment signed  -- RTC on 2/23/24, pt prefers Friday follow ups in advance of Monday infusions  -- Repeat imaging following completion of CRT  -- Message previously sent to  regarding transportation needs      Stage IA3 Adenocarcinoma of the Lingula, NGS and PD-L1 Pending  Bronchoscopy demonstrates adenocarcinoma of the lingula, separate from the squamous cell carcinoma in the LLL.  This is a T1c lesion which, given lack of lymphadenopathy, makes this a Stage IA3 disease.  Plan for treatment w/ CRT, as above  --  Treatment as above      Constipation  Pt with baseline constipation. Previously taking milk of magnesia, held in setting of new chemo start. Started on miralax and senna initially to good effect, will continue though not less effective. Okay to resume milk of magnesia. States this is improved today (2/16/24).  -- Miralax 17 gm daily  -- Senna 8.6 mg BID  -- Okay to resume milk of magnesia  -- Consider increasing regimen pending ongoing symptoms      The above information has been reviewed with the patient and all questions have been answered to their apparent satisfaction.  They understand that they can call the clinic with any questions.    Guanako Grove MD  Hematology/Oncology  [facility]        Med Onc Chart Routing      Follow up with physician . As scheduled.   Follow up with SHARRI    Infusion scheduling note    Injection scheduling note    Labs    Imaging    Pharmacy appointment    Other referrals

## 2024-02-19 ENCOUNTER — INFUSION (OUTPATIENT)
Dept: INFUSION THERAPY | Facility: HOSPITAL | Age: 69
End: 2024-02-19
Attending: INTERNAL MEDICINE
Payer: MEDICARE

## 2024-02-19 ENCOUNTER — DOCUMENTATION ONLY (OUTPATIENT)
Dept: RADIATION ONCOLOGY | Facility: CLINIC | Age: 69
End: 2024-02-19
Payer: MEDICARE

## 2024-02-19 VITALS
SYSTOLIC BLOOD PRESSURE: 135 MMHG | OXYGEN SATURATION: 98 % | HEART RATE: 68 BPM | HEIGHT: 67 IN | DIASTOLIC BLOOD PRESSURE: 65 MMHG | BODY MASS INDEX: 27.06 KG/M2 | TEMPERATURE: 98 F | WEIGHT: 172.38 LBS | RESPIRATION RATE: 18 BRPM

## 2024-02-19 DIAGNOSIS — C34.32 SQUAMOUS CELL CARCINOMA OF BRONCHUS IN LEFT LOWER LOBE: Primary | ICD-10-CM

## 2024-02-19 PROCEDURE — 63600175 PHARM REV CODE 636 W HCPCS: Performed by: HOSPITALIST

## 2024-02-19 PROCEDURE — 25000003 PHARM REV CODE 250: Performed by: HOSPITALIST

## 2024-02-19 PROCEDURE — 96375 TX/PRO/DX INJ NEW DRUG ADDON: CPT

## 2024-02-19 PROCEDURE — 77014 HC CT GUIDANCE RADIATION THERAPY FLDS PLACEMENT: CPT | Mod: TC | Performed by: STUDENT IN AN ORGANIZED HEALTH CARE EDUCATION/TRAINING PROGRAM

## 2024-02-19 PROCEDURE — 96413 CHEMO IV INFUSION 1 HR: CPT

## 2024-02-19 PROCEDURE — 77386 HC IMRT, COMPLEX: CPT | Performed by: STUDENT IN AN ORGANIZED HEALTH CARE EDUCATION/TRAINING PROGRAM

## 2024-02-19 PROCEDURE — 96417 CHEMO IV INFUS EACH ADDL SEQ: CPT

## 2024-02-19 PROCEDURE — 77014 PR  CT GUIDANCE PLACEMENT RAD THERAPY FIELDS: CPT | Mod: 26,,, | Performed by: STUDENT IN AN ORGANIZED HEALTH CARE EDUCATION/TRAINING PROGRAM

## 2024-02-19 PROCEDURE — 96367 TX/PROPH/DG ADDL SEQ IV INF: CPT

## 2024-02-19 RX ORDER — HEPARIN 100 UNIT/ML
500 SYRINGE INTRAVENOUS
Status: DISCONTINUED | OUTPATIENT
Start: 2024-02-19 | End: 2024-02-19 | Stop reason: HOSPADM

## 2024-02-19 RX ORDER — PROCHLORPERAZINE EDISYLATE 5 MG/ML
5 INJECTION INTRAMUSCULAR; INTRAVENOUS ONCE AS NEEDED
Status: DISCONTINUED | OUTPATIENT
Start: 2024-02-19 | End: 2024-02-19 | Stop reason: HOSPADM

## 2024-02-19 RX ORDER — EPINEPHRINE 0.3 MG/.3ML
0.3 INJECTION SUBCUTANEOUS ONCE AS NEEDED
Status: DISCONTINUED | OUTPATIENT
Start: 2024-02-19 | End: 2024-02-19 | Stop reason: HOSPADM

## 2024-02-19 RX ORDER — SODIUM CHLORIDE 0.9 % (FLUSH) 0.9 %
10 SYRINGE (ML) INJECTION
Status: DISCONTINUED | OUTPATIENT
Start: 2024-02-19 | End: 2024-02-19 | Stop reason: HOSPADM

## 2024-02-19 RX ORDER — FAMOTIDINE 10 MG/ML
20 INJECTION INTRAVENOUS
Status: COMPLETED | OUTPATIENT
Start: 2024-02-19 | End: 2024-02-19

## 2024-02-19 RX ORDER — DIPHENHYDRAMINE HYDROCHLORIDE 50 MG/ML
50 INJECTION INTRAMUSCULAR; INTRAVENOUS ONCE AS NEEDED
Status: DISCONTINUED | OUTPATIENT
Start: 2024-02-19 | End: 2024-02-19 | Stop reason: HOSPADM

## 2024-02-19 RX ADMIN — PACLITAXEL 84 MG: 6 INJECTION, SOLUTION INTRAVENOUS at 09:02

## 2024-02-19 RX ADMIN — FAMOTIDINE 20 MG: 10 INJECTION INTRAVENOUS at 09:02

## 2024-02-19 RX ADMIN — CARBOPLATIN 210 MG: 10 INJECTION, SOLUTION INTRAVENOUS at 10:02

## 2024-02-19 RX ADMIN — DIPHENHYDRAMINE HYDROCHLORIDE 50 MG: 50 INJECTION, SOLUTION INTRAMUSCULAR; INTRAVENOUS at 08:02

## 2024-02-19 RX ADMIN — DEXAMETHASONE SODIUM PHOSPHATE 0.25 MG: 4 INJECTION, SOLUTION INTRA-ARTICULAR; INTRALESIONAL; INTRAMUSCULAR; INTRAVENOUS; SOFT TISSUE at 08:02

## 2024-02-19 RX ADMIN — SODIUM CHLORIDE: 9 INJECTION, SOLUTION INTRAVENOUS at 08:02

## 2024-02-19 NOTE — PLAN OF CARE
1055  Patient completed Taxol, Carbo infusions, tolerated well.  PIV discontinued without issue.  RTC 2/26/24  Patient ambulated off floor independently, NAD.

## 2024-02-19 NOTE — PLAN OF CARE
Day 19 of outpatient radiation to the left lung. Tolerating treatment well. No issues reported. A new CT simulation was performed today with a new plan of treatment to begin 2/20/24.

## 2024-02-19 NOTE — PLAN OF CARE
0800  Patient seated in chair, VSS, assessment done.  PIV inserted, flushed, blood return noted, started NS @ 50 cc/hr KVO while waiting for Taxol, Carbo from pharmacy.  WCTM for safety

## 2024-02-20 PROCEDURE — 77338 DESIGN MLC DEVICE FOR IMRT: CPT | Mod: TC | Performed by: STUDENT IN AN ORGANIZED HEALTH CARE EDUCATION/TRAINING PROGRAM

## 2024-02-20 PROCEDURE — 77300 RADIATION THERAPY DOSE PLAN: CPT | Mod: TC | Performed by: STUDENT IN AN ORGANIZED HEALTH CARE EDUCATION/TRAINING PROGRAM

## 2024-02-20 PROCEDURE — 77338 DESIGN MLC DEVICE FOR IMRT: CPT | Mod: 26,,, | Performed by: STUDENT IN AN ORGANIZED HEALTH CARE EDUCATION/TRAINING PROGRAM

## 2024-02-20 PROCEDURE — 77300 RADIATION THERAPY DOSE PLAN: CPT | Mod: 26,,, | Performed by: STUDENT IN AN ORGANIZED HEALTH CARE EDUCATION/TRAINING PROGRAM

## 2024-02-21 PROCEDURE — 77386 HC IMRT, COMPLEX: CPT | Performed by: STUDENT IN AN ORGANIZED HEALTH CARE EDUCATION/TRAINING PROGRAM

## 2024-02-21 PROCEDURE — 77014 PR  CT GUIDANCE PLACEMENT RAD THERAPY FIELDS: CPT | Mod: 26,,, | Performed by: STUDENT IN AN ORGANIZED HEALTH CARE EDUCATION/TRAINING PROGRAM

## 2024-02-21 PROCEDURE — 77336 RADIATION PHYSICS CONSULT: CPT | Performed by: STUDENT IN AN ORGANIZED HEALTH CARE EDUCATION/TRAINING PROGRAM

## 2024-02-22 PROCEDURE — 77386 HC IMRT, COMPLEX: CPT | Performed by: STUDENT IN AN ORGANIZED HEALTH CARE EDUCATION/TRAINING PROGRAM

## 2024-02-22 PROCEDURE — 77427 RADIATION TX MANAGEMENT X5: CPT | Mod: ,,, | Performed by: STUDENT IN AN ORGANIZED HEALTH CARE EDUCATION/TRAINING PROGRAM

## 2024-02-22 PROCEDURE — 77014 PR  CT GUIDANCE PLACEMENT RAD THERAPY FIELDS: CPT | Mod: 26,,, | Performed by: STUDENT IN AN ORGANIZED HEALTH CARE EDUCATION/TRAINING PROGRAM

## 2024-02-23 ENCOUNTER — OFFICE VISIT (OUTPATIENT)
Dept: HEMATOLOGY/ONCOLOGY | Facility: CLINIC | Age: 69
End: 2024-02-23
Payer: MEDICARE

## 2024-02-23 ENCOUNTER — LAB VISIT (OUTPATIENT)
Dept: LAB | Facility: HOSPITAL | Age: 69
End: 2024-02-23
Attending: HOSPITALIST
Payer: MEDICARE

## 2024-02-23 VITALS
HEIGHT: 67 IN | DIASTOLIC BLOOD PRESSURE: 70 MMHG | WEIGHT: 169.06 LBS | HEART RATE: 91 BPM | OXYGEN SATURATION: 98 % | SYSTOLIC BLOOD PRESSURE: 121 MMHG | RESPIRATION RATE: 16 BRPM | TEMPERATURE: 98 F | BODY MASS INDEX: 26.53 KG/M2

## 2024-02-23 DIAGNOSIS — C34.32 SQUAMOUS CELL CARCINOMA OF BRONCHUS IN LEFT LOWER LOBE: ICD-10-CM

## 2024-02-23 DIAGNOSIS — C34.12 ADENOCARCINOMA OF UPPER LOBE OF LEFT LUNG: ICD-10-CM

## 2024-02-23 DIAGNOSIS — C34.32 SQUAMOUS CELL CARCINOMA OF BRONCHUS IN LEFT LOWER LOBE: Primary | ICD-10-CM

## 2024-02-23 DIAGNOSIS — E11.8 TYPE 2 DIABETES MELLITUS WITH COMPLICATION, WITHOUT LONG-TERM CURRENT USE OF INSULIN: ICD-10-CM

## 2024-02-23 LAB
ALBUMIN SERPL BCP-MCNC: 3.5 G/DL (ref 3.5–5.2)
ALP SERPL-CCNC: 109 U/L (ref 55–135)
ALT SERPL W/O P-5'-P-CCNC: 13 U/L (ref 10–44)
ANION GAP SERPL CALC-SCNC: 8 MMOL/L (ref 8–16)
AST SERPL-CCNC: 11 U/L (ref 10–40)
BASOPHILS # BLD AUTO: 0.02 K/UL (ref 0–0.2)
BASOPHILS NFR BLD: 0.6 % (ref 0–1.9)
BILIRUB SERPL-MCNC: 1.4 MG/DL (ref 0.1–1)
BUN SERPL-MCNC: 13 MG/DL (ref 8–23)
CALCIUM SERPL-MCNC: 9.7 MG/DL (ref 8.7–10.5)
CHLORIDE SERPL-SCNC: 102 MMOL/L (ref 95–110)
CO2 SERPL-SCNC: 24 MMOL/L (ref 23–29)
CREAT SERPL-MCNC: 1 MG/DL (ref 0.5–1.4)
DIFFERENTIAL METHOD BLD: ABNORMAL
EOSINOPHIL # BLD AUTO: 0 K/UL (ref 0–0.5)
EOSINOPHIL NFR BLD: 1.3 % (ref 0–8)
ERYTHROCYTE [DISTWIDTH] IN BLOOD BY AUTOMATED COUNT: 14.9 % (ref 11.5–14.5)
EST. GFR  (NO RACE VARIABLE): >60 ML/MIN/1.73 M^2
ESTIMATED AVG GLUCOSE: 260 MG/DL (ref 68–131)
GLUCOSE SERPL-MCNC: 384 MG/DL (ref 70–110)
HBA1C MFR BLD: 10.7 % (ref 4–5.6)
HCT VFR BLD AUTO: 32.9 % (ref 40–54)
HGB BLD-MCNC: 11.3 G/DL (ref 14–18)
IMM GRANULOCYTES # BLD AUTO: 0.01 K/UL (ref 0–0.04)
IMM GRANULOCYTES NFR BLD AUTO: 0.3 % (ref 0–0.5)
LYMPHOCYTES # BLD AUTO: 0.2 K/UL (ref 1–4.8)
LYMPHOCYTES NFR BLD: 4.8 % (ref 18–48)
MCH RBC QN AUTO: 28.3 PG (ref 27–31)
MCHC RBC AUTO-ENTMCNC: 34.3 G/DL (ref 32–36)
MCV RBC AUTO: 82 FL (ref 82–98)
MONOCYTES # BLD AUTO: 0.2 K/UL (ref 0.3–1)
MONOCYTES NFR BLD: 7.7 % (ref 4–15)
NEUTROPHILS # BLD AUTO: 2.6 K/UL (ref 1.8–7.7)
NEUTROPHILS NFR BLD: 85.3 % (ref 38–73)
NRBC BLD-RTO: 1 /100 WBC
PLATELET # BLD AUTO: 172 K/UL (ref 150–450)
PMV BLD AUTO: 10.1 FL (ref 9.2–12.9)
POTASSIUM SERPL-SCNC: 4.5 MMOL/L (ref 3.5–5.1)
PROT SERPL-MCNC: 6.9 G/DL (ref 6–8.4)
RBC # BLD AUTO: 4 M/UL (ref 4.6–6.2)
SODIUM SERPL-SCNC: 134 MMOL/L (ref 136–145)
WBC # BLD AUTO: 3.1 K/UL (ref 3.9–12.7)

## 2024-02-23 PROCEDURE — 83036 HEMOGLOBIN GLYCOSYLATED A1C: CPT | Performed by: INTERNAL MEDICINE

## 2024-02-23 PROCEDURE — 36415 COLL VENOUS BLD VENIPUNCTURE: CPT | Performed by: HOSPITALIST

## 2024-02-23 PROCEDURE — 85025 COMPLETE CBC W/AUTO DIFF WBC: CPT | Performed by: HOSPITALIST

## 2024-02-23 PROCEDURE — 99215 OFFICE O/P EST HI 40 MIN: CPT | Mod: S$GLB,,, | Performed by: HOSPITALIST

## 2024-02-23 PROCEDURE — 80053 COMPREHEN METABOLIC PANEL: CPT | Performed by: HOSPITALIST

## 2024-02-23 PROCEDURE — 77386 HC IMRT, COMPLEX: CPT | Performed by: STUDENT IN AN ORGANIZED HEALTH CARE EDUCATION/TRAINING PROGRAM

## 2024-02-23 PROCEDURE — 99999 PR PBB SHADOW E&M-EST. PATIENT-LVL IV: CPT | Mod: PBBFAC,,, | Performed by: HOSPITALIST

## 2024-02-23 PROCEDURE — 77014 PR  CT GUIDANCE PLACEMENT RAD THERAPY FIELDS: CPT | Mod: 26,,, | Performed by: STUDENT IN AN ORGANIZED HEALTH CARE EDUCATION/TRAINING PROGRAM

## 2024-02-23 RX ORDER — DIPHENHYDRAMINE HYDROCHLORIDE 50 MG/ML
50 INJECTION INTRAMUSCULAR; INTRAVENOUS ONCE AS NEEDED
Status: CANCELLED | OUTPATIENT
Start: 2024-02-26

## 2024-02-23 RX ORDER — SODIUM CHLORIDE 0.9 % (FLUSH) 0.9 %
10 SYRINGE (ML) INJECTION
Status: CANCELLED | OUTPATIENT
Start: 2024-02-26

## 2024-02-23 RX ORDER — PROCHLORPERAZINE EDISYLATE 5 MG/ML
5 INJECTION INTRAMUSCULAR; INTRAVENOUS ONCE AS NEEDED
Status: CANCELLED
Start: 2024-02-26

## 2024-02-23 RX ORDER — FAMOTIDINE 10 MG/ML
20 INJECTION INTRAVENOUS
Status: CANCELLED | OUTPATIENT
Start: 2024-02-26

## 2024-02-23 RX ORDER — HEPARIN 100 UNIT/ML
500 SYRINGE INTRAVENOUS
Status: CANCELLED | OUTPATIENT
Start: 2024-02-26

## 2024-02-23 RX ORDER — EPINEPHRINE 0.3 MG/.3ML
0.3 INJECTION SUBCUTANEOUS ONCE AS NEEDED
Status: CANCELLED | OUTPATIENT
Start: 2024-02-26

## 2024-02-23 NOTE — PROGRESS NOTES
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   9/15/2023 Imaging Significant Findings    CXR (URI)  - Masslike focus along L hilar region     10/6/2023 Imaging Significant Findings    CT C, Non-Con  - 6.6 x 5.9 cm LLL mass  - 2 x 1.2 cm cavitary lesion in lingula  - 0.9 x 0.8 cm pleural based nodule in R lung base  - Trace L pleural effusion  - Suspect hilar LAD  - Calcified hilar LN     11/2/2023 Procedure    Bronchoscopy  LLL, Endobronchial Biopsy  - Squamous cell carcinoma (CDK 5/6, p63 positive, CK7 and TTF negative)    [facility] NGS  - Insufficient tissue     11/6/2023 Imaging Significant Findings    PET CT  - 7.6 x 6.0 cm soft tissue mass in superior segment of LLL, SUV 7.2  - 2.5 x 1.6 cm cavitary lesion in ELDER (neoplastic vs infectious/inflammatory), SUV max 1.8  - No hypermetabolic LAD  - 0.9 cm L hypoattenuating L adrenal nodule, likely adenoma     11/29/2023 Tumor Conference    Tumor Board  - Recommend staging EBUS and L robotic bronchoscopy for sampling of ELDER cavitary nodule.  The nodule is highly suspicious for possible adenocarcinoma as it has evolved from a ground-glass opacity to a part solid and cavitary nodule.  Obtain brain MRI to complete staging.  The clinical picture suggests a possible T3 or T4 primary lung cancer or two separate primary lung cancers.        11/29/2023 Tumor Genotyping    Liquid Biopsy, [facility]  - CKDN2A, TP53 (three separate LOF mutations)     11/30/2023 Imaging Significant Findings    MRI Brain  - JAN     12/12/2023 Imaging Significant Findings    CT C, Non-Con  - 6.7 x 6.2 cm LLL mass (previously 6.6 x 5.9 cm), abutting and narrowing the adjacent LLL bronchus  - Enlarging lingular cavitary lesion, now 2.5 x 1.6 cm  - Slightly decreased size of RLL pleural based nodule     1/3/2024 Tumor  Conference    Tumor Board  - Agree with plan for SBRT of lingular lesion and chemoRT to LLL mass.      1/5/2024 Cancer Staged    Staging form: Lung, AJCC 8th Edition  - Clinical: Stage IIIA (cT4, cN0, cM0)     1/19/2024 -  Chemotherapy    Treatment Summary   Plan Name: OP NSCLC PACLITAXEL + CARBOPLATIN (AUC) QW + RADIATION  Treatment Goal: Curative  Status: Active  Start Date: 1/19/2024  End Date: 2/26/2024 (Planned)  Provider: Guanako Grove IV, MD  Chemotherapy: CARBOplatin (PARAPLATIN) 210 mg in sodium chloride 0.9% 136 mL chemo infusion, 210 mg (100 % of original dose 208.2 mg), Intravenous, Clinic/HOD 1 time, 5 of 6 cycles  Dose modification:   (original dose 208.2 mg, Cycle 1)  Administration: 210 mg (1/22/2024), 210 mg (1/29/2024), 180 mg (2/5/2024), 225 mg (2/12/2024), 210 mg (2/19/2024)  PACLitaxeL (TAXOL) 45 mg/m2 = 84 mg in sodium chloride 0.9% 250 mL chemo infusion, 45 mg/m2 = 84 mg, Intravenous, Clinic/HOD 1 time, 5 of 6 cycles  Administration: 84 mg (1/22/2024), 84 mg (1/29/2024), 84 mg (2/5/2024), 84 mg (2/12/2024), 84 mg (2/19/2024)     Adenocarcinoma of Lingula   10/6/2023 Imaging Significant Findings    CT C, Non-Con  - 6.6 x 5.9 cm LLL mass  - 2 x 1.2 cm cavitary lesion in lingula  - 0.9 x 0.8 cm pleural based nodule in R lung base  - Trace L pleural effusion  - Suspect hilar LAD  - Calcified hilar LN     11/6/2023 Imaging Significant Findings    PET CT  - 7.6 x 6.0 cm soft tissue mass in superior segment of LLL, SUV 7.2  - 2.5 x 1.6 cm cavitary lesion in ELDER (neoplastic vs infectious/inflammatory), SUV max 1.8  - No hypermetabolic LAD  - 0.9 cm L hypoattenuating L adrenal nodule, likely adenoma     11/29/2023 Tumor Conference    Tumor Board  - Recommend staging EBUS and L robotic bronchoscopy for sampling of ELDER cavitary nodule.  The nodule is highly suspicious for possible adenocarcinoma as it has evolved from a ground-glass opacity to a part solid and cavitary nodule.  Obtain brain MRI to  complete staging.  The clinical picture suggests a possible T3 or T4 primary lung cancer or two separate primary lung cancers.        11/29/2023 Tumor Genotyping    Liquid Biopsy, [facility]  - CKDN2A, TP53 (three separate LOF mutations)     11/30/2023 Imaging Significant Findings    MRI Brain  - JAN     12/12/2023 Imaging Significant Findings    CT C, Non-Con  - 6.7 x 6.2 cm LLL mass (previously 6.6 x 5.9 cm), abutting and narrowing the adjacent LLL bronchus  - Enlarging lingular cavitary lesion, now 2.5 x 1.6 cm  - Slightly decreased size of RLL pleural based nodule     12/19/2023 Procedure    Bronch with EBUS  ELDER, FNA and TBBx  - Adenocarcinoma (TTF1 positive, p40 negative)    LN  - Negative: Station 7  - Report: No pathologic enlargement of 4R, 4L, or 11R. Unable to evaluated 11L due to obstructing LLL endobronchial mass    [facility] NGS  - KRAS G12C  - TP53  - Negative: EGFR, ALK, BRAF, ROS1, RET, MET, ERBB2  - PD-L1: 10%       1/3/2024 Tumor Conference    Tumor Board  - Agree with plan for SBRT of lingular lesion and chemoRT to LLL mass.      1/5/2024 Cancer Staged    Staging form: Lung, AJCC 8th Edition  - Clinical: Stage IA3 (cT1c, cN0, cM0)            HPI:     Hernan Engel is a 68 y.o. male with pmh significant for COPD, T2DM, HLD, Stage IB (cH6kI2N5) moderately differentiated adenocarcinoma of RUL, initially dx'd 3/2014, s/p R upper lobectomy (3/2014, Dr. Mendez), and now two newly diagnosed concurrent lung cancers as below who presents for follow up:     1) Stage IIIA (T4N0M0) squamous cell carcinoma of the LLL (insufficient sample for PD-L1, no targetable mutations on liquid biopsy), initially dx'd 11/2/23, currently on CRT (1/23/24 - present) w/ weekly carboplatin/paclitaxel (1/22/24 - present)    2) Stage IA3 (E0xU7V0) adenocarcinoma of the lingula (KRAS G12C, PD-L1 10%), initially dx'd 12/19/23, currently on CRT (1/23/24 - present) w/ weekly carboplatin/paclitaxel (1/22/24 - present)    *Notably, pt has  a RLL pleural based nodule, reviewed at , appears linear and unlikely malignant    Interval History:   - 2/19/24: C5D1    Pt states that his constipation has resolved. He continues to walk 2 miles daily. He denies N/V, diarrhea, abdominal pain, SOB, cough, fatigue, or other new or bothersome issues at this time.     He confirms that his last radiation is on 3/05/24.       History has been obtained by chart review and discussion with the patient.    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    Lung cancer     Normocytic anemia 9/3/2014    Nuclear sclerosis of both eyes 9/4/2020    Seizure 2005    single event - not controlled by medication    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    T2DM (type 2 diabetes mellitus) 2014    DKA 9/2014        Past Surgical History:   Past Surgical History:   Procedure Laterality Date    BRONCHOSCOPY WITH FLUOROSCOPY N/A 11/2/2023    Procedure: BRONCHOSCOPY, WITH FLUOROSCOPY;  Surgeon: Provider, Dosc Diagnostic;  Location: [facility] OR [address] [place] [address];  Service: Endoscopy;  Laterality: N/A;    CATARACT EXTRACTION Right 2021    COLONOSCOPY      LUNG REMOVAL, PARTIAL      ROBOTIC BRONCHOSCOPY N/A 12/19/2023    Procedure: ROBOTIC BRONCHOSCOPY;  Surgeon: Hernandez Brewer MD;  Location: [facility] OR [address] [place] [address];  Service: Pulmonary;  Laterality: N/A;        Family History:   Family History   Problem Relation Age of Onset    No Known Problems Mother     Cancer Father         colon cancer    Colon cancer Father     Cataracts Father     Colon cancer Sister     Cancer Sister         colon or lung cancer     No Known Problems Sister     No Known Problems Sister      No Known Problems Brother     No Known Problems Maternal Aunt     No Known Problems Maternal Uncle     No Known Problems Paternal Aunt     No Known Problems Paternal Uncle     No Known Problems Maternal Grandmother     No Known Problems Maternal Grandfather     No Known Problems Paternal Grandmother     No Known Problems Paternal Grandfather     No Known Problems Other     Amblyopia Neg Hx     Blindness Neg Hx     Diabetes Neg Hx     Glaucoma Neg Hx     Hypertension Neg Hx     Macular degeneration Neg Hx     Retinal detachment Neg Hx     Strabismus Neg Hx     Stroke Neg Hx     Thyroid disease Neg Hx         Social History:   Social History     Tobacco Use    Smoking status: Former     Current packs/day: 0.00     Average packs/day: 1 pack/day for 30.0 years (30.0 ttl pk-yrs)     Types: Cigarettes     Start date: 1/15/1984     Quit date: 1/15/2014     Years since quitting: 10.1    Smokeless tobacco: Never   Substance Use Topics    Alcohol use: Yes     Alcohol/week: 5.7 standard drinks of alcohol     Types: 4 Cans of beer, 2 Standard drinks or equivalent per week     Comment: every other day beer drinker        I have reviewed and updated the patient's past medical, surgical, family and social histories.      ROS:   As per HPI.     Allergies:   Review of patient's allergies indicates:   Allergen Reactions    Adhesive Itching, Rash, Other (See Comments) and Dermatitis     Blister        Medications:   Current Outpatient Medications   Medication Sig Dispense Refill    albuterol (PROVENTIL HFA) 90 mcg/actuation inhaler Inhale 2 puffs into the lungs every 6 (six) hours as needed for Wheezing. Rescue 18 g 0    blood-glucose meter kit Use as instructed 1 each 0    dextrin (FIBER POWDER ORAL) Take 1 Package by mouth once.      efinaconazole (JUBLIA) 10 % Crow Apply 1 Application topically once daily. 8 mL 0    guaiFENesin (MUCINEX) 600 mg 12 hr tablet Take 1 tablet (600 mg total) by mouth every 12 (twelve) hours as needed  "for Congestion (cough). 30 tablet 0    metFORMIN (GLUCOPHAGE-XR) 500 MG ER 24hr tablet Take 2 tablets (1,000 mg total) by mouth 2 (two) times daily with meals. 60 tablet 0    OLANZapine (ZYPREXA) 5 MG tablet Take nights 1, 2, and 3 of each chemotherapy dose. 18 tablet 0    polyethylene glycol (GLYCOLAX) 17 gram/dose powder Mix 1 capful (17 g) and dissolve in glass of water to take by mouth once daily. 510 g 0    prochlorperazine (COMPAZINE) 5 MG tablet Take 1 tablet (5 mg total) by mouth every 6 (six) hours as needed for Nausea. 30 tablet 1    rosuvastatin (CRESTOR) 5 MG tablet Take 4 tablets (20 mg total) by mouth once daily. 90 tablet 3    senna (SENOKOT) 8.6 mg tablet Take 1 tablet by mouth 2 (two) times daily. 60 tablet 2    umeclidinium (INCRUSE ELLIPTA) 62.5 mcg/actuation inhalation capsule Inhale 62.5 mcg into the lungs once daily. Controller 30 each 0     No current facility-administered medications for this visit.          Physical Exam:       /70 (BP Location: Left arm, Patient Position: Sitting, BP Method: Medium (Automatic))   Pulse 91   Temp 97.9 °F (36.6 °C) (Oral)   Resp 16   Ht 5' 7" (1.702 m)   Wt 76.7 kg (169 lb 1.5 oz)   SpO2 98%   BMI 26.48 kg/m²                Physical Exam  Constitutional:       Appearance: Normal appearance.   HENT:      Head: Normocephalic and atraumatic.   Eyes:      Extraocular Movements: Extraocular movements intact.      Conjunctiva/sclera: Conjunctivae normal.      Pupils: Pupils are equal, round, and reactive to light.   Cardiovascular:      Rate and Rhythm: Normal rate and regular rhythm.      Heart sounds: No murmur heard.     No friction rub. No gallop.   Pulmonary:      Effort: Pulmonary effort is normal.      Breath sounds: No rhonchi (Scant b/l rhonchi in posterior lung fields) or rales. Wheezes: Slight expiratory wheeze in posterior lung fields bilaterally.  Musculoskeletal:         General: Normal range of motion.      Right lower leg: No edema.     "  Left lower leg: No edema.   Skin:     General: Skin is warm and dry.   Neurological:      Mental Status: He is alert and oriented to person, place, and time.   Psychiatric:         Mood and Affect: Mood normal.         Thought Content: Thought content normal.         Judgment: Judgment normal.           Labs:   No results found for this or any previous visit (from the past 48 hour(s)).           Imaging:    See oncologic history as above.     Path:  See oncologic history above.      Assessment and Plan:     Hernan Engel is a 68 y.o. male with pmh significant for COPD, T2DM, HLD, Stage IB (eL1gP0R6) moderately differentiated adenocarcinoma of RUL, initially dx'd 3/2014, s/p R upper lobectomy (3/2014, Dr. Mendez), and now two newly diagnosed concurrent lung cancers as below who presents for follow up:     1) Stage IIIA (T4N0M0) squamous cell carcinoma of the LLL (insufficient sample for PD-L1, no targetable mutations on liquid biopsy), initially dx'd 11/2/23, currently on CRT (1/23/24 - present) w/ weekly carboplatin/paclitaxel (1/22/24 - present)    2) Stage IA3 (Q0oQ2P7) adenocarcinoma of the lingula (KRAS G12C, PD-L1 10%), initially dx'd 12/19/23, currently on CRT (1/23/24 - present) w/ weekly carboplatin/paclitaxel (1/22/24 - present)    *Notably, pt has a RLL pleural based nodule, reviewed at TB, appears linear and unlikely malignant    Stage IIIA Squamous Cell Carcinoma of the LLL, No PD-L1, No Targetable Mutations  ECOG PS 0.  Pt is currently on CRT with weekly carboplatin/paclitaxel, tolerating with only mild fatigue. Most recent imaging (CT C, 12/12/23) is from prior to treatment start. Will proceed w/ C6 on 2/26/24. Last day of radiation is 3/5/24. Plan for repeat imaging afterwards followed by 1 year of immunotherapy per the PACIFIC trial.     PLAN:   -- Continue CRT, C6D1 carboplatin/paclitaxel on 2/26/24. Labs labs acceptable (Hgb slightly down at 11.3, T bili slightly elevated at 1.4, glucose remains  elevated at 384) and treatment signed  -- Last day of radiation on 3/5/24. Repeat CT C/A/P 1 week after radiation complete  -- RTC after repeat imaging  -- Plan to start durvalumab within 42 days of radiation completion  -- Message previously sent to  regarding transportation needs      Stage IA3 Adenocarcinoma of the Lingula, NGS and PD-L1 Pending  Bronchoscopy demonstrates adenocarcinoma of the lingula, separate from the squamous cell carcinoma in the LLL.  This is a T1c lesion which, given lack of lymphadenopathy, makes this a Stage IA3 disease.  Plan for treatment w/ CRT, as above  -- Treatment as above      The above information has been reviewed with the patient and all questions have been answered to their apparent satisfaction.  They understand that they can call the clinic with any questions.    Guanako Grove MD  Hematology/Oncology  [facility]        Med Onc Chart Routing      Follow up with physician . CT C/A/P around 3/12/24, RTC at least 1 day afterwards.   Follow up with SHARRI    Infusion scheduling note    Injection scheduling note    Labs    Imaging    Pharmacy appointment    Other referrals

## 2024-02-23 NOTE — Clinical Note
Morning!  His last chemo will be on 2/26. Just confirming - last radiation on 3/5? If so, will get scans afterwards and then proceed w/ immunotherapy.

## 2024-02-26 ENCOUNTER — DOCUMENTATION ONLY (OUTPATIENT)
Dept: RADIATION ONCOLOGY | Facility: CLINIC | Age: 69
End: 2024-02-26
Payer: MEDICARE

## 2024-02-26 ENCOUNTER — INFUSION (OUTPATIENT)
Dept: INFUSION THERAPY | Facility: HOSPITAL | Age: 69
End: 2024-02-26
Attending: INTERNAL MEDICINE
Payer: MEDICARE

## 2024-02-26 ENCOUNTER — PATIENT MESSAGE (OUTPATIENT)
Dept: RADIATION ONCOLOGY | Facility: CLINIC | Age: 69
End: 2024-02-26
Payer: MEDICARE

## 2024-02-26 VITALS
HEART RATE: 78 BPM | TEMPERATURE: 98 F | WEIGHT: 169.06 LBS | OXYGEN SATURATION: 97 % | BODY MASS INDEX: 26.53 KG/M2 | RESPIRATION RATE: 18 BRPM | HEIGHT: 67 IN | DIASTOLIC BLOOD PRESSURE: 55 MMHG | SYSTOLIC BLOOD PRESSURE: 109 MMHG

## 2024-02-26 DIAGNOSIS — C34.32 SQUAMOUS CELL CARCINOMA OF BRONCHUS IN LEFT LOWER LOBE: Primary | ICD-10-CM

## 2024-02-26 PROCEDURE — 77386 HC IMRT, COMPLEX: CPT | Performed by: STUDENT IN AN ORGANIZED HEALTH CARE EDUCATION/TRAINING PROGRAM

## 2024-02-26 PROCEDURE — 25000003 PHARM REV CODE 250: Performed by: HOSPITALIST

## 2024-02-26 PROCEDURE — 96375 TX/PRO/DX INJ NEW DRUG ADDON: CPT

## 2024-02-26 PROCEDURE — 96413 CHEMO IV INFUSION 1 HR: CPT

## 2024-02-26 PROCEDURE — 96417 CHEMO IV INFUS EACH ADDL SEQ: CPT

## 2024-02-26 PROCEDURE — 96367 TX/PROPH/DG ADDL SEQ IV INF: CPT

## 2024-02-26 PROCEDURE — 77014 PR  CT GUIDANCE PLACEMENT RAD THERAPY FIELDS: CPT | Mod: 26,,, | Performed by: STUDENT IN AN ORGANIZED HEALTH CARE EDUCATION/TRAINING PROGRAM

## 2024-02-26 PROCEDURE — 63600175 PHARM REV CODE 636 W HCPCS: Performed by: HOSPITALIST

## 2024-02-26 RX ORDER — DIPHENHYDRAMINE HYDROCHLORIDE 50 MG/ML
50 INJECTION INTRAMUSCULAR; INTRAVENOUS ONCE AS NEEDED
Status: DISCONTINUED | OUTPATIENT
Start: 2024-02-26 | End: 2024-02-26 | Stop reason: HOSPADM

## 2024-02-26 RX ORDER — HEPARIN 100 UNIT/ML
500 SYRINGE INTRAVENOUS
Status: DISCONTINUED | OUTPATIENT
Start: 2024-02-26 | End: 2024-02-26 | Stop reason: HOSPADM

## 2024-02-26 RX ORDER — PROCHLORPERAZINE EDISYLATE 5 MG/ML
5 INJECTION INTRAMUSCULAR; INTRAVENOUS ONCE AS NEEDED
Status: DISCONTINUED | OUTPATIENT
Start: 2024-02-26 | End: 2024-02-26 | Stop reason: HOSPADM

## 2024-02-26 RX ORDER — EPINEPHRINE 0.3 MG/.3ML
0.3 INJECTION SUBCUTANEOUS ONCE AS NEEDED
Status: DISCONTINUED | OUTPATIENT
Start: 2024-02-26 | End: 2024-02-26 | Stop reason: HOSPADM

## 2024-02-26 RX ORDER — SODIUM CHLORIDE 0.9 % (FLUSH) 0.9 %
10 SYRINGE (ML) INJECTION
Status: DISCONTINUED | OUTPATIENT
Start: 2024-02-26 | End: 2024-02-26 | Stop reason: HOSPADM

## 2024-02-26 RX ORDER — FAMOTIDINE 10 MG/ML
20 INJECTION INTRAVENOUS
Status: COMPLETED | OUTPATIENT
Start: 2024-02-26 | End: 2024-02-26

## 2024-02-26 RX ADMIN — DIPHENHYDRAMINE HYDROCHLORIDE 50 MG: 50 INJECTION, SOLUTION INTRAMUSCULAR; INTRAVENOUS at 08:02

## 2024-02-26 RX ADMIN — SODIUM CHLORIDE: 9 INJECTION, SOLUTION INTRAVENOUS at 08:02

## 2024-02-26 RX ADMIN — DEXAMETHASONE SODIUM PHOSPHATE 0.25 MG: 4 INJECTION, SOLUTION INTRA-ARTICULAR; INTRALESIONAL; INTRAMUSCULAR; INTRAVENOUS; SOFT TISSUE at 08:02

## 2024-02-26 RX ADMIN — FAMOTIDINE 20 MG: 10 INJECTION INTRAVENOUS at 08:02

## 2024-02-26 RX ADMIN — PACLITAXEL 84 MG: 6 INJECTION, SOLUTION INTRAVENOUS at 09:02

## 2024-02-26 RX ADMIN — CARBOPLATIN 210 MG: 10 INJECTION, SOLUTION INTRAVENOUS at 10:02

## 2024-02-26 NOTE — PLAN OF CARE
Day 23 of outpatient radiation to the left lung. Doing well. Denies SOB, fatigue. Reports appetite good and walks daily without difficulty.Finished chemo today. Wife present. Weight stable at 172#.

## 2024-02-26 NOTE — PLAN OF CARE
1115-Pt tolerated Taxol/Carbo infusions well, no complications or side effects, POC and meds discussed with pt, pt aware of upcoming appts, pt knows to call MD with any questions or concerns. Pt ambulated off unit, no distress noted.

## 2024-02-27 PROCEDURE — 77386 HC IMRT, COMPLEX: CPT | Performed by: STUDENT IN AN ORGANIZED HEALTH CARE EDUCATION/TRAINING PROGRAM

## 2024-02-27 PROCEDURE — 77014 PR  CT GUIDANCE PLACEMENT RAD THERAPY FIELDS: CPT | Mod: 26,,, | Performed by: STUDENT IN AN ORGANIZED HEALTH CARE EDUCATION/TRAINING PROGRAM

## 2024-02-28 ENCOUNTER — OFFICE VISIT (OUTPATIENT)
Dept: FAMILY MEDICINE | Facility: CLINIC | Age: 69
End: 2024-02-28
Payer: MEDICARE

## 2024-02-28 VITALS
WEIGHT: 169.06 LBS | SYSTOLIC BLOOD PRESSURE: 106 MMHG | BODY MASS INDEX: 26.48 KG/M2 | DIASTOLIC BLOOD PRESSURE: 72 MMHG | OXYGEN SATURATION: 96 % | TEMPERATURE: 98 F | HEART RATE: 83 BPM

## 2024-02-28 DIAGNOSIS — C34.32 SQUAMOUS CELL CARCINOMA OF BRONCHUS IN LEFT LOWER LOBE: ICD-10-CM

## 2024-02-28 DIAGNOSIS — E11.69 DYSLIPIDEMIA ASSOCIATED WITH TYPE 2 DIABETES MELLITUS: ICD-10-CM

## 2024-02-28 DIAGNOSIS — E11.8 TYPE 2 DIABETES MELLITUS WITH COMPLICATION, WITHOUT LONG-TERM CURRENT USE OF INSULIN: Primary | ICD-10-CM

## 2024-02-28 DIAGNOSIS — E78.5 DYSLIPIDEMIA ASSOCIATED WITH TYPE 2 DIABETES MELLITUS: ICD-10-CM

## 2024-02-28 DIAGNOSIS — C78.02 SECONDARY MALIGNANT NEOPLASM OF LEFT LUNG: ICD-10-CM

## 2024-02-28 PROBLEM — J43.1 PANLOBULAR EMPHYSEMA: Status: RESOLVED | Noted: 2020-01-30 | Resolved: 2024-02-28

## 2024-02-28 PROBLEM — I70.0 AORTIC ATHEROSCLEROSIS: Status: RESOLVED | Noted: 2017-12-06 | Resolved: 2024-02-28

## 2024-02-28 PROCEDURE — 77014 PR  CT GUIDANCE PLACEMENT RAD THERAPY FIELDS: CPT | Mod: 26,,, | Performed by: STUDENT IN AN ORGANIZED HEALTH CARE EDUCATION/TRAINING PROGRAM

## 2024-02-28 PROCEDURE — 77386 HC IMRT, COMPLEX: CPT | Performed by: STUDENT IN AN ORGANIZED HEALTH CARE EDUCATION/TRAINING PROGRAM

## 2024-02-28 PROCEDURE — 99999 PR PBB SHADOW E&M-EST. PATIENT-LVL III: CPT | Mod: PBBFAC,,, | Performed by: INTERNAL MEDICINE

## 2024-02-28 PROCEDURE — 77336 RADIATION PHYSICS CONSULT: CPT | Performed by: STUDENT IN AN ORGANIZED HEALTH CARE EDUCATION/TRAINING PROGRAM

## 2024-02-28 PROCEDURE — 99214 OFFICE O/P EST MOD 30 MIN: CPT | Mod: S$GLB,,, | Performed by: INTERNAL MEDICINE

## 2024-02-28 RX ORDER — TIRZEPATIDE 2.5 MG/.5ML
2.5 INJECTION, SOLUTION SUBCUTANEOUS
Qty: 4 PEN | Refills: 0 | Status: SHIPPED | OUTPATIENT
Start: 2024-02-28 | End: 2024-04-08 | Stop reason: DRUGHIGH

## 2024-02-28 RX ORDER — ROSUVASTATIN CALCIUM 5 MG/1
20 TABLET, COATED ORAL DAILY
Qty: 60 TABLET | Refills: 0 | Status: SHIPPED | OUTPATIENT
Start: 2024-02-28 | End: 2024-03-19 | Stop reason: CLARIF

## 2024-02-28 RX ORDER — METFORMIN HYDROCHLORIDE 500 MG/1
1000 TABLET, EXTENDED RELEASE ORAL 2 TIMES DAILY WITH MEALS
Qty: 60 TABLET | Refills: 0 | Status: SHIPPED | OUTPATIENT
Start: 2024-02-28 | End: 2024-04-29

## 2024-02-28 RX ORDER — INSULIN PUMP SYRINGE, 3 ML
EACH MISCELLANEOUS
Qty: 1 EACH | Refills: 0 | Status: SHIPPED | OUTPATIENT
Start: 2024-02-28 | End: 2028-06-14

## 2024-02-28 NOTE — ASSESSMENT & PLAN NOTE
Chronic, uncontrolled. Patient's last A1c was   Lab Results   Component Value Date    HGBA1C 10.7 (H) 02/23/2024    .     -  recommended a diet with reduced processed carbs like rice and white bread, reduce sugar/dessert intake and encouraged weight loss.   - continue metformin  - start Mounjaro, patient counseled about side effects   - follow up two months   - advised patient to measure BG 1-3 times a day

## 2024-02-28 NOTE — PROGRESS NOTES
Chief Complaint: Follow-up      Hernan Engel  is a 68 y.o. year old patient who presents today for f/up    A1c has gone up. Has been compliant to metformin. Does not measure BG.   Has completed chemo, will be completing radiation. Then will likely start immunotherapy. Reports no nausea or diarrhea/constipation.     Past Surgical History:   Procedure Laterality Date    BRONCHOSCOPY WITH FLUOROSCOPY N/A 11/2/2023    Procedure: BRONCHOSCOPY, WITH FLUOROSCOPY;  Surgeon: Provider, Dosc Diagnostic;  Location: [facility] OR [address] [place] [address];  Service: Endoscopy;  Laterality: N/A;    CATARACT EXTRACTION Right 2021    COLONOSCOPY      LUNG REMOVAL, PARTIAL      ROBOTIC BRONCHOSCOPY N/A 12/19/2023    Procedure: ROBOTIC BRONCHOSCOPY;  Surgeon: Hernandez Brewer MD;  Location: [facility] OR [address] [place] [address];  Service: Pulmonary;  Laterality: N/A;        Family History   Problem Relation Age of Onset    No Known Problems Mother     Cancer Father         colon cancer    Colon cancer Father     Cataracts Father     Colon cancer Sister     Cancer Sister         colon or lung cancer     No Known Problems Sister     No Known Problems Sister     No Known Problems Brother     No Known Problems Maternal Aunt     No Known Problems Maternal Uncle     No Known Problems Paternal Aunt     No Known Problems Paternal Uncle     No Known Problems Maternal Grandmother     No Known Problems Maternal Grandfather     No Known Problems Paternal Grandmother     No Known Problems Paternal Grandfather     No Known Problems Other     Amblyopia Neg Hx     Blindness Neg Hx     Diabetes Neg Hx     Glaucoma Neg Hx     Hypertension Neg Hx     Macular degeneration Neg Hx     Retinal detachment Neg Hx     Strabismus Neg Hx     Stroke Neg Hx     Thyroid disease Neg Hx         Social History     Socioeconomic History    Marital status:    Occupational History     Employer: [employer]   Tobacco Use    Smoking status: Former     Current packs/day: 0.00     Average packs/day: 1  pack/day for 30.0 years (30.0 ttl pk-yrs)     Types: Cigarettes     Start date: 1/15/1984     Quit date: 1/15/2014     Years since quitting: 10.1    Smokeless tobacco: Never   Substance and Sexual Activity    Alcohol use: Yes     Alcohol/week: 5.7 standard drinks of alcohol     Types: 4 Cans of beer, 2 Standard drinks or equivalent per week     Comment: every other day beer drinker    Drug use: No    Sexual activity: Not Currently     Partners: Female     Birth control/protection: None         Current Outpatient Medications:     dextrin (FIBER POWDER ORAL), Take 1 Package by mouth once., Disp: , Rfl:     efinaconazole (JUBLIA) 10 % Crow, Apply 1 Application topically once daily., Disp: 8 mL, Rfl: 0    polyethylene glycol (GLYCOLAX) 17 gram/dose powder, Mix 1 capful (17 g) and dissolve in glass of water to take by mouth once daily., Disp: 510 g, Rfl: 0    blood-glucose meter kit, Use as instructed, Disp: 1 each, Rfl: 0    metFORMIN (GLUCOPHAGE-XR) 500 MG ER 24hr tablet, Take 2 tablets (1,000 mg total) by mouth 2 (two) times daily with meals., Disp: 60 tablet, Rfl: 0    rosuvastatin (CRESTOR) 5 MG tablet, Take 4 tablets (20 mg total) by mouth once daily., Disp: 60 tablet, Rfl: 0    tirzepatide (MOUNJARO) 2.5 mg/0.5 mL PnIj, Inject 2.5 mg into the skin every 7 days., Disp: 4 Pen, Rfl: 0    umeclidinium (INCRUSE ELLIPTA) 62.5 mcg/actuation inhalation capsule, Inhale 62.5 mcg into the lungs once daily. Controller, Disp: 30 each, Rfl: 0     Review of Systems   Respiratory:  Negative for cough and shortness of breath.    Cardiovascular:  Negative for chest pain.   Gastrointestinal:  Negative for abdominal pain and nausea.        Objective:      Vitals:    02/28/24 0844   BP: 106/72   Pulse: 83   Temp: 98.1 °F (36.7 °C)       Physical Exam  Vitals and nursing note reviewed.   Constitutional:       Appearance: Normal appearance.   HENT:      Head: Normocephalic and atraumatic.   Cardiovascular:      Rate and Rhythm: Normal  rate and regular rhythm.   Pulmonary:      Effort: Pulmonary effort is normal.      Breath sounds: Normal breath sounds. No wheezing or rales.   Abdominal:      General: Bowel sounds are normal.      Palpations: Abdomen is soft.      Tenderness: There is no abdominal tenderness.   Musculoskeletal:      Right lower leg: No edema.      Left lower leg: No edema.   Skin:     General: Skin is warm and dry.   Neurological:      General: No focal deficit present.      Mental Status: He is alert and oriented to person, place, and time.   Psychiatric:         Mood and Affect: Mood normal.         Behavior: Behavior normal.          Assessment:       1. Type 2 diabetes mellitus with complication, without long-term current use of insulin    2. 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 Dyslipidemia associated with type 2 diabetes mellitus          Plan:   1. Type 2 diabetes mellitus with complication, without long-term current use of insulin  Assessment & Plan:  Chronic, uncontrolled. Patient's last A1c was   Lab Results   Component Value Date    HGBA1C 10.7 (H) 02/23/2024    .     -  recommended a diet with reduced processed carbs like rice and white bread, reduce sugar/dessert intake and encouraged weight loss.   - continue metformin  - start Mounjaro, patient counseled about side effects   - follow up two months   - advised patient to measure BG 1-3 times a day       Orders:  -     metFORMIN (GLUCOPHAGE-XR) 500 MG ER 24hr tablet; Take 2 tablets (1,000 mg total) by mouth 2 (two) times daily with meals.  Dispense: 60 tablet; Refill: 0  -     tirzepatide (MOUNJARO) 2.5 mg/0.5 mL PnIj; Inject 2.5 mg into the skin every 7 days.  Dispense: 4 Pen; Refill: 0  -     blood-glucose meter kit; Use as instructed  Dispense: 1 each; Refill: 0  -     rosuvastatin (CRESTOR) 5 MG tablet; Take 4 tablets (20 mg total) by mouth once daily.  Dispense: 60 tablet; Refill: 0    2. Squamous cell carcinoma of bronchus in left lower  lobe  Assessment & Plan:  Has completed chemo, will be completing radiation. Then will likely start immunotherapy.     - continue follow up with Heme Onc       3. Dyslipidemia associated with type 2 diabetes mellitus  Assessment & Plan:  Counseled patient to continue crestor, reports that he had bleeding with it    - restart crestor and monitor side effects            Follow up in about 2 months (around 4/28/2024) for DM.

## 2024-02-28 NOTE — ASSESSMENT & PLAN NOTE
Has completed chemo, will be completing radiation. Then will likely start immunotherapy.     - continue follow up with Heme Onc

## 2024-02-28 NOTE — ASSESSMENT & PLAN NOTE
Counseled patient to continue crestor, reports that he had bleeding with it    - restart crestor and monitor side effects

## 2024-02-28 NOTE — PROGRESS NOTES
Health Maintenance Due   Topic     RSV Vaccine (Age 60+ and Pregnant patients) (1 - 1-dose 60+ series) Not offered at this office    Shingles Vaccine (2 of 2)  hx chicken pox. Notified pt can get vaccine at pharmacy    Colorectal Cancer Screening  Consult pcp    Lipid Panel  Consult pcp    COVID-19 Vaccine (5 - 2023-24 season) Not offered at this office

## 2024-02-29 ENCOUNTER — TELEPHONE (OUTPATIENT)
Dept: FAMILY MEDICINE | Facility: CLINIC | Age: 69
End: 2024-02-29
Payer: MEDICARE

## 2024-02-29 DIAGNOSIS — E11.8 TYPE 2 DIABETES MELLITUS WITH COMPLICATION, WITHOUT LONG-TERM CURRENT USE OF INSULIN: Primary | ICD-10-CM

## 2024-02-29 PROCEDURE — 77386 HC IMRT, COMPLEX: CPT | Performed by: STUDENT IN AN ORGANIZED HEALTH CARE EDUCATION/TRAINING PROGRAM

## 2024-02-29 PROCEDURE — 77014 PR  CT GUIDANCE PLACEMENT RAD THERAPY FIELDS: CPT | Mod: 26,,, | Performed by: STUDENT IN AN ORGANIZED HEALTH CARE EDUCATION/TRAINING PROGRAM

## 2024-02-29 PROCEDURE — 77427 RADIATION TX MANAGEMENT X5: CPT | Mod: ,,, | Performed by: STUDENT IN AN ORGANIZED HEALTH CARE EDUCATION/TRAINING PROGRAM

## 2024-02-29 RX ORDER — LANCETS
1 EACH MISCELLANEOUS
Qty: 200 EACH | Refills: 3 | Status: SHIPPED | OUTPATIENT
Start: 2024-02-29 | End: 2024-03-01

## 2024-03-01 ENCOUNTER — HOSPITAL ENCOUNTER (OUTPATIENT)
Dept: RADIATION THERAPY | Facility: HOSPITAL | Age: 69
Discharge: HOME | End: 2024-03-01
Attending: STUDENT IN AN ORGANIZED HEALTH CARE EDUCATION/TRAINING PROGRAM
Payer: MEDICARE

## 2024-03-01 PROCEDURE — 77014 PR  CT GUIDANCE PLACEMENT RAD THERAPY FIELDS: CPT | Mod: 26,,, | Performed by: STUDENT IN AN ORGANIZED HEALTH CARE EDUCATION/TRAINING PROGRAM

## 2024-03-01 PROCEDURE — 77386 HC IMRT, COMPLEX: CPT | Performed by: STUDENT IN AN ORGANIZED HEALTH CARE EDUCATION/TRAINING PROGRAM

## 2024-03-04 ENCOUNTER — DOCUMENTATION ONLY (OUTPATIENT)
Dept: RADIATION THERAPY | Facility: HOSPITAL | Age: 69
End: 2024-03-04
Payer: MEDICARE

## 2024-03-04 PROCEDURE — 77014 PR  CT GUIDANCE PLACEMENT RAD THERAPY FIELDS: CPT | Mod: 26,,, | Performed by: STUDENT IN AN ORGANIZED HEALTH CARE EDUCATION/TRAINING PROGRAM

## 2024-03-04 PROCEDURE — 77386 HC IMRT, COMPLEX: CPT | Performed by: STUDENT IN AN ORGANIZED HEALTH CARE EDUCATION/TRAINING PROGRAM

## 2024-03-04 NOTE — PLAN OF CARE
DAY 28 of outpatient radiation to lung. Weighs 78.7 kg. Tolerating treatment. Spouse present. Follow up appt made.

## 2024-03-05 PROCEDURE — 77386 HC IMRT, COMPLEX: CPT | Performed by: STUDENT IN AN ORGANIZED HEALTH CARE EDUCATION/TRAINING PROGRAM

## 2024-03-05 PROCEDURE — 77014 PR  CT GUIDANCE PLACEMENT RAD THERAPY FIELDS: CPT | Mod: 26,,, | Performed by: STUDENT IN AN ORGANIZED HEALTH CARE EDUCATION/TRAINING PROGRAM

## 2024-03-06 PROCEDURE — 77336 RADIATION PHYSICS CONSULT: CPT | Performed by: STUDENT IN AN ORGANIZED HEALTH CARE EDUCATION/TRAINING PROGRAM

## 2024-03-06 PROCEDURE — 77014 PR  CT GUIDANCE PLACEMENT RAD THERAPY FIELDS: CPT | Mod: 26,,, | Performed by: STUDENT IN AN ORGANIZED HEALTH CARE EDUCATION/TRAINING PROGRAM

## 2024-03-06 PROCEDURE — 77386 HC IMRT, COMPLEX: CPT | Performed by: STUDENT IN AN ORGANIZED HEALTH CARE EDUCATION/TRAINING PROGRAM

## 2024-03-11 ENCOUNTER — PATIENT OUTREACH (OUTPATIENT)
Dept: ADMINISTRATIVE | Facility: HOSPITAL | Age: 69
End: 2024-03-11
Payer: MEDICARE

## 2024-03-11 ENCOUNTER — PATIENT MESSAGE (OUTPATIENT)
Dept: ADMINISTRATIVE | Facility: HOSPITAL | Age: 69
End: 2024-03-11
Payer: MEDICARE

## 2024-03-11 NOTE — PROGRESS NOTES
Population Health Chart Review & Patient Outreach Details      Additional [facility] Health Notes:      Due for overdue HM (colonoscopy and lipid panel), need to get recent records if already done. If not done, orders/referrals need to be place and schedule. Please forward messages to me.   -unable to LM. Sent [employer] message.   Please advise of message above.           Updates Requested / Reviewed:      Updated Care Coordination Note, Care Everywhere, and Care Team Updated         Health Maintenance Topics Overdue:      VB Score: 2     Colon Cancer Screening  Lipid Panel    Shingles/Zoster Vaccine  RSV Vaccine                  Health Maintenance Topic(s) Outreach Outcomes & Actions Taken:    Lab(s) - Outreach Outcomes & Actions Taken  : unable to LM    Colorectal Cancer Screening - Outreach Outcomes & Actions Taken  : unable to LM

## 2024-03-13 ENCOUNTER — HOSPITAL ENCOUNTER (OUTPATIENT)
Dept: RADIOLOGY | Facility: HOSPITAL | Age: 69
Discharge: HOME | End: 2024-03-13
Attending: HOSPITALIST
Payer: MEDICARE

## 2024-03-13 DIAGNOSIS — C34.32 SQUAMOUS CELL CARCINOMA OF BRONCHUS IN LEFT LOWER LOBE: ICD-10-CM

## 2024-03-13 PROCEDURE — 25500020 PHARM REV CODE 255: Performed by: HOSPITALIST

## 2024-03-13 PROCEDURE — 74177 CT ABD & PELVIS W/CONTRAST: CPT | Mod: 26,,, | Performed by: INTERNAL MEDICINE

## 2024-03-13 PROCEDURE — 71260 CT THORAX DX C+: CPT | Mod: 26,,, | Performed by: INTERNAL MEDICINE

## 2024-03-13 PROCEDURE — 74177 CT ABD & PELVIS W/CONTRAST: CPT | Mod: TC

## 2024-03-13 RX ADMIN — IOHEXOL 100 ML: 350 INJECTION, SOLUTION INTRAVENOUS at 08:03

## 2024-03-14 ENCOUNTER — TELEPHONE (OUTPATIENT)
Dept: HEMATOLOGY/ONCOLOGY | Facility: CLINIC | Age: 69
End: 2024-03-14
Payer: MEDICARE

## 2024-03-15 ENCOUNTER — OFFICE VISIT (OUTPATIENT)
Dept: HEMATOLOGY/ONCOLOGY | Facility: CLINIC | Age: 69
End: 2024-03-15
Payer: MEDICARE

## 2024-03-15 VITALS
WEIGHT: 169.06 LBS | BODY MASS INDEX: 26.53 KG/M2 | HEART RATE: 80 BPM | SYSTOLIC BLOOD PRESSURE: 99 MMHG | DIASTOLIC BLOOD PRESSURE: 64 MMHG | OXYGEN SATURATION: 98 % | RESPIRATION RATE: 18 BRPM | HEIGHT: 67 IN | TEMPERATURE: 98 F

## 2024-03-15 DIAGNOSIS — C34.12 ADENOCARCINOMA OF UPPER LOBE OF LEFT LUNG: ICD-10-CM

## 2024-03-15 DIAGNOSIS — C34.32 SQUAMOUS CELL CARCINOMA OF BRONCHUS IN LEFT LOWER LOBE: Primary | ICD-10-CM

## 2024-03-15 PROCEDURE — 99999 PR PBB SHADOW E&M-EST. PATIENT-LVL III: CPT | Mod: PBBFAC,,, | Performed by: HOSPITALIST

## 2024-03-15 PROCEDURE — 99215 OFFICE O/P EST HI 40 MIN: CPT | Mod: S$GLB,,, | Performed by: HOSPITALIST

## 2024-03-15 NOTE — PROGRESS NOTES
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   9/15/2023 Imaging Significant Findings    CXR (URI)  - Masslike focus along L hilar region     10/6/2023 Imaging Significant Findings    CT C, Non-Con  - 6.6 x 5.9 cm LLL mass  - 2 x 1.2 cm cavitary lesion in lingula  - 0.9 x 0.8 cm pleural based nodule in R lung base  - Trace L pleural effusion  - Suspect hilar LAD  - Calcified hilar LN     11/2/2023 Procedure    Bronchoscopy  LLL, Endobronchial Biopsy  - Squamous cell carcinoma (CDK 5/6, p63 positive, CK7 and TTF negative)    [facility] NGS  - Insufficient tissue     11/6/2023 Imaging Significant Findings    PET CT  - 7.6 x 6.0 cm soft tissue mass in superior segment of LLL, SUV 7.2  - 2.5 x 1.6 cm cavitary lesion in ELDER (neoplastic vs infectious/inflammatory), SUV max 1.8  - No hypermetabolic LAD  - 0.9 cm L hypoattenuating L adrenal nodule, likely adenoma     11/29/2023 Tumor Conference    Tumor Board  - Recommend staging EBUS and L robotic bronchoscopy for sampling of ELDER cavitary nodule.  The nodule is highly suspicious for possible adenocarcinoma as it has evolved from a ground-glass opacity to a part solid and cavitary nodule.  Obtain brain MRI to complete staging.  The clinical picture suggests a possible T3 or T4 primary lung cancer or two separate primary lung cancers.        11/29/2023 Tumor Genotyping    Liquid Biopsy, [facility]  - CKDN2A, TP53 (three separate LOF mutations)     11/30/2023 Imaging Significant Findings    MRI Brain  - JAN     12/12/2023 Imaging Significant Findings    CT C, Non-Con  - 6.7 x 6.2 cm LLL mass (previously 6.6 x 5.9 cm), abutting and narrowing the adjacent LLL bronchus  - Enlarging lingular cavitary lesion, now 2.5 x 1.6 cm  - Slightly decreased size of RLL pleural based nodule     1/3/2024 Tumor  Conference    Tumor Board  - Agree with plan for SBRT of lingular lesion and chemoRT to LLL mass.      1/5/2024 Cancer Staged    Staging form: Lung, AJCC 8th Edition  - Clinical: Stage IIIA (cT4, cN0, cM0)     1/19/2024 - 2/26/2024 Chemotherapy    Treatment Summary   Plan Name: OP NSCLC PACLITAXEL + CARBOPLATIN (AUC) QW + RADIATION  Treatment Goal: Curative  Status: Inactive  Start Date: 1/19/2024  End Date: 2/26/2024  Provider: Guanako Grove IV, MD  Chemotherapy: CARBOplatin (PARAPLATIN) 210 mg in sodium chloride 0.9% 136 mL chemo infusion, 210 mg (100 % of original dose 208.2 mg), Intravenous, Clinic/HOD 1 time, 6 of 6 cycles  Dose modification:   (original dose 208.2 mg, Cycle 1)  Administration: 210 mg (1/22/2024), 210 mg (1/29/2024), 180 mg (2/5/2024), 225 mg (2/12/2024), 210 mg (2/19/2024), 210 mg (2/26/2024)  PACLitaxeL (TAXOL) 45 mg/m2 = 84 mg in sodium chloride 0.9% 250 mL chemo infusion, 45 mg/m2 = 84 mg, Intravenous, Clinic/HOD 1 time, 6 of 6 cycles  Administration: 84 mg (1/22/2024), 84 mg (1/29/2024), 84 mg (2/5/2024), 84 mg (2/12/2024), 84 mg (2/19/2024), 84 mg (2/26/2024)     3/13/2024 Imaging Significant Findings    CT C/A/P  - 4.7 x 4.4 cm perihilar LLL mass, previously 6.7 x 6.2 cm. Decreased mass effect on LLL airways  - 2.7 x 1.7 cm cavitary ligular lesion, unchanged  - New cavitary lesions, solid pulmonary nodules, and ill-defined opacities in lungs b/l  - Unchanged few sub-cm hypodensities in liver     3/15/2024 -  Chemotherapy    Treatment Summary   Plan Name: OP DURVALUMAB 1500 MG Q4W  Treatment Goal: Curative  Status: Active  Start Date: 3/15/2024 (Planned)  End Date: 2/14/2025 (Planned)  Provider: Guanako Grove IV, MD  Chemotherapy: [No matching medication found in this treatment plan]     Adenocarcinoma of Lingula   10/6/2023 Imaging Significant Findings    CT C, Non-Con  - 6.6 x 5.9 cm LLL mass  - 2 x 1.2 cm cavitary lesion in lingula  - 0.9 x 0.8 cm pleural based nodule in R lung  base  - Trace L pleural effusion  - Suspect hilar LAD  - Calcified hilar LN     11/6/2023 Imaging Significant Findings    PET CT  - 7.6 x 6.0 cm soft tissue mass in superior segment of LLL, SUV 7.2  - 2.5 x 1.6 cm cavitary lesion in ELDER (neoplastic vs infectious/inflammatory), SUV max 1.8  - No hypermetabolic LAD  - 0.9 cm L hypoattenuating L adrenal nodule, likely adenoma     11/29/2023 Tumor Conference    Tumor Board  - Recommend staging EBUS and L robotic bronchoscopy for sampling of ELDER cavitary nodule.  The nodule is highly suspicious for possible adenocarcinoma as it has evolved from a ground-glass opacity to a part solid and cavitary nodule.  Obtain brain MRI to complete staging.  The clinical picture suggests a possible T3 or T4 primary lung cancer or two separate primary lung cancers.        11/29/2023 Tumor Genotyping    Liquid Biopsy, [facility]  - CKDN2A, TP53 (three separate LOF mutations)     11/30/2023 Imaging Significant Findings    MRI Brain  - JAN     12/12/2023 Imaging Significant Findings    CT C, Non-Con  - 6.7 x 6.2 cm LLL mass (previously 6.6 x 5.9 cm), abutting and narrowing the adjacent LLL bronchus  - Enlarging lingular cavitary lesion, now 2.5 x 1.6 cm  - Slightly decreased size of RLL pleural based nodule     12/19/2023 Procedure    Bronch with EBUS  ELDER, FNA and TBBx  - Adenocarcinoma (TTF1 positive, p40 negative)    LN  - Negative: Station 7  - Report: No pathologic enlargement of 4R, 4L, or 11R. Unable to evaluated 11L due to obstructing LLL endobronchial mass    [facility] NGS  - KRAS G12C  - TP53  - Negative: EGFR, ALK, BRAF, ROS1, RET, MET, ERBB2  - PD-L1: 10%       1/3/2024 Tumor Conference    Tumor Board  - Agree with plan for SBRT of lingular lesion and chemoRT to LLL mass.      1/5/2024 Cancer Staged    Staging form: Lung, AJCC 8th Edition  - Clinical: Stage IA3 (cT1c, cN0, cM0)     3/13/2024 Imaging Significant Findings    CT C/A/P  - 4.7 x 4.4 cm perihilar LLL mass, previously 6.7 x  6.2 cm. Decreased mass effect on LLL airways  - 2.7 x 1.7 cm cavitary ligular lesion, unchanged  - New cavitary lesions, solid pulmonary nodules, and ill-defined opacities in lungs b/l  - Unchanged few sub-cm hypodensities in liver            HPI:     Hernan Engel is a 68 y.o. male with pmh significant for COPD, T2DM, HLD, Stage IB ([id number]) moderately differentiated adenocarcinoma of RUL, initially dx'd 3/2014, s/p R upper lobectomy (3/2014, Dr. Mendez), and now two newly diagnosed concurrent lung cancers as below who presents for follow up:     1) Stage IIIA (T4N0M0) squamous cell carcinoma of the LLL (insufficient sample for PD-L1, no targetable mutations on liquid biopsy), initially dx'd 11/2/23, s/p CRT (1/23/24 - 3/05/24) w/ weekly carboplatin/paclitaxel (1/22/24 - 2/26/24)    2) Stage IA3 (H7yQ7N6) adenocarcinoma of the lingula (KRAS G12C, PD-L1 10%), initially dx'd 12/19/23, s/p CRT (1/23/24 - 3/05/24) w/ weekly carboplatin/paclitaxel (1/22/24 - 2/26/24)    *Notably, pt has a RLL pleural based nodule, reviewed at TB, appears linear and unlikely malignant    Interval History:   - 2/26/24: C6D1 carboplatin/pacliatxel  - 3/05/24: Last day radiation therapy    Pt states that he feels well today. He says that the last round of treatment went well. He notes that he has been coughing a little more than recently, which he describes of productive of green sputum but says that this has been minimal. He denies fatigue (walked around a track 7 times yesterday), denies new/worsening SOB, new/worsening cough, pain, or other new or bothersome symptoms.     History has been obtained by chart review and discussion with the patient.    Past Medical History:   Past Medical History:   Diagnosis Date    Anxiety     Aortic atherosclerosis 12/6/2017    BPH with urinary obstruction 2/20/2019    Colon polyp 1/16/2014    COPD (chronic obstructive pulmonary disease)     Depression     Disorder of kidney and ureter     ED (erectile  dysfunction) 1/16/2014    Family history of colon cancer 1/16/2014    Hearing loss in left ear 34 years    Lung cancer     Normocytic anemia 9/3/2014    Nuclear sclerosis of both eyes 9/4/2020    Seizure 2005    single event - not controlled by medication    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    T2DM (type 2 diabetes mellitus) 2014    DKA 9/2014        Past Surgical History:   Past Surgical History:   Procedure Laterality Date    BRONCHOSCOPY WITH FLUOROSCOPY N/A 11/2/2023    Procedure: BRONCHOSCOPY, WITH FLUOROSCOPY;  [facility];  [facility];  Service: Endoscopy;  Laterality: N/A;    CATARACT EXTRACTION Right 2021    COLONOSCOPY      LUNG REMOVAL, PARTIAL      ROBOTIC BRONCHOSCOPY N/A 12/19/2023    Procedure: ROBOTIC BRONCHOSCOPY;  Surgeon: Hernandez Brewer MD;  Location: [facility] OR [address] [place] [address];  Service: Pulmonary;  Laterality: N/A;        Family History:   Family History   Problem Relation Age of Onset    No Known Problems Mother     Cancer Father         colon cancer    Colon cancer Father     Cataracts Father     Colon cancer Sister     Cancer Sister         colon or lung cancer     No Known Problems Sister     No Known Problems Sister     No Known Problems Brother     No Known Problems Maternal Aunt     No Known Problems Maternal Uncle     No Known Problems Paternal Aunt     No Known Problems Paternal Uncle     No Known Problems Maternal Grandmother     No Known Problems Maternal Grandfather     No Known Problems Paternal Grandmother     No Known Problems Paternal Grandfather     No Known Problems Other     Amblyopia Neg Hx     Blindness Neg Hx     Diabetes Neg Hx     Glaucoma Neg Hx     Hypertension Neg Hx     Macular degeneration Neg Hx     Retinal detachment Neg Hx     Strabismus Neg Hx     Stroke Neg Hx     Thyroid disease Neg Hx         Social History:   Social History     Tobacco Use    Smoking status: Former     Current packs/day: 0.00     Average packs/day: 1  "pack/day for 30.0 years (30.0 ttl pk-yrs)     Types: Cigarettes     Start date: 1/15/1984     Quit date: 1/15/2014     Years since quitting: 10.1    Smokeless tobacco: Never   Substance Use Topics    Alcohol use: Yes     Alcohol/week: 5.7 standard drinks of alcohol     Types: 4 Cans of beer, 2 Standard drinks or equivalent per week     Comment: every other day beer drinker        I have reviewed and updated the patient's past medical, surgical, family and social histories.      ROS:   As per HPI.     Allergies:   Review of patient's allergies indicates:   Allergen Reactions    Adhesive Itching, Rash, Other (See Comments) and Dermatitis     Blister        Medications:   Current Outpatient Medications   Medication Sig Dispense Refill    blood-glucose meter kit Use as instructed 1 each 0    dextrin (FIBER POWDER ORAL) Take 1 Package by mouth once.      efinaconazole (JUBLIA) 10 % Crow Apply 1 Application topically once daily. 8 mL 0    metFORMIN (GLUCOPHAGE-XR) 500 MG ER 24hr tablet Take 2 tablets (1,000 mg total) by mouth 2 (two) times daily with meals. 60 tablet 0    polyethylene glycol (GLYCOLAX) 17 gram/dose powder Mix 1 capful (17 g) and dissolve in glass of water to take by mouth once daily. 510 g 0    rosuvastatin (CRESTOR) 5 MG tablet Take 4 tablets (20 mg total) by mouth once daily. 60 tablet 0    tirzepatide (MOUNJARO) 2.5 mg/0.5 mL PnIj Inject 2.5 mg into the skin every 7 days. 4 Pen 0    umeclidinium (INCRUSE ELLIPTA) 62.5 mcg/actuation inhalation capsule Inhale 62.5 mcg into the lungs once daily. Controller 30 each 0     No current facility-administered medications for this visit.          Physical Exam:       BP 99/64 (BP Location: Left arm, Patient Position: Sitting, BP Method: Medium (Automatic))   Pulse 80   Temp 98.1 °F (36.7 °C) (Oral)   Resp 18   Ht 5' 7" (1.702 m)   Wt 76.7 kg (169 lb 1.5 oz)   SpO2 98%   BMI 26.48 kg/m²                Physical Exam  Constitutional:       Appearance: Normal " appearance.   HENT:      Head: Normocephalic and atraumatic.   Eyes:      Extraocular Movements: Extraocular movements intact.      Conjunctiva/sclera: Conjunctivae normal.      Pupils: Pupils are equal, round, and reactive to light.   Pulmonary:      Effort: Pulmonary effort is normal.      Breath sounds: Wheezes: Slight expiratory wheeze in posterior lung fields bilaterally. Rhonchi: Scant b/l rhonchi in posterior lung fields.   Musculoskeletal:         General: Normal range of motion.      Right lower leg: No edema.      Left lower leg: No edema.   Skin:     General: Skin is warm and dry.   Neurological:      Mental Status: He is alert and oriented to person, place, and time.   Psychiatric:         Mood and Affect: Mood normal.         Thought Content: Thought content normal.         Judgment: Judgment normal.           Labs:   No results found for this or any previous visit (from the past 48 hour(s)).      Imaging:    See oncologic history as above.     Path:  See oncologic history above.      Assessment and Plan:     Hernan Engel is a 68 y.o. male with pmh significant for COPD, T2DM, HLD, Stage IB (oI1gT1Y9) moderately differentiated adenocarcinoma of RUL, initially dx'd 3/2014, s/p R upper lobectomy (3/2014, Dr. Mendez), and now two newly diagnosed concurrent lung cancers as below who presents for follow up:     1) Stage IIIA (T4N0M0) squamous cell carcinoma of the LLL (insufficient sample for PD-L1, no targetable mutations on liquid biopsy), initially dx'd 11/2/23, s/p CRT (1/23/24 - 3/05/24) w/ weekly carboplatin/paclitaxel (1/22/24 - 2/26/24)    2) Stage IA3 (U0cY6I9) adenocarcinoma of the lingula (KRAS G12C, PD-L1 10%), initially dx'd 12/19/23, s/p CRT (1/23/24 - 3/05/24) w/ weekly carboplatin/paclitaxel (1/22/24 - 2/26/24)    *Notably, pt has a RLL pleural based nodule, reviewed at TB, appears linear and unlikely malignant    Stage IIIA Squamous Cell Carcinoma of the LLL, No PD-L1, No Targetable  Mutations  ECOG PS 0. Pt is now s/p CRT with weekly carboplatin/paclitaxel and tolerated therapy without significant issue. CT C/A/P (3/13/24) demonstrates decreasing size of the LLL mass, as well as stability of the lingular lesion. Notably, it also demonstrates b/l cavitary lesions (sub-cm), more solid nodules, and ill-defined opacities b/l. I discussed these results and reviewed the images with the patient and his wife. These new findings are of unclear etiology; the pt notes a mild increase in cough prior to the scan, though otherwise denied any infectious symptoms. These may represent developing malignant lesions, though it is unclear which of his two primary lesions they may represent. Based on size, the lesions may not be amenable to biopsy at this time.  Will discuss with radiation oncology and possibly at tumor board to determine next best steps. In the interim, will order durvalumab in anticipation of possibly moving to immunotherapy consolidation. I discussed the logistics of this medication, general mechanism of action, and potential side effects (up to and including death) with the patient and his wife today. He states that he would like to sign consent at our next visit, once his treatment plan has been solidified.     PLAN:   -- Durvalumab ordered  -- Message sent to radiation oncology, may also discuss at TB  -- Will contact patient next week with next steps  -- Tentative RTC in 2 weeks      Stage IA3 Adenocarcinoma of the Lingula, NGS and PD-L1 Pending  Bronchoscopy demonstrates adenocarcinoma of the lingula, separate from the squamous cell carcinoma in the LLL.  This is a T1c lesion which, given lack of lymphadenopathy, makes this a Stage IA3 disease.  Plan for treatment w/ CRT followed by immunotherapy, as above  -- Treatment as above      The above information has been reviewed with the patient and all questions have been answered to their apparent satisfaction.  They understand that they can  call the clinic with any questions.    Guanako Grove MD  Hematology/Oncology  [facility]        Med Onc Chart Routing      Follow up with physician . 2 weeks   Follow up with SHARRI    Infusion scheduling note    Injection scheduling note    Labs    Imaging    Pharmacy appointment    Other referrals

## 2024-03-15 NOTE — Clinical Note
Morning!  Post-CRT CT scan back. Perihilar LLL lesion shrinking and lingular lesion relatively stable. Notably, some new lesions b/l (cavitary and more solid alike), all relatively small. No real recent infectious symptoms. Some of these areas are more convincing than others, but I am a bit concerned these might represent malignancy. They may be too small for biopsy. Wondering your thoughts? Can also discuss at TB on Wednesday.

## 2024-03-19 RX ORDER — ATORVASTATIN CALCIUM 40 MG/1
40 TABLET, FILM COATED ORAL DAILY
COMMUNITY
End: 2024-03-19 | Stop reason: SDUPTHER

## 2024-03-19 RX ORDER — ATORVASTATIN CALCIUM 40 MG/1
40 TABLET, FILM COATED ORAL DAILY
Qty: 90 TABLET | Refills: 3 | Status: SHIPPED | OUTPATIENT
Start: 2024-03-19

## 2024-03-19 NOTE — TELEPHONE ENCOUNTER
Care Due:                  Date            Visit Type   Department     Provider  --------------------------------------------------------------------------------                                EP -                              PRIMARY      [facility]  Last Visit: 02-      CARE ([facility])   MEDICINE       Lorrie Calvin                              EP -                              PRIMARY      [facility]  Next Visit: 04-      CARE ([facility])   MEDICINE       Lorrieuvaldo Calvin                                                            Last  Test          Frequency    Reason                     Performed    Due Date  --------------------------------------------------------------------------------    Lipid Panel.  12 months..  rosuvastatin.............  Not Found    Overdue    [employer] Embedded Care Due Messages. Reference number: [id number].   3/19/2024 7:57:50 AM CDT

## 2024-03-20 ENCOUNTER — TELEPHONE (OUTPATIENT)
Dept: HEMATOLOGY/ONCOLOGY | Facility: CLINIC | Age: 69
End: 2024-03-20
Payer: MEDICARE

## 2024-03-20 NOTE — TELEPHONE ENCOUNTER
Discussed patient with Dr. Arora, favor that new lesions on imaging represent inflammatory changes from radiation therapy. Will plan to proceed w/ durvalumab consolidation. Called patient to discuss, left voicemail. Working to schedule first dose of durvalumab now.

## 2024-03-21 ENCOUNTER — TELEPHONE (OUTPATIENT)
Dept: HEMATOLOGY/ONCOLOGY | Facility: CLINIC | Age: 69
End: 2024-03-21
Payer: MEDICARE

## 2024-03-21 NOTE — PROGRESS NOTES
Patient was not aware that appointment was for a diagnostic biopsy for lung cancer.  He is currently in active treatment.   Patient was without respiratory complaints.  Appointment cancelled after confirming it was not necessary with Dr Grove.

## 2024-03-22 ENCOUNTER — OFFICE VISIT (OUTPATIENT)
Dept: HEMATOLOGY/ONCOLOGY | Facility: CLINIC | Age: 69
End: 2024-03-22
Payer: MEDICARE

## 2024-03-22 ENCOUNTER — LAB VISIT (OUTPATIENT)
Dept: LAB | Facility: HOSPITAL | Age: 69
End: 2024-03-22
Attending: HOSPITALIST
Payer: MEDICARE

## 2024-03-22 VITALS
DIASTOLIC BLOOD PRESSURE: 73 MMHG | HEIGHT: 67 IN | SYSTOLIC BLOOD PRESSURE: 121 MMHG | BODY MASS INDEX: 26.4 KG/M2 | RESPIRATION RATE: 18 BRPM | TEMPERATURE: 98 F | WEIGHT: 168.19 LBS | OXYGEN SATURATION: 98 % | HEART RATE: 77 BPM

## 2024-03-22 DIAGNOSIS — C34.12 ADENOCARCINOMA OF UPPER LOBE OF LEFT LUNG: ICD-10-CM

## 2024-03-22 DIAGNOSIS — C34.32 SQUAMOUS CELL CARCINOMA OF BRONCHUS IN LEFT LOWER LOBE: ICD-10-CM

## 2024-03-22 DIAGNOSIS — C34.32 SQUAMOUS CELL CARCINOMA OF BRONCHUS IN LEFT LOWER LOBE: Primary | ICD-10-CM

## 2024-03-22 LAB
ALBUMIN SERPL BCP-MCNC: 3.9 G/DL (ref 3.5–5.2)
ALP SERPL-CCNC: 87 U/L (ref 55–135)
ALT SERPL W/O P-5'-P-CCNC: 12 U/L (ref 10–44)
ANION GAP SERPL CALC-SCNC: 7 MMOL/L (ref 8–16)
AST SERPL-CCNC: 14 U/L (ref 10–40)
BASOPHILS # BLD AUTO: 0.02 K/UL (ref 0–0.2)
BASOPHILS NFR BLD: 0.8 % (ref 0–1.9)
BILIRUB SERPL-MCNC: 0.8 MG/DL (ref 0.1–1)
BUN SERPL-MCNC: 14 MG/DL (ref 8–23)
CALCIUM SERPL-MCNC: 9.6 MG/DL (ref 8.7–10.5)
CHLORIDE SERPL-SCNC: 109 MMOL/L (ref 95–110)
CO2 SERPL-SCNC: 24 MMOL/L (ref 23–29)
CREAT SERPL-MCNC: 1 MG/DL (ref 0.5–1.4)
DIFFERENTIAL METHOD BLD: ABNORMAL
EOSINOPHIL # BLD AUTO: 0.1 K/UL (ref 0–0.5)
EOSINOPHIL NFR BLD: 2.4 % (ref 0–8)
ERYTHROCYTE [DISTWIDTH] IN BLOOD BY AUTOMATED COUNT: 17.1 % (ref 11.5–14.5)
EST. GFR  (NO RACE VARIABLE): >60 ML/MIN/1.73 M^2
GLUCOSE SERPL-MCNC: 93 MG/DL (ref 70–110)
HCT VFR BLD AUTO: 32.7 % (ref 40–54)
HGB BLD-MCNC: 10.8 G/DL (ref 14–18)
IMM GRANULOCYTES # BLD AUTO: 0.01 K/UL (ref 0–0.04)
IMM GRANULOCYTES NFR BLD AUTO: 0.4 % (ref 0–0.5)
LYMPHOCYTES # BLD AUTO: 0.4 K/UL (ref 1–4.8)
LYMPHOCYTES NFR BLD: 14.9 % (ref 18–48)
MCH RBC QN AUTO: 30.4 PG (ref 27–31)
MCHC RBC AUTO-ENTMCNC: 33 G/DL (ref 32–36)
MCV RBC AUTO: 92 FL (ref 82–98)
MONOCYTES # BLD AUTO: 0.5 K/UL (ref 0.3–1)
MONOCYTES NFR BLD: 18.4 % (ref 4–15)
NEUTROPHILS # BLD AUTO: 1.6 K/UL (ref 1.8–7.7)
NEUTROPHILS NFR BLD: 63.1 % (ref 38–73)
NRBC BLD-RTO: 0 /100 WBC
PLATELET # BLD AUTO: 181 K/UL (ref 150–450)
PMV BLD AUTO: 9.5 FL (ref 9.2–12.9)
POTASSIUM SERPL-SCNC: 4.2 MMOL/L (ref 3.5–5.1)
PROT SERPL-MCNC: 6.8 G/DL (ref 6–8.4)
RBC # BLD AUTO: 3.55 M/UL (ref 4.6–6.2)
SODIUM SERPL-SCNC: 140 MMOL/L (ref 136–145)
WBC # BLD AUTO: 2.55 K/UL (ref 3.9–12.7)

## 2024-03-22 PROCEDURE — 80053 COMPREHEN METABOLIC PANEL: CPT | Performed by: HOSPITALIST

## 2024-03-22 PROCEDURE — 99215 OFFICE O/P EST HI 40 MIN: CPT | Mod: S$GLB,,, | Performed by: HOSPITALIST

## 2024-03-22 PROCEDURE — 85025 COMPLETE CBC W/AUTO DIFF WBC: CPT | Performed by: HOSPITALIST

## 2024-03-22 PROCEDURE — 36415 COLL VENOUS BLD VENIPUNCTURE: CPT | Performed by: HOSPITALIST

## 2024-03-22 PROCEDURE — 99999 PR PBB SHADOW E&M-EST. PATIENT-LVL III: CPT | Mod: PBBFAC,,, | Performed by: HOSPITALIST

## 2024-03-22 RX ORDER — HEPARIN 100 UNIT/ML
500 SYRINGE INTRAVENOUS
Status: CANCELLED | OUTPATIENT
Start: 2024-04-19

## 2024-03-22 RX ORDER — DIPHENHYDRAMINE HYDROCHLORIDE 50 MG/ML
50 INJECTION INTRAMUSCULAR; INTRAVENOUS ONCE AS NEEDED
Status: CANCELLED | OUTPATIENT
Start: 2024-04-19

## 2024-03-22 RX ORDER — SODIUM CHLORIDE 0.9 % (FLUSH) 0.9 %
10 SYRINGE (ML) INJECTION
Status: CANCELLED | OUTPATIENT
Start: 2024-04-19

## 2024-03-22 RX ORDER — EPINEPHRINE 0.3 MG/.3ML
0.3 INJECTION SUBCUTANEOUS ONCE AS NEEDED
Status: CANCELLED | OUTPATIENT
Start: 2024-04-19

## 2024-03-22 NOTE — PROGRESS NOTES
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   9/15/2023 Imaging Significant Findings    CXR (URI)  - Masslike focus along L hilar region     10/6/2023 Imaging Significant Findings    CT C, Non-Con  - 6.6 x 5.9 cm LLL mass  - 2 x 1.2 cm cavitary lesion in lingula  - 0.9 x 0.8 cm pleural based nodule in R lung base  - Trace L pleural effusion  - Suspect hilar LAD  - Calcified hilar LN     11/2/2023 Procedure    Bronchoscopy  LLL, Endobronchial Biopsy  - Squamous cell carcinoma (CDK 5/6, p63 positive, CK7 and TTF negative)    [facility] NGS  - Insufficient tissue     11/6/2023 Imaging Significant Findings    PET CT  - 7.6 x 6.0 cm soft tissue mass in superior segment of LLL, SUV 7.2  - 2.5 x 1.6 cm cavitary lesion in ELDER (neoplastic vs infectious/inflammatory), SUV max 1.8  - No hypermetabolic LAD  - 0.9 cm L hypoattenuating L adrenal nodule, likely adenoma     11/29/2023 Tumor Conference    Tumor Board  - Recommend staging EBUS and L robotic bronchoscopy for sampling of ELDER cavitary nodule.  The nodule is highly suspicious for possible adenocarcinoma as it has evolved from a ground-glass opacity to a part solid and cavitary nodule.  Obtain brain MRI to complete staging.  The clinical picture suggests a possible T3 or T4 primary lung cancer or two separate primary lung cancers.        11/29/2023 Tumor Genotyping    Liquid Biopsy, [facility]  - CKDN2A, TP53 (three separate LOF mutations)     11/30/2023 Imaging Significant Findings    MRI Brain  - JAN     12/12/2023 Imaging Significant Findings    CT C, Non-Con  - 6.7 x 6.2 cm LLL mass (previously 6.6 x 5.9 cm), abutting and narrowing the adjacent LLL bronchus  - Enlarging lingular cavitary lesion, now 2.5 x 1.6 cm  - Slightly decreased size of RLL pleural based nodule     1/3/2024 Tumor  Conference    Tumor Board  - Agree with plan for SBRT of lingular lesion and chemoRT to LLL mass.      1/5/2024 Cancer Staged    Staging form: Lung, AJCC 8th Edition  - Clinical: Stage IIIA (cT4, cN0, cM0)     1/19/2024 - 2/26/2024 Chemotherapy    Treatment Summary   Plan Name: OP NSCLC PACLITAXEL + CARBOPLATIN (AUC) QW + RADIATION  Treatment Goal: Curative  Status: Inactive  Start Date: 1/19/2024  End Date: 2/26/2024  Provider: Guanako Grove IV, MD  Chemotherapy: CARBOplatin (PARAPLATIN) 210 mg in sodium chloride 0.9% 136 mL chemo infusion, 210 mg (100 % of original dose 208.2 mg), Intravenous, Clinic/HOD 1 time, 6 of 6 cycles  Dose modification:   (original dose 208.2 mg, Cycle 1)  Administration: 210 mg (1/22/2024), 210 mg (1/29/2024), 180 mg (2/5/2024), 225 mg (2/12/2024), 210 mg (2/19/2024), 210 mg (2/26/2024)  PACLitaxeL (TAXOL) 45 mg/m2 = 84 mg in sodium chloride 0.9% 250 mL chemo infusion, 45 mg/m2 = 84 mg, Intravenous, Clinic/HOD 1 time, 6 of 6 cycles  Administration: 84 mg (1/22/2024), 84 mg (1/29/2024), 84 mg (2/5/2024), 84 mg (2/12/2024), 84 mg (2/19/2024), 84 mg (2/26/2024)     3/13/2024 Imaging Significant Findings    CT C/A/P  - 4.7 x 4.4 cm perihilar LLL mass, previously 6.7 x 6.2 cm. Decreased mass effect on LLL airways  - 2.7 x 1.7 cm cavitary ligular lesion, unchanged  - New cavitary lesions, solid pulmonary nodules, and ill-defined opacities in lungs b/l  - Unchanged few sub-cm hypodensities in liver     3/15/2024 -  Chemotherapy    Treatment Summary   Plan Name: OP DURVALUMAB 1500 MG Q4W  Treatment Goal: Curative  Status: Active  Start Date: 3/15/2024 (Planned)  End Date: 2/14/2025 (Planned)  Provider: Guanako Grove IV, MD  Chemotherapy: [No matching medication found in this treatment plan]     Adenocarcinoma of Lingula   10/6/2023 Imaging Significant Findings    CT C, Non-Con  - 6.6 x 5.9 cm LLL mass  - 2 x 1.2 cm cavitary lesion in lingula  - 0.9 x 0.8 cm pleural based nodule in R lung  base  - Trace L pleural effusion  - Suspect hilar LAD  - Calcified hilar LN     11/6/2023 Imaging Significant Findings    PET CT  - 7.6 x 6.0 cm soft tissue mass in superior segment of LLL, SUV 7.2  - 2.5 x 1.6 cm cavitary lesion in ELDER (neoplastic vs infectious/inflammatory), SUV max 1.8  - No hypermetabolic LAD  - 0.9 cm L hypoattenuating L adrenal nodule, likely adenoma     11/29/2023 Tumor Conference    Tumor Board  - Recommend staging EBUS and L robotic bronchoscopy for sampling of ELDER cavitary nodule.  The nodule is highly suspicious for possible adenocarcinoma as it has evolved from a ground-glass opacity to a part solid and cavitary nodule.  Obtain brain MRI to complete staging.  The clinical picture suggests a possible T3 or T4 primary lung cancer or two separate primary lung cancers.        11/29/2023 Tumor Genotyping    Liquid Biopsy, [facility]  - CKDN2A, TP53 (three separate LOF mutations)     11/30/2023 Imaging Significant Findings    MRI Brain  - JAN     12/12/2023 Imaging Significant Findings    CT C, Non-Con  - 6.7 x 6.2 cm LLL mass (previously 6.6 x 5.9 cm), abutting and narrowing the adjacent LLL bronchus  - Enlarging lingular cavitary lesion, now 2.5 x 1.6 cm  - Slightly decreased size of RLL pleural based nodule     12/19/2023 Procedure    Bronch with EBUS  ELDER, FNA and TBBx  - Adenocarcinoma (TTF1 positive, p40 negative)    LN  - Negative: Station 7  - Report: No pathologic enlargement of 4R, 4L, or 11R. Unable to evaluated 11L due to obstructing LLL endobronchial mass    [facility] NGS  - KRAS G12C  - TP53  - Negative: EGFR, ALK, BRAF, ROS1, RET, MET, ERBB2  - PD-L1: 10%       1/3/2024 Tumor Conference    Tumor Board  - Agree with plan for SBRT of lingular lesion and chemoRT to LLL mass.      1/5/2024 Cancer Staged    Staging form: Lung, AJCC 8th Edition  - Clinical: Stage IA3 (cT1c, cN0, cM0)     3/13/2024 Imaging Significant Findings    CT C/A/P  - 4.7 x 4.4 cm perihilar LLL mass, previously 6.7 x  6.2 cm. Decreased mass effect on LLL airways  - 2.7 x 1.7 cm cavitary ligular lesion, unchanged  - New cavitary lesions, solid pulmonary nodules, and ill-defined opacities in lungs b/l  - Unchanged few sub-cm hypodensities in liver            HPI:     Hernan Engel is a 68 y.o. male with pmh significant for COPD, T2DM, HLD, Stage IB ([id number]) moderately differentiated adenocarcinoma of RUL, initially dx'd 3/2014, s/p R upper lobectomy (3/2014, Dr. Mendez), and now two newly diagnosed concurrent lung cancers as below who presents for follow up:     1) Stage IIIA (T4N0M0) squamous cell carcinoma of the LLL (insufficient sample for PD-L1, no targetable mutations on liquid biopsy), initially dx'd 11/2/23, s/p CRT (1/23/24 - 3/05/24) w/ weekly carboplatin/paclitaxel (1/22/24 - 2/26/24)    2) Stage IA3 ([id number]) adenocarcinoma of the lingula (KRAS G12C, PD-L1 10%), initially dx'd 12/19/23, s/p CRT (1/23/24 - 3/05/24) w/ weekly carboplatin/paclitaxel (1/22/24 - 2/26/24)    *Notably, pt has a RLL pleural based nodule, reviewed at TB, appears linear and unlikely malignant    Interval History:   Patient states that he has been well since last visit.  He denies any new or bothersome symptoms at this time.  He presents today with his wife.    History has been obtained by chart review and discussion with the patient.    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    Lung cancer     Normocytic anemia 9/3/2014    Nuclear sclerosis of both eyes 9/4/2020    Seizure 2005    single event - not controlled by medication    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    T2DM (type 2 diabetes  mellitus) 2014    DKA 9/2014        Past Surgical History:   Past Surgical History:   Procedure Laterality Date    BRONCHOSCOPY WITH FLUOROSCOPY N/A 11/2/2023    Procedure: BRONCHOSCOPY, WITH FLUOROSCOPY;  Surgeon: Provider, [facility];  [facility];  Service: Endoscopy;  Laterality: N/A;    CATARACT EXTRACTION Right 2021    COLONOSCOPY      LUNG REMOVAL, PARTIAL      ROBOTIC BRONCHOSCOPY N/A 12/19/2023    Procedure: ROBOTIC BRONCHOSCOPY;  Surgeon: Hernandez Brewer MD;  Location: [facility] OR [address] [place] [address];  Service: Pulmonary;  Laterality: N/A;        Family History:   Family History   Problem Relation Age of Onset    No Known Problems Mother     Cancer Father         colon cancer    Colon cancer Father     Cataracts Father     Colon cancer Sister     Cancer Sister         colon or lung cancer     No Known Problems Sister     No Known Problems Sister     No Known Problems Brother     No Known Problems Maternal Aunt     No Known Problems Maternal Uncle     No Known Problems Paternal Aunt     No Known Problems Paternal Uncle     No Known Problems Maternal Grandmother     No Known Problems Maternal Grandfather     No Known Problems Paternal Grandmother     No Known Problems Paternal Grandfather     No Known Problems Other     Amblyopia Neg Hx     Blindness Neg Hx     Diabetes Neg Hx     Glaucoma Neg Hx     Hypertension Neg Hx     Macular degeneration Neg Hx     Retinal detachment Neg Hx     Strabismus Neg Hx     Stroke Neg Hx     Thyroid disease Neg Hx         Social History:   Social History     Tobacco Use    Smoking status: Former     Current packs/day: 0.00     Average packs/day: 1 pack/day for 30.0 years (30.0 ttl pk-yrs)     Types: Cigarettes     Start date: 1/15/1984     Quit date: 1/15/2014     Years since quitting: 10.1    Smokeless tobacco: Never   Substance Use Topics    Alcohol use: Yes     Alcohol/week: 5.7 standard drinks of alcohol     Types: 4 Cans of beer, 2 Standard drinks or equivalent  "per week     Comment: every other day beer drinker        I have reviewed and updated the patient's past medical, surgical, family and social histories.      ROS:   As per HPI.     Allergies:   Review of patient's allergies indicates:   Allergen Reactions    Adhesive Itching, Rash, Other (See Comments) and Dermatitis     Blister        Medications:   Current Outpatient Medications   Medication Sig Dispense Refill    atorvastatin (LIPITOR) 40 MG tablet Take 1 tablet (40 mg total) by mouth once daily. 90 tablet 3    blood-glucose meter kit Use as instructed 1 each 0    dextrin (FIBER POWDER ORAL) Take 1 Package by mouth once.      efinaconazole (JUBLIA) 10 % Crow Apply 1 Application topically once daily. 8 mL 0    metFORMIN (GLUCOPHAGE-XR) 500 MG ER 24hr tablet Take 2 tablets (1,000 mg total) by mouth 2 (two) times daily with meals. 60 tablet 0    polyethylene glycol (GLYCOLAX) 17 gram/dose powder Mix 1 capful (17 g) and dissolve in glass of water to take by mouth once daily. 510 g 0    tirzepatide (MOUNJARO) 2.5 mg/0.5 mL PnIj Inject 2.5 mg into the skin every 7 days. 4 Pen 0    umeclidinium (INCRUSE ELLIPTA) 62.5 mcg/actuation inhalation capsule Inhale 62.5 mcg into the lungs once daily. Controller 30 each 0     No current facility-administered medications for this visit.          Physical Exam:       Ht 5' 7" (1.702 m)   BMI 26.48 kg/m²                Physical Exam  Constitutional:       Appearance: Normal appearance.   HENT:      Head: Normocephalic and atraumatic.   Eyes:      Extraocular Movements: Extraocular movements intact.      Conjunctiva/sclera: Conjunctivae normal.      Pupils: Pupils are equal, round, and reactive to light.   Cardiovascular:      Rate and Rhythm: Normal rate and regular rhythm.      Heart sounds: No murmur heard.     No friction rub. No gallop.   Pulmonary:      Effort: Pulmonary effort is normal.      Breath sounds: No wheezing, rhonchi or rales.   Musculoskeletal:         General: " Normal range of motion.      Right lower leg: No edema.      Left lower leg: No edema.   Skin:     General: Skin is warm and dry.   Neurological:      Mental Status: He is alert and oriented to person, place, and time.   Psychiatric:         Mood and Affect: Mood normal.         Thought Content: Thought content normal.         Judgment: Judgment normal.           Labs:   Recent Results (from the past 48 hour(s))   CBC w/ DIFF    Collection Time: 03/22/24 11:52 AM   Result Value Ref Range    WBC 2.55 (L) 3.90 - 12.70 K/uL    RBC 3.55 (L) 4.60 - 6.20 M/uL    Hemoglobin 10.8 (L) 14.0 - 18.0 g/dL    Hematocrit 32.7 (L) 40.0 - 54.0 %    MCV 92 82 - 98 fL    MCH 30.4 27.0 - 31.0 pg    MCHC 33.0 32.0 - 36.0 g/dL    RDW 17.1 (H) 11.5 - 14.5 %    Platelets 181 150 - 450 K/uL    MPV 9.5 9.2 - 12.9 fL    Immature Granulocytes 0.4 0.0 - 0.5 %    Gran # (ANC) 1.6 (L) 1.8 - 7.7 K/uL    Immature Grans (Abs) 0.01 0.00 - 0.04 K/uL    Lymph # 0.4 (L) 1.0 - 4.8 K/uL    Mono # 0.5 0.3 - 1.0 K/uL    Eos # 0.1 0.0 - 0.5 K/uL    Baso # 0.02 0.00 - 0.20 K/uL    nRBC 0 0 /100 WBC    Gran % 63.1 38.0 - 73.0 %    Lymph % 14.9 (L) 18.0 - 48.0 %    Mono % 18.4 (H) 4.0 - 15.0 %    Eosinophil % 2.4 0.0 - 8.0 %    Basophil % 0.8 0.0 - 1.9 %    Differential Method Automated    CMP    Collection Time: 03/22/24 11:52 AM   Result Value Ref Range    Sodium 140 136 - 145 mmol/L    Potassium 4.2 3.5 - 5.1 mmol/L    Chloride 109 95 - 110 mmol/L    CO2 24 23 - 29 mmol/L    Glucose 93 70 - 110 mg/dL    BUN 14 8 - 23 mg/dL    Creatinine 1.0 0.5 - 1.4 mg/dL    Calcium 9.6 8.7 - 10.5 mg/dL    Total Protein 6.8 6.0 - 8.4 g/dL    Albumin 3.9 3.5 - 5.2 g/dL    Total Bilirubin 0.8 0.1 - 1.0 mg/dL    Alkaline Phosphatase 87 55 - 135 U/L    AST 14 10 - 40 U/L    ALT 12 10 - 44 U/L    eGFR >60.0 >60 mL/min/1.73 m^2    Anion Gap 7 (L) 8 - 16 mmol/L         Imaging:    See oncologic history as above.     Path:  See oncologic history above.      Assessment and Plan:      Hernan Engel is a 68 y.o. male with pmh significant for COPD, T2DM, HLD, Stage IB ([id number]) moderately differentiated adenocarcinoma of RUL, initially dx'd 3/2014, s/p R upper lobectomy (3/2014, Dr. Mendez), and now two newly diagnosed concurrent lung cancers as below who presents for follow up:     1) Stage IIIA (T4N0M0) squamous cell carcinoma of the LLL (insufficient sample for PD-L1, no targetable mutations on liquid biopsy), initially dx'd 11/2/23, s/p CRT (1/23/24 - 3/05/24) w/ weekly carboplatin/paclitaxel (1/22/24 - 2/26/24)    2) Stage IA3 (O5xH3M1) adenocarcinoma of the lingula (KRAS G12C, PD-L1 10%), initially dx'd 12/19/23, s/p CRT (1/23/24 - 3/05/24) w/ weekly carboplatin/paclitaxel (1/22/24 - 2/26/24)    *Notably, pt has a RLL pleural based nodule, reviewed at TB, appears linear and unlikely malignant    Stage IIIA Squamous Cell Carcinoma of the LLL, No PD-L1, No Targetable Mutations  ECOG PS 0. Pt is now s/p CRT with weekly carboplatin/paclitaxel and tolerated therapy without significant issue. CT C/A/P (3/13/24) demonstrates decreasing size of the LLL mass, as well as stability of the lingular lesion. Notably, it also demonstrates b/l cavitary lesions (sub-cm), more solid nodules, and ill-defined opacities b/l.  On discussion with Radiation Oncology, favor that these represent postradiation inflammatory changes and so will plan to proceed with immunotherapy consolidation with close imaging follow-up.  Today (03/22/2024), I reiterated the logistics of this medication, the general mechanism of action, and the potential side effects (up to and including death) with the patient and his wife today.  Patient signed consent today.  He is scheduled for his first cycle next week.    PLAN:   -- Proceed w/ durvalumab 1500 mg C1D1 on 3/26/24, labs acceptable (ANC 1600, likely 2/2 recent treatment) and treatment signed  -- Labs and RTC on 4/17/24, C2D1 on 4/18/24      Stage IA3 Adenocarcinoma of the  Lingula, NGS and PD-L1 Pending  Bronchoscopy demonstrates adenocarcinoma of the lingula, separate from the squamous cell carcinoma in the LLL.  This is a T1c lesion which, given lack of lymphadenopathy, makes this a Stage IA3 disease.  Plan for treatment w/ CRT followed by immunotherapy, as above  -- Treatment as above      The above information has been reviewed with the patient and all questions have been answered to their apparent satisfaction.  They understand that they can call the clinic with any questions.    Guanako Grove MD  Hematology/Oncology  [facility]        Med Onc Chart Routing      Follow up with physician . Labs and RTC on 4/17/24, C2D1 on 4/18/24   Follow up with SHAR[facility]    Infusion scheduling note    Injection scheduling note    Labs CBC, CMP, free T4 and TSH   Scheduling:  Preferred lab:  Lab interval:     Imaging    Pharmacy appointment    Other referrals

## 2024-03-25 DIAGNOSIS — C34.32 SQUAMOUS CELL CARCINOMA OF BRONCHUS IN LEFT LOWER LOBE: Primary | ICD-10-CM

## 2024-03-28 ENCOUNTER — TELEPHONE (OUTPATIENT)
Dept: FAMILY MEDICINE | Facility: CLINIC | Age: 69
End: 2024-03-28
Payer: MEDICARE

## 2024-03-28 NOTE — TELEPHONE ENCOUNTER
----- Message from Radha Ramirez sent at 3/28/2024  4:19 PM CDT -----  Regarding: Self [phone]  Type: Patient Call Back     Who called: Self     What is the request in detail: Pt is requesting a copy of proof of covid injection.      Can the clinic reply by [facility]? No     Would the patient rather a call back or a response via [facility]? Call back      Best call back number: [phone]      Additional Information:

## 2024-03-28 NOTE — TELEPHONE ENCOUNTER
Left message informing patient that proof of  COVID vaccines are available via [facility].  Also advised that if he is unable to access, his immunization record has been printed and is ready for pickup at the clinic.

## 2024-04-01 ENCOUNTER — TELEPHONE (OUTPATIENT)
Dept: FAMILY MEDICINE | Facility: CLINIC | Age: 69
End: 2024-04-01
Payer: MEDICARE

## 2024-04-01 NOTE — TELEPHONE ENCOUNTER
----- Message from Berkley Mcdowell sent at 4/1/2024  1:09 PM CDT -----  Regarding: self    Type: Patient Call Back     Who called: self     What is the request in detail: pt is stating he just completed chemo and would like to know if he should get the covid and rsv vaccines     Can the clinic reply by [facility]? No     Would the patient rather a call back or a response via [facility]? Call back     Best call back number: [phone]       Additional Information:     Thank you.

## 2024-04-01 NOTE — TELEPHONE ENCOUNTER
Called to inform patient of Dr. Calvin's response.  No answer, unable to leave message - voicemail full.

## 2024-04-02 NOTE — TELEPHONE ENCOUNTER
Ocie/patient's wife informed of Dr. Calvin's response regarding vaccines.  Wife verbalized understanding.

## 2024-04-03 ENCOUNTER — OFFICE VISIT (OUTPATIENT)
Dept: RADIATION ONCOLOGY | Facility: CLINIC | Age: 69
End: 2024-04-03
Payer: MEDICARE

## 2024-04-03 ENCOUNTER — OFFICE VISIT (OUTPATIENT)
Dept: URGENT CARE | Facility: CLINIC | Age: 69
End: 2024-04-03
Payer: MEDICARE

## 2024-04-03 ENCOUNTER — OCCUPATIONAL HEALTH (OUTPATIENT)
Dept: URGENT CARE | Facility: CLINIC | Age: 69
End: 2024-04-03

## 2024-04-03 VITALS
BODY MASS INDEX: 27 KG/M2 | TEMPERATURE: 98 F | OXYGEN SATURATION: 96 % | WEIGHT: 172 LBS | HEART RATE: 85 BPM | RESPIRATION RATE: 17 BRPM | HEIGHT: 67 IN | DIASTOLIC BLOOD PRESSURE: 74 MMHG | SYSTOLIC BLOOD PRESSURE: 120 MMHG

## 2024-04-03 VITALS
HEIGHT: 67 IN | OXYGEN SATURATION: 98 % | HEART RATE: 67 BPM | DIASTOLIC BLOOD PRESSURE: 63 MMHG | BODY MASS INDEX: 27.02 KG/M2 | SYSTOLIC BLOOD PRESSURE: 122 MMHG | WEIGHT: 172.19 LBS

## 2024-04-03 DIAGNOSIS — Z13.9 ENCOUNTER FOR SCREENING: Primary | ICD-10-CM

## 2024-04-03 DIAGNOSIS — B86 SCABIES: Primary | ICD-10-CM

## 2024-04-03 DIAGNOSIS — C34.32 SQUAMOUS CELL CARCINOMA OF BRONCHUS IN LEFT LOWER LOBE: Primary | ICD-10-CM

## 2024-04-03 DIAGNOSIS — C34.12 ADENOCARCINOMA OF UPPER LOBE OF LEFT LUNG: ICD-10-CM

## 2024-04-03 DIAGNOSIS — L29.9 PRURITUS: ICD-10-CM

## 2024-04-03 PROCEDURE — 86580 TB INTRADERMAL TEST: CPT | Mod: S$GLB,,, | Performed by: PHYSICIAN ASSISTANT

## 2024-04-03 PROCEDURE — 99024 POSTOP FOLLOW-UP VISIT: CPT | Mod: S$GLB,,, | Performed by: STUDENT IN AN ORGANIZED HEALTH CARE EDUCATION/TRAINING PROGRAM

## 2024-04-03 PROCEDURE — 99213 OFFICE O/P EST LOW 20 MIN: CPT | Mod: S$GLB,,, | Performed by: NURSE PRACTITIONER

## 2024-04-03 PROCEDURE — 90686 IIV4 VACC NO PRSV 0.5 ML IM: CPT | Mod: S$GLB,,, | Performed by: PHYSICIAN ASSISTANT

## 2024-04-03 PROCEDURE — 99999 PR PBB SHADOW E&M-EST. PATIENT-LVL III: CPT | Mod: PBBFAC,,, | Performed by: STUDENT IN AN ORGANIZED HEALTH CARE EDUCATION/TRAINING PROGRAM

## 2024-04-03 RX ORDER — HYDROXYZINE HYDROCHLORIDE 25 MG/1
25 TABLET, FILM COATED ORAL 2 TIMES DAILY
Qty: 14 TABLET | Refills: 0 | Status: SHIPPED | OUTPATIENT
Start: 2024-04-03 | End: 2024-04-29 | Stop reason: ALTCHOICE

## 2024-04-03 RX ORDER — TRIAMCINOLONE ACETONIDE 1 MG/G
CREAM TOPICAL 3 TIMES DAILY
Qty: 45 G | Refills: 0 | Status: SHIPPED | OUTPATIENT
Start: 2024-04-03 | End: 2024-04-10

## 2024-04-03 RX ORDER — PERMETHRIN 50 MG/G
CREAM TOPICAL
Qty: 60 G | Refills: 1 | Status: SHIPPED | OUTPATIENT
Start: 2024-04-03

## 2024-04-03 NOTE — PROGRESS NOTES
"Subjective:      Patient ID: Hernan Engel is a 68 y.o. male.    Vitals:  height is 5' 7" (1.702 m) and weight is 78 kg (172 lb). His oral temperature is 98 °F (36.7 °C). His blood pressure is 120/74 and his pulse is 85. His respiration is 17 and oxygen saturation is 96%.     Chief Complaint: Rash    Pt presents with Rash on both upper arms and neck that started 2 days ago. Rash has gotten worse. Tried benadryl, lotion, and OTC antibiotic cream but nothing is helping.     Rash  This is a new problem. Episode onset: 2 days. The problem has been gradually worsening since onset. The affected locations include the neck, right arm and left arm. The rash is characterized by itchiness. He was exposed to nothing. Pertinent negatives include no anorexia, congestion, cough, diarrhea, eye pain, facial edema, fatigue, fever, joint pain, nail changes, rhinorrhea, shortness of breath, sore throat or vomiting. Past treatments include antihistamine, anti-itch cream and antibiotic cream. The treatment provided no relief.       Constitution: Negative for fatigue and fever.   HENT:  Negative for congestion and sore throat.    Eyes:  Negative for eye pain.   Respiratory:  Negative for cough and shortness of breath.    Gastrointestinal:  Negative for vomiting and diarrhea.   Skin:  Positive for rash.   Allergic/Immunologic: Positive for itching.      Objective:     Physical Exam   Constitutional: He is oriented to person, place, and time. He appears well-developed. He is cooperative. awake  HENT:   Head: Normocephalic and atraumatic.   Ears:   Right Ear: Hearing, tympanic membrane, external ear and ear canal normal.   Left Ear: Hearing, tympanic membrane, external ear and ear canal normal.   Nose: Nose normal.   Mouth/Throat: Uvula is midline and oropharynx is clear and moist. Mucous membranes are moist. Oropharynx is clear.   Eyes: Conjunctivae, EOM and lids are normal. Pupils are equal, round, and reactive to light. Extraocular movement " intact   Neck: Trachea normal and phonation normal. Neck supple. No thyromegaly present.   Cardiovascular: Normal rate, regular rhythm, normal heart sounds and normal pulses.   Pulmonary/Chest: Effort normal and breath sounds normal. No respiratory distress. He has no decreased breath sounds. He has no wheezes. He has no rhonchi.   Abdominal: Normal appearance and bowel sounds are normal. Soft. flat abdomen   Musculoskeletal: Normal range of motion.         General: Normal range of motion.   Lymphadenopathy:     He has no cervical adenopathy.   Neurological: He is alert and oriented to person, place, and time.   Skin: Skin is warm, dry, intact and rash (linear progression of inflamed papules to neck and bilateral upper arm, with nocturnal itching). Capillary refill takes less than 2 seconds.   Psychiatric: His speech is normal and behavior is normal. Mood, judgment and thought content normal.   Nursing note and vitals reviewed.          Assessment:     1. Scabies    2. Pruritus        Plan:       Scabies  -     permethrin (ELIMITE) 5 % cream; Apply to skin head to toe over night, for 8-10 hours, wash off in AM in shower or bath  Repeat in 7 days.  Dispense: 60 g; Refill: 1    Pruritus  -     hydrOXYzine HCL (ATARAX) 25 MG tablet; Take 1 tablet (25 mg total) by mouth 2 (two) times daily. for 7 days  Dispense: 14 tablet; Refill: 0  -     triamcinolone acetonide 0.1% (KENALOG) 0.1 % cream; Apply topically 3 (three) times daily. Apply to itching skin  as directed for 7 days  Dispense: 45 g; Refill: 0        Patient Instructions   Discharge instructions for Scabies  Follow up with PCP    Environmental Care discussed  What care is needed at home?   Ask your doctor what you need to do when you go home. Make sure you ask questions if you do not understand what the doctor says. This way you will know what you need to do.  Take care of yourself.  Apply creams and lotions as ordered by your doctor.  Use a soft brush to apply  creams and lotion on your nails.  Cut your nails and clean them well to get rid of any mites or eggs.  Avoid scratching your skin. Take a cool bath to help with itching.  Keep any open wound clean and dry.  Protect others and prevent reinfection.  Use hot water to wash all clothing, towels, and bed linens.  Dry clean items that cannot be washed. You can also seal them in plastic bags for at least 3 days to kill the mites.  Vacuum your rugs, furniture, bedding, and car interior. Throw the vacuum  bag away when done.  Treat other family members for scabies.  Avoid sex until your infection is treated.  Avoid close contact with other people until your infection is treated.    1) See orders for this visit as documented in the electronic medical record.  2) Symptomatic therapy suggested: use acetaminophen/ibuprofen every 6-8 hours prn pain or fever, push fluids.   3) Call or return to clinic prn if these symptoms worsen or fail to improve as anticipated.    Discussed results/diagnosis/plan with patient in clinic.  We had shared decision making for patient's treatment. Patient verbalized understanding and in agreement with current treatment plan.     Patient was instructed to return for re-evaluation with urgent care or PCP for continued outpatient workup and management if symptoms do not improve/worsening symptoms. Strict ED versus clinic precautions given in depth.    Discharge and follow-up instructions given verbally/printed with the patient who expressed understanding. The instructions and results are also available on [employer].      - You must understand that you have received an Urgent Care treatment only and that you may be released before all of your medical problems are known or treated.   - You, the patient, will arrange for follow up care as instructed.   - Follow up with your PCP or specialty clinic as directed in the next 1-2 weeks if not improved or as needed.  You can call [phone] to schedule  an appointment with the appropriate provider.   - If your condition worsens or fails to improve we recommend that you receive another evaluation at the ER immediately or contact your PCP to discuss your concerns or return here.        MAYURI Kenney

## 2024-04-03 NOTE — PROGRESS NOTES
[facility] Follow Up Note      Assessment:  Hernan Engel is a 68 y.o. 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  ECOG: (1) Restricted in physically strenuous activity, ambulatory and able to do work of light nature        Plan:  We discussed [facility] trial, noting PFS benefit with small increase in pneumonitis with consolidative durva. He is amendable to proceeding. He will see Dr. Reza 4/18 and is to start IO on 4/19.  RTC 3 months          Oncologic History:  He has a history of stage IB ([id number]) moderately differentiated adenocarcinoma of RUL, initially dx'd 3/2014, s/p R upper lobectomy (3/2014, Dr. Mendze).  11/22/22: CT chest ([facility] - report only) with mild progression in the GGO in the anterior left lingula now with cavitary component. New development of diffuse tree in bud nodularity in the superior segment of the LLL. Both suggested to be infectious in etiology.   11/2/23: Bronch   A completely obstructing mass was found proximally, at the orifice in the superior segment of the left lower lobe. Biopsied.  Path: squamous cell carcinoma of the LLL  11/6/23: PET/CT with 7.6 cm LLL superior segmetn mass. 2.5 cm lingular cavitary lesion. Bandlike uptake in the RLL, presumed atelectasis. No suspicious adenopathy. No distant mets. Likely left adrenal adenoma without uptake  11/30/23: MRI Brain with JAN  12/19/23: Bronch and EBUS  Op Note: A completely obstructing mass was found proximally, at the orifice in the left lower lobe with an endobronchial lesion. No additional abnormalities on left and no significant secretions. Evidence of previous surgery was found in the right upper lobe. The bronchial stump is  well healed. Otherwise,        right anatomy is normal. No endobronchial lesions or masses. Superior lingula lesion biopsied. 11R, 4R, 4L- No pathologic LN enlargement visualized at these locations. Unable to evaluate station 11L as unable to wedge EBUS scope 2/2 obstructing LLL endobronchial mass   Path: ELDER with adenocarcinoma. Station 7 benign with lymphoid tissue present  1/23/24 - 3/6/24: 60-66 Gy in 30 fractions with concurrent carbo taxol      Possibility of pregnancy: N/A  History of prior irradiation: as above  History of prior systemic anti-cancer therapy: No  History of collagen vascular disease: No  Implanted electronic device (pacer/defib/nerve stimulator): No     Interval history  Hernan Engel presents today for follow up.    He is doing great, with no adverse effects of CRT at this time. He is back to walking at least a mile and a half a day. He is hoping to resume working in near future. No CP, SOB, worsening cough. Energy is normal. No odynophagia or dysphagia.       Review of Systems:  ROS as above    Social History:  Social History     Tobacco Use    Smoking status: Former     Current packs/day: 0.00     Average packs/day: 1 pack/day for 30.0 years (30.0 ttl pk-yrs)     Types: Cigarettes     Start date: 1/15/1984     Quit date: 1/15/2014     Years since quitting: 10.2    Smokeless tobacco: Never   Substance Use Topics    Alcohol use: Yes     Alcohol/week: 5.7 standard drinks of alcohol     Types: 4 Cans of beer, 2 Standard drinks or equivalent per week     Comment: every other day beer drinker    Drug use: No             Medications:  Current Outpatient Medications on File Prior to Visit   Medication Sig Dispense Refill    atorvastatin (LIPITOR) 40 MG tablet Take 1 tablet (40 mg total) by mouth once daily. 90 tablet 3    blood-glucose meter kit Use as instructed 1 each 0    dextrin (FIBER POWDER ORAL) Take 1 Package by mouth once.      efinaconazole (JUBLIA) 10 % Crow Apply 1 Application topically  "once daily. 8 mL 0    metFORMIN (GLUCOPHAGE-XR) 500 MG ER 24hr tablet Take 2 tablets (1,000 mg total) by mouth 2 (two) times daily with meals. 60 tablet 0    polyethylene glycol (GLYCOLAX) 17 gram/dose powder Mix 1 capful (17 g) and dissolve in glass of water to take by mouth once daily. 510 g 0    tirzepatide (MOUNJARO) 2.5 mg/0.5 mL PnIj Inject 2.5 mg into the skin every 7 days. 4 Pen 0    umeclidinium (INCRUSE ELLIPTA) 62.5 mcg/actuation inhalation capsule Inhale 62.5 mcg into the lungs once daily. Controller 30 each 0     No current facility-administered medications on file prior to visit.       Allergies:  Review of patient's allergies indicates:   Allergen Reactions    Adhesive Itching, Rash, Other (See Comments) and Dermatitis     Blister       Exam:  Vitals:    04/03/24 0910   BP: 122/63   Pulse: 67   SpO2: 98%   Weight: 78.1 kg (172 lb 2.9 oz)   Height: 5' 7" (1.702 m)     Constitutional: Pleasant 68 y.o. male in no acute distress.  Well nourished. Well groomed.   HEENT: Normocephalic and atraumatic   Cardiovascular: Upper extremities warm to touch  Lungs: No audible wheezing.  Normal effort.   Musculoskeletal: No gross MSK deformities. Ambulates well  Skin: No rashes appreciated.  Psych: Alert and oriented with appropriate mood and affect.  Neuro:   Grossly normal.    Data Review:  Information obtained from Hernan Engel and via chart review.           Osman Arora MD  Radiation Oncology                  "

## 2024-04-03 NOTE — PATIENT INSTRUCTIONS
Discharge instructions for Scabies  Follow up with PCP    Environmental Care discussed  What care is needed at home?   Ask your doctor what you need to do when you go home. Make sure you ask questions if you do not understand what the doctor says. This way you will know what you need to do.  Take care of yourself.  Apply creams and lotions as ordered by your doctor.  Use a soft brush to apply creams and lotion on your nails.  Cut your nails and clean them well to get rid of any mites or eggs.  Avoid scratching your skin. Take a cool bath to help with itching.  Keep any open wound clean and dry.  Protect others and prevent reinfection.  Use hot water to wash all clothing, towels, and bed linens.  Dry clean items that cannot be washed. You can also seal them in plastic bags for at least 3 days to kill the mites.  Vacuum your rugs, furniture, bedding, and car interior. Throw the vacuum  bag away when done.  Treat other family members for scabies.  Avoid sex until your infection is treated.  Avoid close contact with other people until your infection is treated.    1) See orders for this visit as documented in the electronic medical record.  2) Symptomatic therapy suggested: use acetaminophen/ibuprofen every 6-8 hours prn pain or fever, push fluids.   3) Call or return to clinic prn if these symptoms worsen or fail to improve as anticipated.    Discussed results/diagnosis/plan with patient in clinic.  We had shared decision making for patient's treatment. Patient verbalized understanding and in agreement with current treatment plan.     Patient was instructed to return for re-evaluation with urgent care or PCP for continued outpatient workup and management if symptoms do not improve/worsening symptoms. Strict ED versus clinic precautions given in depth.    Discharge and follow-up instructions given verbally/printed with the patient who expressed understanding. The instructions and results are also available on  [employer].      - You must understand that you have received an Urgent Care treatment only and that you may be released before all of your medical problems are known or treated.   - You, the patient, will arrange for follow up care as instructed.   - Follow up with your PCP or specialty clinic as directed in the next 1-2 weeks if not improved or as needed.  You can call [phone] to schedule an appointment with the appropriate provider.   - If your condition worsens or fails to improve we recommend that you receive another evaluation at the ER immediately or contact your PCP to discuss your concerns or return here.        MAYURI Kenney

## 2024-04-05 LAB
TB INDURATION - 48 HR READ: 0 MM
TB INDURATION - 72 HR READ: 0 MM
TB SKIN TEST - 48 HR READ: NEGATIVE
TB SKIN TEST - 72 HR READ: NEGATIVE

## 2024-04-08 DIAGNOSIS — E11.8 TYPE 2 DIABETES MELLITUS WITH COMPLICATION, WITHOUT LONG-TERM CURRENT USE OF INSULIN: ICD-10-CM

## 2024-04-08 RX ORDER — TIRZEPATIDE 5 MG/.5ML
5 INJECTION, SOLUTION SUBCUTANEOUS
Qty: 4 PEN | Refills: 0 | Status: SHIPPED | OUTPATIENT
Start: 2024-04-08 | End: 2024-04-29 | Stop reason: DRUGHIGH

## 2024-04-08 RX ORDER — TIRZEPATIDE 2.5 MG/.5ML
2.5 INJECTION, SOLUTION SUBCUTANEOUS
Qty: 4 PEN | Refills: 0 | Status: CANCELLED | OUTPATIENT
Start: 2024-04-08

## 2024-04-08 NOTE — TELEPHONE ENCOUNTER
No care due was identified.  [facility] Embedded Care Due Messages. Reference number: [id number].   4/08/2024 9:18:35 AM CDT

## 2024-04-17 ENCOUNTER — TELEPHONE (OUTPATIENT)
Dept: HEMATOLOGY/ONCOLOGY | Facility: CLINIC | Age: 69
End: 2024-04-17
Payer: MEDICARE

## 2024-04-18 ENCOUNTER — LAB VISIT (OUTPATIENT)
Dept: LAB | Facility: HOSPITAL | Age: 69
End: 2024-04-18
Attending: HOSPITALIST
Payer: MEDICARE

## 2024-04-18 ENCOUNTER — OFFICE VISIT (OUTPATIENT)
Dept: HEMATOLOGY/ONCOLOGY | Facility: CLINIC | Age: 69
End: 2024-04-18
Payer: MEDICARE

## 2024-04-18 VITALS
OXYGEN SATURATION: 96 % | HEIGHT: 67 IN | SYSTOLIC BLOOD PRESSURE: 124 MMHG | HEART RATE: 76 BPM | RESPIRATION RATE: 18 BRPM | BODY MASS INDEX: 26.89 KG/M2 | WEIGHT: 171.31 LBS | DIASTOLIC BLOOD PRESSURE: 82 MMHG | TEMPERATURE: 98 F

## 2024-04-18 DIAGNOSIS — C34.12 ADENOCARCINOMA OF UPPER LOBE OF LEFT LUNG: ICD-10-CM

## 2024-04-18 DIAGNOSIS — C34.32 SQUAMOUS CELL CARCINOMA OF BRONCHUS IN LEFT LOWER LOBE: ICD-10-CM

## 2024-04-18 DIAGNOSIS — C34.32 SQUAMOUS CELL CARCINOMA OF BRONCHUS IN LEFT LOWER LOBE: Primary | ICD-10-CM

## 2024-04-18 LAB
ALBUMIN SERPL BCP-MCNC: 3.9 G/DL (ref 3.5–5.2)
ALP SERPL-CCNC: 100 U/L (ref 55–135)
ALT SERPL W/O P-5'-P-CCNC: 19 U/L (ref 10–44)
ANION GAP SERPL CALC-SCNC: 8 MMOL/L (ref 8–16)
AST SERPL-CCNC: 18 U/L (ref 10–40)
BASOPHILS # BLD AUTO: 0.05 K/UL (ref 0–0.2)
BASOPHILS NFR BLD: 1.8 % (ref 0–1.9)
BILIRUB SERPL-MCNC: 0.9 MG/DL (ref 0.1–1)
BUN SERPL-MCNC: 17 MG/DL (ref 8–23)
CALCIUM SERPL-MCNC: 9.8 MG/DL (ref 8.7–10.5)
CHLORIDE SERPL-SCNC: 109 MMOL/L (ref 95–110)
CO2 SERPL-SCNC: 24 MMOL/L (ref 23–29)
CREAT SERPL-MCNC: 1 MG/DL (ref 0.5–1.4)
DIFFERENTIAL METHOD BLD: ABNORMAL
EOSINOPHIL # BLD AUTO: 0.1 K/UL (ref 0–0.5)
EOSINOPHIL NFR BLD: 2.5 % (ref 0–8)
ERYTHROCYTE [DISTWIDTH] IN BLOOD BY AUTOMATED COUNT: 13.4 % (ref 11.5–14.5)
EST. GFR  (NO RACE VARIABLE): >60 ML/MIN/1.73 M^2
GLUCOSE SERPL-MCNC: 95 MG/DL (ref 70–110)
HCT VFR BLD AUTO: 34.9 % (ref 40–54)
HGB BLD-MCNC: 11.8 G/DL (ref 14–18)
IMM GRANULOCYTES # BLD AUTO: 0 K/UL (ref 0–0.04)
IMM GRANULOCYTES NFR BLD AUTO: 0 % (ref 0–0.5)
LYMPHOCYTES # BLD AUTO: 0.5 K/UL (ref 1–4.8)
LYMPHOCYTES NFR BLD: 16.3 % (ref 18–48)
MCH RBC QN AUTO: 30.3 PG (ref 27–31)
MCHC RBC AUTO-ENTMCNC: 33.8 G/DL (ref 32–36)
MCV RBC AUTO: 90 FL (ref 82–98)
MONOCYTES # BLD AUTO: 0.6 K/UL (ref 0.3–1)
MONOCYTES NFR BLD: 20.8 % (ref 4–15)
NEUTROPHILS # BLD AUTO: 1.7 K/UL (ref 1.8–7.7)
NEUTROPHILS NFR BLD: 58.6 % (ref 38–73)
NRBC BLD-RTO: 0 /100 WBC
PLATELET # BLD AUTO: 233 K/UL (ref 150–450)
PMV BLD AUTO: 9.9 FL (ref 9.2–12.9)
POTASSIUM SERPL-SCNC: 3.9 MMOL/L (ref 3.5–5.1)
PROT SERPL-MCNC: 6.9 G/DL (ref 6–8.4)
RBC # BLD AUTO: 3.9 M/UL (ref 4.6–6.2)
SODIUM SERPL-SCNC: 141 MMOL/L (ref 136–145)
T4 FREE SERPL-MCNC: 0.86 NG/DL (ref 0.71–1.51)
TSH SERPL DL<=0.005 MIU/L-ACNC: 0.62 UIU/ML (ref 0.4–4)
WBC # BLD AUTO: 2.83 K/UL (ref 3.9–12.7)

## 2024-04-18 PROCEDURE — 1159F MED LIST DOCD IN RCRD: CPT | Mod: CPTII,S$GLB,, | Performed by: HOSPITALIST

## 2024-04-18 PROCEDURE — 1101F PT FALLS ASSESS-DOCD LE1/YR: CPT | Mod: CPTII,S$GLB,, | Performed by: HOSPITALIST

## 2024-04-18 PROCEDURE — 84443 ASSAY THYROID STIM HORMONE: CPT | Performed by: HOSPITALIST

## 2024-04-18 PROCEDURE — 3079F DIAST BP 80-89 MM HG: CPT | Mod: CPTII,S$GLB,, | Performed by: HOSPITALIST

## 2024-04-18 PROCEDURE — 3008F BODY MASS INDEX DOCD: CPT | Mod: CPTII,S$GLB,, | Performed by: HOSPITALIST

## 2024-04-18 PROCEDURE — 84439 ASSAY OF FREE THYROXINE: CPT | Performed by: HOSPITALIST

## 2024-04-18 PROCEDURE — 3072F LOW RISK FOR RETINOPATHY: CPT | Mod: CPTII,S$GLB,, | Performed by: HOSPITALIST

## 2024-04-18 PROCEDURE — 80053 COMPREHEN METABOLIC PANEL: CPT | Performed by: HOSPITALIST

## 2024-04-18 PROCEDURE — 3074F SYST BP LT 130 MM HG: CPT | Mod: CPTII,S$GLB,, | Performed by: HOSPITALIST

## 2024-04-18 PROCEDURE — 85025 COMPLETE CBC W/AUTO DIFF WBC: CPT | Performed by: HOSPITALIST

## 2024-04-18 PROCEDURE — 3288F FALL RISK ASSESSMENT DOCD: CPT | Mod: CPTII,S$GLB,, | Performed by: HOSPITALIST

## 2024-04-18 PROCEDURE — 99999 PR PBB SHADOW E&M-EST. PATIENT-LVL III: CPT | Mod: PBBFAC,,, | Performed by: HOSPITALIST

## 2024-04-18 PROCEDURE — 99215 OFFICE O/P EST HI 40 MIN: CPT | Mod: S$GLB,,, | Performed by: HOSPITALIST

## 2024-04-18 PROCEDURE — 1126F AMNT PAIN NOTED NONE PRSNT: CPT | Mod: CPTII,S$GLB,, | Performed by: HOSPITALIST

## 2024-04-18 PROCEDURE — 36415 COLL VENOUS BLD VENIPUNCTURE: CPT | Performed by: HOSPITALIST

## 2024-04-18 PROCEDURE — 3046F HEMOGLOBIN A1C LEVEL >9.0%: CPT | Mod: CPTII,S$GLB,, | Performed by: HOSPITALIST

## 2024-04-18 PROCEDURE — 1157F ADVNC CARE PLAN IN RCRD: CPT | Mod: CPTII,S$GLB,, | Performed by: HOSPITALIST

## 2024-04-18 NOTE — PROGRESS NOTES
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   9/15/2023 Imaging Significant Findings    CXR (URI)  - Masslike focus along L hilar region     10/6/2023 Imaging Significant Findings    CT C, Non-Con  - 6.6 x 5.9 cm LLL mass  - 2 x 1.2 cm cavitary lesion in lingula  - 0.9 x 0.8 cm pleural based nodule in R lung base  - Trace L pleural effusion  - Suspect hilar LAD  - Calcified hilar LN     11/2/2023 Procedure    Bronchoscopy  LLL, Endobronchial Biopsy  - Squamous cell carcinoma (CDK 5/6, p63 positive, CK7 and TTF negative)    [facility] NGS  - Insufficient tissue     11/6/2023 Imaging Significant Findings    PET CT  - 7.6 x 6.0 cm soft tissue mass in superior segment of LLL, SUV 7.2  - 2.5 x 1.6 cm cavitary lesion in ELDER (neoplastic vs infectious/inflammatory), SUV max 1.8  - No hypermetabolic LAD  - 0.9 cm L hypoattenuating L adrenal nodule, likely adenoma     11/29/2023 Tumor Conference    Tumor Board  - Recommend staging EBUS and L robotic bronchoscopy for sampling of ELDER cavitary nodule.  The nodule is highly suspicious for possible adenocarcinoma as it has evolved from a ground-glass opacity to a part solid and cavitary nodule.  Obtain brain MRI to complete staging.  The clinical picture suggests a possible T3 or T4 primary lung cancer or two separate primary lung cancers.        11/29/2023 Tumor Genotyping    Liquid Biopsy, [facility]  - CKDN2A, TP53 (three separate LOF mutations)     11/30/2023 Imaging Significant Findings    MRI Brain  - JAN     12/12/2023 Imaging Significant Findings    CT C, Non-Con  - 6.7 x 6.2 cm LLL mass (previously 6.6 x 5.9 cm), abutting and narrowing the adjacent LLL bronchus  - Enlarging lingular cavitary lesion, now 2.5 x 1.6 cm  - Slightly decreased size of RLL pleural based nodule     1/3/2024 Tumor  Conference    Tumor Board  - Agree with plan for SBRT of lingular lesion and chemoRT to LLL mass.      1/5/2024 Cancer Staged    Staging form: Lung, AJCC 8th Edition  - Clinical: Stage IIIA (cT4, cN0, cM0)     1/19/2024 - 2/26/2024 Chemotherapy    Treatment Summary   Plan Name: OP NSCLC PACLITAXEL + CARBOPLATIN (AUC) QW + RADIATION  Treatment Goal: Curative  Status: Inactive  Start Date: 1/19/2024  End Date: 2/26/2024  Provider: Guanako Grove IV, MD  Chemotherapy: CARBOplatin (PARAPLATIN) 210 mg in sodium chloride 0.9% 136 mL chemo infusion, 210 mg (100 % of original dose 208.2 mg), Intravenous, Clinic/HOD 1 time, 6 of 6 cycles  Dose modification:   (original dose 208.2 mg, Cycle 1)  Administration: 210 mg (1/22/2024), 210 mg (1/29/2024), 180 mg (2/5/2024), 225 mg (2/12/2024), 210 mg (2/19/2024), 210 mg (2/26/2024)  PACLitaxeL (TAXOL) 45 mg/m2 = 84 mg in sodium chloride 0.9% 250 mL chemo infusion, 45 mg/m2 = 84 mg, Intravenous, Clinic/HOD 1 time, 6 of 6 cycles  Administration: 84 mg (1/22/2024), 84 mg (1/29/2024), 84 mg (2/5/2024), 84 mg (2/12/2024), 84 mg (2/19/2024), 84 mg (2/26/2024)     1/23/2024 - 3/6/2024 Radiation Therapy    Treating physician: Santino Arora  Treatment Summary  Course: C1 Thorax 2024  Treatment Site Ref. ID Energy Dose/Fx (Gy) #Fx Dose Correction (Gy) Total Dose (Gy) Start Date End Date Elapsed Days   IM Lung_L PTV_High 6X 2.2 19 / 30 0 41.8 1/23/2024 2/19/2024 27   IMLungNew PTV_High_resim 6X 2.2 11 / 11 0 24.2 2/21/2024 3/6/2024 14   Multiple planns were attempted, including SBRT to the ELDER and CRT to the LLL. With dose overlap, it was ultimately decided to treat with concurrent CRT to both lesions.. He was resimulated for treatment response in the LLL mass.     3/13/2024 Imaging Significant Findings    CT C/A/P  - 4.7 x 4.4 cm perihilar LLL mass, previously 6.7 x 6.2 cm. Decreased mass effect on LLL airways  - 2.7 x 1.7 cm cavitary ligular lesion, unchanged  - New cavitary lesions, solid  pulmonary nodules, and ill-defined opacities in lungs b/l  - Unchanged few sub-cm hypodensities in liver     4/19/2024 -  Chemotherapy    Treatment Summary   Plan Name: OP DURVALUMAB 1500 MG Q4W  Treatment Goal: Curative  Status: Active  Start Date: 4/19/2024 (Planned)  End Date: 3/21/2025 (Planned)  Provider: Guanako Grove IV, MD  Chemotherapy: [No matching medication found in this treatment plan]     Adenocarcinoma of Lingula   10/6/2023 Imaging Significant Findings    CT C, Non-Con  - 6.6 x 5.9 cm LLL mass  - 2 x 1.2 cm cavitary lesion in lingula  - 0.9 x 0.8 cm pleural based nodule in R lung base  - Trace L pleural effusion  - Suspect hilar LAD  - Calcified hilar LN     11/6/2023 Imaging Significant Findings    PET CT  - 7.6 x 6.0 cm soft tissue mass in superior segment of LLL, SUV 7.2  - 2.5 x 1.6 cm cavitary lesion in ELDER (neoplastic vs infectious/inflammatory), SUV max 1.8  - No hypermetabolic LAD  - 0.9 cm L hypoattenuating L adrenal nodule, likely adenoma     11/29/2023 Tumor Conference    Tumor Board  - Recommend staging EBUS and L robotic bronchoscopy for sampling of ELDER cavitary nodule.  The nodule is highly suspicious for possible adenocarcinoma as it has evolved from a ground-glass opacity to a part solid and cavitary nodule.  Obtain brain MRI to complete staging.  The clinical picture suggests a possible T3 or T4 primary lung cancer or two separate primary lung cancers.        11/29/2023 Tumor Genotyping    Liquid Biopsy, [facility]  - CKDN2A, TP53 (three separate LOF mutations)     11/30/2023 Imaging Significant Findings    MRI Brain  - JAN     12/12/2023 Imaging Significant Findings    CT C, Non-Con  - 6.7 x 6.2 cm LLL mass (previously 6.6 x 5.9 cm), abutting and narrowing the adjacent LLL bronchus  - Enlarging lingular cavitary lesion, now 2.5 x 1.6 cm  - Slightly decreased size of RLL pleural based nodule     12/19/2023 Procedure    Bronch with EBUS  ELDER, FNA and TBBx  - Adenocarcinoma (TTF1  positive, p40 negative)    LN  - Negative: Station 7  - Report: No pathologic enlargement of 4R, 4L, or 11R. Unable to evaluated 11L due to obstructing LLL endobronchial mass    [facility] NGS  - KRAS G12C  - TP53  - Negative: EGFR, ALK, BRAF, ROS1, RET, MET, ERBB2  - PD-L1: 10%       1/3/2024 Tumor Conference    Tumor Board  - Agree with plan for SBRT of lingular lesion and chemoRT to LLL mass.      1/5/2024 Cancer Staged    Staging form: Lung, AJCC 8th Edition  - Clinical: Stage IA3 (cT1c, cN0, cM0)     3/13/2024 Imaging Significant Findings    CT C/A/P  - 4.7 x 4.4 cm perihilar LLL mass, previously 6.7 x 6.2 cm. Decreased mass effect on LLL airways  - 2.7 x 1.7 cm cavitary ligular lesion, unchanged  - New cavitary lesions, solid pulmonary nodules, and ill-defined opacities in lungs b/l  - Unchanged few sub-cm hypodensities in liver            HPI:     Hernan Engel is a 68 y.o. male with pmh significant for COPD, T2DM, HLD, Stage IB ([id number]) moderately differentiated adenocarcinoma of RUL, initially dx'd 3/2014, s/p R upper lobectomy (3/2014, Dr. Mendez), and now two newly diagnosed concurrent lung cancers as below who presents for follow up:     1) Stage IIIA (T4N0M0) squamous cell carcinoma of the LLL (insufficient sample for PD-L1, no targetable mutations on liquid biopsy), initially dx'd 11/2/23, s/p CRT (1/23/24 - 3/05/24) w/ weekly carboplatin/paclitaxel (1/22/24 - 2/26/24)    2) Stage IA3 (S0pG6I1) adenocarcinoma of the lingula (KRAS G12C, PD-L1 10%), initially dx'd 12/19/23, s/p CRT (1/23/24 - 3/05/24) w/ weekly carboplatin/paclitaxel (1/22/24 - 2/26/24)    *Notably, pt has a RLL pleural based nodule, reviewed at TB, appears linear and unlikely malignant    Last clinic 3/22/24, plan to start C1D1 durvalumab on 3/26/24. C2D1 on 4/18/24.     Interval History: Patient presents to clinic for routine follow-up prior to C1 of Durvalumab. He canceled his previously scheduled cycle due to some anxiety and fears  of side-effects of immunotherapy but is more comfortable with proceeding following his follow-up with radiation oncology. He has started a part-time job with [employer] at [employer] since his last visit and has improved fatigue and dyspnea.     - 3/26/24: Pt canceled C1D1 durvalumab  - 4/03/24: Rad onc visit, discussed PACIFIC trial, RTC in 3 months      History has been obtained by chart review and discussion with the patient.    Past Medical History:   Past Medical History:   Diagnosis Date    Anxiety     Aortic atherosclerosis 12/6/2017    BPH with urinary obstruction 2/20/2019    Colon polyp 1/16/2014    COPD (chronic obstructive pulmonary disease)     Depression     Disorder of kidney and ureter     ED (erectile dysfunction) 1/16/2014    Family history of colon cancer 1/16/2014    Hearing loss in left ear 34 years    Lung cancer     Normocytic anemia 9/3/2014    Nuclear sclerosis of both eyes 9/4/2020    Seizure 2005    single event - not controlled by medication    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    T2DM (type 2 diabetes mellitus) 2014    DKA 9/2014        Past Surgical History:   Past Surgical History:   Procedure Laterality Date    BRONCHOSCOPY WITH FLUOROSCOPY N/A 11/2/2023    Procedure: BRONCHOSCOPY, WITH FLUOROSCOPY;  Surgeon: Provider, [facility];  Location: [facility] OR [address] [place] [address];  Service: Endoscopy;  Laterality: N/A;    CATARACT EXTRACTION Right 2021    COLONOSCOPY      LUNG REMOVAL, PARTIAL      ROBOTIC BRONCHOSCOPY N/A 12/19/2023    Procedure: ROBOTIC BRONCHOSCOPY;  Surgeon: Hernandez Brewer MD;  Location: [facility] OR [address] [place] [address];  Service: Pulmonary;  Laterality: N/A;        Family History:   Family History   Problem Relation Name Age of Onset    No Known Problems Mother      Cancer Father          colon cancer    Colon cancer Father      Cataracts Father      Colon cancer Sister Kira     Cancer Sister Kira         colon or lung cancer     No Known Problems Sister Valentine      No Known Problems Sister Chantal     No Known Problems Brother Diogo     No Known Problems Maternal Aunt      No Known Problems Maternal Uncle      No Known Problems Paternal Aunt      No Known Problems Paternal Uncle      No Known Problems Maternal Grandmother      No Known Problems Maternal Grandfather      No Known Problems Paternal Grandmother      No Known Problems Paternal Grandfather      No Known Problems Other      Amblyopia Neg Hx      Blindness Neg Hx      Diabetes Neg Hx      Glaucoma Neg Hx      Hypertension Neg Hx      Macular degeneration Neg Hx      Retinal detachment Neg Hx      Strabismus Neg Hx      Stroke Neg Hx      Thyroid disease Neg Hx          Social History:   Social History     Tobacco Use    Smoking status: Former     Current packs/day: 0.00     Average packs/day: 1 pack/day for 30.0 years (30.0 ttl pk-yrs)     Types: Cigarettes     Start date: 1/15/1984     Quit date: 1/15/2014     Years since quitting: 10.2    Smokeless tobacco: Never   Substance Use Topics    Alcohol use: Yes     Alcohol/week: 5.7 standard drinks of alcohol     Types: 4 Cans of beer, 2 Standard drinks or equivalent per week     Comment: every other day beer drinker        I have reviewed and updated the patient's past medical, surgical, family and social histories.      ROS:   As per HPI.     Allergies:   Review of patient's allergies indicates:   Allergen Reactions    Adhesive Itching, Rash, Other (See Comments) and Dermatitis     Blister        Medications:   Current Outpatient Medications   Medication Sig Dispense Refill    atorvastatin (LIPITOR) 40 MG tablet Take 1 tablet (40 mg total) by mouth once daily. 90 tablet 3    blood-glucose meter kit Use as instructed 1 each 0    dextrin (FIBER POWDER ORAL) Take 1 Package by mouth once.      efinaconazole (JUBLIA) 10 % Crow Apply 1 Application topically once daily. 8 mL 0    metFORMIN (GLUCOPHAGE-XR) 500 MG ER 24hr tablet Take 2 tablets  "(1,000 mg total) by mouth 2 (two) times daily with meals. 60 tablet 0    permethrin (ELIMITE) 5 % cream Apply to skin head to toe over night, for 8-10 hours, wash off in AM in shower or bath  Repeat in 7 days. 60 g 1    polyethylene glycol (GLYCOLAX) 17 gram/dose powder Mix 1 capful (17 g) and dissolve in glass of water to take by mouth once daily. 510 g 0    tirzepatide (MOUNJARO) 5 mg/0.5 mL PnIj Inject 5 mg into the skin every 7 days. 4 Pen 0    umeclidinium (INCRUSE ELLIPTA) 62.5 mcg/actuation inhalation capsule Inhale 62.5 mcg into the lungs once daily. Controller 30 each 0    triamcinolone acetonide 0.1% (KENALOG) 0.1 % cream Apply topically 3 (three) times daily. Apply to itching skin  as directed for 7 days 45 g 0     No current facility-administered medications for this visit.          Physical Exam:       /82 (BP Location: Right arm, Patient Position: Sitting, BP Method: Medium (Automatic))   Pulse 76   Temp 98 °F (36.7 °C) (Oral)   Resp 18   Ht 5' 7" (1.702 m)   Wt 77.7 kg (171 lb 4.8 oz)   SpO2 96%   BMI 26.83 kg/m²                Physical Exam  Constitutional:       Appearance: Normal appearance.   HENT:      Head: Normocephalic and atraumatic.   Eyes:      Extraocular Movements: Extraocular movements intact.      Conjunctiva/sclera: Conjunctivae normal.      Pupils: Pupils are equal, round, and reactive to light.   Cardiovascular:      Rate and Rhythm: Normal rate and regular rhythm.      Heart sounds: No murmur heard.     No friction rub. No gallop.   Pulmonary:      Effort: Pulmonary effort is normal.      Breath sounds: No wheezing, rhonchi or rales.   Musculoskeletal:         General: Normal range of motion.      Right lower leg: No edema.      Left lower leg: No edema.   Skin:     General: Skin is warm and dry.   Neurological:      Mental Status: He is alert and oriented to person, place, and time.   Psychiatric:         Mood and Affect: Mood normal.         Thought Content: " Thought content normal.         Judgment: Judgment normal.         Labs:   Recent Results (from the past 48 hour(s))   CBC w/ DIFF    Collection Time: 04/18/24 12:09 PM   Result Value Ref Range    WBC 2.83 (L) 3.90 - 12.70 K/uL    RBC 3.90 (L) 4.60 - 6.20 M/uL    Hemoglobin 11.8 (L) 14.0 - 18.0 g/dL    Hematocrit 34.9 (L) 40.0 - 54.0 %    MCV 90 82 - 98 fL    MCH 30.3 27.0 - 31.0 pg    MCHC 33.8 32.0 - 36.0 g/dL    RDW 13.4 11.5 - 14.5 %    Platelets 233 150 - 450 K/uL    MPV 9.9 9.2 - 12.9 fL    Immature Granulocytes 0.0 0.0 - 0.5 %    Gran # (ANC) 1.7 (L) 1.8 - 7.7 K/uL    Immature Grans (Abs) 0.00 0.00 - 0.04 K/uL    Lymph # 0.5 (L) 1.0 - 4.8 K/uL    Mono # 0.6 0.3 - 1.0 K/uL    Eos # 0.1 0.0 - 0.5 K/uL    Baso # 0.05 0.00 - 0.20 K/uL    nRBC 0 0 /100 WBC    Gran % 58.6 38.0 - 73.0 %    Lymph % 16.3 (L) 18.0 - 48.0 %    Mono % 20.8 (H) 4.0 - 15.0 %    Eosinophil % 2.5 0.0 - 8.0 %    Basophil % 1.8 0.0 - 1.9 %    Differential Method Automated    CMP    Collection Time: 04/18/24 12:09 PM   Result Value Ref Range    Sodium 141 136 - 145 mmol/L    Potassium 3.9 3.5 - 5.1 mmol/L    Chloride 109 95 - 110 mmol/L    CO2 24 23 - 29 mmol/L    Glucose 95 70 - 110 mg/dL    BUN 17 8 - 23 mg/dL    Creatinine 1.0 0.5 - 1.4 mg/dL    Calcium 9.8 8.7 - 10.5 mg/dL    Total Protein 6.9 6.0 - 8.4 g/dL    Albumin 3.9 3.5 - 5.2 g/dL    Total Bilirubin 0.9 0.1 - 1.0 mg/dL    Alkaline Phosphatase 100 55 - 135 U/L    AST 18 10 - 40 U/L    ALT 19 10 - 44 U/L    eGFR >60.0 >60 mL/min/1.73 m^2    Anion Gap 8 8 - 16 mmol/L   TSH    Collection Time: 04/18/24 12:09 PM   Result Value Ref Range    TSH 0.616 0.400 - 4.000 uIU/mL   T4, Free    Collection Time: 04/18/24 12:09 PM   Result Value Ref Range    Free T4 0.86 0.71 - 1.51 ng/dL           Imaging:    See oncologic history as above.     Path:  See oncologic history above.      Assessment and Plan:     Hernan Engel is a 68 y.o. male with pmh significant for COPD, T2DM, HLD, Stage IB ([id number])  moderately differentiated adenocarcinoma of RUL, initially dx'd 3/2014, s/p R upper lobectomy (3/2014, Dr. Mendez), and now two newly diagnosed concurrent lung cancers as below who presents for follow up:     1) Stage IIIA (T4N0M0) squamous cell carcinoma of the LLL (insufficient sample for PD-L1, no targetable mutations on liquid biopsy), initially dx'd 11/2/23, s/p CRT (1/23/24 - 3/05/24) w/ weekly carboplatin/paclitaxel (1/22/24 - 2/26/24)    2) Stage IA3 (I4bH5X7) adenocarcinoma of the lingula (KRAS G12C, PD-L1 10%), initially dx'd 12/19/23, s/p CRT (1/23/24 - 3/05/24) w/ weekly carboplatin/paclitaxel (1/22/24 - 2/26/24)    *Notably, pt has a RLL pleural based nodule, reviewed at TB, appears linear and unlikely malignant    Stage IIIA Squamous Cell Carcinoma of the LLL, No PD-L1, No Targetable Mutations  ECOG PS 0. Pt is now s/p CRT with weekly carboplatin/paclitaxel and tolerated therapy without significant issue. CT C/A/P (3/13/24) demonstrates decreasing size of the LLL mass, as well as stability of the lingular lesion. Notably, it also demonstrates b/l cavitary lesions (sub-cm), more solid nodules, and ill-defined opacities b/l.  On discussion with Radiation Oncology, favor that these represent postradiation inflammatory changes and so will plan to proceed with immunotherapy consolidation with close imaging follow-up.  Today (03/22/2024), I reiterated the logistics of this medication, the general mechanism of action, and the potential side effects (up to and including death) with the patient and his wife today.  Patient signed consent today.  He is scheduled for his first cycle next week.    PLAN:   -- Proceed w/ durvalumab 1500 mg C1D1 on 4/19/24, labs acceptable (ANC 1700, likely 2/2 recent treatment) and treatment signed  -- Labs and RTC on 5/16/24, C2D1 on 5/17/24  -- Close follow-up with interval scans in 3 months      Stage IA3 Adenocarcinoma of the Lingula, NGS and PD-L1 Pending  Bronchoscopy  demonstrates adenocarcinoma of the lingula, separate from the squamous cell carcinoma in the LLL.  This is a T1c lesion which, given lack of lymphadenopathy, makes this a Stage IA3 disease.  Plan for treatment w/ CRT followed by immunotherapy, as above  -- Treatment as above          The above information has been reviewed with the patient and all questions have been answered to their apparent satisfaction.  They understand that they can call the clinic with any questions.    Guanako Grove MD  Hematology/Oncology  [facility]        Med Onc Chart Routing      Follow up with physician 4 weeks. 4 weeks w/ Dr. Grove   Follow up with SHARRI    Infusion scheduling note    Injection scheduling note Duravlumab on 5/17/24   Labs CBC, CMP and TSH   Scheduling:  Preferred lab:  Lab interval:  labs on 5/16/24   Imaging    Pharmacy appointment    Other referrals

## 2024-04-18 NOTE — PROGRESS NOTES
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   9/15/2023 Imaging Significant Findings    CXR (URI)  - Masslike focus along L hilar region     10/6/2023 Imaging Significant Findings    CT C, Non-Con  - 6.6 x 5.9 cm LLL mass  - 2 x 1.2 cm cavitary lesion in lingula  - 0.9 x 0.8 cm pleural based nodule in R lung base  - Trace L pleural effusion  - Suspect hilar LAD  - Calcified hilar LN     11/2/2023 Procedure    Bronchoscopy  LLL, Endobronchial Biopsy  - Squamous cell carcinoma (CDK 5/6, p63 positive, CK7 and TTF negative)    [facility] NGS  - Insufficient tissue     11/6/2023 Imaging Significant Findings    PET CT  - 7.6 x 6.0 cm soft tissue mass in superior segment of LLL, SUV 7.2  - 2.5 x 1.6 cm cavitary lesion in ELDER (neoplastic vs infectious/inflammatory), SUV max 1.8  - No hypermetabolic LAD  - 0.9 cm L hypoattenuating L adrenal nodule, likely adenoma     11/29/2023 Tumor Conference    Tumor Board  - Recommend staging EBUS and L robotic bronchoscopy for sampling of ELDER cavitary nodule.  The nodule is highly suspicious for possible adenocarcinoma as it has evolved from a ground-glass opacity to a part solid and cavitary nodule.  Obtain brain MRI to complete staging.  The clinical picture suggests a possible T3 or T4 primary lung cancer or two separate primary lung cancers.        11/29/2023 Tumor Genotyping    Liquid Biopsy, [facility]  - CKDN2A, TP53 (three separate LOF mutations)     11/30/2023 Imaging Significant Findings    MRI Brain  - JAN     12/12/2023 Imaging Significant Findings    CT C, Non-Con  - 6.7 x 6.2 cm LLL mass (previously 6.6 x 5.9 cm), abutting and narrowing the adjacent LLL bronchus  - Enlarging lingular cavitary lesion, now 2.5 x 1.6 cm  - Slightly decreased size of RLL pleural based nodule     1/3/2024 Tumor  Conference    Tumor Board  - Agree with plan for SBRT of lingular lesion and chemoRT to LLL mass.      1/5/2024 Cancer Staged    Staging form: Lung, AJCC 8th Edition  - Clinical: Stage IIIA (cT4, cN0, cM0)     1/19/2024 - 2/26/2024 Chemotherapy    Treatment Summary   Plan Name: OP NSCLC PACLITAXEL + CARBOPLATIN (AUC) QW + RADIATION  Treatment Goal: Curative  Status: Inactive  Start Date: 1/19/2024  End Date: 2/26/2024  Provider: Guanako Grove IV, MD  Chemotherapy: CARBOplatin (PARAPLATIN) 210 mg in sodium chloride 0.9% 136 mL chemo infusion, 210 mg (100 % of original dose 208.2 mg), Intravenous, Clinic/HOD 1 time, 6 of 6 cycles  Dose modification:   (original dose 208.2 mg, Cycle 1)  Administration: 210 mg (1/22/2024), 210 mg (1/29/2024), 180 mg (2/5/2024), 225 mg (2/12/2024), 210 mg (2/19/2024), 210 mg (2/26/2024)  PACLitaxeL (TAXOL) 45 mg/m2 = 84 mg in sodium chloride 0.9% 250 mL chemo infusion, 45 mg/m2 = 84 mg, Intravenous, Clinic/HOD 1 time, 6 of 6 cycles  Administration: 84 mg (1/22/2024), 84 mg (1/29/2024), 84 mg (2/5/2024), 84 mg (2/12/2024), 84 mg (2/19/2024), 84 mg (2/26/2024)     1/23/2024 - 3/6/2024 Radiation Therapy    Treating physician: Santino Arora  Treatment Summary  Course: C1 Thorax 2024  Treatment Site Ref. ID Energy Dose/Fx (Gy) #Fx Dose Correction (Gy) Total Dose (Gy) Start Date End Date Elapsed Days   IM Lung_L PTV_High 6X 2.2 19 / 30 0 41.8 1/23/2024 2/19/2024 27   IMLungNew PTV_High_resim 6X 2.2 11 / 11 0 24.2 2/21/2024 3/6/2024 14   Multiple planns were attempted, including SBRT to the ELDER and CRT to the LLL. With dose overlap, it was ultimately decided to treat with concurrent CRT to both lesions.. He was resimulated for treatment response in the LLL mass.     3/13/2024 Imaging Significant Findings    CT C/A/P  - 4.7 x 4.4 cm perihilar LLL mass, previously 6.7 x 6.2 cm. Decreased mass effect on LLL airways  - 2.7 x 1.7 cm cavitary ligular lesion, unchanged  - New cavitary lesions, solid  pulmonary nodules, and ill-defined opacities in lungs b/l  - Unchanged few sub-cm hypodensities in liver     3/26/2024 -  Chemotherapy    Treatment Summary   Plan Name: OP DURVALUMAB 1500 MG Q4W  Treatment Goal: Curative  Status: Active  Start Date: 3/26/2024 (Planned)  End Date: 2/25/2025 (Planned)  Provider: Guanako Grove IV, MD  Chemotherapy: [No matching medication found in this treatment plan]     Adenocarcinoma of Lingula   10/6/2023 Imaging Significant Findings    CT C, Non-Con  - 6.6 x 5.9 cm LLL mass  - 2 x 1.2 cm cavitary lesion in lingula  - 0.9 x 0.8 cm pleural based nodule in R lung base  - Trace L pleural effusion  - Suspect hilar LAD  - Calcified hilar LN     11/6/2023 Imaging Significant Findings    PET CT  - 7.6 x 6.0 cm soft tissue mass in superior segment of LLL, SUV 7.2  - 2.5 x 1.6 cm cavitary lesion in ELDER (neoplastic vs infectious/inflammatory), SUV max 1.8  - No hypermetabolic LAD  - 0.9 cm L hypoattenuating L adrenal nodule, likely adenoma     11/29/2023 Tumor Conference    Tumor Board  - Recommend staging EBUS and L robotic bronchoscopy for sampling of ELDER cavitary nodule.  The nodule is highly suspicious for possible adenocarcinoma as it has evolved from a ground-glass opacity to a part solid and cavitary nodule.  Obtain brain MRI to complete staging.  The clinical picture suggests a possible T3 or T4 primary lung cancer or two separate primary lung cancers.        11/29/2023 Tumor Genotyping    Liquid Biopsy, [facility]  - CKDN2A, TP53 (three separate LOF mutations)     11/30/2023 Imaging Significant Findings    MRI Brain  - JAN     12/12/2023 Imaging Significant Findings    CT C, Non-Con  - 6.7 x 6.2 cm LLL mass (previously 6.6 x 5.9 cm), abutting and narrowing the adjacent LLL bronchus  - Enlarging lingular cavitary lesion, now 2.5 x 1.6 cm  - Slightly decreased size of RLL pleural based nodule     12/19/2023 Procedure    Bronch with EBUS  ELDER, FNA and TBBx  - Adenocarcinoma (TTF1  positive, p40 negative)    LN  - Negative: Station 7  - Report: No pathologic enlargement of 4R, 4L, or 11R. Unable to evaluated 11L due to obstructing LLL endobronchial mass    [facility] NGS  - KRAS G12C  - TP53  - Negative: EGFR, ALK, BRAF, ROS1, RET, MET, ERBB2  - PD-L1: 10%       1/3/2024 Tumor Conference    Tumor Board  - Agree with plan for SBRT of lingular lesion and chemoRT to LLL mass.      1/5/2024 Cancer Staged    Staging form: Lung, AJCC 8th Edition  - Clinical: Stage IA3 (cT1c, cN0, cM0)     3/13/2024 Imaging Significant Findings    CT C/A/P  - 4.7 x 4.4 cm perihilar LLL mass, previously 6.7 x 6.2 cm. Decreased mass effect on LLL airways  - 2.7 x 1.7 cm cavitary ligular lesion, unchanged  - New cavitary lesions, solid pulmonary nodules, and ill-defined opacities in lungs b/l  - Unchanged few sub-cm hypodensities in liver            HPI:     Hernan Engel is a 68 y.o. male with pmh significant for COPD, T2DM, HLD, Stage IB ([id number]) moderately differentiated adenocarcinoma of RUL, initially dx'd 3/2014, s/p R upper lobectomy (3/2014, Dr. Mendez), and now two newly diagnosed concurrent lung cancers as below who presents for follow up:     1) Stage IIIA (T4N0M0) squamous cell carcinoma of the LLL (insufficient sample for PD-L1, no targetable mutations on liquid biopsy), initially dx'd 11/2/23, s/p CRT (1/23/24 - 3/05/24) w/ weekly carboplatin/paclitaxel (1/22/24 - 2/26/24)    2) Stage IA3 (U0lJ0Q5) adenocarcinoma of the lingula (KRAS G12C, PD-L1 10%), initially dx'd 12/19/23, s/p CRT (1/23/24 - 3/05/24) w/ weekly carboplatin/paclitaxel (1/22/24 - 2/26/24)    *Notably, pt has a RLL pleural based nodule, reviewed at TB, appears linear and unlikely malignant    Last clinic 3/22/24, plan to start C1D1 durvalumab on 3/26/24. C2D1 on 4/18/24.     Interval History:   - 3/26/24: Pt canceled C1D1 durvalumab  - 4/03/24: Rad onc visit, discussed PACIFIC trial, RTC in 3 months    PLAN:   - Studies   - CBC, CMP  today  - Imaging   - Last on 3/13/24  - Treatment   - C1D1 durva today (4/18/24)  - RTC    - Labs on 5/15/24, RTC and C2D1 on 5/16/24  - [facility]  enrollment    History has been obtained by chart review and discussion with the patient.    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    Lung cancer     Normocytic anemia 9/3/2014    Nuclear sclerosis of both eyes 9/4/2020    Seizure 2005    single event - not controlled by medication    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    T2DM (type 2 diabetes mellitus) 2014    DKA 9/2014        Past Surgical History:   Past Surgical History:   Procedure Laterality Date    BRONCHOSCOPY WITH FLUOROSCOPY N/A 11/2/2023    Procedure: BRONCHOSCOPY, WITH FLUOROSCOPY;  Surgeon: Provider, [facility];  Location: [facility] OR [address] [place] [address];  Service: Endoscopy;  Laterality: N/A;    CATARACT EXTRACTION Right 2021    COLONOSCOPY      LUNG REMOVAL, PARTIAL      ROBOTIC BRONCHOSCOPY N/A 12/19/2023    Procedure: ROBOTIC BRONCHOSCOPY;  Surgeon: Hernandez Brewer MD;  Location: [facility] OR [address] [place] [address];  Service: Pulmonary;  Laterality: N/A;        Family History:   Family History   Problem Relation Name Age of Onset    No Known Problems Mother      Cancer Father          colon cancer    Colon cancer Father      Cataracts Father      Colon cancer Sister Kira     Cancer Sister Kira         colon or lung cancer     No Known Problems Sister Valentine     No Known Problems Sister Chantal     No Known Problems Brother Diogo     No Known Problems Maternal Aunt      No Known Problems Maternal Uncle      No Known Problems Paternal Aunt      No Known Problems  Paternal Uncle      No Known Problems Maternal Grandmother      No Known Problems Maternal Grandfather      No Known Problems Paternal Grandmother      No Known Problems Paternal Grandfather      No Known Problems Other      Amblyopia Neg Hx      Blindness Neg Hx      Diabetes Neg Hx      Glaucoma Neg Hx      Hypertension Neg Hx      Macular degeneration Neg Hx      Retinal detachment Neg Hx      Strabismus Neg Hx      Stroke Neg Hx      Thyroid disease Neg Hx          Social History:   Social History     Tobacco Use    Smoking status: Former     Current packs/day: 0.00     Average packs/day: 1 pack/day for 30.0 years (30.0 ttl pk-yrs)     Types: Cigarettes     Start date: 1/15/1984     Quit date: 1/15/2014     Years since quitting: 10.2    Smokeless tobacco: Never   Substance Use Topics    Alcohol use: Yes     Alcohol/week: 5.7 standard drinks of alcohol     Types: 4 Cans of beer, 2 Standard drinks or equivalent per week     Comment: every other day beer drinker        I have reviewed and updated the patient's past medical, surgical, family and social histories.      ROS:   As per HPI.     Allergies:   Review of patient's allergies indicates:   Allergen Reactions    Adhesive Itching, Rash, Other (See Comments) and Dermatitis     Blister        Medications:   Current Outpatient Medications   Medication Sig Dispense Refill    atorvastatin (LIPITOR) 40 MG tablet Take 1 tablet (40 mg total) by mouth once daily. 90 tablet 3    blood-glucose meter kit Use as instructed 1 each 0    dextrin (FIBER POWDER ORAL) Take 1 Package by mouth once.      efinaconazole (JUBLIA) 10 % Crow Apply 1 Application topically once daily. 8 mL 0    metFORMIN (GLUCOPHAGE-XR) 500 MG ER 24hr tablet Take 2 tablets (1,000 mg total) by mouth 2 (two) times daily with meals. 60 tablet 0    permethrin (ELIMITE) 5 % cream Apply to skin head to toe over night, for 8-10 hours, wash off in AM in shower or bath  Repeat in 7 days. 60 g 1    polyethylene  glycol (GLYCOLAX) 17 gram/dose powder Mix 1 capful (17 g) and dissolve in glass of water to take by mouth once daily. 510 g 0    tirzepatide (MOUNJARO) 5 mg/0.5 mL PnIj Inject 5 mg into the skin every 7 days. 4 Pen 0    triamcinolone acetonide 0.1% (KENALOG) 0.1 % cream Apply topically 3 (three) times daily. Apply to itching skin  as directed for 7 days 45 g 0    umeclidinium (INCRUSE ELLIPTA) 62.5 mcg/actuation inhalation capsule Inhale 62.5 mcg into the lungs once daily. Controller 30 each 0     No current facility-administered medications for this visit.          Physical Exam:       There were no vitals taken for this visit.               Physical Exam  Constitutional:       Appearance: Normal appearance.   HENT:      Head: Normocephalic and atraumatic.   Eyes:      Extraocular Movements: Extraocular movements intact.      Conjunctiva/sclera: Conjunctivae normal.      Pupils: Pupils are equal, round, and reactive to light.   Cardiovascular:      Rate and Rhythm: Normal rate and regular rhythm.      Heart sounds: No murmur heard.     No friction rub. No gallop.   Pulmonary:      Effort: Pulmonary effort is normal.      Breath sounds: No wheezing, rhonchi or rales.   Musculoskeletal:         General: Normal range of motion.      Right lower leg: No edema.      Left lower leg: No edema.   Skin:     General: Skin is warm and dry.   Neurological:      Mental Status: He is alert and oriented to person, place, and time.   Psychiatric:         Mood and Affect: Mood normal.         Thought Content: Thought content normal.         Judgment: Judgment normal.           Labs:   No results found for this or any previous visit (from the past 48 hour(s)).        Imaging:    See oncologic history as above.     Path:  See oncologic history above.      Assessment and Plan:     Hernan Engel is a 68 y.o. male with pmh significant for COPD, T2DM, HLD, Stage IB (bE2uN0X1) moderately differentiated adenocarcinoma of RUL, initially dx'd  3/2014, s/p R upper lobectomy (3/2014, Dr. Mendez), and now two newly diagnosed concurrent lung cancers as below who presents for follow up:     1) Stage IIIA (T4N0M0) squamous cell carcinoma of the LLL (insufficient sample for PD-L1, no targetable mutations on liquid biopsy), initially dx'd 11/2/23, s/p CRT (1/23/24 - 3/05/24) w/ weekly carboplatin/paclitaxel (1/22/24 - 2/26/24)    2) Stage IA3 (E3jA5I9) adenocarcinoma of the lingula (KRAS G12C, PD-L1 10%), initially dx'd 12/19/23, s/p CRT (1/23/24 - 3/05/24) w/ weekly carboplatin/paclitaxel (1/22/24 - 2/26/24)    *Notably, pt has a RLL pleural based nodule, reviewed at TB, appears linear and unlikely malignant    Stage IIIA Squamous Cell Carcinoma of the LLL, No PD-L1, No Targetable Mutations  ECOG PS 0. Pt is now s/p CRT with weekly carboplatin/paclitaxel and tolerated therapy without significant issue. CT C/A/P (3/13/24) demonstrates decreasing size of the LLL mass, as well as stability of the lingular lesion. Notably, it also demonstrates b/l cavitary lesions (sub-cm), more solid nodules, and ill-defined opacities b/l.  On discussion with Radiation Oncology, favor that these represent postradiation inflammatory changes and so will plan to proceed with immunotherapy consolidation with close imaging follow-up.  Today (03/22/2024), I reiterated the logistics of this medication, the general mechanism of action, and the potential side effects (up to and including death) with the patient and his wife today.  Patient signed consent today.  He is scheduled for his first cycle next week.    PLAN:   -- Proceed w/ durvalumab 1500 mg C1D1 on 3/26/24, labs acceptable (ANC 1600, likely 2/2 recent treatment) and treatment signed  -- Labs and RTC on 4/17/24, C2D1 on 4/18/24      Stage IA3 Adenocarcinoma of the Lingula, NGS and PD-L1 Pending  Bronchoscopy demonstrates adenocarcinoma of the lingula, separate from the squamous cell carcinoma in the LLL.  This is a T1c lesion  which, given lack of lymphadenopathy, makes this a Stage IA3 disease.  Plan for treatment w/ CRT followed by immunotherapy, as above  -- Treatment as above      The above information has been reviewed with the patient and all questions have been answered to their apparent satisfaction.  They understand that they can call the clinic with any questions.    Guanako Grove MD  Hematology/Oncology  [facility]      Med Onc Route Chart for Scheduling

## 2024-04-19 ENCOUNTER — INFUSION (OUTPATIENT)
Dept: INFUSION THERAPY | Facility: HOSPITAL | Age: 69
End: 2024-04-19
Attending: INTERNAL MEDICINE
Payer: MEDICARE

## 2024-04-19 VITALS
HEART RATE: 82 BPM | TEMPERATURE: 98 F | HEIGHT: 67 IN | RESPIRATION RATE: 18 BRPM | BODY MASS INDEX: 26.89 KG/M2 | SYSTOLIC BLOOD PRESSURE: 116 MMHG | WEIGHT: 171.31 LBS | DIASTOLIC BLOOD PRESSURE: 62 MMHG

## 2024-04-19 DIAGNOSIS — C34.32 SQUAMOUS CELL CARCINOMA OF BRONCHUS IN LEFT LOWER LOBE: Primary | ICD-10-CM

## 2024-04-19 PROCEDURE — 63600175 PHARM REV CODE 636 W HCPCS: Mod: JZ,JG | Performed by: HOSPITALIST

## 2024-04-19 PROCEDURE — 96413 CHEMO IV INFUSION 1 HR: CPT

## 2024-04-19 PROCEDURE — 25000003 PHARM REV CODE 250: Performed by: HOSPITALIST

## 2024-04-19 RX ORDER — EPINEPHRINE 0.3 MG/.3ML
0.3 INJECTION SUBCUTANEOUS ONCE AS NEEDED
Status: DISCONTINUED | OUTPATIENT
Start: 2024-04-19 | End: 2024-04-19 | Stop reason: HOSPADM

## 2024-04-19 RX ORDER — DIPHENHYDRAMINE HYDROCHLORIDE 50 MG/ML
50 INJECTION INTRAMUSCULAR; INTRAVENOUS ONCE AS NEEDED
Status: DISCONTINUED | OUTPATIENT
Start: 2024-04-19 | End: 2024-04-19 | Stop reason: HOSPADM

## 2024-04-19 RX ORDER — HEPARIN 100 UNIT/ML
500 SYRINGE INTRAVENOUS
Status: DISCONTINUED | OUTPATIENT
Start: 2024-04-19 | End: 2024-04-19 | Stop reason: HOSPADM

## 2024-04-19 RX ORDER — SODIUM CHLORIDE 0.9 % (FLUSH) 0.9 %
10 SYRINGE (ML) INJECTION
Status: DISCONTINUED | OUTPATIENT
Start: 2024-04-19 | End: 2024-04-19 | Stop reason: HOSPADM

## 2024-04-19 RX ADMIN — SODIUM CHLORIDE 1500 MG: 9 INJECTION, SOLUTION INTRAVENOUS at 02:04

## 2024-04-19 RX ADMIN — SODIUM CHLORIDE: 9 INJECTION, SOLUTION INTRAVENOUS at 02:04

## 2024-04-29 ENCOUNTER — LAB VISIT (OUTPATIENT)
Dept: LAB | Facility: HOSPITAL | Age: 69
End: 2024-04-29
Attending: HOSPITALIST
Payer: MEDICARE

## 2024-04-29 ENCOUNTER — OFFICE VISIT (OUTPATIENT)
Dept: FAMILY MEDICINE | Facility: CLINIC | Age: 69
End: 2024-04-29
Payer: MEDICARE

## 2024-04-29 VITALS
DIASTOLIC BLOOD PRESSURE: 62 MMHG | SYSTOLIC BLOOD PRESSURE: 114 MMHG | HEIGHT: 67 IN | TEMPERATURE: 98 F | HEART RATE: 76 BPM | BODY MASS INDEX: 26.71 KG/M2 | OXYGEN SATURATION: 96 % | WEIGHT: 170.19 LBS

## 2024-04-29 DIAGNOSIS — E78.5 DYSLIPIDEMIA ASSOCIATED WITH TYPE 2 DIABETES MELLITUS: ICD-10-CM

## 2024-04-29 DIAGNOSIS — K63.5 POLYP OF COLON, UNSPECIFIED PART OF COLON, UNSPECIFIED TYPE: ICD-10-CM

## 2024-04-29 DIAGNOSIS — C34.32 SQUAMOUS CELL CARCINOMA OF BRONCHUS IN LEFT LOWER LOBE: ICD-10-CM

## 2024-04-29 DIAGNOSIS — E11.69 DYSLIPIDEMIA ASSOCIATED WITH TYPE 2 DIABETES MELLITUS: ICD-10-CM

## 2024-04-29 DIAGNOSIS — D70.1 CHEMOTHERAPY-INDUCED NEUTROPENIA: ICD-10-CM

## 2024-04-29 DIAGNOSIS — T45.1X5A CHEMOTHERAPY-INDUCED NEUTROPENIA: ICD-10-CM

## 2024-04-29 DIAGNOSIS — E11.8 TYPE 2 DIABETES MELLITUS WITH COMPLICATION, WITHOUT LONG-TERM CURRENT USE OF INSULIN: Primary | ICD-10-CM

## 2024-04-29 LAB
ALBUMIN SERPL BCP-MCNC: 3.9 G/DL (ref 3.5–5.2)
ALBUMIN SERPL BCP-MCNC: 3.9 G/DL (ref 3.5–5.2)
ALP SERPL-CCNC: 104 U/L (ref 55–135)
ALP SERPL-CCNC: 104 U/L (ref 55–135)
ALT SERPL W/O P-5'-P-CCNC: 21 U/L (ref 10–44)
ALT SERPL W/O P-5'-P-CCNC: 21 U/L (ref 10–44)
ANION GAP SERPL CALC-SCNC: 7 MMOL/L (ref 8–16)
ANION GAP SERPL CALC-SCNC: 7 MMOL/L (ref 8–16)
AST SERPL-CCNC: 16 U/L (ref 10–40)
AST SERPL-CCNC: 16 U/L (ref 10–40)
BILIRUB SERPL-MCNC: 1 MG/DL (ref 0.1–1)
BILIRUB SERPL-MCNC: 1 MG/DL (ref 0.1–1)
BUN SERPL-MCNC: 15 MG/DL (ref 8–23)
BUN SERPL-MCNC: 15 MG/DL (ref 8–23)
CALCIUM SERPL-MCNC: 9.6 MG/DL (ref 8.7–10.5)
CALCIUM SERPL-MCNC: 9.6 MG/DL (ref 8.7–10.5)
CHLORIDE SERPL-SCNC: 109 MMOL/L (ref 95–110)
CHLORIDE SERPL-SCNC: 109 MMOL/L (ref 95–110)
CO2 SERPL-SCNC: 26 MMOL/L (ref 23–29)
CO2 SERPL-SCNC: 26 MMOL/L (ref 23–29)
CREAT SERPL-MCNC: 0.9 MG/DL (ref 0.5–1.4)
CREAT SERPL-MCNC: 0.9 MG/DL (ref 0.5–1.4)
EST. GFR  (NO RACE VARIABLE): >60 ML/MIN/1.73 M^2
EST. GFR  (NO RACE VARIABLE): >60 ML/MIN/1.73 M^2
GLUCOSE SERPL-MCNC: 137 MG/DL (ref 70–110)
GLUCOSE SERPL-MCNC: 137 MG/DL (ref 70–110)
POTASSIUM SERPL-SCNC: 4.1 MMOL/L (ref 3.5–5.1)
POTASSIUM SERPL-SCNC: 4.1 MMOL/L (ref 3.5–5.1)
PROT SERPL-MCNC: 6.7 G/DL (ref 6–8.4)
PROT SERPL-MCNC: 6.7 G/DL (ref 6–8.4)
SODIUM SERPL-SCNC: 142 MMOL/L (ref 136–145)
SODIUM SERPL-SCNC: 142 MMOL/L (ref 136–145)
T4 FREE SERPL-MCNC: 1.03 NG/DL (ref 0.71–1.51)
TSH SERPL DL<=0.005 MIU/L-ACNC: 0.85 UIU/ML (ref 0.4–4)

## 2024-04-29 PROCEDURE — 3008F BODY MASS INDEX DOCD: CPT | Mod: CPTII,S$GLB,, | Performed by: INTERNAL MEDICINE

## 2024-04-29 PROCEDURE — 1157F ADVNC CARE PLAN IN RCRD: CPT | Mod: CPTII,S$GLB,, | Performed by: INTERNAL MEDICINE

## 2024-04-29 PROCEDURE — 80053 COMPREHEN METABOLIC PANEL: CPT | Performed by: HOSPITALIST

## 2024-04-29 PROCEDURE — 84439 ASSAY OF FREE THYROXINE: CPT | Performed by: HOSPITALIST

## 2024-04-29 PROCEDURE — 1160F RVW MEDS BY RX/DR IN RCRD: CPT | Mod: CPTII,S$GLB,, | Performed by: INTERNAL MEDICINE

## 2024-04-29 PROCEDURE — 3078F DIAST BP <80 MM HG: CPT | Mod: CPTII,S$GLB,, | Performed by: INTERNAL MEDICINE

## 2024-04-29 PROCEDURE — 99999 PR PBB SHADOW E&M-EST. PATIENT-LVL IV: CPT | Mod: PBBFAC,,, | Performed by: INTERNAL MEDICINE

## 2024-04-29 PROCEDURE — 1126F AMNT PAIN NOTED NONE PRSNT: CPT | Mod: CPTII,S$GLB,, | Performed by: INTERNAL MEDICINE

## 2024-04-29 PROCEDURE — 84443 ASSAY THYROID STIM HORMONE: CPT | Performed by: HOSPITALIST

## 2024-04-29 PROCEDURE — 99214 OFFICE O/P EST MOD 30 MIN: CPT | Mod: S$GLB,,, | Performed by: INTERNAL MEDICINE

## 2024-04-29 PROCEDURE — 3074F SYST BP LT 130 MM HG: CPT | Mod: CPTII,S$GLB,, | Performed by: INTERNAL MEDICINE

## 2024-04-29 PROCEDURE — 3288F FALL RISK ASSESSMENT DOCD: CPT | Mod: CPTII,S$GLB,, | Performed by: INTERNAL MEDICINE

## 2024-04-29 PROCEDURE — 3046F HEMOGLOBIN A1C LEVEL >9.0%: CPT | Mod: CPTII,S$GLB,, | Performed by: INTERNAL MEDICINE

## 2024-04-29 PROCEDURE — 3072F LOW RISK FOR RETINOPATHY: CPT | Mod: CPTII,S$GLB,, | Performed by: INTERNAL MEDICINE

## 2024-04-29 PROCEDURE — 1159F MED LIST DOCD IN RCRD: CPT | Mod: CPTII,S$GLB,, | Performed by: INTERNAL MEDICINE

## 2024-04-29 PROCEDURE — 36415 COLL VENOUS BLD VENIPUNCTURE: CPT | Mod: PO | Performed by: HOSPITALIST

## 2024-04-29 PROCEDURE — 1101F PT FALLS ASSESS-DOCD LE1/YR: CPT | Mod: CPTII,S$GLB,, | Performed by: INTERNAL MEDICINE

## 2024-04-29 RX ORDER — METFORMIN HYDROCHLORIDE 500 MG/1
500 TABLET, EXTENDED RELEASE ORAL 2 TIMES DAILY WITH MEALS
Start: 2024-04-29 | End: 2024-07-28

## 2024-04-29 RX ORDER — CALCIUM CARB/VITAMIN D3/VIT K1 500-500-40
TABLET,CHEWABLE ORAL
COMMUNITY
Start: 2024-02-29

## 2024-04-29 NOTE — PROGRESS NOTES
Chief Complaint: Follow-up      Hernan Engel  is a 68 y.o. year old patient who presents today for diabetes follow up     Post prandial 145. Not been checking BG regularly.   Started new immunotherapy.   Has been using trelegy off and on   Reports metformin makes him tired? Has been taking one pill twice a day   Reports no issues with Mounjaro   Lost his hearing aid     Past Medical History:   Diagnosis Date    Anxiety     Aortic atherosclerosis 12/6/2017    BPH with urinary obstruction 2/20/2019    Colon polyp 1/16/2014    COPD (chronic obstructive pulmonary disease)     Depression     Disorder of kidney and ureter     ED (erectile dysfunction) 1/16/2014    Family history of colon cancer 1/16/2014    Hearing loss in left ear 34 years    Lung cancer     Normocytic anemia 9/3/2014    Nuclear sclerosis of both eyes 9/4/2020    Seizure 2005    single event - not controlled by medication    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    T2DM (type 2 diabetes mellitus) 2014    DKA 9/2014       Past Surgical History:   Procedure Laterality Date    BRONCHOSCOPY WITH FLUOROSCOPY N/A 11/2/2023    Procedure: BRONCHOSCOPY, WITH FLUOROSCOPY;  [facility]: [facility];  Location: [facility] OR [address] [place] [address];  Service: Endoscopy;  Laterality: N/A;    CATARACT EXTRACTION Right 2021    COLONOSCOPY      LUNG REMOVAL, PARTIAL      ROBOTIC BRONCHOSCOPY N/A 12/19/2023    Procedure: ROBOTIC BRONCHOSCOPY;  Surgeon: Hernandez Brewer MD;  Location: [facility] OR [address] [place] [address];  Service: Pulmonary;  Laterality: N/A;        Family History   Problem Relation Name Age of Onset    No Known Problems Mother      Cancer Father          colon cancer    Colon cancer Father      Cataracts Father      Colon cancer Sister Kira     Cancer Sister Kira         colon or lung cancer     No Known Problems Sister Valentine     No Known Problems Sister Chantal     No Known Problems Brother Diogo     No Known Problems Maternal Aunt      No Known Problems Maternal Uncle      No  Known Problems Paternal Aunt      No Known Problems Paternal Uncle      No Known Problems Maternal Grandmother      No Known Problems Maternal Grandfather      No Known Problems Paternal Grandmother      No Known Problems Paternal Grandfather      No Known Problems Other      Amblyopia Neg Hx      Blindness Neg Hx      Diabetes Neg Hx      Glaucoma Neg Hx      Hypertension Neg Hx      Macular degeneration Neg Hx      Retinal detachment Neg Hx      Strabismus Neg Hx      Stroke Neg Hx      Thyroid disease Neg Hx          Social History     Socioeconomic History    Marital status:    Occupational History     Employer: [employer]   Tobacco Use    Smoking status: Former     Current packs/day: 0.00     Average packs/day: 1 pack/day for 30.0 years (30.0 ttl pk-yrs)     Types: Cigarettes     Start date: 1/15/1984     Quit date: 1/15/2014     Years since quitting: 10.2    Smokeless tobacco: Never   Substance and Sexual Activity    Alcohol use: Yes     Alcohol/week: 5.7 standard drinks of alcohol     Types: 4 Cans of beer, 2 Standard drinks or equivalent per week     Comment: every other day beer drinker    Drug use: No    Sexual activity: Not Currently     Partners: Female     Birth control/protection: None         Current Outpatient Medications:     atorvastatin (LIPITOR) 40 MG tablet, Take 1 tablet (40 mg total) by mouth once daily., Disp: 90 tablet, Rfl: 3    blood-glucose meter kit, Use as instructed, Disp: 1 each, Rfl: 0    efinaconazole (JUBLIA) 10 % Crow, Apply 1 Application topically once daily., Disp: 8 mL, Rfl: 0    MICRO THIN LANCETS 33 gauge Misc, , Disp: , Rfl:     permethrin (ELIMITE) 5 % cream, Apply to skin head to toe over night, for 8-10 hours, wash off in AM in shower or bath Repeat in 7 days., Disp: 60 g, Rfl: 1    polyethylene glycol (GLYCOLAX) 17 gram/dose powder, Mix 1 capful (17 g) and dissolve in glass of water to take by mouth once daily., Disp: 510 g, Rfl: 0     fluticasone-umeclidin-vilanter (TRELEGY ELLIPTA) 100-62.5-25 mcg DsDv, Inhale 1 puff into the lungs once daily., Disp: , Rfl:     metFORMIN (GLUCOPHAGE-XR) 500 MG ER 24hr tablet, Take 1 tablet (500 mg total) by mouth 2 (two) times daily with meals., Disp: , Rfl:     tirzepatide 7.5 mg/0.5 mL PnIj, Inject 7.5 mg into the skin every 7 days., Disp: 2 mL, Rfl: 2    triamcinolone acetonide 0.1% (KENALOG) 0.1 % cream, Apply topically 3 (three) times daily. Apply to itching skin  as directed for 7 days, Disp: 45 g, Rfl: 0     Review of Systems   HENT:  Positive for hearing loss.    Respiratory:  Negative for cough and shortness of breath.    Gastrointestinal:  Negative for constipation, diarrhea and nausea.        Objective:      Vitals:    04/29/24 0816   BP: 114/62   Pulse: 76   Temp: 97.9 °F (36.6 °C)       Physical Exam  Constitutional:       Appearance: Normal appearance.   Pulmonary:      Effort: Pulmonary effort is normal.      Breath sounds: Wheezing present.   Neurological:      Mental Status: He is alert.          Assessment:       1. Type 2 diabetes mellitus with complication, without long-term current use of insulin    2. Chemotherapy-induced neutropenia    3. Dyslipidemia associated with type 2 diabetes mellitus    4. 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 Polyp of colon, unspecified part of colon, unspecified type          Plan:   1. Type 2 diabetes mellitus with complication, without long-term current use of insulin  Assessment & Plan:  Chronic, uncontrolled. Patient's last A1c was   Lab Results   Component Value Date    HGBA1C 10.7 (H) 02/23/2024   Refer to HPI     - continue metformin 500mg BID   - continue Mounjaro 0.75mg weekly   - follow up three months   - A1c repeat   - advised patient to measure BG 1-3 times a day       Orders:  -     metFORMIN (GLUCOPHAGE-XR) 500 MG ER 24hr tablet; Take 1 tablet (500 mg total) by mouth 2 (two) times daily with meals.  -     tirzepatide 7.5  mg/0.5 mL PnIj; Inject 7.5 mg into the skin every 7 days.  Dispense: 2 mL; Refill: 2  -     Hemoglobin A1C; Future; Expected date: 05/29/2024  -     Lipid Panel; Future; Expected date: 05/29/2024    2. Chemotherapy-induced neutropenia  Assessment & Plan:  Chronic, stable. ANC improving   Lab Results   Component Value Date    WBC 2.83 (L) 04/18/2024    HGB 11.8 (L) 04/18/2024    HCT 34.9 (L) 04/18/2024    MCV 90 04/18/2024     04/18/2024     - continue to monitor      3. Dyslipidemia associated with type 2 diabetes mellitus  Assessment & Plan:  Chronic, no lipid panel for two years     - lipid panel   - continue lipitor      4. 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 Has started immunotherapy.   Reports he has tolerated it well   Had lab work done today     - continue follow up with Heme Onc       5. Polyp of colon, unspecified part of colon, unspecified type  Assessment & Plan:  Counseled on the need for CRC screening    - patient will complete cologuard            Follow up in about 3 months (around 7/29/2024) for f/up DM.

## 2024-04-29 NOTE — ASSESSMENT & PLAN NOTE
Chronic, uncontrolled. Patient's last A1c was   Lab Results   Component Value Date    HGBA1C 10.7 (H) 02/23/2024   Refer to HPI     - continue metformin 500mg BID   - continue Mounjaro 0.75mg weekly   - follow up three months   - A1c repeat   - advised patient to measure BG 1-3 times a day

## 2024-04-29 NOTE — ASSESSMENT & PLAN NOTE
Chronic, stable. ANC improving   Lab Results   Component Value Date    WBC 2.83 (L) 04/18/2024    HGB 11.8 (L) 04/18/2024    HCT 34.9 (L) 04/18/2024    MCV 90 04/18/2024     04/18/2024     - continue to monitor

## 2024-04-29 NOTE — PROGRESS NOTES
Health Maintenance Due   Topic     RSV Vaccine (Age 60+ and Pregnant patients) (1 - 1-dose 60+ series) Not offered at this office    Shingles Vaccine (2 of 2)  hx chicken pox. Notified pt can get vaccine at pharmacy    Colorectal Cancer Screening  Consult pcp    Lipid Panel  Consult pcp    COVID-19 Vaccine (5 - 2023-24 season) Not offered at this office    Hemoglobin A1c  Consult pcp

## 2024-04-29 NOTE — ASSESSMENT & PLAN NOTE
Has completed chemo and radiation. Has started immunotherapy.   Reports he has tolerated it well   Had lab work done today     - continue follow up with [facility]

## 2024-05-15 ENCOUNTER — TELEPHONE (OUTPATIENT)
Dept: HEMATOLOGY/ONCOLOGY | Facility: CLINIC | Age: 69
End: 2024-05-15
Payer: MEDICARE

## 2024-05-16 ENCOUNTER — OFFICE VISIT (OUTPATIENT)
Dept: HEMATOLOGY/ONCOLOGY | Facility: CLINIC | Age: 69
End: 2024-05-16
Payer: MEDICARE

## 2024-05-16 ENCOUNTER — INFUSION (OUTPATIENT)
Dept: INFUSION THERAPY | Facility: HOSPITAL | Age: 69
End: 2024-05-16
Attending: INTERNAL MEDICINE
Payer: MEDICARE

## 2024-05-16 VITALS
HEART RATE: 80 BPM | RESPIRATION RATE: 14 BRPM | TEMPERATURE: 98 F | SYSTOLIC BLOOD PRESSURE: 110 MMHG | DIASTOLIC BLOOD PRESSURE: 58 MMHG | OXYGEN SATURATION: 97 % | HEIGHT: 67 IN | BODY MASS INDEX: 27.34 KG/M2 | WEIGHT: 174.19 LBS

## 2024-05-16 VITALS
WEIGHT: 174.19 LBS | RESPIRATION RATE: 18 BRPM | TEMPERATURE: 98 F | OXYGEN SATURATION: 97 % | SYSTOLIC BLOOD PRESSURE: 118 MMHG | HEART RATE: 75 BPM | DIASTOLIC BLOOD PRESSURE: 59 MMHG | HEIGHT: 67 IN | BODY MASS INDEX: 27.34 KG/M2

## 2024-05-16 DIAGNOSIS — C34.32 SQUAMOUS CELL CARCINOMA OF BRONCHUS IN LEFT LOWER LOBE: Primary | ICD-10-CM

## 2024-05-16 DIAGNOSIS — E11.8 TYPE 2 DIABETES MELLITUS WITH COMPLICATION, WITHOUT LONG-TERM CURRENT USE OF INSULIN: ICD-10-CM

## 2024-05-16 DIAGNOSIS — J44.9 CHRONIC OBSTRUCTIVE PULMONARY DISEASE, UNSPECIFIED COPD TYPE: ICD-10-CM

## 2024-05-16 DIAGNOSIS — C34.12 ADENOCARCINOMA OF UPPER LOBE OF LEFT LUNG: ICD-10-CM

## 2024-05-16 PROCEDURE — 3078F DIAST BP <80 MM HG: CPT | Mod: CPTII,S$GLB,, | Performed by: HOSPITALIST

## 2024-05-16 PROCEDURE — 1101F PT FALLS ASSESS-DOCD LE1/YR: CPT | Mod: CPTII,S$GLB,, | Performed by: HOSPITALIST

## 2024-05-16 PROCEDURE — 1157F ADVNC CARE PLAN IN RCRD: CPT | Mod: CPTII,S$GLB,, | Performed by: HOSPITALIST

## 2024-05-16 PROCEDURE — A4216 STERILE WATER/SALINE, 10 ML: HCPCS | Performed by: HOSPITALIST

## 2024-05-16 PROCEDURE — 63600175 PHARM REV CODE 636 W HCPCS: Mod: JZ,JG | Performed by: HOSPITALIST

## 2024-05-16 PROCEDURE — 3046F HEMOGLOBIN A1C LEVEL >9.0%: CPT | Mod: CPTII,S$GLB,, | Performed by: HOSPITALIST

## 2024-05-16 PROCEDURE — 99999 PR PBB SHADOW E&M-EST. PATIENT-LVL III: CPT | Mod: PBBFAC,,, | Performed by: HOSPITALIST

## 2024-05-16 PROCEDURE — G2211 COMPLEX E/M VISIT ADD ON: HCPCS | Mod: S$GLB,,, | Performed by: HOSPITALIST

## 2024-05-16 PROCEDURE — 25000003 PHARM REV CODE 250: Performed by: HOSPITALIST

## 2024-05-16 PROCEDURE — 96413 CHEMO IV INFUSION 1 HR: CPT

## 2024-05-16 PROCEDURE — 1126F AMNT PAIN NOTED NONE PRSNT: CPT | Mod: CPTII,S$GLB,, | Performed by: HOSPITALIST

## 2024-05-16 PROCEDURE — 3288F FALL RISK ASSESSMENT DOCD: CPT | Mod: CPTII,S$GLB,, | Performed by: HOSPITALIST

## 2024-05-16 PROCEDURE — 3008F BODY MASS INDEX DOCD: CPT | Mod: CPTII,S$GLB,, | Performed by: HOSPITALIST

## 2024-05-16 PROCEDURE — 3072F LOW RISK FOR RETINOPATHY: CPT | Mod: CPTII,S$GLB,, | Performed by: HOSPITALIST

## 2024-05-16 PROCEDURE — 3074F SYST BP LT 130 MM HG: CPT | Mod: CPTII,S$GLB,, | Performed by: HOSPITALIST

## 2024-05-16 PROCEDURE — 99215 OFFICE O/P EST HI 40 MIN: CPT | Mod: S$GLB,,, | Performed by: HOSPITALIST

## 2024-05-16 RX ORDER — SODIUM CHLORIDE 0.9 % (FLUSH) 0.9 %
10 SYRINGE (ML) INJECTION
Status: DISCONTINUED | OUTPATIENT
Start: 2024-05-16 | End: 2024-05-16 | Stop reason: HOSPADM

## 2024-05-16 RX ORDER — SODIUM CHLORIDE 0.9 % (FLUSH) 0.9 %
10 SYRINGE (ML) INJECTION
Status: CANCELLED | OUTPATIENT
Start: 2024-05-16

## 2024-05-16 RX ORDER — DIPHENHYDRAMINE HYDROCHLORIDE 50 MG/ML
50 INJECTION INTRAMUSCULAR; INTRAVENOUS ONCE AS NEEDED
Status: CANCELLED | OUTPATIENT
Start: 2024-05-16

## 2024-05-16 RX ORDER — DIPHENHYDRAMINE HYDROCHLORIDE 50 MG/ML
50 INJECTION INTRAMUSCULAR; INTRAVENOUS ONCE AS NEEDED
Status: DISCONTINUED | OUTPATIENT
Start: 2024-05-16 | End: 2024-05-16 | Stop reason: HOSPADM

## 2024-05-16 RX ORDER — HEPARIN 100 UNIT/ML
500 SYRINGE INTRAVENOUS
Status: CANCELLED | OUTPATIENT
Start: 2024-05-16

## 2024-05-16 RX ORDER — HEPARIN 100 UNIT/ML
500 SYRINGE INTRAVENOUS
Status: DISCONTINUED | OUTPATIENT
Start: 2024-05-16 | End: 2024-05-16 | Stop reason: HOSPADM

## 2024-05-16 RX ORDER — EPINEPHRINE 0.3 MG/.3ML
0.3 INJECTION SUBCUTANEOUS ONCE AS NEEDED
Status: DISCONTINUED | OUTPATIENT
Start: 2024-05-16 | End: 2024-05-16 | Stop reason: HOSPADM

## 2024-05-16 RX ORDER — EPINEPHRINE 0.3 MG/.3ML
0.3 INJECTION SUBCUTANEOUS ONCE AS NEEDED
Status: CANCELLED | OUTPATIENT
Start: 2024-05-16

## 2024-05-16 RX ADMIN — SODIUM CHLORIDE: 9 INJECTION, SOLUTION INTRAVENOUS at 10:05

## 2024-05-16 RX ADMIN — Medication 10 ML: at 11:05

## 2024-05-16 RX ADMIN — SODIUM CHLORIDE 1500 MG: 9 INJECTION, SOLUTION INTRAVENOUS at 10:05

## 2024-05-16 NOTE — PROGRESS NOTES
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   9/15/2023 Imaging Significant Findings    CXR (URI)  - Masslike focus along L hilar region     10/6/2023 Imaging Significant Findings    CT C, Non-Con  - 6.6 x 5.9 cm LLL mass  - 2 x 1.2 cm cavitary lesion in lingula  - 0.9 x 0.8 cm pleural based nodule in R lung base  - Trace L pleural effusion  - Suspect hilar LAD  - Calcified hilar LN     11/2/2023 Procedure    Bronchoscopy  LLL, Endobronchial Biopsy  - Squamous cell carcinoma (CDK 5/6, p63 positive, CK7 and TTF negative)    [facility] NGS  - Insufficient tissue     11/6/2023 Imaging Significant Findings    PET CT  - 7.6 x 6.0 cm soft tissue mass in superior segment of LLL, SUV 7.2  - 2.5 x 1.6 cm cavitary lesion in ELDER (neoplastic vs infectious/inflammatory), SUV max 1.8  - No hypermetabolic LAD  - 0.9 cm L hypoattenuating L adrenal nodule, likely adenoma     11/29/2023 Tumor Conference    Tumor Board  - Recommend staging EBUS and L robotic bronchoscopy for sampling of ELDER cavitary nodule.  The nodule is highly suspicious for possible adenocarcinoma as it has evolved from a ground-glass opacity to a part solid and cavitary nodule.  Obtain brain MRI to complete staging.  The clinical picture suggests a possible T3 or T4 primary lung cancer or two separate primary lung cancers.        11/29/2023 Tumor Genotyping    Liquid Biopsy, [facility]  - CKDN2A, TP53 (three separate LOF mutations)     11/30/2023 Imaging Significant Findings    MRI Brain  - JAN     12/12/2023 Imaging Significant Findings    CT C, Non-Con  - 6.7 x 6.2 cm LLL mass (previously 6.6 x 5.9 cm), abutting and narrowing the adjacent LLL bronchus  - Enlarging lingular cavitary lesion, now 2.5 x 1.6 cm  - Slightly decreased size of RLL pleural based nodule     1/3/2024 Tumor  Conference    Tumor Board  - Agree with plan for SBRT of lingular lesion and chemoRT to LLL mass.      1/5/2024 Cancer Staged    Staging form: Lung, AJCC 8th Edition  - Clinical: Stage IIIA (cT4, cN0, cM0)     1/19/2024 - 2/26/2024 Chemotherapy    Treatment Summary   Plan Name: OP NSCLC PACLITAXEL + CARBOPLATIN (AUC) QW + RADIATION  Treatment Goal: Curative  Status: Inactive  Start Date: 1/19/2024  End Date: 2/26/2024  Provider: Guanako Grove IV, MD  Chemotherapy: CARBOplatin (PARAPLATIN) 210 mg in sodium chloride 0.9% 136 mL chemo infusion, 210 mg (100 % of original dose 208.2 mg), Intravenous, Clinic/HOD 1 time, 6 of 6 cycles  Dose modification:   (original dose 208.2 mg, Cycle 1)  Administration: 210 mg (1/22/2024), 210 mg (1/29/2024), 180 mg (2/5/2024), 225 mg (2/12/2024), 210 mg (2/19/2024), 210 mg (2/26/2024)  PACLitaxeL (TAXOL) 45 mg/m2 = 84 mg in sodium chloride 0.9% 250 mL chemo infusion, 45 mg/m2 = 84 mg, Intravenous, Clinic/HOD 1 time, 6 of 6 cycles  Administration: 84 mg (1/22/2024), 84 mg (1/29/2024), 84 mg (2/5/2024), 84 mg (2/12/2024), 84 mg (2/19/2024), 84 mg (2/26/2024)     1/23/2024 - 3/6/2024 Radiation Therapy    Treating physician: Santino Arora  Treatment Summary  Course: C1 Thorax 2024  Treatment Site Ref. ID Energy Dose/Fx (Gy) #Fx Dose Correction (Gy) Total Dose (Gy) Start Date End Date Elapsed Days   IM Lung_L PTV_High 6X 2.2 19 / 30 0 41.8 1/23/2024 2/19/2024 27   IMLungNew PTV_High_resim 6X 2.2 11 / 11 0 24.2 2/21/2024 3/6/2024 14   Multiple planns were attempted, including SBRT to the ELDER and CRT to the LLL. With dose overlap, it was ultimately decided to treat with concurrent CRT to both lesions.. He was resimulated for treatment response in the LLL mass.     3/13/2024 Imaging Significant Findings    CT C/A/P  - 4.7 x 4.4 cm perihilar LLL mass, previously 6.7 x 6.2 cm. Decreased mass effect on LLL airways  - 2.7 x 1.7 cm cavitary ligular lesion, unchanged  - New cavitary lesions, solid  pulmonary nodules, and ill-defined opacities in lungs b/l  - Unchanged few sub-cm hypodensities in liver     4/19/2024 -  Chemotherapy    Treatment Summary   Plan Name: OP DURVALUMAB 1500 MG Q4W  Treatment Goal: Curative  Status: Active  Start Date: 4/19/2024  End Date: 3/21/2025 (Planned)  Provider: Guanako Grove IV, MD  Chemotherapy: [No matching medication found in this treatment plan]     Adenocarcinoma of Lingula   10/6/2023 Imaging Significant Findings    CT C, Non-Con  - 6.6 x 5.9 cm LLL mass  - 2 x 1.2 cm cavitary lesion in lingula  - 0.9 x 0.8 cm pleural based nodule in R lung base  - Trace L pleural effusion  - Suspect hilar LAD  - Calcified hilar LN     11/6/2023 Imaging Significant Findings    PET CT  - 7.6 x 6.0 cm soft tissue mass in superior segment of LLL, SUV 7.2  - 2.5 x 1.6 cm cavitary lesion in ELDER (neoplastic vs infectious/inflammatory), SUV max 1.8  - No hypermetabolic LAD  - 0.9 cm L hypoattenuating L adrenal nodule, likely adenoma     11/29/2023 Tumor Conference    Tumor Board  - Recommend staging EBUS and L robotic bronchoscopy for sampling of ELDER cavitary nodule.  The nodule is highly suspicious for possible adenocarcinoma as it has evolved from a ground-glass opacity to a part solid and cavitary nodule.  Obtain brain MRI to complete staging.  The clinical picture suggests a possible T3 or T4 primary lung cancer or two separate primary lung cancers.        11/29/2023 Tumor Genotyping    Liquid Biopsy, [facility]  - CKDN2A, TP53 (three separate LOF mutations)     11/30/2023 Imaging Significant Findings    MRI Brain  - JAN     12/12/2023 Imaging Significant Findings    CT C, Non-Con  - 6.7 x 6.2 cm LLL mass (previously 6.6 x 5.9 cm), abutting and narrowing the adjacent LLL bronchus  - Enlarging lingular cavitary lesion, now 2.5 x 1.6 cm  - Slightly decreased size of RLL pleural based nodule     12/19/2023 Procedure    Bronch with EBUS  ELDER, FNA and TBBx  - Adenocarcinoma (TTF1 positive, p40  negative)    LN  - Negative: Station 7  - Report: No pathologic enlargement of 4R, 4L, or 11R. Unable to evaluated 11L due to obstructing LLL endobronchial mass    [facility] NGS  - KRAS G12C  - TP53  - Negative: EGFR, ALK, BRAF, ROS1, RET, MET, ERBB2  - PD-L1: 10%       1/3/2024 Tumor Conference    Tumor Board  - Agree with plan for SBRT of lingular lesion and chemoRT to LLL mass.      1/5/2024 Cancer Staged    Staging form: Lung, AJCC 8th Edition  - Clinical: Stage IA3 (cT1c, cN0, cM0)     3/13/2024 Imaging Significant Findings    CT C/A/P  - 4.7 x 4.4 cm perihilar LLL mass, previously 6.7 x 6.2 cm. Decreased mass effect on LLL airways  - 2.7 x 1.7 cm cavitary ligular lesion, unchanged  - New cavitary lesions, solid pulmonary nodules, and ill-defined opacities in lungs b/l  - Unchanged few sub-cm hypodensities in liver            HPI:     Hernan Engel is a 68 y.o. male with pmh significant for COPD, T2DM, HLD, Stage IB ([id number]) moderately differentiated adenocarcinoma of RUL, initially dx'd 3/2014, s/p R upper lobectomy (3/2014, Dr. 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  Labs and RTC on 05/16 with C2D1 on 05/17.  Repeat scans 3 months from prior.    Interval History:   -  4/19/24: C1D1 durvalumab    Pt states that he tolerated his first infusion of durvalumab without issue. 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 He denies N/V, diarrhea, constipation, new/worsening SOB (continues to use his inhalers PRN to good effect), new/worsening cough, rash, or fatigue.     The pt is curious about whether or not he should be taking his Mounjaro at a higher dose. He says that his wife is doing okay.     He weighs 174.6 lbs today (5/16/24), from 171 on 4/18/24.       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    Lung cancer     Normocytic anemia 9/3/2014    Nuclear sclerosis of both eyes 9/4/2020    Seizure 2005    single event - not controlled by medication    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    T2DM (type 2 diabetes mellitus) 2014    DKA 9/2014        Past Surgical History:   Past Surgical History:   Procedure Laterality Date    BRONCHOSCOPY WITH FLUOROSCOPY N/A 11/2/2023    Procedure: BRONCHOSCOPY, WITH FLUOROSCOPY;  Surgeon: Provider, [facility];  Location: [facility] OR [address] [place] [address];  Service: Endoscopy;  Laterality: N/A;    CATARACT EXTRACTION Right 2021    COLONOSCOPY      LUNG REMOVAL, PARTIAL      ROBOTIC BRONCHOSCOPY N/A 12/19/2023    Procedure: ROBOTIC BRONCHOSCOPY;  Surgeon: Hernandez Brewer MD;  Location: [facility] OR [address] [place] [address];  Service: Pulmonary;  Laterality: N/A;        Family History:   Family History   Problem Relation Name Age of Onset    No Known Problems Mother      Cancer Father          colon cancer    Colon cancer Father      Cataracts Father      Colon cancer Sister Kira     Cancer  Sister Kira         colon or lung cancer     No Known Problems Sister Valentine     No Known Problems Sister Chantal     No Known Problems Brother Diogo     No Known Problems Maternal Aunt      No Known Problems Maternal Uncle      No Known Problems Paternal Aunt      No Known Problems Paternal Uncle      No Known Problems Maternal Grandmother      No Known Problems Maternal Grandfather      No Known Problems Paternal Grandmother      No Known Problems Paternal Grandfather      No Known Problems Other      Amblyopia Neg Hx      Blindness Neg Hx      Diabetes Neg Hx      Glaucoma Neg Hx      Hypertension Neg Hx      Macular degeneration Neg Hx      Retinal detachment Neg Hx      Strabismus Neg Hx      Stroke Neg Hx      Thyroid disease Neg Hx          Social History:   Social History     Tobacco Use    Smoking status: Former     Current packs/day: 0.00     Average packs/day: 1 pack/day for 30.0 years (30.0 ttl pk-yrs)     Types: Cigarettes     Start date: 1/15/1984     Quit date: 1/15/2014     Years since quitting: 10.3    Smokeless tobacco: Never   Substance Use Topics    Alcohol use: Yes     Alcohol/week: 5.7 standard drinks of alcohol     Types: 4 Cans of beer, 2 Standard drinks or equivalent per week     Comment: every other day beer drinker        I have reviewed and updated the patient's past medical, surgical, family and social histories.      ROS:   As per HPI.     Allergies:   Review of patient's allergies indicates:   Allergen Reactions    Adhesive Itching, Rash, Other (See Comments) and Dermatitis     Blister        Medications:   Current Outpatient Medications   Medication Sig Dispense Refill    atorvastatin (LIPITOR) 40 MG tablet Take 1 tablet (40 mg total) by mouth once daily. 90 tablet 3    blood-glucose meter kit Use as instructed 1 each 0    efinaconazole (JUBLIA) 10 % Crow Apply 1 Application topically once daily. 8 mL 0    fluticasone-umeclidin-vilanter (TRELEGY ELLIPTA) 100-62.5-25 mcg DsDv Inhale 1  puff into the lungs once daily.      metFORMIN (GLUCOPHAGE-XR) 500 MG ER 24hr tablet Take 1 tablet (500 mg total) by mouth 2 (two) times daily with meals.      MICRO THIN LANCETS 33 gauge Misc       permethrin (ELIMITE) 5 % cream Apply to skin head to toe over night, for 8-10 hours, wash off in AM in shower or bath  Repeat in 7 days. 60 g 1    polyethylene glycol (GLYCOLAX) 17 gram/dose powder Mix 1 capful (17 g) and dissolve in glass of water to take by mouth once daily. 510 g 0    tirzepatide 7.5 mg/0.5 mL PnIj Inject 7.5 mg into the skin every 7 days. 2 mL 2    triamcinolone acetonide 0.1% (KENALOG) 0.1 % cream Apply topically 3 (three) times daily. Apply to itching skin  as directed for 7 days 45 g 0     No current facility-administered medications for this visit.          Physical Exam:       There were no vitals taken for this visit.               Physical Exam  Constitutional:       Appearance: Normal appearance.   HENT:      Head: Normocephalic and atraumatic.   Eyes:      Extraocular Movements: Extraocular movements intact.      Conjunctiva/sclera: Conjunctivae normal.      Pupils: Pupils are equal, round, and reactive to light.   Cardiovascular:      Rate and Rhythm: Normal rate and regular rhythm.      Heart sounds: No murmur heard.     No friction rub. No gallop.   Pulmonary:      Effort: Pulmonary effort is normal.      Breath sounds: No wheezing, rhonchi or rales.   Musculoskeletal:         General: Normal range of motion.      Right lower leg: No edema.      Left lower leg: No edema.   Skin:     General: Skin is warm and dry.   Neurological:      Mental Status: He is alert and oriented to person, place, and time.   Psychiatric:         Mood and Affect: Mood normal.         Thought Content: Thought content normal.         Judgment: Judgment normal.           Labs:   No results found for this or any previous visit (from the past 48 hour(s)).          Imaging:    See oncologic history as above.      Path:  See oncologic history above.      Assessment and Plan:     Hernan Engel is a 68 y.o. male with pmh significant for COPD, T2DM, HLD, Stage IB ([id number]) moderately differentiated adenocarcinoma of RUL, initially dx'd 3/2014, s/p R upper lobectomy (3/2014, Dr. Mendez), and now two newly diagnosed concurrent lung cancers as below who presents for follow up:     1) Stage IIIA (T4N0M0) squamous cell carcinoma of the LLL (insufficient sample for PD-L1, no targetable mutations on liquid biopsy), initially dx'd 11/2/23, s/p CRT (1/23/24 - 3/05/24) w/ weekly carboplatin/paclitaxel (1/22/24 - 2/26/24)    2) Stage IA3 ([id number]) adenocarcinoma of the lingula (KRAS G12C, PD-L1 10%), initially dx'd 12/19/23, s/p CRT (1/23/24 - 3/05/24) w/ weekly carboplatin/paclitaxel (1/22/24 - 2/26/24)    *Notably, pt has a RLL pleural based nodule, reviewed at TB, appears linear and unlikely malignant    Stage IIIA Squamous Cell Carcinoma of the LLL, No PD-L1, No Targetable Mutations  ECOG PS 0.  Patient currently on durvalumab consolidation, tolerating without significant issue.  Most recent imaging (CT C/A/P, 3/13/24, prior to immunotherapy start) demonstrated decreasing size of the LLL mass as well as stability of the lingular lesion. Notably, it also demonstrated b/l cavitary lesions (sub-cm), more solid nodules, and ill-defined opacities b/l.  On discussion with Radiation Oncology, favor that these represent postradiation inflammatory changes and so will plan to proceed with immunotherapy consolidation with close imaging follow-up.  Pending [id number] scans, plan to complete 1 year of consolidation immunotherapy with imaging every 3 months throughout.    Of note, the patient missed his initial infusion of durvalumab due to concern that it was too close to radiation therapy and so will have received only 2 doses prior to repeat imaging.    PLAN:   -- Proceed w/ durvalumab C2D1 today (5/16/24), labs acceptable and  treatment signed  -- Repeat CT C at least 1 day prior to RTC, labs to be drawn with imaging  -- RTC and C3D1 on 6/13/24  -- Patient prefers early morning appointments infusions due to work schedule      Stage IA3 Adenocarcinoma of the Lingula, NGS and PD-L1 Pending  Bronchoscopy demonstrates adenocarcinoma of the lingula, separate from the squamous cell carcinoma in the LLL.  This is a T1c lesion which, given lack of lymphadenopathy, makes this a Stage IA3 disease.  Plan for treatment w/ CRT followed by immunotherapy, as above.  -- Treatment as above      COPD  Patient has recently resumed work and notes that he develops mild dyspnea on exertion, particularly after eating lunch (possible postprandial dyspnea).  He has been using trilogy to good effect, though says that before resuming work, he was not requiring this inhaler.  He feels that his shortness of breath is stable.  Will monitor closely and if worsening, must consider possible radiation pneumonitis versus immunotherapy induced pneumonitis (likely too early in immunotherapy treatment course for pneumonitis to occur).  -- Pt aware to contact oncology if shortness of breath worsens prior to next appointment      T2DM  Patient currently on metformin and prescribed Mounjaro, however states he has been taking Mounjaro as he was waiting to discuss with oncology.  No interactions with durvalumab, from an oncology standpoint, okay to start.  Glucose 190 today (5/16/24), nonfasting.  -- Message sent to PCP (Dr. Calvin) regarding Mounjaro use         The above information has been reviewed with the patient and all questions have been answered to their apparent satisfaction.  They understand that they can call the clinic with any questions.    Guanako Grove MD  Hematology/Oncology  [facility]        Med Onc Chart Routing      Follow up with physician . Repeat CT C at least 1 day prior to RTC, labs to be drawn with imaging. RTC and C3D1 on 6/13/24.  Patient prefers early morning appointments infusions due to work schedule.   Follow up with SH[facility]    Infusion scheduling note    Injection scheduling note    Labs CBC and CMP   Scheduling:  Preferred lab:  Lab interval:     Imaging    Pharmacy appointment    Other referrals

## 2024-05-16 NOTE — Clinical Note
Good morning,   I wanted to let you know that Mr. Engel hasn't been taking his mounjaro as he wanted to wait to discuss with me. I told him that, from my standpoint, there isn't any reason to not take it (I also ran an interaction check with him present). I believe he's planning to start now, but just wanted to give you that update!  Thanks! Wolfgang

## 2024-05-16 NOTE — PLAN OF CARE
1127-Pt tolerated Imfinzi well today, no complaints or complications. VSS. Pt aware to call provider with any questions or concerns and is aware of upcoming appts. Pt ambulatory from clinic with steady gait, no distress noted.

## 2024-05-23 ENCOUNTER — HOSPITAL ENCOUNTER (OUTPATIENT)
Dept: RADIOLOGY | Facility: HOSPITAL | Age: 69
Discharge: HOME | End: 2024-05-23
Attending: FAMILY MEDICINE
Payer: MEDICARE

## 2024-05-23 ENCOUNTER — OFFICE VISIT (OUTPATIENT)
Dept: URGENT CARE | Facility: CLINIC | Age: 69
End: 2024-05-23
Payer: MEDICARE

## 2024-05-23 VITALS
HEART RATE: 75 BPM | BODY MASS INDEX: 27.31 KG/M2 | HEIGHT: 67 IN | SYSTOLIC BLOOD PRESSURE: 119 MMHG | DIASTOLIC BLOOD PRESSURE: 69 MMHG | TEMPERATURE: 99 F | RESPIRATION RATE: 20 BRPM | WEIGHT: 174 LBS | OXYGEN SATURATION: 96 %

## 2024-05-23 DIAGNOSIS — I80.9 PHLEBITIS: Primary | ICD-10-CM

## 2024-05-23 DIAGNOSIS — I80.9 PHLEBITIS: ICD-10-CM

## 2024-05-23 PROCEDURE — 76882 US LMTD JT/FCL EVL NVASC XTR: CPT | Mod: TC,RT

## 2024-05-23 PROCEDURE — 76882 US LMTD JT/FCL EVL NVASC XTR: CPT | Mod: 26,RT,, | Performed by: RADIOLOGY

## 2024-05-23 PROCEDURE — 99213 OFFICE O/P EST LOW 20 MIN: CPT | Mod: S$GLB,,, | Performed by: FAMILY MEDICINE

## 2024-05-23 RX ORDER — IBUPROFEN 600 MG/1
600 TABLET ORAL EVERY 8 HOURS PRN
Qty: 30 TABLET | Refills: 0 | Status: SHIPPED | OUTPATIENT
Start: 2024-05-23

## 2024-05-23 NOTE — PROGRESS NOTES
"Subjective:      Patient ID: Hernan Engel is a 68 y.o. male.    Vitals:  height is 5' 7" (1.702 m) and weight is 78.9 kg (174 lb). His oral temperature is 98.5 °F (36.9 °C). His blood pressure is 119/69 and his pulse is 75. His respiration is 20 and oxygen saturation is 96%.     Chief Complaint: Arm Pain     Pt is here for a bump on Rt arm. Pt says this started about a month ago, pt just noticing it .pt been treatign with tylenol pm. Pt has a bump on right arm, but it looks to be the vein in right arm.      Arm Pain   The incident occurred more than 1 week ago. The incident occurred at home. There was no injury mechanism. The pain is present in the right forearm. The quality of the pain is described as burning. The pain does not radiate. The pain is at a severity of 5/10. The pain is mild. The pain has been Fluctuating since the incident. Nothing aggravates the symptoms. He has tried acetaminophen for the symptoms. The treatment provided no relief.   ROS   Objective:     Physical Exam   Constitutional: He does not appear ill. No distress. normal  HENT:   Head: Normocephalic.   Cardiovascular: Normal rate, regular rhythm, normal heart sounds and normal pulses.   Pulmonary/Chest: Effort normal and breath sounds normal.   Abdominal: Normal appearance. Soft.   Neurological: He is alert.   Skin: lesion (firm nonconcompressible superfical vein right forearm approx 6 cm in length, tender)   Nursing note and vitals reviewed.      Assessment:     1. Phlebitis        Plan:       Phlebitis  -     US Soft Tissue Upper Extremity, Right; Future; Expected date: 05/23/2024  -     ibuprofen (ADVIL,MOTRIN) 600 MG tablet; Take 1 tablet (600 mg total) by mouth every 8 (eight) hours as needed for Pain (with food).  Dispense: 30 tablet; Refill: 0    Warm compresses to affected area                "

## 2024-05-24 ENCOUNTER — TELEPHONE (OUTPATIENT)
Dept: FAMILY MEDICINE | Facility: CLINIC | Age: 69
End: 2024-05-24
Payer: MEDICARE

## 2024-05-24 ENCOUNTER — TELEPHONE (OUTPATIENT)
Dept: URGENT CARE | Facility: CLINIC | Age: 69
End: 2024-05-24
Payer: MEDICARE

## 2024-05-24 NOTE — TELEPHONE ENCOUNTER
----- Message from Braxton Garcia sent at 5/24/2024  3:26 PM CDT -----  Regarding: self  Type: Patient Call Back    Who called:self    What is the request in detail:pt is calling to speak with dr hdz regarding his results     Can the clinic reply by [facility]?no    Would the patient rather a call back or a response via [facility]? callback    Best call back number:[phone]    Additional Information:

## 2024-05-24 NOTE — TELEPHONE ENCOUNTER
Advised pt as a courtesy to the ordering physician he will need to contact their to discuss the results. Pt verbalized understanding.

## 2024-05-26 ENCOUNTER — TELEPHONE (OUTPATIENT)
Dept: URGENT CARE | Facility: CLINIC | Age: 69
End: 2024-05-26
Payer: MEDICARE

## 2024-05-26 NOTE — TELEPHONE ENCOUNTER
Spoke to patient regarding ultrasound test results. Advised applying heat to the painful area, elevating the affected arm, using an over-the-counter nonsteroidal anti-inflammatory drug (NSAID)  such as ibuprofen. If does not resolve or worsens to see PCP. Patient verbalized understanding and agreement

## 2024-06-03 ENCOUNTER — TELEPHONE (OUTPATIENT)
Dept: HEMATOLOGY/ONCOLOGY | Facility: CLINIC | Age: 69
End: 2024-06-03
Payer: MEDICARE

## 2024-06-04 ENCOUNTER — TELEPHONE (OUTPATIENT)
Dept: HEMATOLOGY/ONCOLOGY | Facility: CLINIC | Age: 69
End: 2024-06-04
Payer: MEDICARE

## 2024-06-07 ENCOUNTER — CLINICAL SUPPORT (OUTPATIENT)
Dept: HEMATOLOGY/ONCOLOGY | Facility: CLINIC | Age: 69
End: 2024-06-07
Payer: MEDICARE

## 2024-06-07 DIAGNOSIS — Z71.3 NUTRITIONAL COUNSELING: Primary | ICD-10-CM

## 2024-06-07 DIAGNOSIS — E11.8 TYPE 2 DIABETES MELLITUS WITH COMPLICATION, WITHOUT LONG-TERM CURRENT USE OF INSULIN: ICD-10-CM

## 2024-06-07 DIAGNOSIS — C34.32 SQUAMOUS CELL CARCINOMA OF BRONCHUS IN LEFT LOWER LOBE: ICD-10-CM

## 2024-06-07 DIAGNOSIS — C34.12 ADENOCARCINOMA OF UPPER LOBE OF LEFT LUNG: ICD-10-CM

## 2024-06-07 PROCEDURE — 97802 MEDICAL NUTRITION INDIV IN: CPT | Mod: S$GLB,,,

## 2024-06-11 ENCOUNTER — HOSPITAL ENCOUNTER (OUTPATIENT)
Dept: RADIOLOGY | Facility: HOSPITAL | Age: 69
Discharge: HOME | End: 2024-06-11
Attending: HOSPITALIST
Payer: MEDICARE

## 2024-06-11 DIAGNOSIS — C34.32 SQUAMOUS CELL CARCINOMA OF BRONCHUS IN LEFT LOWER LOBE: ICD-10-CM

## 2024-06-11 DIAGNOSIS — C34.12 ADENOCARCINOMA OF UPPER LOBE OF LEFT LUNG: ICD-10-CM

## 2024-06-11 PROCEDURE — 25500020 PHARM REV CODE 255: Performed by: HOSPITALIST

## 2024-06-11 PROCEDURE — 71260 CT THORAX DX C+: CPT | Mod: 26,,, | Performed by: RADIOLOGY

## 2024-06-11 PROCEDURE — 71260 CT THORAX DX C+: CPT | Mod: TC

## 2024-06-11 RX ADMIN — IOHEXOL 75 ML: 350 INJECTION, SOLUTION INTRAVENOUS at 09:06

## 2024-06-13 ENCOUNTER — OFFICE VISIT (OUTPATIENT)
Dept: HEMATOLOGY/ONCOLOGY | Facility: CLINIC | Age: 69
End: 2024-06-13
Payer: MEDICARE

## 2024-06-13 VITALS
TEMPERATURE: 98 F | OXYGEN SATURATION: 98 % | SYSTOLIC BLOOD PRESSURE: 97 MMHG | HEIGHT: 67 IN | RESPIRATION RATE: 16 BRPM | DIASTOLIC BLOOD PRESSURE: 63 MMHG | BODY MASS INDEX: 25.71 KG/M2 | HEART RATE: 97 BPM | WEIGHT: 163.81 LBS

## 2024-06-13 DIAGNOSIS — R06.09 DYSPNEA ON EXERTION: ICD-10-CM

## 2024-06-13 DIAGNOSIS — J98.4 PNEUMONITIS: ICD-10-CM

## 2024-06-13 DIAGNOSIS — C34.12 ADENOCARCINOMA OF UPPER LOBE OF LEFT LUNG: ICD-10-CM

## 2024-06-13 DIAGNOSIS — C34.32 SQUAMOUS CELL CARCINOMA OF BRONCHUS IN LEFT LOWER LOBE: Primary | ICD-10-CM

## 2024-06-13 DIAGNOSIS — I80.9 SUPERFICIAL THROMBOPHLEBITIS, INVOLVING UNSPECIFIED SITE: ICD-10-CM

## 2024-06-13 PROCEDURE — 3046F HEMOGLOBIN A1C LEVEL >9.0%: CPT | Mod: CPTII,S$GLB,, | Performed by: HOSPITALIST

## 2024-06-13 PROCEDURE — 1126F AMNT PAIN NOTED NONE PRSNT: CPT | Mod: CPTII,S$GLB,, | Performed by: HOSPITALIST

## 2024-06-13 PROCEDURE — 1157F ADVNC CARE PLAN IN RCRD: CPT | Mod: CPTII,S$GLB,, | Performed by: HOSPITALIST

## 2024-06-13 PROCEDURE — 99999 PR PBB SHADOW E&M-EST. PATIENT-LVL III: CPT | Mod: PBBFAC,,, | Performed by: HOSPITALIST

## 2024-06-13 PROCEDURE — 1101F PT FALLS ASSESS-DOCD LE1/YR: CPT | Mod: CPTII,S$GLB,, | Performed by: HOSPITALIST

## 2024-06-13 PROCEDURE — 3288F FALL RISK ASSESSMENT DOCD: CPT | Mod: CPTII,S$GLB,, | Performed by: HOSPITALIST

## 2024-06-13 PROCEDURE — G2211 COMPLEX E/M VISIT ADD ON: HCPCS | Mod: S$GLB,,, | Performed by: HOSPITALIST

## 2024-06-13 PROCEDURE — 3072F LOW RISK FOR RETINOPATHY: CPT | Mod: CPTII,S$GLB,, | Performed by: HOSPITALIST

## 2024-06-13 PROCEDURE — 3078F DIAST BP <80 MM HG: CPT | Mod: CPTII,S$GLB,, | Performed by: HOSPITALIST

## 2024-06-13 PROCEDURE — 99215 OFFICE O/P EST HI 40 MIN: CPT | Mod: S$GLB,,, | Performed by: HOSPITALIST

## 2024-06-13 PROCEDURE — 3074F SYST BP LT 130 MM HG: CPT | Mod: CPTII,S$GLB,, | Performed by: HOSPITALIST

## 2024-06-13 PROCEDURE — 1159F MED LIST DOCD IN RCRD: CPT | Mod: CPTII,S$GLB,, | Performed by: HOSPITALIST

## 2024-06-13 PROCEDURE — 3008F BODY MASS INDEX DOCD: CPT | Mod: CPTII,S$GLB,, | Performed by: HOSPITALIST

## 2024-06-13 RX ORDER — SULFAMETHOXAZOLE AND TRIMETHOPRIM 800; 160 MG/1; MG/1
1 TABLET ORAL
Qty: 20 TABLET | Refills: 0 | Status: SHIPPED | OUTPATIENT
Start: 2024-06-14 | End: 2024-07-26

## 2024-06-13 RX ORDER — PREDNISONE 10 MG/1
TABLET ORAL
Qty: 140 TABLET | Refills: 0 | Status: SHIPPED | OUTPATIENT
Start: 2024-06-13 | End: 2024-07-25

## 2024-06-13 RX ORDER — PANTOPRAZOLE SODIUM 40 MG/1
40 TABLET, DELAYED RELEASE ORAL DAILY
Qty: 60 TABLET | Refills: 0 | Status: SHIPPED | OUTPATIENT
Start: 2024-06-13 | End: 2025-06-13

## 2024-06-13 NOTE — PROGRESS NOTES
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   9/15/2023 Imaging Significant Findings    CXR (URI)  - Masslike focus along L hilar region     10/6/2023 Imaging Significant Findings    CT C, Non-Con  - 6.6 x 5.9 cm LLL mass  - 2 x 1.2 cm cavitary lesion in lingula  - 0.9 x 0.8 cm pleural based nodule in R lung base  - Trace L pleural effusion  - Suspect hilar LAD  - Calcified hilar LN     11/2/2023 Procedure    Bronchoscopy  LLL, Endobronchial Biopsy  - Squamous cell carcinoma (CDK 5/6, p63 positive, CK7 and TTF negative)    [facility] NGS  - Insufficient tissue     11/6/2023 Imaging Significant Findings    PET CT  - 7.6 x 6.0 cm soft tissue mass in superior segment of LLL, SUV 7.2  - 2.5 x 1.6 cm cavitary lesion in ELDER (neoplastic vs infectious/inflammatory), SUV max 1.8  - No hypermetabolic LAD  - 0.9 cm L hypoattenuating L adrenal nodule, likely adenoma     11/29/2023 Tumor Conference    Tumor Board  - Recommend staging EBUS and L robotic bronchoscopy for sampling of ELDER cavitary nodule.  The nodule is highly suspicious for possible adenocarcinoma as it has evolved from a ground-glass opacity to a part solid and cavitary nodule.  Obtain brain MRI to complete staging.  The clinical picture suggests a possible T3 or T4 primary lung cancer or two separate primary lung cancers.        11/29/2023 Tumor Genotyping    Liquid Biopsy, [facility]  - CKDN2A, TP53 (three separate LOF mutations)     11/30/2023 Imaging Significant Findings    MRI Brain  - JAN     12/12/2023 Imaging Significant Findings    CT C, Non-Con  - 6.7 x 6.2 cm LLL mass (previously 6.6 x 5.9 cm), abutting and narrowing the adjacent LLL bronchus  - Enlarging lingular cavitary lesion, now 2.5 x 1.6 cm  - Slightly decreased size of RLL pleural based nodule     1/3/2024 Tumor  Conference    Tumor Board  - Agree with plan for SBRT of lingular lesion and chemoRT to LLL mass.      1/5/2024 Cancer Staged    Staging form: Lung, AJCC 8th Edition  - Clinical: Stage IIIA (cT4, cN0, cM0)     1/19/2024 - 2/26/2024 Chemotherapy    Treatment Summary   Plan Name: OP NSCLC PACLITAXEL + CARBOPLATIN (AUC) QW + RADIATION  Treatment Goal: Curative  Status: Inactive  Start Date: 1/19/2024  End Date: 2/26/2024  Provider: Guanako Grove IV, MD  Chemotherapy: CARBOplatin (PARAPLATIN) 210 mg in sodium chloride 0.9% 136 mL chemo infusion, 210 mg (100 % of original dose 208.2 mg), Intravenous, Clinic/HOD 1 time, 6 of 6 cycles  Dose modification:   (original dose 208.2 mg, Cycle 1)  Administration: 210 mg (1/22/2024), 210 mg (1/29/2024), 180 mg (2/5/2024), 225 mg (2/12/2024), 210 mg (2/19/2024), 210 mg (2/26/2024)  PACLitaxeL (TAXOL) 45 mg/m2 = 84 mg in sodium chloride 0.9% 250 mL chemo infusion, 45 mg/m2 = 84 mg, Intravenous, Clinic/HOD 1 time, 6 of 6 cycles  Administration: 84 mg (1/22/2024), 84 mg (1/29/2024), 84 mg (2/5/2024), 84 mg (2/12/2024), 84 mg (2/19/2024), 84 mg (2/26/2024)     1/23/2024 - 3/6/2024 Radiation Therapy    Treating physician: Santino Arora  Treatment Summary  Course: C1 Thorax 2024  Treatment Site Ref. ID Energy Dose/Fx (Gy) #Fx Dose Correction (Gy) Total Dose (Gy) Start Date End Date Elapsed Days   IM Lung_L PTV_High 6X 2.2 19 / 30 0 41.8 1/23/2024 2/19/2024 27   IMLungNew PTV_High_resim 6X 2.2 11 / 11 0 24.2 2/21/2024 3/6/2024 14   Multiple planns were attempted, including SBRT to the ELDER and CRT to the LLL. With dose overlap, it was ultimately decided to treat with concurrent CRT to both lesions.. He was resimulated for treatment response in the LLL mass.     3/13/2024 Imaging Significant Findings    CT C/A/P  - 4.7 x 4.4 cm perihilar LLL mass, previously 6.7 x 6.2 cm. Decreased mass effect on LLL airways  - 2.7 x 1.7 cm cavitary ligular lesion, unchanged  - New cavitary lesions, solid  pulmonary nodules, and ill-defined opacities in lungs b/l  - Unchanged few sub-cm hypodensities in liver     4/19/2024 -  Chemotherapy    Treatment Summary   Plan Name: OP DURVALUMAB 1500 MG Q4W  Treatment Goal: Curative  Status: Active  Start Date: 4/19/2024  End Date: 3/20/2025 (Planned)  Provider: Guanako Grove IV, MD  Chemotherapy: [No matching medication found in this treatment plan]     6/11/2024 Imaging Significant Findings    CT C/A/P  - 4.2 x 3.4 cm perihilar LLL mass, previously 4.7 x 4.4 cm.  Improvement in intralesional cavitation  - 1.6 x 1.3 cm lingular lesion, previously 2.7 x 1.7 cm  - New interstitial infiltrates in posterior aspect of ELDER and lingula  - Worsening of ill-defined opacities in LLL and bronchiectatic changes  - Improvement in previously seen ill-defined opacities in medial aspect of RLL  - Improvement other cavitary lesions and pulmonary nodules  - Postoperative upper lobectomy changes, no recurrent disease bronchial stump  - Small pericardial effusion  - Few subcentimeter hepatic hypodensities, grossly unchanged     Adenocarcinoma of Lingula   10/6/2023 Imaging Significant Findings    CT C, Non-Con  - 6.6 x 5.9 cm LLL mass  - 2 x 1.2 cm cavitary lesion in lingula  - 0.9 x 0.8 cm pleural based nodule in R lung base  - Trace L pleural effusion  - Suspect hilar LAD  - Calcified hilar LN     11/6/2023 Imaging Significant Findings    PET CT  - 7.6 x 6.0 cm soft tissue mass in superior segment of LLL, SUV 7.2  - 2.5 x 1.6 cm cavitary lesion in ELDER (neoplastic vs infectious/inflammatory), SUV max 1.8  - No hypermetabolic LAD  - 0.9 cm L hypoattenuating L adrenal nodule, likely adenoma     11/29/2023 Tumor Conference    Tumor Board  - Recommend staging EBUS and L robotic bronchoscopy for sampling of ELDER cavitary nodule.  The nodule is highly suspicious for possible adenocarcinoma as it has evolved from a ground-glass opacity to a part solid and cavitary nodule.  Obtain brain MRI to complete  staging.  The clinical picture suggests a possible T3 or T4 primary lung cancer or two separate primary lung cancers.        11/29/2023 Tumor Genotyping    Liquid Biopsy, [facility]  - CKDN2A, TP53 (three separate LOF mutations)     11/30/2023 Imaging Significant Findings    MRI Brain  - JAN     12/12/2023 Imaging Significant Findings    CT C, Non-Con  - 6.7 x 6.2 cm LLL mass (previously 6.6 x 5.9 cm), abutting and narrowing the adjacent LLL bronchus  - Enlarging lingular cavitary lesion, now 2.5 x 1.6 cm  - Slightly decreased size of RLL pleural based nodule     12/19/2023 Procedure    Bronch with EBUS  ELDER, FNA and TBBx  - Adenocarcinoma (TTF1 positive, p40 negative)    LN  - Negative: Station 7  - Report: No pathologic enlargement of 4R, 4L, or 11R. Unable to evaluated 11L due to obstructing LLL endobronchial mass    [facility] NGS  - KRAS G12C  - TP53  - Negative: EGFR, ALK, BRAF, ROS1, RET, MET, ERBB2  - PD-L1: 10%       1/3/2024 Tumor Conference    Tumor Board  - Agree with plan for SBRT of lingular lesion and chemoRT to LLL mass.      1/5/2024 Cancer Staged    Staging form: Lung, AJCC 8th Edition  - Clinical: Stage IA3 (cT1c, cN0, cM0)     3/13/2024 Imaging Significant Findings    CT C/A/P  - 4.7 x 4.4 cm perihilar LLL mass, previously 6.7 x 6.2 cm. Decreased mass effect on LLL airways  - 2.7 x 1.7 cm cavitary ligular lesion, unchanged  - New cavitary lesions, solid pulmonary nodules, and ill-defined opacities in lungs b/l  - Unchanged few sub-cm hypodensities in liver     6/11/2024 Imaging Significant Findings    CT C/A/P  - 4.2 x 3.4 cm perihilar LLL mass, previously 4.7 x 4.4 cm.  Improvement in intralesional cavitation  - 1.6 x 1.3 cm lingular lesion, previously 2.7 x 1.7 cm  - New interstitial infiltrates in posterior aspect of ELDER and lingula  - Worsening of ill-defined opacities in LLL and bronchiectatic changes  - Improvement in previously seen ill-defined opacities in medial aspect of RLL  - Improvement  "other cavitary lesions and pulmonary nodules  - Postoperative upper lobectomy changes, no recurrent disease bronchial stump  - Small pericardial effusion  - Few subcentimeter hepatic hypodensities, grossly unchanged              HPI:     Hernan Engel is a 68 y.o. male with pmh significant for COPD, T2DM, HLD, Stage IB ([id number]) moderately differentiated adenocarcinoma of RUL, initially dx'd 3/2014, s/p R upper lobectomy (3/2014, Dr. Mendez), and now two newly diagnosed concurrent lung cancers as below who presents for follow up:     1) Stage IIIA (T4N0M0) squamous cell carcinoma of the LLL (insufficient sample for PD-L1, no targetable mutations on liquid biopsy), initially dx'd 11/2/23, s/p CRT (1/23/24 - 3/05/24) w/ weekly carboplatin/paclitaxel (1/22/24 - 2/26/24), and currently on durvalumab (4/19/24-present)    2) Stage IA3 (H2oF2Y7) adenocarcinoma of the lingula (KRAS G12C, PD-L1 10%), initially dx'd 12/19/23, s/p CRT (1/23/24 - 3/05/24) w/ weekly carboplatin/paclitaxel (1/22/24 - 2/26/24), and currently on durvalumab (4/19/24-present)    *Notably, pt has a RLL pleural based nodule, reviewed at TB, appears linear and unlikely malignant    Last clinic 5/16/24, proceed with C2D1 durvalumab, repeat CT C prior to RTC, C3D1 on 6/13/24.    Interval History:   - 5/16/24: C2D1 durvalumab  - 5/23/24: RUE US, superifical venous thrombosis of distal upper extremity  - 5/24/24:  Urgent Care, phlebitis of right upper extremity  - 6/10/24: CT C/A/P, as above    Pt states that he feels "okay". He says that "sometimes I get a little short of breath"; he says this is new since I saw him last time. He says that if he does something active (picking up objects and carrying them), which is new since his last visit. He denies any cough. He notes ongoing swelling over his R forearm, which he has been treating with NSAIDs and warm compresses. He denies pain in this location, and says it is improving with time and treatment. Pt " denies N/V, diarrhea, constipation (unchanged), rash, or new/worsening fatigue.      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    Lung cancer     Normocytic anemia 9/3/2014    Nuclear sclerosis of both eyes 9/4/2020    Seizure 2005    single event - not controlled by medication    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    T2DM (type 2 diabetes mellitus) 2014    DKA 9/2014        Past Surgical History:   Past Surgical History:   Procedure Laterality Date    BRONCHOSCOPY WITH FLUOROSCOPY N/A 11/2/2023    Procedure: BRONCHOSCOPY, WITH FLUOROSCOPY;  Surgeon: Provider, [facility];  Location: [facility] OR [address] [place] [address];  Service: Endoscopy;  Laterality: N/A;    CATARACT EXTRACTION Right 2021    COLONOSCOPY      LUNG REMOVAL, PARTIAL      ROBOTIC BRONCHOSCOPY N/A 12/19/2023    Procedure: ROBOTIC BRONCHOSCOPY;  Surgeon: Hernandez Brewer MD;  Location: [facility] OR [address] [place] [address];  Service: Pulmonary;  Laterality: N/A;        Family History:   Family History   Problem Relation Name Age of Onset    No Known Problems Mother      Cancer Father          colon cancer    Colon cancer Father      Cataracts Father      Colon cancer Sister Kira     Cancer Sister Kira         colon or lung cancer     No Known Problems Sister Valentine     No Known Problems Sister Chantal     No Known Problems Brother Diogo     No Known Problems Maternal Aunt      No Known Problems Maternal Uncle      No Known Problems Paternal Aunt      No Known Problems Paternal Uncle      No Known Problems Maternal Grandmother      No Known Problems Maternal Grandfather      No Known Problems Paternal Grandmother      No Known  Problems Paternal Grandfather      No Known Problems Other      Amblyopia Neg Hx      Blindness Neg Hx      Diabetes Neg Hx      Glaucoma Neg Hx      Hypertension Neg Hx      Macular degeneration Neg Hx      Retinal detachment Neg Hx      Strabismus Neg Hx      Stroke Neg Hx      Thyroid disease Neg Hx          Social History:   Social History     Tobacco Use    Smoking status: Former     Current packs/day: 0.00     Average packs/day: 1 pack/day for 30.0 years (30.0 ttl pk-yrs)     Types: Cigarettes     Start date: 1/15/1984     Quit date: 1/15/2014     Years since quitting: 10.4    Smokeless tobacco: Never   Substance Use Topics    Alcohol use: Yes     Alcohol/week: 5.7 standard drinks of alcohol     Types: 4 Cans of beer, 2 Standard drinks or equivalent per week     Comment: every other day beer drinker        I have reviewed and updated the patient's past medical, surgical, family and social histories.      ROS:   As per HPI.     Allergies:   Review of patient's allergies indicates:   Allergen Reactions    Adhesive Itching, Rash, Other (See Comments) and Dermatitis     Blister        Medications:   Current Outpatient Medications   Medication Sig Dispense Refill    atorvastatin (LIPITOR) 40 MG tablet Take 1 tablet (40 mg total) by mouth once daily. 90 tablet 3    blood-glucose meter kit Use as instructed 1 each 0    efinaconazole (JUBLIA) 10 % Crow Apply 1 Application topically once daily. 8 mL 0    fluticasone-umeclidin-vilanter (TRELEGY ELLIPTA) 100-62.5-25 mcg DsDv Inhale 1 puff into the lungs once daily.      ibuprofen (ADVIL,MOTRIN) 600 MG tablet Take 1 tablet (600 mg total) by mouth every 8 (eight) hours as needed for Pain (with food). 30 tablet 0    metFORMIN (GLUCOPHAGE-XR) 500 MG ER 24hr tablet Take 1 tablet (500 mg total) by mouth 2 (two) times daily with meals.      MICRO THIN LANCETS 33 gauge Misc       permethrin (ELIMITE) 5 % cream Apply to skin head to toe over night, for 8-10 hours, wash off in AM  in shower or bath  Repeat in 7 days. 60 g 1    polyethylene glycol (GLYCOLAX) 17 gram/dose powder Mix 1 capful (17 g) and dissolve in glass of water to take by mouth once daily. 510 g 0    tirzepatide 7.5 mg/0.5 mL PnIj Inject 7.5 mg into the skin every 7 days. 2 mL 2    triamcinolone acetonide 0.1% (KENALOG) 0.1 % cream Apply topically 3 (three) times daily. Apply to itching skin  as directed for 7 days 45 g 0     No current facility-administered medications for this visit.          Physical Exam:       There were no vitals taken for this visit.               Physical Exam  Constitutional:       Appearance: Normal appearance.   HENT:      Head: Normocephalic and atraumatic.   Eyes:      Extraocular Movements: Extraocular movements intact.      Conjunctiva/sclera: Conjunctivae normal.      Pupils: Pupils are equal, round, and reactive to light.   Cardiovascular:      Rate and Rhythm: Normal rate and regular rhythm.      Heart sounds: No murmur heard.     No friction rub. No gallop.   Pulmonary:      Effort: Pulmonary effort is normal.      Breath sounds: No wheezing, rhonchi or rales.   Musculoskeletal:         General: Normal range of motion.      Right lower leg: No edema.      Left lower leg: No edema.   Skin:     General: Skin is warm and dry.   Neurological:      Mental Status: He is alert and oriented to person, place, and time.   Psychiatric:         Mood and Affect: Mood normal.         Thought Content: Thought content normal.         Judgment: Judgment normal.           Labs:   Recent Results (from the past 48 hour(s))   CBC w/ DIFF    Collection Time: 06/11/24  8:02 AM   Result Value Ref Range    WBC 4.91 3.90 - 12.70 K/uL    RBC 4.34 (L) 4.60 - 6.20 M/uL    Hemoglobin 12.5 (L) 14.0 - 18.0 g/dL    Hematocrit 36.9 (L) 40.0 - 54.0 %    MCV 85 82 - 98 fL    MCH 28.8 27.0 - 31.0 pg    MCHC 33.9 32.0 - 36.0 g/dL    RDW 13.2 11.5 - 14.5 %    Platelets 244 150 - 450 K/uL    MPV 9.1 (L) 9.2 - 12.9 fL    Immature  Granulocytes 0.0 0.0 - 0.5 %    Gran # (ANC) 3.6 1.8 - 7.7 K/uL    Immature Grans (Abs) 0.00 0.00 - 0.04 K/uL    Lymph # 0.5 (L) 1.0 - 4.8 K/uL    Mono # 0.6 0.3 - 1.0 K/uL    Eos # 0.1 0.0 - 0.5 K/uL    Baso # 0.04 0.00 - 0.20 K/uL    nRBC 0 0 /100 WBC    Gran % 74.0 (H) 38.0 - 73.0 %    Lymph % 10.6 (L) 18.0 - 48.0 %    Mono % 12.0 4.0 - 15.0 %    Eosinophil % 2.6 0.0 - 8.0 %    Basophil % 0.8 0.0 - 1.9 %    Differential Method Automated    CMP    Collection Time: 06/11/24  8:02 AM   Result Value Ref Range    Sodium 141 136 - 145 mmol/L    Potassium 4.2 3.5 - 5.1 mmol/L    Chloride 110 95 - 110 mmol/L    CO2 22 (L) 23 - 29 mmol/L    Glucose 124 (H) 70 - 110 mg/dL    BUN 19 8 - 23 mg/dL    Creatinine 1.0 0.5 - 1.4 mg/dL    Calcium 10.0 8.7 - 10.5 mg/dL    Total Protein 7.0 6.0 - 8.4 g/dL    Albumin 3.7 3.5 - 5.2 g/dL    Total Bilirubin 0.8 0.1 - 1.0 mg/dL    Alkaline Phosphatase 109 55 - 135 U/L    AST 14 10 - 40 U/L    ALT 13 10 - 44 U/L    eGFR >60.0 >60 mL/min/1.73 m^2    Anion Gap 9 8 - 16 mmol/L             Imaging:    See oncologic history as above.     Path:  See oncologic history above.      Assessment and Plan:     Hernan Engel is a 68 y.o. male with pmh significant for COPD, T2DM, HLD, Stage IB ([id number]) moderately differentiated adenocarcinoma of RUL, initially dx'd 3/2014, s/p R upper lobectomy (3/2014, Dr. Mendez), and now two newly diagnosed concurrent lung cancers as below who presents for follow up:     1) Stage IIIA (T4N0M0) squamous cell carcinoma of the LLL (insufficient sample for PD-L1, no targetable mutations on liquid biopsy), initially dx'd 11/2/23, s/p CRT (1/23/24 - 3/05/24) w/ weekly carboplatin/paclitaxel (1/22/24 - 2/26/24)    2) Stage IA3 (S8wL9Y5) adenocarcinoma of the lingula (KRAS G12C, PD-L1 10%), initially dx'd 12/19/23, s/p CRT (1/23/24 - 3/05/24) w/ weekly carboplatin/paclitaxel (1/22/24 - 2/26/24)    *Notably, pt has a RLL pleural based nodule, reviewed at TB, appears  linear and unlikely malignant    Stage IIIA Squamous Cell Carcinoma of the LLL, No PD-L1, No Targetable Mutations  ECOG PS 0.  Patient currently on durvalumab consolidation, tolerating well though now with dyspnea on exertion (see below).  Most recent imaging (CT C/A/P, 6/11/24, on durvalumab) demonstrates ongoing decrement in both the LLL mass and the lingular lesion but new interstitial infiltrates in the posterior aspect of the ELDER and lingula as well as worsening ill-defined opacities in the LLL (see below).  Given concern for possible pneumonitis as below, will hold on durvalumab treatment at this time.      Of note, the patient missed his initial infusion of durvalumab due to concern that it was too close to radiation therapy and so had received only 2 doses prior to repeat imaging.    PLAN:   -- Cancel durvalumab infusion today (6/13/24)  -- Treat pneumonitis as below  -- RTC in 3 weeks for symptom check and to confirm medication compliance as below  -- If symptoms improved after steroid treatment is complete, will consider re-challenging w/ durvalumab.  -- Patient prefers early morning appointments infusions due to work schedule  -- Rad onc f/u on 7/03/24      Stage IA3 Adenocarcinoma of the Lingula, NGS and PD-L1 Pending  Bronchoscopy demonstrates adenocarcinoma of the lingula, separate from the squamous cell carcinoma in the LLL.  This is a T1c lesion which, given lack of lymphadenopathy, makes this a Stage IA3 disease.  Plan for treatment w/ CRT followed by immunotherapy, as above.  -- Treatment as above      Dyspnea  Pneumonitis  Patient describes new dyspnea on exertion since last visit.  CT C/A/P from 6/11/24 demonstrates worsening ill-defined opacities in the LLL with bronchiectatic changes.  On my review, this appears consistent with a pneumonitis.  Based on distribution, may be related to radiation (which ended on 3/5/24), though must also consider role of immunotherapy even noting sparing of other  regions of lung and immunotherapy start ~8 weeks ago.  On exam, no rales, rhonchi, or wheezing.  Discussed with Dr. Arora (radiation oncology) who agrees with this assessment. Given that patient is symptomatic, will hold immunotherapy at this time and treat with 6 week course of steroids. Pt states his understanding.   -- Hold durvalumab  -- Steroid taper prescribed:  - 50 mg daily x 14 days, then down by 10 mg weekly   - Bactrim M/W/F   - Pantoprazole 40 mg daily   - Message sent to PCP regarding closer monitoring of blood sugar   - Handwritten paper detailing the above given to pt, and discussed he should provide this to the pharmacist to educate him on the regimen as well, and to give the paper to his wife to review.      Superficial Thrombophlebitis  Patient presented to urgent Care with swelling over his right forearm.  Ultrasound demonstrated a superficial venous thrombus.  He is started on NSAIDs and warm compresses and notes ongoing improvement.  -- PCP appointment on 8/1/24, pt states he is working to make a sooner appointment  -- Discussed symptoms requiring re-evaluation      T2DM  Patient currently on metformin and prescribed Mounjaro, however states he has been taking Mounjaro as he was waiting to discuss with oncology.  No interactions with durvalumab, from an oncology standpoint, okay to start.  Glucose 190 today (5/16/24), nonfasting.  -- Message sent to PCP (Dr. Calvin) as above        The above information has been reviewed with the patient and all questions have been answered to their apparent satisfaction.  They understand that they can call the clinic with any questions.    Guanako Grove MD  Hematology/Oncology  [facility]        Med Onc Chart Routing      Follow up with physician . Cancel infusion today, do not reschedule at this time. RTC in person in 3 weeks.   Follow up with SHARRI    Infusion scheduling note    Injection scheduling note    Labs    Imaging    Pharmacy  appointment    Other referrals

## 2024-06-13 NOTE — Clinical Note
Good morning,   I think this patient might have some pneumonitis from his radiation treatments and so will be starting him on high-dose steroids.  I educated him at length about this, including also taking Bactrim and pantoprazole.  I know that there have been some issues with his blood sugars so I wanted to reach out to you and let you know that he will be on high-dose prednisone for several weeks.  I told him to reach out to you as well. Please let me know if there's anything I can do on my end regarding this!

## 2024-06-18 NOTE — Clinical Note
Hi!  Another CRT. Are you okay following up with him on Mondays? If booked, I can try virtuals, etc.   Thanks! Wolfgang Patient Specific Counseling (Will Not Stick From Patient To Patient): -initiate Claritin twice daily to help with symptoms \\n-Will send medrol dosepack \\n-continue triamcinolone, will send jar\\n- recommend Helicare ultra plus Detail Level: Detailed

## 2024-06-21 ENCOUNTER — LAB VISIT (OUTPATIENT)
Dept: LAB | Facility: HOSPITAL | Age: 69
End: 2024-06-21
Attending: INTERNAL MEDICINE
Payer: MEDICARE

## 2024-06-21 DIAGNOSIS — E11.8 TYPE 2 DIABETES MELLITUS WITH COMPLICATION, WITHOUT LONG-TERM CURRENT USE OF INSULIN: ICD-10-CM

## 2024-06-21 LAB
CHOLEST SERPL-MCNC: 109 MG/DL (ref 120–199)
CHOLEST/HDLC SERPL: 2.4 {RATIO} (ref 2–5)
ESTIMATED AVG GLUCOSE: 143 MG/DL (ref 68–131)
HBA1C MFR BLD: 6.6 % (ref 4–5.6)
HDLC SERPL-MCNC: 46 MG/DL (ref 40–75)
HDLC SERPL: 42.2 % (ref 20–50)
LDLC SERPL CALC-MCNC: 51.4 MG/DL (ref 63–159)
NONHDLC SERPL-MCNC: 63 MG/DL
TRIGL SERPL-MCNC: 58 MG/DL (ref 30–150)

## 2024-06-21 PROCEDURE — 80061 LIPID PANEL: CPT | Performed by: INTERNAL MEDICINE

## 2024-06-21 PROCEDURE — 83036 HEMOGLOBIN GLYCOSYLATED A1C: CPT | Performed by: INTERNAL MEDICINE

## 2024-06-21 PROCEDURE — 36415 COLL VENOUS BLD VENIPUNCTURE: CPT | Mod: PO | Performed by: INTERNAL MEDICINE

## 2024-06-24 ENCOUNTER — TELEPHONE (OUTPATIENT)
Dept: FAMILY MEDICINE | Facility: CLINIC | Age: 69
End: 2024-06-24
Payer: MEDICARE

## 2024-06-24 NOTE — TELEPHONE ENCOUNTER
----- Message from Lorrie Calvin MD sent at 6/21/2024  5:36 PM CDT -----  Please contact the patient and let him/her know that his/her results showed great improvement. Keep up the good work. Thank you.

## 2024-06-26 ENCOUNTER — TELEPHONE (OUTPATIENT)
Dept: FAMILY MEDICINE | Facility: CLINIC | Age: 69
End: 2024-06-26
Payer: MEDICARE

## 2024-07-02 ENCOUNTER — TELEPHONE (OUTPATIENT)
Dept: HEMATOLOGY/ONCOLOGY | Facility: CLINIC | Age: 69
End: 2024-07-02
Payer: MEDICARE

## 2024-07-03 ENCOUNTER — OFFICE VISIT (OUTPATIENT)
Dept: HEMATOLOGY/ONCOLOGY | Facility: CLINIC | Age: 69
End: 2024-07-03
Payer: MEDICARE

## 2024-07-03 VITALS
HEART RATE: 83 BPM | BODY MASS INDEX: 25.47 KG/M2 | RESPIRATION RATE: 14 BRPM | SYSTOLIC BLOOD PRESSURE: 110 MMHG | OXYGEN SATURATION: 97 % | HEIGHT: 67 IN | DIASTOLIC BLOOD PRESSURE: 70 MMHG | WEIGHT: 162.25 LBS

## 2024-07-03 DIAGNOSIS — C34.32 SQUAMOUS CELL CARCINOMA OF BRONCHUS IN LEFT LOWER LOBE: Primary | ICD-10-CM

## 2024-07-03 DIAGNOSIS — E11.8 TYPE 2 DIABETES MELLITUS WITH COMPLICATION, WITHOUT LONG-TERM CURRENT USE OF INSULIN: ICD-10-CM

## 2024-07-03 DIAGNOSIS — R06.09 DYSPNEA ON EXERTION: ICD-10-CM

## 2024-07-03 DIAGNOSIS — C34.12 ADENOCARCINOMA OF UPPER LOBE OF LEFT LUNG: ICD-10-CM

## 2024-07-03 DIAGNOSIS — I80.9 SUPERFICIAL THROMBOPHLEBITIS, INVOLVING UNSPECIFIED SITE: ICD-10-CM

## 2024-07-03 DIAGNOSIS — J98.4 PNEUMONITIS: ICD-10-CM

## 2024-07-03 PROCEDURE — G2211 COMPLEX E/M VISIT ADD ON: HCPCS | Mod: S$GLB,,, | Performed by: HOSPITALIST

## 2024-07-03 PROCEDURE — 99215 OFFICE O/P EST HI 40 MIN: CPT | Mod: S$GLB,,, | Performed by: HOSPITALIST

## 2024-07-03 PROCEDURE — 1101F PT FALLS ASSESS-DOCD LE1/YR: CPT | Mod: CPTII,S$GLB,, | Performed by: HOSPITALIST

## 2024-07-03 PROCEDURE — 1157F ADVNC CARE PLAN IN RCRD: CPT | Mod: CPTII,S$GLB,, | Performed by: HOSPITALIST

## 2024-07-03 PROCEDURE — 3074F SYST BP LT 130 MM HG: CPT | Mod: CPTII,S$GLB,, | Performed by: HOSPITALIST

## 2024-07-03 PROCEDURE — 3288F FALL RISK ASSESSMENT DOCD: CPT | Mod: CPTII,S$GLB,, | Performed by: HOSPITALIST

## 2024-07-03 PROCEDURE — 3072F LOW RISK FOR RETINOPATHY: CPT | Mod: CPTII,S$GLB,, | Performed by: HOSPITALIST

## 2024-07-03 PROCEDURE — 3044F HG A1C LEVEL LT 7.0%: CPT | Mod: CPTII,S$GLB,, | Performed by: HOSPITALIST

## 2024-07-03 PROCEDURE — 1159F MED LIST DOCD IN RCRD: CPT | Mod: CPTII,S$GLB,, | Performed by: HOSPITALIST

## 2024-07-03 PROCEDURE — 3078F DIAST BP <80 MM HG: CPT | Mod: CPTII,S$GLB,, | Performed by: HOSPITALIST

## 2024-07-03 PROCEDURE — 1126F AMNT PAIN NOTED NONE PRSNT: CPT | Mod: CPTII,S$GLB,, | Performed by: HOSPITALIST

## 2024-07-03 PROCEDURE — 3008F BODY MASS INDEX DOCD: CPT | Mod: CPTII,S$GLB,, | Performed by: HOSPITALIST

## 2024-07-03 PROCEDURE — 99999 PR PBB SHADOW E&M-EST. PATIENT-LVL IV: CPT | Mod: PBBFAC,,, | Performed by: HOSPITALIST

## 2024-07-03 NOTE — Clinical Note
Pt confirms adherence to the taper. Symptoms maybe a bit better. Planning to continue taper, and will get repeat imaging around a week after complete to determine next steps, unless you think imaging should occur at a different point.

## 2024-07-03 NOTE — PROGRESS NOTES
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   9/15/2023 Imaging Significant Findings    CXR (URI)  - Masslike focus along L hilar region     10/6/2023 Imaging Significant Findings    CT C, Non-Con  - 6.6 x 5.9 cm LLL mass  - 2 x 1.2 cm cavitary lesion in lingula  - 0.9 x 0.8 cm pleural based nodule in R lung base  - Trace L pleural effusion  - Suspect hilar LAD  - Calcified hilar LN     11/2/2023 Procedure    Bronchoscopy  LLL, Endobronchial Biopsy  - Squamous cell carcinoma (CDK 5/6, p63 positive, CK7 and TTF negative)    [facility] NGS  - Insufficient tissue     11/6/2023 Imaging Significant Findings    PET CT  - 7.6 x 6.0 cm soft tissue mass in superior segment of LLL, SUV 7.2  - 2.5 x 1.6 cm cavitary lesion in ELDER (neoplastic vs infectious/inflammatory), SUV max 1.8  - No hypermetabolic LAD  - 0.9 cm L hypoattenuating L adrenal nodule, likely adenoma     11/29/2023 Tumor Conference    Tumor Board  - Recommend staging EBUS and L robotic bronchoscopy for sampling of ELDER cavitary nodule.  The nodule is highly suspicious for possible adenocarcinoma as it has evolved from a ground-glass opacity to a part solid and cavitary nodule.  Obtain brain MRI to complete staging.  The clinical picture suggests a possible T3 or T4 primary lung cancer or two separate primary lung cancers.        11/29/2023 Tumor Genotyping    Liquid Biopsy, [facility]  - CKDN2A, TP53 (three separate LOF mutations)     11/30/2023 Imaging Significant Findings    MRI Brain  - JAN     12/12/2023 Imaging Significant Findings    CT C, Non-Con  - 6.7 x 6.2 cm LLL mass (previously 6.6 x 5.9 cm), abutting and narrowing the adjacent LLL bronchus  - Enlarging lingular cavitary lesion, now 2.5 x 1.6 cm  - Slightly decreased size of RLL pleural based nodule     1/3/2024 Tumor  Conference    Tumor Board  - Agree with plan for SBRT of lingular lesion and chemoRT to LLL mass.      1/5/2024 Cancer Staged    Staging form: Lung, AJCC 8th Edition  - Clinical: Stage IIIA (cT4, cN0, cM0)     1/19/2024 - 2/26/2024 Chemotherapy    Treatment Summary   Plan Name: OP NSCLC PACLITAXEL + CARBOPLATIN (AUC) QW + RADIATION  Treatment Goal: Curative  Status: Inactive  Start Date: 1/19/2024  End Date: 2/26/2024  Provider: Guanako Grove IV, MD  Chemotherapy: CARBOplatin (PARAPLATIN) 210 mg in sodium chloride 0.9% 136 mL chemo infusion, 210 mg (100 % of original dose 208.2 mg), Intravenous, Clinic/HOD 1 time, 6 of 6 cycles  Dose modification:   (original dose 208.2 mg, Cycle 1)  Administration: 210 mg (1/22/2024), 210 mg (1/29/2024), 180 mg (2/5/2024), 225 mg (2/12/2024), 210 mg (2/19/2024), 210 mg (2/26/2024)  PACLitaxeL (TAXOL) 45 mg/m2 = 84 mg in sodium chloride 0.9% 250 mL chemo infusion, 45 mg/m2 = 84 mg, Intravenous, Clinic/HOD 1 time, 6 of 6 cycles  Administration: 84 mg (1/22/2024), 84 mg (1/29/2024), 84 mg (2/5/2024), 84 mg (2/12/2024), 84 mg (2/19/2024), 84 mg (2/26/2024)     1/23/2024 - 3/6/2024 Radiation Therapy    Treating physician: Santino Arora  Treatment Summary  Course: C1 Thorax 2024  Treatment Site Ref. ID Energy Dose/Fx (Gy) #Fx Dose Correction (Gy) Total Dose (Gy) Start Date End Date Elapsed Days   IM Lung_L PTV_High 6X 2.2 19 / 30 0 41.8 1/23/2024 2/19/2024 27   IMLungNew PTV_High_resim 6X 2.2 11 / 11 0 24.2 2/21/2024 3/6/2024 14   Multiple planns were attempted, including SBRT to the ELDER and CRT to the LLL. With dose overlap, it was ultimately decided to treat with concurrent CRT to both lesions.. He was resimulated for treatment response in the LLL mass.     3/13/2024 Imaging Significant Findings    CT C/A/P  - 4.7 x 4.4 cm perihilar LLL mass, previously 6.7 x 6.2 cm. Decreased mass effect on LLL airways  - 2.7 x 1.7 cm cavitary ligular lesion, unchanged  - New cavitary lesions, solid  pulmonary nodules, and ill-defined opacities in lungs b/l  - Unchanged few sub-cm hypodensities in liver     4/19/2024 -  Chemotherapy    Treatment Summary   Plan Name: OP DURVALUMAB 1500 MG Q4W  Treatment Goal: Curative  Status: Active  Start Date: 4/19/2024  End Date: 3/20/2025 (Planned)  Provider: Guanako Grove IV, MD  Chemotherapy: [No matching medication found in this treatment plan]     6/11/2024 Imaging Significant Findings    CT C/A/P  - 4.2 x 3.4 cm perihilar LLL mass, previously 4.7 x 4.4 cm.  Improvement in intralesional cavitation  - 1.6 x 1.3 cm lingular lesion, previously 2.7 x 1.7 cm  - New interstitial infiltrates in posterior aspect of ELDER and lingula  - Worsening of ill-defined opacities in LLL and bronchiectatic changes  - Improvement in previously seen ill-defined opacities in medial aspect of RLL  - Improvement other cavitary lesions and pulmonary nodules  - Postoperative upper lobectomy changes, no recurrent disease bronchial stump  - Small pericardial effusion  - Few subcentimeter hepatic hypodensities, grossly unchanged     Adenocarcinoma of Lingula   10/6/2023 Imaging Significant Findings    CT C, Non-Con  - 6.6 x 5.9 cm LLL mass  - 2 x 1.2 cm cavitary lesion in lingula  - 0.9 x 0.8 cm pleural based nodule in R lung base  - Trace L pleural effusion  - Suspect hilar LAD  - Calcified hilar LN     11/6/2023 Imaging Significant Findings    PET CT  - 7.6 x 6.0 cm soft tissue mass in superior segment of LLL, SUV 7.2  - 2.5 x 1.6 cm cavitary lesion in ELDER (neoplastic vs infectious/inflammatory), SUV max 1.8  - No hypermetabolic LAD  - 0.9 cm L hypoattenuating L adrenal nodule, likely adenoma     11/29/2023 Tumor Conference    Tumor Board  - Recommend staging EBUS and L robotic bronchoscopy for sampling of ELDER cavitary nodule.  The nodule is highly suspicious for possible adenocarcinoma as it has evolved from a ground-glass opacity to a part solid and cavitary nodule.  Obtain brain MRI to complete  staging.  The clinical picture suggests a possible T3 or T4 primary lung cancer or two separate primary lung cancers.        11/29/2023 Tumor Genotyping    Liquid Biopsy, [facility]  - CKDN2A, TP53 (three separate LOF mutations)     11/30/2023 Imaging Significant Findings    MRI Brain  - JAN     12/12/2023 Imaging Significant Findings    CT C, Non-Con  - 6.7 x 6.2 cm LLL mass (previously 6.6 x 5.9 cm), abutting and narrowing the adjacent LLL bronchus  - Enlarging lingular cavitary lesion, now 2.5 x 1.6 cm  - Slightly decreased size of RLL pleural based nodule     12/19/2023 Procedure    Bronch with EBUS  ELDER, FNA and TBBx  - Adenocarcinoma (TTF1 positive, p40 negative)    LN  - Negative: Station 7  - Report: No pathologic enlargement of 4R, 4L, or 11R. Unable to evaluated 11L due to obstructing LLL endobronchial mass    [facility] NGS  - KRAS G12C  - TP53  - Negative: EGFR, ALK, BRAF, ROS1, RET, MET, ERBB2  - PD-L1: 10%       1/3/2024 Tumor Conference    Tumor Board  - Agree with plan for SBRT of lingular lesion and chemoRT to LLL mass.      1/5/2024 Cancer Staged    Staging form: Lung, AJCC 8th Edition  - Clinical: Stage IA3 (cT1c, cN0, cM0)     3/13/2024 Imaging Significant Findings    CT C/A/P  - 4.7 x 4.4 cm perihilar LLL mass, previously 6.7 x 6.2 cm. Decreased mass effect on LLL airways  - 2.7 x 1.7 cm cavitary ligular lesion, unchanged  - New cavitary lesions, solid pulmonary nodules, and ill-defined opacities in lungs b/l  - Unchanged few sub-cm hypodensities in liver     6/11/2024 Imaging Significant Findings    CT C/A/P  - 4.2 x 3.4 cm perihilar LLL mass, previously 4.7 x 4.4 cm.  Improvement in intralesional cavitation  - 1.6 x 1.3 cm lingular lesion, previously 2.7 x 1.7 cm  - New interstitial infiltrates in posterior aspect of ELDER and lingula  - Worsening of ill-defined opacities in LLL and bronchiectatic changes  - Improvement in previously seen ill-defined opacities in medial aspect of RLL  - Improvement  "other cavitary lesions and pulmonary nodules  - Postoperative upper lobectomy changes, no recurrent disease bronchial stump  - Small pericardial effusion  - Few subcentimeter hepatic hypodensities, grossly unchanged              HPI:     Hernan Engel is a 68 y.o. male with pmh significant for COPD, T2DM, HLD, Stage IB ([id number]) moderately differentiated adenocarcinoma of RUL, initially dx'd 3/2014, s/p R upper lobectomy (3/2014, Dr. Mendez), and now two newly diagnosed concurrent lung cancers as below who presents for follow up:     1) Stage IIIA (T4N0M0) squamous cell carcinoma of the LLL (insufficient sample for PD-L1, no targetable mutations on liquid biopsy), initially dx'd 11/2/23, s/p CRT (1/23/24 - 3/05/24) w/ weekly carboplatin/paclitaxel (1/22/24 - 2/26/24), and currently on durvalumab (4/19/24-present)    2) Stage IA3 (C0wP8B5) adenocarcinoma of the lingula (KRAS G12C, PD-L1 10%), initially dx'd 12/19/23, s/p CRT (1/23/24 - 3/05/24) w/ weekly carboplatin/paclitaxel (1/22/24 - 2/26/24), and currently on durvalumab (4/19/24-present)    *Notably, pt has a RLL pleural based nodule, reviewed at TB, appears linear and unlikely malignant    Last clinic 6/13/24, ongoing decrease in LLL mass in lingular lesion but new interstitial infiltrates in ELDER and lingula as well as worsening ill-defined opacities in LLL concerning for pneumonitis.  Hold durvalumab.  As symptomatic, start steroid taper.  RTC in 3 weeks for symptom review. Consider durva rechallenge after steroids complete    Interval History:   - 6/21/24: Hgb A1c 6.6    Pt states that he continues to experience TURPIN. He is able to walk from the waiting room to clinic without, but when he has to pull his work cart the same distance, he would be SOB (which is new over the past couple of months). He is using the Tellegy "a little bit more" than he was. He has an infrequent intermittent cough which is productive of scant green mucus. He confirms that he has " been taking the steroids prescribed, and confirms that he is tapering as discussed. He also confirms that he is taking the bactrim and the pantoprazole as prescribed. He says that his blood sugars have been elevated, and he says he has been more tremulous since starting the steroids.     Of note, he says that the swelling in his arm has resolved.         PLAN:   - Studies   - None  - Imaging   - ?After steroid taper? Ask Don  - Treatment   - Schedule after completion of steroid taper  - RTC    - Rad onc today (09:30)  - Concerned about SOB from the immunotherapy.   - Expiratory wheezes which clear w/ cough  Scan after steroids, then RTC to dsicuss restarting durva            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    Lung cancer     Normocytic anemia 9/3/2014    Nuclear sclerosis of both eyes 9/4/2020    Seizure 2005    single event - not controlled by medication    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    T2DM (type 2 diabetes mellitus) 2014    DKA 9/2014        Past Surgical History:   Past Surgical History:   Procedure Laterality Date    BRONCHOSCOPY WITH FLUOROSCOPY N/A 11/2/2023    Procedure: BRONCHOSCOPY, WITH FLUOROSCOPY;  Surgeon: Provider, [facility];  Location: [facility] OR [address] [place] [address];  Service: Endoscopy;  Laterality: N/A;    CATARACT EXTRACTION Right 2021    COLONOSCOPY      LUNG REMOVAL, PARTIAL      ROBOTIC BRONCHOSCOPY N/A 12/19/2023    Procedure: ROBOTIC BRONCHOSCOPY;  Surgeon: Hernandez Brewer MD;  Location: [facility] OR [address] [place] [address];  Service: Pulmonary;  Laterality: N/A;        Family History:   Family History   Problem Relation Name Age of Onset    No  Known Problems Mother      Cancer Father          colon cancer    Colon cancer Father      Cataracts Father      Colon cancer Sister Kira     Cancer Sister Kira         colon or lung cancer     No Known Problems Sister Valentine     No Known Problems Sister Chantal     No Known Problems Brother Diogo     No Known Problems Maternal Aunt      No Known Problems Maternal Uncle      No Known Problems Paternal Aunt      No Known Problems Paternal Uncle      No Known Problems Maternal Grandmother      No Known Problems Maternal Grandfather      No Known Problems Paternal Grandmother      No Known Problems Paternal Grandfather      No Known Problems Other      Amblyopia Neg Hx      Blindness Neg Hx      Diabetes Neg Hx      Glaucoma Neg Hx      Hypertension Neg Hx      Macular degeneration Neg Hx      Retinal detachment Neg Hx      Strabismus Neg Hx      Stroke Neg Hx      Thyroid disease Neg Hx          Social History:   Social History     Tobacco Use    Smoking status: Former     Current packs/day: 0.00     Average packs/day: 1 pack/day for 30.0 years (30.0 ttl pk-yrs)     Types: Cigarettes     Start date: 1/15/1984     Quit date: 1/15/2014     Years since quitting: 10.4    Smokeless tobacco: Never   Substance Use Topics    Alcohol use: Yes     Alcohol/week: 5.7 standard drinks of alcohol     Types: 4 Cans of beer, 2 Standard drinks or equivalent per week     Comment: every other day beer drinker        I have reviewed and updated the patient's past medical, surgical, family and social histories.      ROS:   As per HPI.     Allergies:   Review of patient's allergies indicates:   Allergen Reactions    Adhesive Itching, Rash, Other (See Comments) and Dermatitis     Blister        Medications:   Current Outpatient Medications   Medication Sig Dispense Refill    atorvastatin (LIPITOR) 40 MG tablet Take 1 tablet (40 mg total) by mouth once daily. 90 tablet 3    blood-glucose meter kit Use as instructed 1 each 0    efinaconazole  (JUBLIA) 10 % Crow Apply 1 Application topically once daily. 8 mL 0    fluticasone-umeclidin-vilanter (TRELEGY ELLIPTA) 100-62.5-25 mcg DsDv Inhale 1 puff into the lungs once daily.      ibuprofen (ADVIL,MOTRIN) 600 MG tablet Take 1 tablet (600 mg total) by mouth every 8 (eight) hours as needed for Pain (with food). 30 tablet 0    metFORMIN (GLUCOPHAGE-XR) 500 MG ER 24hr tablet Take 1 tablet (500 mg total) by mouth 2 (two) times daily with meals.      MICRO THIN LANCETS 33 gauge Misc       pantoprazole (PROTONIX) 40 MG tablet Take 1 tablet (40 mg total) by mouth once daily. 60 tablet 0    permethrin (ELIMITE) 5 % cream Apply to skin head to toe over night, for 8-10 hours, wash off in AM in shower or bath  Repeat in 7 days. 60 g 1    polyethylene glycol (GLYCOLAX) 17 gram/dose powder Mix 1 capful (17 g) and dissolve in glass of water to take by mouth once daily. 510 g 0    predniSONE (DELTASONE) 10 MG tablet Take 5 tablets (50 mg total) by mouth once daily for 14 days, THEN 4 tablets (40 mg total) once daily for 7 days, THEN 3 tablets (30 mg total) once daily for 7 days, THEN 2 tablets (20 mg total) once daily for 7 days, THEN 1 tablet (10 mg total) once daily for 7 days. 140 tablet 0    sulfamethoxazole-trimethoprim 800-160mg (BACTRIM DS) 800-160 mg Tab Take 1 tablet by mouth every Mon, Wed, Fri. 20 tablet 0    tirzepatide 7.5 mg/0.5 mL PnIj Inject 7.5 mg into the skin every 7 days. 2 mL 2    triamcinolone acetonide 0.1% (KENALOG) 0.1 % cream Apply topically 3 (three) times daily. Apply to itching skin  as directed for 7 days 45 g 0     No current facility-administered medications for this visit.          Physical Exam:       There were no vitals taken for this visit.               Physical Exam  Constitutional:       Appearance: Normal appearance.   HENT:      Head: Normocephalic and atraumatic.   Eyes:      Extraocular Movements: Extraocular movements intact.      Conjunctiva/sclera: Conjunctivae normal.       Pupils: Pupils are equal, round, and reactive to light.   Cardiovascular:      Rate and Rhythm: Normal rate and regular rhythm.      Heart sounds: No murmur heard.     No friction rub. No gallop.   Pulmonary:      Effort: Pulmonary effort is normal.      Breath sounds: Wheezing (End expiratory wheezes which clear with cough) present. No rhonchi or rales.   Musculoskeletal:         General: Normal range of motion.      Right lower leg: No edema.      Left lower leg: No edema.   Skin:     General: Skin is warm and dry.   Neurological:      Mental Status: He is alert and oriented to person, place, and time.   Psychiatric:         Mood and Affect: Mood normal.         Thought Content: Thought content normal.         Judgment: Judgment normal.           Labs:   No results found for this or any previous visit (from the past 48 hour(s)).            Imaging:    See oncologic history as above.     Path:  See oncologic history above.      Assessment and Plan:     Hernan Engel is a 68 y.o. male with pmh significant for COPD, T2DM, HLD, Stage IB ([id number]) moderately differentiated adenocarcinoma of RUL, initially dx'd 3/2014, s/p R upper lobectomy (3/2014, Dr. Mendez), and now two newly diagnosed concurrent lung cancers as below who presents for follow up:     1) Stage IIIA (T4N0M0) squamous cell carcinoma of the LLL (insufficient sample for PD-L1, no targetable mutations on liquid biopsy), initially dx'd 11/2/23, s/p CRT (1/23/24 - 3/05/24) w/ weekly carboplatin/paclitaxel (1/22/24 - 2/26/24)    2) Stage IA3 (K6uH3P3) adenocarcinoma of the lingula (KRAS G12C, PD-L1 10%), initially dx'd 12/19/23, s/p CRT (1/23/24 - 3/05/24) w/ weekly carboplatin/paclitaxel (1/22/24 - 2/26/24)    *Notably, pt has a RLL pleural based nodule, reviewed at TB, appears linear and unlikely malignant    Stage IIIA Squamous Cell Carcinoma of the LLL, No PD-L1, No Targetable Mutations  ECOG PS 0.  Patient currently on durvalumab consolidation,  tolerating well though now with dyspnea on exertion (see below).  Most recent imaging (CT C/A/P, 6/11/24, on durvalumab) demonstrated ongoing decrement in both the LLL mass and the lingular lesion but new interstitial infiltrates in the posterior aspect of the ELDER and lingula as well as worsening ill-defined opacities in the LLL (see below).  Given concern for possible pneumonitis as below, will hold on durvalumab treatment at this time.      Of note, the patient missed his initial infusion of durvalumab due to concern that it was too close to radiation therapy and so had received only 2 doses prior to repeat imaging.    PLAN:   -- Continue to hold durvalumab  -- Treat pneumonitis as below  -- Repeat imaging in 4 weeks (after steroid completion). If imaging stable to improved, and symptoms improved after steroid treatment is complete, will consider re-challenging w/ durvalumab.  -- Patient prefers early morning appointments infusions due to work schedule  -- Rad onc f/u initially on 7/03/24, will need to be rescheduled      Stage IA3 Adenocarcinoma of the Lingula, NGS and PD-L1 Pending  Bronchoscopy demonstrates adenocarcinoma of the lingula, separate from the squamous cell carcinoma in the LLL.  This is a T1c lesion which, given lack of lymphadenopathy, makes this a Stage IA3 disease.  Plan for treatment w/ CRT followed by immunotherapy, as above.  -- Treatment as above      Dyspnea  Pneumonitis   CT C/A/P from 6/11/24 demonstrates worsening ill-defined opacities in the LLL with bronchiectatic changes.  On my review, this appears consistent with a pneumonitis.  Based on distribution, may be related to radiation (which ended on 3/5/24), though must also consider role of immunotherapy even noting sparing of other regions of lung and immunotherapy start ~8 weeks ago.  Discussed with Dr. Arora (radiation oncology) who agrees with this assessment. On exam, pt had mild wheezes which resolved with cough. Since starting  steroid taper, symptoms of somewhat improved though patient continues to endorse dyspnea on exertion worse than prior to development of pneumonitis; pt confirms adherence to the taper.  Discussed reaching out if SOB worsens during taper, as pt may need to increase steroids again in this setting. Continue to hold immunotherapy as above.  -- Hold durvalumab  -- Steroid taper prescribed:  - 50 mg daily x 14 days, then down by 10 mg weekly   - Bactrim M/W/F   - Pantoprazole 40 mg daily   - Message sent to PCP regarding closer monitoring of blood sugar   - Handwritten paper detailing the above previously given to pt  -- CT C in 4 weeks, and RTC afterwards      Superficial Thrombophlebitis  Patient presented to urgent Care with swelling over his right forearm.  Ultrasound demonstrated a superficial venous thrombus.  He is started on NSAIDs and warm compresses, now largely resolved (hyperpigmented vein still visible)  -- Previously discussed symptoms requiring re-evaluation      T2DM  Patient currently on metformin and prescribed Mounjaro, however states he has been taking Mounjaro as he was waiting to discuss with oncology.  No interactions with durvalumab, from an oncology standpoint, okay to start.    -- Message previously sent to PCP (Dr. Calvin) as above, including that pt is on high dose steroids        The above information has been reviewed with the patient and all questions have been answered to their apparent satisfaction.  They understand that they can call the clinic with any questions.    Guanako Grove MD  Hematology/Oncology  [facility] [place]        Med Onc Chart Routing      Follow up with physician . CT C in 4 weeks, RTC at least 1 day later. Creatinine ordered for scan.   Follow up with SHARRI    Infusion scheduling note    Injection scheduling note    Labs    Imaging    Pharmacy appointment    Other referrals

## 2024-07-08 NOTE — PROGRESS NOTES
"  Oncology Nutrition Assessment Medical Nutrition Therapy Follow-up Visit    Hernan Engel  1955    Referring Provider: No ref. provider found   Reason for Referral: T2DM and squamous cell carcinoma of the lung     Diagnosis: squamous cell carcinoma of the lung   Treatment: OP DURVALUMAB 1500 MG Q4W (4/19/2024 to 3/20/2025) - Dr. 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    Lung cancer     Normocytic anemia 9/3/2014    Nuclear sclerosis of both eyes 9/4/2020    Seizure 2005    single event - not controlled by medication    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    T2DM (type 2 diabetes mellitus) 2014    DKA 9/2014      Allergies: Adhesive    Nutrition Assessment    Anthropometrics:   Weight:   07/12/24: 162.7 lb  Wt Readings from Last 10 Encounters:   07/03/24 73.6 kg (162 lb 4.1 oz)   06/13/24 74.3 kg (163 lb 12.8 oz)   05/23/24 78.9 kg (174 lb)   05/16/24 79 kg (174 lb 2.6 oz)   05/16/24 79 kg (174 lb 2.6 oz)   04/29/24 77.2 kg (170 lb 3.1 oz)   04/19/24 77.7 kg (171 lb 4.8 oz)   04/18/24 77.7 kg (171 lb 4.8 oz)   04/03/24 78 kg (172 lb)   04/03/24 78.1 kg (172 lb 2.9 oz)                               Ht Readings from Last 1 Encounters:   07/03/24 5' 7" (1.702 m)      BMI Readings from Last 1 Encounters:   07/03/24 25.41 kg/m²     Estimated body surface area is 1.87 meters squared as calculated from the following:    Height as of 7/3/24: 5' 7" (1.702 m).    Weight as of 7/3/24: 73.6 kg (162 lb 4.1 oz).     Usual BW: 175-180 lb. Timeframe: about 1 year ago     Appetite/Intake: Good; Eating 2 meals daily. Snacking PB crackers. Previously drinking Ensure Plus but " "discontinued due to sugar content.   Breakfast: 7-8 am - donut or sausage biscuit and egg (Joel and Dominic) + coffee  Lunch: 7 pm - turkey necks, rice gravy with snap beans.     Encounter Notes:  Hernan Engel is a 68 y.o. male with squamous cell carcinoma of the lung referred by Dr. Arora for nutrition assessment and counseling. Noted that patient is an [employer] employee working 3 pm to 11:30 pm and active on his feet at work. Checking BG intermittently at home. Noted that Hgb A1c has significantly improved since Feb 2024. Taking Metformin BID and Mounjaro intermittently. Patient reports feeling that Mounjaro is contributing to "inability to gain weight". He reports strong desire to "slack off" on use of medication. Explained to Mr. Engel that this would not be recommended due to use of high dose steroids with treatment. However upon reviewing 24 hour food recall and contents of today's lunch, it is noted that patient is consuming an estimated 80-90 gm of carbohydrates per meal. Complaint of leg cramps.    Current Medications:  Current Outpatient Medications   Medication Instructions    atorvastatin (LIPITOR) 40 mg, Oral, Daily    blood-glucose meter kit Use as instructed    efinaconazole (JUBLIA) 10 % Crow 1 Application, Topical (Top), Daily    fluticasone-umeclidin-vilanter (TRELEGY ELLIPTA) 100-62.5-25 mcg DsDv 1 puff, Inhalation, Daily    ibuprofen (ADVIL,MOTRIN) 600 mg, Oral, Every 8 hours PRN    metFORMIN (GLUCOPHAGE-XR) 500 mg, Oral, 2 times daily with meals    MICRO THIN LANCETS 33 gauge Misc     pantoprazole (PROTONIX) 40 mg, Oral, Daily    permethrin (ELIMITE) 5 % cream Apply to skin head to toe over night, for 8-10 hours, wash off in AM in shower or bath<BR>Repeat in 7 days.    polyethylene glycol (GLYCOLAX) 17 gram/dose powder Mix 1 capful (17 g) and dissolve in glass of water to take by mouth once daily.    predniSONE (DELTASONE) 10 MG tablet Take 5 tablets (50 mg total) by mouth once daily for 14 days, " THEN 4 tablets (40 mg total) once daily for 7 days, THEN 3 tablets (30 mg total) once daily for 7 days, THEN 2 tablets (20 mg total) once daily for 7 days, THEN 1 tablet (10 mg total) once daily for 7 days.    sulfamethoxazole-trimethoprim 800-160mg (BACTRIM DS) 800-160 mg Tab 1 tablet, Oral, Every Mon, Wed, Fri    tirzepatide 7.5 mg, Subcutaneous, Every 7 days    triamcinolone acetonide 0.1% (KENALOG) 0.1 % cream Topical (Top), 3 times daily, Apply to itching skin  as directed     Labs: Reviewed   Hemoglobin A1C   Date Value Ref Range Status   06/21/2024 6.6 (H) 4.0 - 5.6 % Final     Comment:     ADA Screening Guidelines:  5.7-6.4%  Consistent with prediabetes  >or=6.5%  Consistent with diabetes    High levels of fetal hemoglobin interfere with the HbA1C  assay. Heterozygous hemoglobin variants (HbS, HgC, etc)do  not significantly interfere with this assay.   However, presence of multiple variants may affect accuracy.     02/23/2024 10.7 (H) 4.0 - 5.6 % Final     Comment:     ADA Screening Guidelines:  5.7-6.4%  Consistent with prediabetes  >or=6.5%  Consistent with diabetes    High levels of fetal hemoglobin interfere with the HbA1C  assay. Heterozygous hemoglobin variants (HbS, HgC, etc)do  not significantly interfere with this assay.   However, presence of multiple variants may affect accuracy.     10/02/2023 8.1 (H) 4.0 - 5.6 % Final     Comment:     ADA Screening Guidelines:  5.7-6.4%  Consistent with prediabetes  >or=6.5%  Consistent with diabetes    High levels of fetal hemoglobin interfere with the HbA1C  assay. Heterozygous hemoglobin variants (HbS, HgC, etc)do  not significantly interfere with this assay.   However, presence of multiple variants may affect accuracy.        Nutrition Diagnosis    Nutrition Problem: Altered nutrition related laboratory values  Etiology (related to): food and nutrition related knowledge deficit  Signs/Symptoms (as evidenced by):  elevated hgb a 1c    Nutrition Intervention     Nutrition Prescription  0988-3808 kcals/day 30-35 kcal/kg   79-95 g protein/day 1-1.2 gm/kg  0703-1186 mL fluid/day 1 ml/kcal    Recommendations:   Encouraged continued use of Metformin and Mounjaro. Explained the importance of taking medication as directed, checking BG at home, and monitoring portion sizes.  Recommend 30-45 gm of carb per meal (2-3 servings)  Encouraged use of electrolyte drinks of working outside and sweating.     Materials Provided/Reviewed:  Biolyte Samples    Nutrition Monitoring and Evaluation    Monitor: labs glucose/hgb a1c and diet education needs     Follow up In 3 months    Communication to referring provider/care team: Note available in chart.     Consultation Time: 30 Minutes    Teto CHAPARRO, , LDN  [facility] in Clinical Nutrition  Board Certified Specialist in Oncology Nutrition   [facility]  [phone]

## 2024-07-12 ENCOUNTER — CLINICAL SUPPORT (OUTPATIENT)
Dept: HEMATOLOGY/ONCOLOGY | Facility: CLINIC | Age: 69
End: 2024-07-12
Payer: MEDICARE

## 2024-07-12 DIAGNOSIS — C34.32 SQUAMOUS CELL CARCINOMA OF BRONCHUS IN LEFT LOWER LOBE: ICD-10-CM

## 2024-07-12 DIAGNOSIS — E11.8 TYPE 2 DIABETES MELLITUS WITH COMPLICATION, WITHOUT LONG-TERM CURRENT USE OF INSULIN: ICD-10-CM

## 2024-07-12 DIAGNOSIS — C34.12 ADENOCARCINOMA OF UPPER LOBE OF LEFT LUNG: ICD-10-CM

## 2024-07-12 DIAGNOSIS — Z71.3 NUTRITIONAL COUNSELING: Primary | ICD-10-CM

## 2024-07-12 DIAGNOSIS — C78.02 SECONDARY MALIGNANT NEOPLASM OF LEFT LUNG: ICD-10-CM

## 2024-07-29 ENCOUNTER — TELEPHONE (OUTPATIENT)
Dept: FAMILY MEDICINE | Facility: CLINIC | Age: 69
End: 2024-07-29
Payer: MEDICARE

## 2024-07-29 NOTE — TELEPHONE ENCOUNTER
Called pt to reschedule appt on 8/1 due to  being out of the office. Appt has been rescheduled with another provider

## 2024-07-30 ENCOUNTER — TELEPHONE (OUTPATIENT)
Dept: HEMATOLOGY/ONCOLOGY | Facility: CLINIC | Age: 69
End: 2024-07-30
Payer: MEDICARE

## 2024-07-30 ENCOUNTER — HOSPITAL ENCOUNTER (OUTPATIENT)
Dept: RADIOLOGY | Facility: HOSPITAL | Age: 69
Discharge: HOME | End: 2024-07-30
Attending: HOSPITALIST
Payer: MEDICARE

## 2024-07-30 DIAGNOSIS — J98.4 PNEUMONITIS: ICD-10-CM

## 2024-07-30 DIAGNOSIS — C34.32 SQUAMOUS CELL CARCINOMA OF BRONCHUS IN LEFT LOWER LOBE: ICD-10-CM

## 2024-07-30 PROCEDURE — 71260 CT THORAX DX C+: CPT | Mod: TC

## 2024-07-30 PROCEDURE — 71260 CT THORAX DX C+: CPT | Mod: 26,,, | Performed by: RADIOLOGY

## 2024-07-30 PROCEDURE — 25500020 PHARM REV CODE 255: Performed by: HOSPITALIST

## 2024-07-30 RX ADMIN — IOHEXOL 75 ML: 350 INJECTION, SOLUTION INTRAVENOUS at 08:07

## 2024-07-31 ENCOUNTER — OFFICE VISIT (OUTPATIENT)
Dept: HEMATOLOGY/ONCOLOGY | Facility: CLINIC | Age: 69
End: 2024-07-31
Payer: MEDICARE

## 2024-07-31 VITALS
SYSTOLIC BLOOD PRESSURE: 115 MMHG | BODY MASS INDEX: 25.78 KG/M2 | WEIGHT: 164.25 LBS | OXYGEN SATURATION: 98 % | HEART RATE: 81 BPM | DIASTOLIC BLOOD PRESSURE: 61 MMHG | RESPIRATION RATE: 16 BRPM | HEIGHT: 67 IN | TEMPERATURE: 98 F

## 2024-07-31 DIAGNOSIS — C34.32 SQUAMOUS CELL CARCINOMA OF BRONCHUS IN LEFT LOWER LOBE: Primary | ICD-10-CM

## 2024-07-31 DIAGNOSIS — J98.4 PNEUMONITIS: ICD-10-CM

## 2024-07-31 DIAGNOSIS — C34.12 ADENOCARCINOMA OF UPPER LOBE OF LEFT LUNG: ICD-10-CM

## 2024-07-31 DIAGNOSIS — R06.09 DYSPNEA ON EXERTION: ICD-10-CM

## 2024-07-31 LAB
CREAT SERPL-MCNC: 1.1 MG/DL (ref 0.5–1.4)
SAMPLE: NORMAL

## 2024-07-31 PROCEDURE — 3072F LOW RISK FOR RETINOPATHY: CPT | Mod: CPTII,S$GLB,, | Performed by: HOSPITALIST

## 2024-07-31 PROCEDURE — 3078F DIAST BP <80 MM HG: CPT | Mod: CPTII,S$GLB,, | Performed by: HOSPITALIST

## 2024-07-31 PROCEDURE — 3074F SYST BP LT 130 MM HG: CPT | Mod: CPTII,S$GLB,, | Performed by: HOSPITALIST

## 2024-07-31 PROCEDURE — G2211 COMPLEX E/M VISIT ADD ON: HCPCS | Mod: S$GLB,,, | Performed by: HOSPITALIST

## 2024-07-31 PROCEDURE — 99999 PR PBB SHADOW E&M-EST. PATIENT-LVL III: CPT | Mod: PBBFAC,,, | Performed by: HOSPITALIST

## 2024-07-31 PROCEDURE — 1101F PT FALLS ASSESS-DOCD LE1/YR: CPT | Mod: CPTII,S$GLB,, | Performed by: HOSPITALIST

## 2024-07-31 PROCEDURE — 3288F FALL RISK ASSESSMENT DOCD: CPT | Mod: CPTII,S$GLB,, | Performed by: HOSPITALIST

## 2024-07-31 PROCEDURE — 3008F BODY MASS INDEX DOCD: CPT | Mod: CPTII,S$GLB,, | Performed by: HOSPITALIST

## 2024-07-31 PROCEDURE — 1159F MED LIST DOCD IN RCRD: CPT | Mod: CPTII,S$GLB,, | Performed by: HOSPITALIST

## 2024-07-31 PROCEDURE — 1126F AMNT PAIN NOTED NONE PRSNT: CPT | Mod: CPTII,S$GLB,, | Performed by: HOSPITALIST

## 2024-07-31 PROCEDURE — 1157F ADVNC CARE PLAN IN RCRD: CPT | Mod: CPTII,S$GLB,, | Performed by: HOSPITALIST

## 2024-07-31 PROCEDURE — 3044F HG A1C LEVEL LT 7.0%: CPT | Mod: CPTII,S$GLB,, | Performed by: HOSPITALIST

## 2024-07-31 PROCEDURE — 99215 OFFICE O/P EST HI 40 MIN: CPT | Mod: S$GLB,,, | Performed by: HOSPITALIST

## 2024-07-31 NOTE — Clinical Note
Morning!  Repeat CT C pending, though looks a bit better to me. Respiratory status continues to improve, and he is now able to complete the same tasks at work as prior to developing pneumonitis. Thinking this is more likely from the radiation than the immunotherapy. May be appropriate to rechallenge with durva (cam as he only got two cycles), however he is hesitant and wants to wait another 4 weeks to decide.

## 2024-07-31 NOTE — PROGRESS NOTES
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   9/15/2023 Imaging Significant Findings    CXR (URI)  - Masslike focus along L hilar region     10/6/2023 Imaging Significant Findings    CT C, Non-Con  - 6.6 x 5.9 cm LLL mass  - 2 x 1.2 cm cavitary lesion in lingula  - 0.9 x 0.8 cm pleural based nodule in R lung base  - Trace L pleural effusion  - Suspect hilar LAD  - Calcified hilar LN     11/2/2023 Procedure    Bronchoscopy  LLL, Endobronchial Biopsy  - Squamous cell carcinoma (CDK 5/6, p63 positive, CK7 and TTF negative)    [facility] NGS  - Insufficient tissue     11/6/2023 Imaging Significant Findings    PET CT  - 7.6 x 6.0 cm soft tissue mass in superior segment of LLL, SUV 7.2  - 2.5 x 1.6 cm cavitary lesion in ELDER (neoplastic vs infectious/inflammatory), SUV max 1.8  - No hypermetabolic LAD  - 0.9 cm L hypoattenuating L adrenal nodule, likely adenoma     11/29/2023 Tumor Conference    Tumor Board  - Recommend staging EBUS and L robotic bronchoscopy for sampling of ELDER cavitary nodule.  The nodule is highly suspicious for possible adenocarcinoma as it has evolved from a ground-glass opacity to a part solid and cavitary nodule.  Obtain brain MRI to complete staging.  The clinical picture suggests a possible T3 or T4 primary lung cancer or two separate primary lung cancers.        11/29/2023 Tumor Genotyping    Liquid Biopsy, [facility]  - CKDN2A, TP53 (three separate LOF mutations)     11/30/2023 Imaging Significant Findings    MRI Brain  - JAN     12/12/2023 Imaging Significant Findings    CT C, Non-Con  - 6.7 x 6.2 cm LLL mass (previously 6.6 x 5.9 cm), abutting and narrowing the adjacent LLL bronchus  - Enlarging lingular cavitary lesion, now 2.5 x 1.6 cm  - Slightly decreased size of RLL pleural based nodule     1/3/2024 Tumor  Conference    Tumor Board  - Agree with plan for SBRT of lingular lesion and chemoRT to LLL mass.      1/5/2024 Cancer Staged    Staging form: Lung, AJCC 8th Edition  - Clinical: Stage IIIA (cT4, cN0, cM0)     1/19/2024 - 2/26/2024 Chemotherapy    Treatment Summary   Plan Name: OP NSCLC PACLITAXEL + CARBOPLATIN (AUC) QW + RADIATION  Treatment Goal: Curative  Status: Inactive  Start Date: 1/19/2024  End Date: 2/26/2024  Provider: Guanako Grove IV, MD  Chemotherapy: CARBOplatin (PARAPLATIN) 210 mg in sodium chloride 0.9% 136 mL chemo infusion, 210 mg (100 % of original dose 208.2 mg), Intravenous, Clinic/HOD 1 time, 6 of 6 cycles  Dose modification:   (original dose 208.2 mg, Cycle 1)  Administration: 210 mg (1/22/2024), 210 mg (1/29/2024), 180 mg (2/5/2024), 225 mg (2/12/2024), 210 mg (2/19/2024), 210 mg (2/26/2024)  PACLitaxeL (TAXOL) 45 mg/m2 = 84 mg in sodium chloride 0.9% 250 mL chemo infusion, 45 mg/m2 = 84 mg, Intravenous, Clinic/HOD 1 time, 6 of 6 cycles  Administration: 84 mg (1/22/2024), 84 mg (1/29/2024), 84 mg (2/5/2024), 84 mg (2/12/2024), 84 mg (2/19/2024), 84 mg (2/26/2024)     1/23/2024 - 3/6/2024 Radiation Therapy    Treating physician: Santino Arora  Treatment Summary  Course: C1 Thorax 2024  Treatment Site Ref. ID Energy Dose/Fx (Gy) #Fx Dose Correction (Gy) Total Dose (Gy) Start Date End Date Elapsed Days   IM Lung_L PTV_High 6X 2.2 19 / 30 0 41.8 1/23/2024 2/19/2024 27   IMLungNew PTV_High_resim 6X 2.2 11 / 11 0 24.2 2/21/2024 3/6/2024 14   Multiple planns were attempted, including SBRT to the ELDER and CRT to the LLL. With dose overlap, it was ultimately decided to treat with concurrent CRT to both lesions.. He was resimulated for treatment response in the LLL mass.     3/13/2024 Imaging Significant Findings    CT C/A/P  - 4.7 x 4.4 cm perihilar LLL mass, previously 6.7 x 6.2 cm. Decreased mass effect on LLL airways  - 2.7 x 1.7 cm cavitary ligular lesion, unchanged  - New cavitary lesions, solid  pulmonary nodules, and ill-defined opacities in lungs b/l  - Unchanged few sub-cm hypodensities in liver     4/19/2024 -  Chemotherapy    Treatment Summary   Plan Name: OP DURVALUMAB 1500 MG Q4W  Treatment Goal: Curative  Status: Active  Start Date: 4/19/2024  End Date: 3/20/2025 (Planned)  Provider: Guanako Grove IV, MD  Chemotherapy: [No matching medication found in this treatment plan]     6/11/2024 Imaging Significant Findings    CT C/A/P  - 4.2 x 3.4 cm perihilar LLL mass, previously 4.7 x 4.4 cm.  Improvement in intralesional cavitation  - 1.6 x 1.3 cm lingular lesion, previously 2.7 x 1.7 cm  - New interstitial infiltrates in posterior aspect of ELDER and lingula  - Worsening of ill-defined opacities in LLL and bronchiectatic changes  - Improvement in previously seen ill-defined opacities in medial aspect of RLL  - Improvement other cavitary lesions and pulmonary nodules  - Postoperative upper lobectomy changes, no recurrent disease bronchial stump  - Small pericardial effusion  - Few subcentimeter hepatic hypodensities, grossly unchanged     Adenocarcinoma of Lingula   10/6/2023 Imaging Significant Findings    CT C, Non-Con  - 6.6 x 5.9 cm LLL mass  - 2 x 1.2 cm cavitary lesion in lingula  - 0.9 x 0.8 cm pleural based nodule in R lung base  - Trace L pleural effusion  - Suspect hilar LAD  - Calcified hilar LN     11/6/2023 Imaging Significant Findings    PET CT  - 7.6 x 6.0 cm soft tissue mass in superior segment of LLL, SUV 7.2  - 2.5 x 1.6 cm cavitary lesion in ELDER (neoplastic vs infectious/inflammatory), SUV max 1.8  - No hypermetabolic LAD  - 0.9 cm L hypoattenuating L adrenal nodule, likely adenoma     11/29/2023 Tumor Conference    Tumor Board  - Recommend staging EBUS and L robotic bronchoscopy for sampling of ELDER cavitary nodule.  The nodule is highly suspicious for possible adenocarcinoma as it has evolved from a ground-glass opacity to a part solid and cavitary nodule.  Obtain brain MRI to complete  staging.  The clinical picture suggests a possible T3 or T4 primary lung cancer or two separate primary lung cancers.        11/29/2023 Tumor Genotyping    Liquid Biopsy, [facility]  - CKDN2A, TP53 (three separate LOF mutations)     11/30/2023 Imaging Significant Findings    MRI Brain  - JAN     12/12/2023 Imaging Significant Findings    CT C, Non-Con  - 6.7 x 6.2 cm LLL mass (previously 6.6 x 5.9 cm), abutting and narrowing the adjacent LLL bronchus  - Enlarging lingular cavitary lesion, now 2.5 x 1.6 cm  - Slightly decreased size of RLL pleural based nodule     12/19/2023 Procedure    Bronch with EBUS  ELDER, FNA and TBBx  - Adenocarcinoma (TTF1 positive, p40 negative)    LN  - Negative: Station 7  - Report: No pathologic enlargement of 4R, 4L, or 11R. Unable to evaluated 11L due to obstructing LLL endobronchial mass    [facility] NGS  - KRAS G12C  - TP53  - Negative: EGFR, ALK, BRAF, ROS1, RET, MET, ERBB2  - PD-L1: 10%       1/3/2024 Tumor Conference    Tumor Board  - Agree with plan for SBRT of lingular lesion and chemoRT to LLL mass.      1/5/2024 Cancer Staged    Staging form: Lung, AJCC 8th Edition  - Clinical: Stage IA3 (cT1c, cN0, cM0)     3/13/2024 Imaging Significant Findings    CT C/A/P  - 4.7 x 4.4 cm perihilar LLL mass, previously 6.7 x 6.2 cm. Decreased mass effect on LLL airways  - 2.7 x 1.7 cm cavitary ligular lesion, unchanged  - New cavitary lesions, solid pulmonary nodules, and ill-defined opacities in lungs b/l  - Unchanged few sub-cm hypodensities in liver     6/11/2024 Imaging Significant Findings    CT C/A/P  - 4.2 x 3.4 cm perihilar LLL mass, previously 4.7 x 4.4 cm.  Improvement in intralesional cavitation  - 1.6 x 1.3 cm lingular lesion, previously 2.7 x 1.7 cm  - New interstitial infiltrates in posterior aspect of ELDER and lingula  - Worsening of ill-defined opacities in LLL and bronchiectatic changes  - Improvement in previously seen ill-defined opacities in medial aspect of RLL  - Improvement  "other cavitary lesions and pulmonary nodules  - Postoperative upper lobectomy changes, no recurrent disease bronchial stump  - Small pericardial effusion  - Few subcentimeter hepatic hypodensities, grossly unchanged              HPI:     Hernan Engel is a 68 y.o. male with pmh significant for COPD, T2DM, HLD, Stage IB ([id number]) moderately differentiated adenocarcinoma of RUL, initially dx'd 3/2014, s/p R upper lobectomy (3/2014, Dr. Mendez), and now two newly diagnosed concurrent lung cancers as below who presents for follow up:     1) Stage IIIA (T4N0M0) squamous cell carcinoma of the LLL (insufficient sample for PD-L1, no targetable mutations on liquid biopsy), initially dx'd 11/2/23, s/p CRT (1/23/24 - 3/05/24) w/ weekly carboplatin/paclitaxel (1/22/24 - 2/26/24), and currently on durvalumab (4/19/24-present)    2) Stage IA3 ([id number]) adenocarcinoma of the lingula (KRAS G12C, PD-L1 10%), initially dx'd 12/19/23, s/p CRT (1/23/24 - 3/05/24) w/ weekly carboplatin/paclitaxel (1/22/24 - 2/26/24), and currently on durvalumab (4/19/24-present)    *Notably, pt has a RLL pleural based nodule, reviewed at TB, appears linear and unlikely malignant    Last clinic 7/3/24, dyspnea somewhat improved since starting steroid taper.  Will continue taper with repeat imaging in 4 weeks and RTC afterwards.    Interval History:   - 7/31/24: CT C, pending final read    Pt states that his breathing is "a little better" than 4 weeks ago. He says that he is newly able to pull his work cart the same distance as prior to treatment, though says he is a little tired. His cough is largely unchanged, and says he feels like he has developed a "little cold". He confirms that he has 5-6 doses of steroids left, and confirms that he is taking 10 mg only as planned. He also confirms that he is taking the bactrim and the pantoprazole as prescribed. He says that his blood sugars have been somewhat better controlled than at our last visit.     ROS: " "  As per HPI.       Physical Exam:       /61 (BP Location: Left arm, Patient Position: Sitting, BP Method: Medium (Automatic))   Pulse 81   Temp 98 °F (36.7 °C) (Oral)   Resp 16   Ht 5' 7" (1.702 m)   Wt 74.5 kg (164 lb 3.9 oz)   SpO2 98%   BMI 25.72 kg/m²                Physical Exam  Constitutional:       Appearance: Normal appearance.   HENT:      Head: Normocephalic and atraumatic.   Eyes:      Extraocular Movements: Extraocular movements intact.      Conjunctiva/sclera: Conjunctivae normal.      Pupils: Pupils are equal, round, and reactive to light.   Cardiovascular:      Rate and Rhythm: Normal rate and regular rhythm.      Heart sounds: No murmur heard.     No friction rub. No gallop.   Pulmonary:      Effort: Pulmonary effort is normal.      Breath sounds: Wheezing (End expiratory wheezes which clear with cough) present. No rhonchi or rales.   Musculoskeletal:         General: Normal range of motion.      Right lower leg: No edema.      Left lower leg: No edema.   Skin:     General: Skin is warm and dry.   Neurological:      Mental Status: He is alert and oriented to person, place, and time.   Psychiatric:         Mood and Affect: Mood normal.         Thought Content: Thought content normal.         Judgment: Judgment normal.           Labs:   No results found for this or any previous visit (from the past 48 hour(s)).            Imaging:    See oncologic history as above.     Path:  See oncologic history above.      Assessment and Plan:     Hernan Engel is a 68 y.o. male with pmh significant for COPD, T2DM, HLD, Stage IB ([id number]) moderately differentiated adenocarcinoma of RUL, initially dx'd 3/2014, s/p R upper lobectomy (3/2014, Dr. Mendez), and now two newly diagnosed concurrent lung cancers as below who presents for follow up:     1) Stage IIIA (T4N0M0) squamous cell carcinoma of the LLL (insufficient sample for PD-L1, no targetable mutations on liquid biopsy), initially dx'd 11/2/23, " s/p CRT (1/23/24 - 3/05/24) w/ weekly carboplatin/paclitaxel (1/22/24 - 2/26/24), and durvalumab consolidation (4/19/24-5/16/24; held for pneumonitis)    2) Stage IA3 ([id number]) adenocarcinoma of the lingula (KRAS G12C, PD-L1 10%), initially dx'd 12/19/23, s/p CRT (1/23/24 - 3/05/24) w/ weekly carboplatin/paclitaxel (1/22/24 - 2/26/24), and durvalumab consolidation (4/19/24-5/16/24; held for pneumonitis)    *Notably, pt has a RLL pleural based nodule, reviewed at TB, appears linear and unlikely malignant    Stage IIIA Squamous Cell Carcinoma of the LLL, No PD-L1, No Targetable Mutations  ECOG PS 0.  Patient currently on durvalumab consolidation (currently held), possibly complicated by immunotherapy induced pneumonitis vs more likely radiation pneumonitis (see below).  CT C/A/P (6/11/24, on durvalumab) demonstrated ongoing decrement in both the LLL mass and the lingular lesion but new interstitial infiltrates in the posterior aspect of the ELDER and lingula as well as worsening ill-defined opacities in the LLL; repeat CT C (7/30/24) appears somewhat improved  though final read is pending (see below).  Clinically, patient feels that his breathing is almost back to baseline he is able to complete the same work tasks is prior to starting the steroid taper.  Given this was a grade 2 pneumonitis, if this were immunotherapy related, it would be appropriate to consider immunotherapy rechallenge once patient has completed steroid taper and is asymptomatic (steroid taper completing in approximately 1 week).  This was discussed with the patient (and guidelines were provided), but he notes his ongoing wariness to resume due to the risk of recurrent pneumonitis.  We discussed that the pneumonitis was more likely related to radiation than immunotherapy, however the patient states he would like to take time to think about this prior to making his final decision and says he would not be ready to restart the medicine for at least 1  month.  Therefore, will follow up final read of CT scan, discussed with radiation oncology (Dr. Arora), and tentatively reschedule next cycle of durvalumab in 4 weeks.    Of note, the patient missed his initial infusion of durvalumab due to concern that it was too close to radiation therapy and so had received only 2 doses prior to repeat imaging.    PLAN:   -- Continue to hold durvalumab  -- Treat pneumonitis as below  -- F/u final read of CT C from 7/30/24  -- Message sent to radiation oncology  -- Labs, RTC, and C3 durvalumab tentatively scheduled in 4 weeks  -- Patient prefers early morning appointments and infusions due to work schedule      Stage IA3 Adenocarcinoma of the Lingula, NGS and PD-L1 Pending  Bronchoscopy demonstrates adenocarcinoma of the lingula, separate from the squamous cell carcinoma in the LLL.  This is a T1c lesion which, given lack of lymphadenopathy, makes this a Stage IA3 disease.  Plan for treatment w/ CRT followed by immunotherapy, as above.  -- Treatment as above      Dyspnea  Pneumonitis   CT C/A/P from 6/11/24 demonstrates worsening ill-defined opacities in the LLL with bronchiectatic changes.  On my review, this appears consistent with a pneumonitis.  Based on distribution, may be related to radiation (which ended on 3/5/24), though must also consider role of immunotherapy even noting sparing of other regions of lung and immunotherapy start ~8 weeks prior to findings.  Discussed with Dr. Arora (radiation oncology) who agrees with this assessment.  Repeat CT C on 7/30/24 is pending final read, however appears somewhat improved.  Patient notes significant improvement in his respiratory status as compared to prior to starting steroids (see above).  Therefore, favor that pneumonitis has improved to < G1. Continue to hold immunotherapy as above.  -- Hold durvalumab  -- Steroid taper prescribed:  - 50 mg daily x 14 days, then down by 10 mg weekly; final dose in ~1 week   - Bactrim  M/W/F   - Pantoprazole 40 mg daily   - Message previously sent to PCP regarding closer monitoring of blood sugar   - Handwritten paper detailing the above previously given to pt  -- RTC in 4 weeks as above      Superficial Thrombophlebitis  Patient presented to urgent Care with swelling over his right forearm.  Ultrasound demonstrated a superficial venous thrombus.  He started on NSAIDs and warm compresses, now largely resolved (hyperpigmented vein still visible)  -- Previously discussed symptoms requiring re-evaluation      T2DM  Patient currently on metformin and prescribed Mounjaro, however states he has been taking Mounjaro as he was waiting to discuss with oncology.  No interactions with durvalumab, from an oncology standpoint, okay to start.    -- Message previously sent to PCP (Dr. Calvin) as above, including that pt is on high dose steroids        The above information has been reviewed with the patient and all questions have been answered to their apparent satisfaction.  They understand that they can call the clinic with any questions.    Guanako Grove MD  Hematology/Oncology  [facility] [place]        Med Onc Chart Routing      Follow up with physician . Labs, RTC, and C3 durvalumab tentatively scheduled in 4 weeks. Pt prefers early morning appointments and infusions if possible.   Follow up with SHAR[facility]    Infusion scheduling note    Injection scheduling note    Labs    Imaging    Pharmacy appointment    Other referrals

## 2024-08-06 ENCOUNTER — TELEPHONE (OUTPATIENT)
Dept: FAMILY MEDICINE | Facility: CLINIC | Age: 69
End: 2024-08-06

## 2024-08-06 ENCOUNTER — PATIENT OUTREACH (OUTPATIENT)
Dept: ADMINISTRATIVE | Facility: HOSPITAL | Age: 69
End: 2024-08-06
Payer: MEDICARE

## 2024-08-06 NOTE — TELEPHONE ENCOUNTER
----- Message from Radha Ochoa sent at 8/6/2024  3:27 PM CDT -----  Type:  Patient Returning Call    Who Called: pt   Who Left Message for Patient:pt   Does the patient know what this is regarding?:pt had a missed call and was calling back   Would the patient rather a call back or a response via [facility]?  [facility]  Best Call Back Number: [phone]  Additional Information: call back

## 2024-08-09 ENCOUNTER — PATIENT OUTREACH (OUTPATIENT)
Dept: ADMINISTRATIVE | Facility: HOSPITAL | Age: 69
End: 2024-08-09
Payer: MEDICARE

## 2024-08-09 DIAGNOSIS — Z12.11 ENCOUNTER FOR COLORECTAL CANCER SCREENING: Primary | ICD-10-CM

## 2024-08-09 DIAGNOSIS — Z12.12 ENCOUNTER FOR COLORECTAL CANCER SCREENING: Primary | ICD-10-CM

## 2024-08-15 ENCOUNTER — OFFICE VISIT (OUTPATIENT)
Dept: FAMILY MEDICINE | Facility: CLINIC | Age: 69
End: 2024-08-15
Payer: MEDICARE

## 2024-08-15 VITALS
TEMPERATURE: 98 F | OXYGEN SATURATION: 96 % | SYSTOLIC BLOOD PRESSURE: 114 MMHG | HEIGHT: 67 IN | BODY MASS INDEX: 24.92 KG/M2 | WEIGHT: 158.75 LBS | RESPIRATION RATE: 16 BRPM | HEART RATE: 78 BPM | DIASTOLIC BLOOD PRESSURE: 64 MMHG

## 2024-08-15 DIAGNOSIS — E11.8 TYPE 2 DIABETES MELLITUS WITH COMPLICATION, WITHOUT LONG-TERM CURRENT USE OF INSULIN: ICD-10-CM

## 2024-08-15 DIAGNOSIS — N52.9 ERECTILE DYSFUNCTION, UNSPECIFIED ERECTILE DYSFUNCTION TYPE: Primary | ICD-10-CM

## 2024-08-15 DIAGNOSIS — C34.32 SQUAMOUS CELL CARCINOMA OF BRONCHUS IN LEFT LOWER LOBE: ICD-10-CM

## 2024-08-15 PROCEDURE — 99999 PR PBB SHADOW E&M-EST. PATIENT-LVL IV: CPT | Mod: PBBFAC,,,

## 2024-08-15 RX ORDER — FLUTICASONE FUROATE, UMECLIDINIUM BROMIDE AND VILANTEROL TRIFENATATE 100; 62.5; 25 UG/1; UG/1; UG/1
1 POWDER RESPIRATORY (INHALATION) DAILY
Qty: 60 EACH | Refills: 3 | Status: SHIPPED | OUTPATIENT
Start: 2024-08-15

## 2024-08-15 RX ORDER — SILDENAFIL 50 MG/1
50 TABLET, FILM COATED ORAL DAILY PRN
Qty: 30 TABLET | Refills: 0 | Status: SHIPPED | OUTPATIENT
Start: 2024-08-15 | End: 2025-08-15

## 2024-08-15 NOTE — PROGRESS NOTES
HPI     Chief Complaint:  Chief Complaint   Patient presents with    Weight Loss    Medication Refill       Hernan Engel is a 68 y.o. male with multiple medical diagnoses as listed in the medical history and problem list that presents for follow-up    Medication Refill  Pertinent negatives include no abdominal pain, chills, congestion, fever, headaches, nausea, numbness, rash or weakness. Nothing aggravates the symptoms. He has tried nothing for the symptoms.      Here to get a refill on his Mounjaro, restart a refill on Viagra and refill on inhaler Trelegy    Water: 4-5 16 oz bottles per day   Is concerned about weight loss.  Denies Mounjaro decreasing his appetite    Just coffee this morning  for breakfast.  Tea with honey and candy bar last night, blood sugar high    Drinks  body amour  for  electrolytes at night at [facility] hearing to go back to get hearing aids    Assessment & Plan      Follow up with the provider that prescribed your hearing aids   Refilled Mounjaro 7.5mg   Patient has for a restart refill of Viagra   Needed a refill on his inhaler Trelegy   Provided patient information for diabetic diet;  discussed eating lean proteins such as chicken fish steak and turkey and protein shakes with low sugar (<5g) to no sugar added   Information provided on AVS      Problem List Items Addressed This Visit          Renal/    ED (erectile dysfunction) - Primary    Relevant Medications    sildenafiL (VIAGRA) 50 MG tablet       Oncology    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    Relevant Medications    fluticasone-umeclidin-vilanter (TRELEGY ELLIPTA) 100-62.5-25 mcg DsDv       Endocrine    Type 2 diabetes mellitus with complication, without long-term current use of insulin    Relevant Medications    tirzepatide 7.5 mg/0.5 mL PnIj         --------------------------------------------      Health Maintenance:  Health Maintenance         Date Due Completion Date    RSV Vaccine (Age 60+  and Pregnant patients) (1 - 1-dose 60+ series) Never done ---    Shingles Vaccine (2 of 2) 07/05/2018 5/10/2018    Colorectal Cancer Screening 02/05/2020 2/5/2019    COVID-19 Vaccine (5 - 2023-24 season) 09/01/2023 10/26/2022    Eye Exam 10/26/2024 10/26/2023    Override on 9/1/2021: Declined (syas eyes examined last month at [place] - exact date unknown)    Override on 9/4/2020: Done    Override on 3/15/2019: Done    Override on 12/6/2017: Done    Influenza Vaccine (1) 09/01/2024 4/3/2024    Override on 12/6/2017: Done    Diabetes Urine Screening 09/28/2024 9/28/2023    PROSTATE-SPECIFIC ANTIGEN 10/02/2024 10/2/2023    Foot Exam 10/19/2024 10/19/2023    Override on 7/16/2020: Done (toenail fungus)    Override on 10/4/2019: Done    Override on 10/11/2018: Done    Override on 9/25/2017: Done    Hemoglobin A1c 12/21/2024 6/21/2024    Lipid Panel 06/21/2025 6/21/2024    Low Dose Statin 07/31/2025 7/31/2024    TETANUS VACCINE 04/01/2032 4/1/2022    Override on 12/1/2014: Done            Health maintenance reviewed    Follow Up:  Follow up if symptoms worsen or fail to improve.    Exam     Review of Systems:  (as noted above)  Review of Systems   Constitutional:  Negative for chills and fever.   HENT:  Negative for congestion.    Gastrointestinal:  Negative for abdominal pain and nausea.   Skin:  Negative for rash.   Neurological:  Negative for weakness, numbness and headaches.       Physical Exam:   Physical Exam  Constitutional:       General: He is not in acute distress.     Appearance: He is not ill-appearing, toxic-appearing or diaphoretic.   HENT:      Head: Normocephalic and atraumatic.   Cardiovascular:      Rate and Rhythm: Normal rate and regular rhythm.      Pulses: Normal pulses.      Heart sounds: No murmur heard.  Pulmonary:      Effort: Pulmonary effort is normal. No respiratory distress.   Musculoskeletal:      Right lower leg: No edema.      Left lower leg: No edema.   Neurological:      Mental  "Status: He is alert and oriented to person, place, and time.   Psychiatric:         Mood and Affect: Mood normal.       Vitals:    08/15/24 0802   BP: 114/64   BP Location: Right arm   Patient Position: Sitting   BP Method: Large (Manual)   Pulse: 78   Resp: 16   Temp: 97.9 °F (36.6 °C)   TempSrc: Oral   SpO2: 96%   Weight: 72 kg (158 lb 11.7 oz)   Height: 5' 7" (1.702 m)      Body mass index is 24.86 kg/m².        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    T2DM (type 2 diabetes mellitus) 2014    DKA 9/2014       Past Surgical History:  Past Surgical History:   Procedure Laterality Date    BRONCHOSCOPY WITH FLUOROSCOPY N/A 11/2/2023    Procedure: BRONCHOSCOPY, WITH FLUOROSCOPY;  Surgeon: Provider, [facility];  Location: [facility] OR [address] [place] [address];  Service: Endoscopy;  Laterality: N/A;    CATARACT EXTRACTION Right 2021    COLONOSCOPY      LUNG REMOVAL, PARTIAL      ROBOTIC BRONCHOSCOPY N/A 12/19/2023    Procedure: ROBOTIC BRONCHOSCOPY;  Surgeon: Hernandez Brewer MD;  Location: [facility] OR [address] [place] [address];  Service: Pulmonary;  Laterality: N/A;       Social History:  Social History     Socioeconomic History    Marital status:    Occupational History     Employer: [employer]   Tobacco Use    Smoking status: Former     Current packs/day: 0.00     Average packs/day: 1 pack/day for 30.0 years (30.0 ttl pk-yrs)     Types: Cigarettes     Start date: 1/15/1984     Quit date: " 1/15/2014     Years since quitting: 10.5    Smokeless tobacco: Never   Substance and Sexual Activity    Alcohol use: Yes     Alcohol/week: 5.7 standard drinks of alcohol     Types: 4 Cans of beer, 2 Standard drinks or equivalent per week     Comment: every other day beer drinker    Drug use: No    Sexual activity: Not Currently     Partners: Female     Birth control/protection: None   Social History Narrative    ** Merged History Encounter **            Family History:  Family History   Problem Relation Name Age of Onset    No Known Problems Mother      Cancer Father          colon cancer    Colon cancer Father      Cataracts Father      Colon cancer Sister Kira     Cancer Sister Kira         colon or lung cancer     No Known Problems Sister Valentine     No Known Problems Sister Chantal     No Known Problems Brother Idogo     No Known Problems Maternal Aunt      No Known Problems Maternal Uncle      No Known Problems Paternal Aunt      No Known Problems Paternal Uncle      No Known Problems Maternal Grandmother      No Known Problems Maternal Grandfather      No Known Problems Paternal Grandmother      No Known Problems Paternal Grandfather      No Known Problems Other      Amblyopia Neg Hx      Blindness Neg Hx      Diabetes Neg Hx      Glaucoma Neg Hx      Hypertension Neg Hx      Macular degeneration Neg Hx      Retinal detachment Neg Hx      Strabismus Neg Hx      Stroke Neg Hx      Thyroid disease Neg Hx         Allergies and Medications: (updated and reviewed)  Review of patient's allergies indicates:   Allergen Reactions    Adhesive Itching, Rash, Other (See Comments) and Dermatitis     Blister     Current Outpatient Medications   Medication Sig Dispense Refill    atorvastatin (LIPITOR) 40 MG tablet Take 1 tablet (40 mg total) by mouth once daily. 90 tablet 3    blood-glucose meter kit Use as instructed 1 each 0    metFORMIN (GLUCOPHAGE-XR) 500 MG ER 24hr tablet Take 1 tablet (500 mg total) by mouth 2 (two)  times daily with meals.      MICRO THIN LANCETS 33 gauge Misc       pantoprazole (PROTONIX) 40 MG tablet Take 1 tablet (40 mg total) by mouth once daily. 60 tablet 0    tirzepatide (MOUNJARO SUBQ) Inject into the skin. Pt is unsure of the dosage but states that he gets the injection once a week.      triamcinolone acetonide 0.1% (KENALOG) 0.1 % cream Apply topically 3 (three) times daily. Apply to itching skin  as directed for 7 days 45 g 0    efinaconazole (JUBLIA) 10 % Crow Apply 1 Application topically once daily. (Patient not taking: Reported on 7/3/2024) 8 mL 0    fluticasone-umeclidin-vilanter (TRELEGY ELLIPTA) 100-62.5-25 mcg DsDv Inhale 1 puff into the lungs once daily. 60 each 3    ibuprofen (ADVIL,MOTRIN) 600 MG tablet Take 1 tablet (600 mg total) by mouth every 8 (eight) hours as needed for Pain (with food). (Patient not taking: Reported on 7/3/2024) 30 tablet 0    permethrin (ELIMITE) 5 % cream Apply to skin head to toe over night, for 8-10 hours, wash off in AM in shower or bath  Repeat in 7 days. (Patient not taking: Reported on 7/3/2024) 60 g 1    polyethylene glycol (GLYCOLAX) 17 gram/dose powder Mix 1 capful (17 g) and dissolve in glass of water to take by mouth once daily. (Patient not taking: Reported on 7/3/2024) 510 g 0    sildenafiL (VIAGRA) 50 MG tablet Take 1 tablet (50 mg total) by mouth daily as needed for Erectile Dysfunction. 30 tablet 0    tirzepatide 7.5 mg/0.5 mL PnIj Inject 7.5 mg into the skin every 7 days. 4 Pen 4     No current facility-administered medications for this visit.       Patient Care Team:  Lorrie Calvin MD as PCP - General (Internal Medicine)  Ankur Curtis MD as Consulting Physician (Sleep Medicine)  Marybel Gauthier FNP as Physician (Gastroenterology)  Kristine Anderson MA as Care Coordinator  Ellie South MA as Care Coordinator  [place] IV, MD as Consulting Physician (Hematology and Oncology)  Ysabel Torres LCSW as   Vo,  John ADAIR MA as Care Coordinator         - The patient is given an After Visit Summary that lists all medications with directions, allergies, education, orders placed during this encounter and follow-up instructions.      - I have reviewed the patient's medical information including past medical, family, and social history sections including the medications and allergies.      - We discussed the patient's current medications.     This note was created by combination of typed  and MModal dictation.  Transcription errors may be present.  If there are any questions, please contact me.       Judie Diego PA-C

## 2024-08-15 NOTE — PROGRESS NOTES
Health Maintenance Due   Topic     RSV Vaccine (Age 60+ and Pregnant patients) (1 - 1-dose 60+ series) Not given at this facility       Shingles Vaccine (2 of 2) hx chickenpox ; inform pt can get vaccine at pharmacy.    Colorectal Cancer Screening  CONSULT WITH PCP    COVID-19 Vaccine (5 - 2023-24 season) Not given at this facility       Eye Exam  CONSULT WITH PCP

## 2024-08-28 ENCOUNTER — OFFICE VISIT (OUTPATIENT)
Dept: HEMATOLOGY/ONCOLOGY | Facility: CLINIC | Age: 69
End: 2024-08-28
Payer: MEDICARE

## 2024-08-28 ENCOUNTER — LAB VISIT (OUTPATIENT)
Dept: LAB | Facility: HOSPITAL | Age: 69
End: 2024-08-28
Attending: HOSPITALIST
Payer: MEDICARE

## 2024-08-28 VITALS
RESPIRATION RATE: 16 BRPM | SYSTOLIC BLOOD PRESSURE: 117 MMHG | DIASTOLIC BLOOD PRESSURE: 61 MMHG | WEIGHT: 163.56 LBS | HEIGHT: 67 IN | HEART RATE: 80 BPM | TEMPERATURE: 98 F | OXYGEN SATURATION: 96 % | BODY MASS INDEX: 25.67 KG/M2

## 2024-08-28 DIAGNOSIS — C34.32 SQUAMOUS CELL CARCINOMA OF BRONCHUS IN LEFT LOWER LOBE: ICD-10-CM

## 2024-08-28 DIAGNOSIS — R30.0 DYSURIA: ICD-10-CM

## 2024-08-28 DIAGNOSIS — C34.12 ADENOCARCINOMA OF UPPER LOBE OF LEFT LUNG: ICD-10-CM

## 2024-08-28 DIAGNOSIS — R31.9 HEMATURIA, UNSPECIFIED TYPE: ICD-10-CM

## 2024-08-28 DIAGNOSIS — C34.32 SQUAMOUS CELL CARCINOMA OF BRONCHUS IN LEFT LOWER LOBE: Primary | ICD-10-CM

## 2024-08-28 LAB
ALBUMIN SERPL BCP-MCNC: 3.4 G/DL (ref 3.5–5.2)
ALP SERPL-CCNC: 120 U/L (ref 55–135)
ALT SERPL W/O P-5'-P-CCNC: 18 U/L (ref 10–44)
ANION GAP SERPL CALC-SCNC: 7 MMOL/L (ref 8–16)
AST SERPL-CCNC: 15 U/L (ref 10–40)
BACTERIA #/AREA URNS AUTO: ABNORMAL /HPF
BASOPHILS # BLD AUTO: 0.04 K/UL (ref 0–0.2)
BASOPHILS NFR BLD: 0.4 % (ref 0–1.9)
BILIRUB SERPL-MCNC: 1 MG/DL (ref 0.1–1)
BILIRUB UR QL STRIP: NEGATIVE
BUN SERPL-MCNC: 14 MG/DL (ref 8–23)
CALCIUM SERPL-MCNC: 9.5 MG/DL (ref 8.7–10.5)
CHLORIDE SERPL-SCNC: 108 MMOL/L (ref 95–110)
CLARITY UR REFRACT.AUTO: ABNORMAL
CO2 SERPL-SCNC: 25 MMOL/L (ref 23–29)
COLOR UR AUTO: ABNORMAL
CREAT SERPL-MCNC: 1 MG/DL (ref 0.5–1.4)
DIFFERENTIAL METHOD BLD: ABNORMAL
EOSINOPHIL # BLD AUTO: 0.1 K/UL (ref 0–0.5)
EOSINOPHIL NFR BLD: 1.5 % (ref 0–8)
ERYTHROCYTE [DISTWIDTH] IN BLOOD BY AUTOMATED COUNT: 14.6 % (ref 11.5–14.5)
EST. GFR  (NO RACE VARIABLE): >60 ML/MIN/1.73 M^2
GLUCOSE SERPL-MCNC: 257 MG/DL (ref 70–110)
GLUCOSE UR QL STRIP: ABNORMAL
HCT VFR BLD AUTO: 35.8 % (ref 40–54)
HGB BLD-MCNC: 11.4 G/DL (ref 14–18)
HGB UR QL STRIP: ABNORMAL
HYALINE CASTS UR QL AUTO: 0 /LPF
IMM GRANULOCYTES # BLD AUTO: 0.03 K/UL (ref 0–0.04)
IMM GRANULOCYTES NFR BLD AUTO: 0.3 % (ref 0–0.5)
KETONES UR QL STRIP: NEGATIVE
LEUKOCYTE ESTERASE UR QL STRIP: ABNORMAL
LYMPHOCYTES # BLD AUTO: 0.5 K/UL (ref 1–4.8)
LYMPHOCYTES NFR BLD: 5.8 % (ref 18–48)
MCH RBC QN AUTO: 27.5 PG (ref 27–31)
MCHC RBC AUTO-ENTMCNC: 31.8 G/DL (ref 32–36)
MCV RBC AUTO: 86 FL (ref 82–98)
MICROSCOPIC COMMENT: ABNORMAL
MONOCYTES # BLD AUTO: 0.8 K/UL (ref 0.3–1)
MONOCYTES NFR BLD: 8.4 % (ref 4–15)
NEUTROPHILS # BLD AUTO: 7.6 K/UL (ref 1.8–7.7)
NEUTROPHILS NFR BLD: 83.6 % (ref 38–73)
NITRITE UR QL STRIP: NEGATIVE
NRBC BLD-RTO: 0 /100 WBC
PH UR STRIP: 6 [PH] (ref 5–8)
PLATELET # BLD AUTO: 248 K/UL (ref 150–450)
PMV BLD AUTO: 9.4 FL (ref 9.2–12.9)
POTASSIUM SERPL-SCNC: 3.7 MMOL/L (ref 3.5–5.1)
PROT SERPL-MCNC: 6 G/DL (ref 6–8.4)
PROT UR QL STRIP: ABNORMAL
RBC # BLD AUTO: 4.15 M/UL (ref 4.6–6.2)
RBC #/AREA URNS AUTO: >100 /HPF (ref 0–4)
SODIUM SERPL-SCNC: 140 MMOL/L (ref 136–145)
SP GR UR STRIP: 1.02 (ref 1–1.03)
SQUAMOUS #/AREA URNS AUTO: 1 /HPF
T4 FREE SERPL-MCNC: 0.92 NG/DL (ref 0.71–1.51)
TSH SERPL DL<=0.005 MIU/L-ACNC: 0.75 UIU/ML (ref 0.4–4)
URN SPEC COLLECT METH UR: ABNORMAL
WBC # BLD AUTO: 9.12 K/UL (ref 3.9–12.7)
WBC #/AREA URNS AUTO: >100 /HPF (ref 0–5)
YEAST UR QL AUTO: ABNORMAL

## 2024-08-28 PROCEDURE — 3078F DIAST BP <80 MM HG: CPT | Mod: CPTII,S$GLB,, | Performed by: HOSPITALIST

## 2024-08-28 PROCEDURE — 87186 SC STD MICRODIL/AGAR DIL: CPT | Performed by: HOSPITALIST

## 2024-08-28 PROCEDURE — 1157F ADVNC CARE PLAN IN RCRD: CPT | Mod: CPTII,S$GLB,, | Performed by: HOSPITALIST

## 2024-08-28 PROCEDURE — 3008F BODY MASS INDEX DOCD: CPT | Mod: CPTII,S$GLB,, | Performed by: HOSPITALIST

## 2024-08-28 PROCEDURE — 1159F MED LIST DOCD IN RCRD: CPT | Mod: CPTII,S$GLB,, | Performed by: HOSPITALIST

## 2024-08-28 PROCEDURE — 80053 COMPREHEN METABOLIC PANEL: CPT | Performed by: HOSPITALIST

## 2024-08-28 PROCEDURE — G2211 COMPLEX E/M VISIT ADD ON: HCPCS | Mod: S$GLB,,, | Performed by: HOSPITALIST

## 2024-08-28 PROCEDURE — 99215 OFFICE O/P EST HI 40 MIN: CPT | Mod: S$GLB,,, | Performed by: HOSPITALIST

## 2024-08-28 PROCEDURE — 3074F SYST BP LT 130 MM HG: CPT | Mod: CPTII,S$GLB,, | Performed by: HOSPITALIST

## 2024-08-28 PROCEDURE — 1126F AMNT PAIN NOTED NONE PRSNT: CPT | Mod: CPTII,S$GLB,, | Performed by: HOSPITALIST

## 2024-08-28 PROCEDURE — 3044F HG A1C LEVEL LT 7.0%: CPT | Mod: CPTII,S$GLB,, | Performed by: HOSPITALIST

## 2024-08-28 PROCEDURE — 87088 URINE BACTERIA CULTURE: CPT | Performed by: HOSPITALIST

## 2024-08-28 PROCEDURE — 3288F FALL RISK ASSESSMENT DOCD: CPT | Mod: CPTII,S$GLB,, | Performed by: HOSPITALIST

## 2024-08-28 PROCEDURE — 3072F LOW RISK FOR RETINOPATHY: CPT | Mod: CPTII,S$GLB,, | Performed by: HOSPITALIST

## 2024-08-28 PROCEDURE — 84439 ASSAY OF FREE THYROXINE: CPT | Performed by: HOSPITALIST

## 2024-08-28 PROCEDURE — 85025 COMPLETE CBC W/AUTO DIFF WBC: CPT | Performed by: HOSPITALIST

## 2024-08-28 PROCEDURE — 99999 PR PBB SHADOW E&M-EST. PATIENT-LVL IV: CPT | Mod: PBBFAC,,, | Performed by: HOSPITALIST

## 2024-08-28 PROCEDURE — 87086 URINE CULTURE/COLONY COUNT: CPT | Performed by: HOSPITALIST

## 2024-08-28 PROCEDURE — 1101F PT FALLS ASSESS-DOCD LE1/YR: CPT | Mod: CPTII,S$GLB,, | Performed by: HOSPITALIST

## 2024-08-28 PROCEDURE — 81001 URINALYSIS AUTO W/SCOPE: CPT | Performed by: HOSPITALIST

## 2024-08-28 PROCEDURE — 36415 COLL VENOUS BLD VENIPUNCTURE: CPT | Performed by: HOSPITALIST

## 2024-08-28 PROCEDURE — 84443 ASSAY THYROID STIM HORMONE: CPT | Performed by: HOSPITALIST

## 2024-08-28 RX ORDER — SULFAMETHOXAZOLE AND TRIMETHOPRIM 800; 160 MG/1; MG/1
1 TABLET ORAL 2 TIMES DAILY
Qty: 14 TABLET | Refills: 0 | Status: SHIPPED | OUTPATIENT
Start: 2024-08-28 | End: 2024-09-04

## 2024-08-28 NOTE — Clinical Note
Judith: I put in the bactrim x7 days  Papi: Can you let the pt know that the antibiotic has been prescribed (Edin Duncan), and can we follow up with the patient in a few days to see how his symptoms are doing?   Naima: Labs with scans, please!  Thanks all!

## 2024-08-28 NOTE — PROGRESS NOTES
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   9/15/2023 Imaging Significant Findings    CXR (URI)  - Masslike focus along L hilar region     10/6/2023 Imaging Significant Findings    CT C, Non-Con  - 6.6 x 5.9 cm LLL mass  - 2 x 1.2 cm cavitary lesion in lingula  - 0.9 x 0.8 cm pleural based nodule in R lung base  - Trace L pleural effusion  - Suspect hilar LAD  - Calcified hilar LN     11/2/2023 Procedure    Bronchoscopy  LLL, Endobronchial Biopsy  - Squamous cell carcinoma (CDK 5/6, p63 positive, CK7 and TTF negative)    [facility] NGS  - Insufficient tissue     11/6/2023 Imaging Significant Findings    PET CT  - 7.6 x 6.0 cm soft tissue mass in superior segment of LLL, SUV 7.2  - 2.5 x 1.6 cm cavitary lesion in ELDER (neoplastic vs infectious/inflammatory), SUV max 1.8  - No hypermetabolic LAD  - 0.9 cm L hypoattenuating L adrenal nodule, likely adenoma     11/29/2023 Tumor Conference    Tumor Board  - Recommend staging EBUS and L robotic bronchoscopy for sampling of ELDER cavitary nodule.  The nodule is highly suspicious for possible adenocarcinoma as it has evolved from a ground-glass opacity to a part solid and cavitary nodule.  Obtain brain MRI to complete staging.  The clinical picture suggests a possible T3 or T4 primary lung cancer or two separate primary lung cancers.        11/29/2023 Tumor Genotyping    Liquid Biopsy, [facility]  - CKDN2A, TP53 (three separate LOF mutations)     11/30/2023 Imaging Significant Findings    MRI Brain  - JAN     12/12/2023 Imaging Significant Findings    CT C, Non-Con  - 6.7 x 6.2 cm LLL mass (previously 6.6 x 5.9 cm), abutting and narrowing the adjacent LLL bronchus  - Enlarging lingular cavitary lesion, now 2.5 x 1.6 cm  - Slightly decreased size of RLL pleural based nodule     1/3/2024 Tumor  Conference    Tumor Board  - Agree with plan for SBRT of lingular lesion and chemoRT to LLL mass.      1/5/2024 Cancer Staged    Staging form: Lung, AJCC 8th Edition  - Clinical: Stage IIIA (cT4, cN0, cM0)     1/19/2024 - 2/26/2024 Chemotherapy    Treatment Summary   Plan Name: OP NSCLC PACLITAXEL + CARBOPLATIN (AUC) QW + RADIATION  Treatment Goal: Curative  Status: Inactive  Start Date: 1/19/2024  End Date: 2/26/2024  Provider: Guanako Grove IV, MD  Chemotherapy: CARBOplatin (PARAPLATIN) 210 mg in sodium chloride 0.9% 136 mL chemo infusion, 210 mg (100 % of original dose 208.2 mg), Intravenous, Clinic/HOD 1 time, 6 of 6 cycles  Dose modification:   (original dose 208.2 mg, Cycle 1)  Administration: 210 mg (1/22/2024), 210 mg (1/29/2024), 180 mg (2/5/2024), 225 mg (2/12/2024), 210 mg (2/19/2024), 210 mg (2/26/2024)  PACLitaxeL (TAXOL) 45 mg/m2 = 84 mg in sodium chloride 0.9% 250 mL chemo infusion, 45 mg/m2 = 84 mg, Intravenous, Clinic/HOD 1 time, 6 of 6 cycles  Administration: 84 mg (1/22/2024), 84 mg (1/29/2024), 84 mg (2/5/2024), 84 mg (2/12/2024), 84 mg (2/19/2024), 84 mg (2/26/2024)     1/23/2024 - 3/6/2024 Radiation Therapy    Treating physician: Santino Arora  Treatment Summary  Course: C1 Thorax 2024  Treatment Site Ref. ID Energy Dose/Fx (Gy) #Fx Dose Correction (Gy) Total Dose (Gy) Start Date End Date Elapsed Days   IM Lung_L PTV_High 6X 2.2 19 / 30 0 41.8 1/23/2024 2/19/2024 27   IMLungNew PTV_High_resim 6X 2.2 11 / 11 0 24.2 2/21/2024 3/6/2024 14   Multiple planns were attempted, including SBRT to the ELDER and CRT to the LLL. With dose overlap, it was ultimately decided to treat with concurrent CRT to both lesions.. He was resimulated for treatment response in the LLL mass.     3/13/2024 Imaging Significant Findings    CT C/A/P  - 4.7 x 4.4 cm perihilar LLL mass, previously 6.7 x 6.2 cm. Decreased mass effect on LLL airways  - 2.7 x 1.7 cm cavitary ligular lesion, unchanged  - New cavitary lesions, solid  pulmonary nodules, and ill-defined opacities in lungs b/l  - Unchanged few sub-cm hypodensities in liver     4/19/2024 -  Chemotherapy    Treatment Summary   Plan Name: OP DURVALUMAB 1500 MG Q4W  Treatment Goal: Curative  Status: Active  Start Date: 4/19/2024  End Date: 3/20/2025 (Planned)  Provider: Guanako Grove IV, MD  Chemotherapy: [No matching medication found in this treatment plan]     6/11/2024 Imaging Significant Findings    CT C/A/P  - 4.2 x 3.4 cm perihilar LLL mass, previously 4.7 x 4.4 cm.  Improvement in intralesional cavitation  - 1.6 x 1.3 cm lingular lesion, previously 2.7 x 1.7 cm  - New interstitial infiltrates in posterior aspect of ELDER and lingula  - Worsening of ill-defined opacities in LLL and bronchiectatic changes  - Improvement in previously seen ill-defined opacities in medial aspect of RLL  - Improvement other cavitary lesions and pulmonary nodules  - Postoperative upper lobectomy changes, no recurrent disease bronchial stump  - Small pericardial effusion  - Few subcentimeter hepatic hypodensities, grossly unchanged     7/30/2024 Imaging Significant Findings    CT C  - Interval resolution of lingular cavitary lesion   - 4.3 x 4.0 left hilar soft tissue mass, previously 4.2 x 3.4 cm  - Worsening opacity at left lung base with denser consolidation, concerning for possible superimposed infection, malignancy not excluded  - 0.9 cm more conspicuous nodular density along left greater fissure  - Trace pleural fluid on left  - 0.4 cm ELDER pulmonary nodule, unchanged  - 0.6 cm right paramediastinal nodule, unchanged  - No mediastinal or hilar lymphadenopathy     Adenocarcinoma of Lingula   10/6/2023 Imaging Significant Findings    CT C, Non-Con  - 6.6 x 5.9 cm LLL mass  - 2 x 1.2 cm cavitary lesion in lingula  - 0.9 x 0.8 cm pleural based nodule in R lung base  - Trace L pleural effusion  - Suspect hilar LAD  - Calcified hilar LN     11/6/2023 Imaging Significant Findings    PET CT  - 7.6 x 6.0  cm soft tissue mass in superior segment of LLL, SUV 7.2  - 2.5 x 1.6 cm cavitary lesion in ELDER (neoplastic vs infectious/inflammatory), SUV max 1.8  - No hypermetabolic LAD  - 0.9 cm L hypoattenuating L adrenal nodule, likely adenoma     11/29/2023 Tumor Conference    Tumor Board  - Recommend staging EBUS and L robotic bronchoscopy for sampling of ELDER cavitary nodule.  The nodule is highly suspicious for possible adenocarcinoma as it has evolved from a ground-glass opacity to a part solid and cavitary nodule.  Obtain brain MRI to complete staging.  The clinical picture suggests a possible T3 or T4 primary lung cancer or two separate primary lung cancers.        11/29/2023 Tumor Genotyping    Liquid Biopsy, [facility]  - CKDN2A, TP53 (three separate LOF mutations)     11/30/2023 Imaging Significant Findings    MRI Brain  - JAN     12/12/2023 Imaging Significant Findings    CT C, Non-Con  - 6.7 x 6.2 cm LLL mass (previously 6.6 x 5.9 cm), abutting and narrowing the adjacent LLL bronchus  - Enlarging lingular cavitary lesion, now 2.5 x 1.6 cm  - Slightly decreased size of RLL pleural based nodule     12/19/2023 Procedure    Bronch with EBUS  ELDER, FNA and TBBx  - Adenocarcinoma (TTF1 positive, p40 negative)    LN  - Negative: Station 7  - Report: No pathologic enlargement of 4R, 4L, or 11R. Unable to evaluated 11L due to obstructing LLL endobronchial mass    [facility] NGS  - KRAS G12C  - TP53  - Negative: EGFR, ALK, BRAF, ROS1, RET, MET, ERBB2  - PD-L1: 10%       1/3/2024 Tumor Conference    Tumor Board  - Agree with plan for SBRT of lingular lesion and chemoRT to LLL mass.      1/5/2024 Cancer Staged    Staging form: Lung, AJCC 8th Edition  - Clinical: Stage IA3 (cT1c, cN0, cM0)     3/13/2024 Imaging Significant Findings    CT C/A/P  - 4.7 x 4.4 cm perihilar LLL mass, previously 6.7 x 6.2 cm. Decreased mass effect on LLL airways  - 2.7 x 1.7 cm cavitary ligular lesion, unchanged  - New cavitary lesions, solid pulmonary  nodules, and ill-defined opacities in lungs b/l  - Unchanged few sub-cm hypodensities in liver     6/11/2024 Imaging Significant Findings    CT C/A/P  - 4.2 x 3.4 cm perihilar LLL mass, previously 4.7 x 4.4 cm.  Improvement in intralesional cavitation  - 1.6 x 1.3 cm lingular lesion, previously 2.7 x 1.7 cm  - New interstitial infiltrates in posterior aspect of ELDER and lingula  - Worsening of ill-defined opacities in LLL and bronchiectatic changes  - Improvement in previously seen ill-defined opacities in medial aspect of RLL  - Improvement other cavitary lesions and pulmonary nodules  - Postoperative upper lobectomy changes, no recurrent disease bronchial stump  - Small pericardial effusion  - Few subcentimeter hepatic hypodensities, grossly unchanged     7/30/2024 Imaging Significant Findings    CT C  - Interval resolution of lingular cavitary lesion   - 4.3 x 4.0 left hilar soft tissue mass, previously 4.2 x 3.4 cm  - Worsening opacity at left lung base with denser consolidation, concerning for possible superimposed infection, malignancy not excluded  - 0.9 cm more conspicuous nodular density along left greater fissure  - Trace pleural fluid on left  - 0.4 cm ELDER pulmonary nodule, unchanged  - 0.6 cm right paramediastinal nodule, unchanged  - No mediastinal or hilar lymphadenopathy        Oncology History    HPI:     Hernan Engel is a 68 y.o. male with pmh significant for COPD, T2DM, HLD, Stage IB ([id number]) moderately differentiated adenocarcinoma of RUL, initially dx'd 3/2014, s/p R upper lobectomy (3/2014, Dr. Mendez), and now two newly diagnosed concurrent lung cancers as below who presents for follow up:     1) Stage IIIA (T4N0M0) squamous cell carcinoma of the LLL (insufficient sample for PD-L1, no targetable mutations on liquid biopsy), initially dx'd 11/2/23, s/p CRT (1/23/24 - 3/05/24) w/ weekly carboplatin/paclitaxel (1/22/24 - 2/26/24), and currently on durvalumab (4/19/24-present)    2) Stage IA3  "([id number]) adenocarcinoma of the lingula (KRAS G12C, PD-L1 10%), initially dx'd 12/19/23, s/p CRT (1/23/24 - 3/05/24) w/ weekly carboplatin/paclitaxel (1/22/24 - 2/26/24), and currently on durvalumab (4/19/24-present)    *Notably, pt has a RLL pleural based nodule, reviewed at TB, appears linear and unlikely malignant    Last clinic 7/31/24, final read CT C pending.  Breathing almost back to baseline per patient.  Given grade 2 pneumonitis, could consider rechallenge with the patient was concerned about this prospect.  Patient would like to take at least 1 more month to consider restarting medication.  Follow up read of CT scan, discuss with radiation oncology, and tentatively reschedule next cycle of durvalumab 4 weeks.  Patient has yet to finish steroid taper.    Interval History:     Questions:   - Completed steroid taper? -- Did not, made him weak  - Breathing? -- Bake to baseline      PLAN:   - Studies   - ***  - Imaging   - Due around 9/24  - Treatment   - Restart after repeat imaging  - RTC    - ***    Dysuria, hematuria, urge incontinence --> UA and C&S    Arora:   8 week scan sounds good. The distribution appears to point to radiation, so agree that is likely the cause. He has a history of RUL lobectomy, so I am always more cautious with these patients as they just have less baseline lung volume.      Comparing this to his initial/ pre tx scan, there is a significant improvement in size of both lesions, the lingular lesion continues to improve. I'm not sure what's going on more inferiorly for the LLL. I was generous with my RT coverage in this region and given good response in the LLL lesion, hopefully is just inflammatory. We dont have a PDL1 do we?        er the trial, for G2 pnuemonitis, "hold dose untile resolution < or equal to G1. Permanently discontinue if it doesn't resolve by next dose (after skipping a dose).   We're technically there, but not because of symptoms. So, I think worthwhile to discuss " "possibly resuming after the next scan.     ROS:   As per HPI.       Physical Exam:       /61 (BP Location: Left arm, Patient Position: Sitting, BP Method: Medium (Automatic))   Pulse 80   Temp 98.4 °F (36.9 °C) (Oral)   Resp 16   Ht 5' 7" (1.702 m)   Wt 74.2 kg (163 lb 9.3 oz)   SpO2 96%   BMI 25.62 kg/m²                Physical Exam  Constitutional:       Appearance: Normal appearance.   HENT:      Head: Normocephalic and atraumatic.   Eyes:      Extraocular Movements: Extraocular movements intact.      Conjunctiva/sclera: Conjunctivae normal.      Pupils: Pupils are equal, round, and reactive to light.   Cardiovascular:      Rate and Rhythm: Normal rate and regular rhythm.      Heart sounds: No murmur heard.     No friction rub. No gallop.   Pulmonary:      Effort: Pulmonary effort is normal.      Breath sounds: Wheezing (End expiratory wheezes which clear with cough) present. No rhonchi or rales.   Musculoskeletal:         General: Normal range of motion.      Right lower leg: No edema.      Left lower leg: No edema.   Skin:     General: Skin is warm and dry.   Neurological:      Mental Status: He is alert and oriented to person, place, and time.   Psychiatric:         Mood and Affect: Mood normal.         Thought Content: Thought content normal.         Judgment: Judgment normal.           Labs:   No results found for this or any previous visit (from the past 48 hour(s)).            Imaging:    See oncologic history as above.     Path:  See oncologic history above.      Assessment and Plan:     Hernan Engel is a 68 y.o. male with pmh significant for COPD, T2DM, HLD, Stage IB ([id number]) moderately differentiated adenocarcinoma of RUL, initially dx'd 3/2014, s/p R upper lobectomy (3/2014, Dr. Mendez), and now two newly diagnosed concurrent lung cancers as below who presents for follow up:     1) Stage IIIA (T4N0M0) squamous cell carcinoma of the LLL (insufficient sample for PD-L1, no targetable " mutations on liquid biopsy), initially dx'd 11/2/23, s/p CRT (1/23/24 - 3/05/24) w/ weekly carboplatin/paclitaxel (1/22/24 - 2/26/24), and durvalumab consolidation (4/19/24-5/16/24; held for pneumonitis)    2) Stage IA3 ([id number]) adenocarcinoma of the lingula (KRAS G12C, PD-L1 10%), initially dx'd 12/19/23, s/p CRT (1/23/24 - 3/05/24) w/ weekly carboplatin/paclitaxel (1/22/24 - 2/26/24), and durvalumab consolidation (4/19/24-5/16/24; held for pneumonitis)    *Notably, pt has a RLL pleural based nodule, reviewed at TB, appears linear and unlikely malignant    Stage IIIA Squamous Cell Carcinoma of the LLL, No PD-L1, No Targetable Mutations  ECOG PS 0.  Patient currently on durvalumab consolidation (currently held), possibly complicated by immunotherapy induced pneumonitis vs more likely radiation pneumonitis (see below).  CT C/A/P (6/11/24, on durvalumab) demonstrated ongoing decrement in both the LLL mass and the lingular lesion but new interstitial infiltrates in the posterior aspect of the ELDER and lingula as well as worsening ill-defined opacities in the LLL; repeat CT C (7/30/24) appears somewhat improved  though final read is pending (see below).  Clinically, patient feels that his breathing is almost back to baseline he is able to complete the same work tasks is prior to starting the steroid taper.  Given this was a grade 2 pneumonitis, if this were immunotherapy related, it would be appropriate to consider immunotherapy rechallenge once patient has completed steroid taper and is asymptomatic (steroid taper completing in approximately 1 week).  This was discussed with the patient (and guidelines were provided), but he notes his ongoing wariness to resume due to the risk of recurrent pneumonitis.  We discussed that the pneumonitis was more likely related to radiation than immunotherapy, however the patient states he would like to take time to think about this prior to making his final decision and says he would  not be ready to restart the medicine for at least 1 month.  Therefore, will follow up final read of CT scan, discussed with radiation oncology (Dr. Arora), and tentatively reschedule next cycle of durvalumab in 4 weeks.    Of note, the patient missed his initial infusion of durvalumab due to concern that it was too close to radiation therapy and so had received only 2 doses prior to repeat imaging.    PLAN:   -- Continue to hold durvalumab  -- Treat pneumonitis as below  -- F/u final read of CT C from 7/30/24  -- Message sent to radiation oncology  -- Labs, RTC, and C3 durvalumab tentatively scheduled in 4 weeks  -- Patient prefers early morning appointments and infusions due to work schedule      Stage IA3 Adenocarcinoma of the Lingula, NGS and PD-L1 Pending  Bronchoscopy demonstrates adenocarcinoma of the lingula, separate from the squamous cell carcinoma in the LLL.  This is a T1c lesion which, given lack of lymphadenopathy, makes this a Stage IA3 disease.  Plan for treatment w/ CRT followed by immunotherapy, as above.  -- Treatment as above      Dyspnea  Pneumonitis   CT C/A/P from 6/11/24 demonstrates worsening ill-defined opacities in the LLL with bronchiectatic changes.  On my review, this appears consistent with a pneumonitis.  Based on distribution, may be related to radiation (which ended on 3/5/24), though must also consider role of immunotherapy even noting sparing of other regions of lung and immunotherapy start ~8 weeks prior to findings.  Discussed with Dr. Arora (radiation oncology) who agrees with this assessment.  Repeat CT C on 7/30/24 is pending final read, however appears somewhat improved.  Patient notes significant improvement in his respiratory status as compared to prior to starting steroids (see above).  Therefore, favor that pneumonitis has improved to < G1. Continue to hold immunotherapy as above.  -- Hold durvalumab  -- Steroid taper prescribed:  - 50 mg daily x 14 days, then down by  10 mg weekly; final dose in ~1 week   - Bactrim M/W/F   - Pantoprazole 40 mg daily   - Message previously sent to PCP regarding closer monitoring of blood sugar   - Handwritten paper detailing the above previously given to pt  -- RTC in 4 weeks as above      Superficial Thrombophlebitis  Patient presented to urgent Care with swelling over his right forearm.  Ultrasound demonstrated a superficial venous thrombus.  He started on NSAIDs and warm compresses, now largely resolved (hyperpigmented vein still visible)  -- Previously discussed symptoms requiring re-evaluation      T2DM  Patient currently on metformin and prescribed Mounjaro, however states he has been taking Mounjaro as he was waiting to discuss with oncology.  No interactions with durvalumab, from an oncology standpoint, okay to start.    -- Message previously sent to PCP (Dr. Calvin) as above, including that pt is on high dose steroids        The above information has been reviewed with the patient and all questions have been answered to their apparent satisfaction.  They understand that they can call the clinic with any questions.    Guanako Grove MD  Hematology/Oncology  [facility]      Med Onc Route Chart for Scheduling

## 2024-08-28 NOTE — PROGRESS NOTES
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   9/15/2023 Imaging Significant Findings    CXR (URI)  - Masslike focus along L hilar region     10/6/2023 Imaging Significant Findings    CT C, Non-Con  - 6.6 x 5.9 cm LLL mass  - 2 x 1.2 cm cavitary lesion in lingula  - 0.9 x 0.8 cm pleural based nodule in R lung base  - Trace L pleural effusion  - Suspect hilar LAD  - Calcified hilar LN     11/2/2023 Procedure    Bronchoscopy  LLL, Endobronchial Biopsy  - Squamous cell carcinoma (CDK 5/6, p63 positive, CK7 and TTF negative)    [facility] NGS  - Insufficient tissue     11/6/2023 Imaging Significant Findings    PET CT  - 7.6 x 6.0 cm soft tissue mass in superior segment of LLL, SUV 7.2  - 2.5 x 1.6 cm cavitary lesion in ELDER (neoplastic vs infectious/inflammatory), SUV max 1.8  - No hypermetabolic LAD  - 0.9 cm L hypoattenuating L adrenal nodule, likely adenoma     11/29/2023 Tumor Conference    Tumor Board  - Recommend staging EBUS and L robotic bronchoscopy for sampling of LEDER cavitary nodule.  The nodule is highly suspicious for possible adenocarcinoma as it has evolved from a ground-glass opacity to a part solid and cavitary nodule.  Obtain brain MRI to complete staging.  The clinical picture suggests a possible T3 or T4 primary lung cancer or two separate primary lung cancers.        11/29/2023 Tumor Genotyping    Liquid Biopsy, [facility]  - CKDN2A, TP53 (three separate LOF mutations)     11/30/2023 Imaging Significant Findings    MRI Brain  - JAN     12/12/2023 Imaging Significant Findings    CT C, Non-Con  - 6.7 x 6.2 cm LLL mass (previously 6.6 x 5.9 cm), abutting and narrowing the adjacent LLL bronchus  - Enlarging lingular cavitary lesion, now 2.5 x 1.6 cm  - Slightly decreased size of RLL pleural based nodule     1/3/2024 Tumor  Conference    Tumor Board  - Agree with plan for SBRT of lingular lesion and chemoRT to LLL mass.      1/5/2024 Cancer Staged    Staging form: Lung, AJCC 8th Edition  - Clinical: Stage IIIA (cT4, cN0, cM0)     1/19/2024 - 2/26/2024 Chemotherapy    Treatment Summary   Plan Name: OP NSCLC PACLITAXEL + CARBOPLATIN (AUC) QW + RADIATION  Treatment Goal: Curative  Status: Inactive  Start Date: 1/19/2024  End Date: 2/26/2024  Provider: Guanako Grove IV, MD  Chemotherapy: CARBOplatin (PARAPLATIN) 210 mg in sodium chloride 0.9% 136 mL chemo infusion, 210 mg (100 % of original dose 208.2 mg), Intravenous, Clinic/HOD 1 time, 6 of 6 cycles  Dose modification:   (original dose 208.2 mg, Cycle 1)  Administration: 210 mg (1/22/2024), 210 mg (1/29/2024), 180 mg (2/5/2024), 225 mg (2/12/2024), 210 mg (2/19/2024), 210 mg (2/26/2024)  PACLitaxeL (TAXOL) 45 mg/m2 = 84 mg in sodium chloride 0.9% 250 mL chemo infusion, 45 mg/m2 = 84 mg, Intravenous, Clinic/HOD 1 time, 6 of 6 cycles  Administration: 84 mg (1/22/2024), 84 mg (1/29/2024), 84 mg (2/5/2024), 84 mg (2/12/2024), 84 mg (2/19/2024), 84 mg (2/26/2024)     1/23/2024 - 3/6/2024 Radiation Therapy    Treating physician: Santino Arora  Treatment Summary  Course: C1 Thorax 2024  Treatment Site Ref. ID Energy Dose/Fx (Gy) #Fx Dose Correction (Gy) Total Dose (Gy) Start Date End Date Elapsed Days   IM Lung_L PTV_High 6X 2.2 19 / 30 0 41.8 1/23/2024 2/19/2024 27   IMLungNew PTV_High_resim 6X 2.2 11 / 11 0 24.2 2/21/2024 3/6/2024 14   Multiple planns were attempted, including SBRT to the ELDER and CRT to the LLL. With dose overlap, it was ultimately decided to treat with concurrent CRT to both lesions.. He was resimulated for treatment response in the LLL mass.     3/13/2024 Imaging Significant Findings    CT C/A/P  - 4.7 x 4.4 cm perihilar LLL mass, previously 6.7 x 6.2 cm. Decreased mass effect on LLL airways  - 2.7 x 1.7 cm cavitary ligular lesion, unchanged  - New cavitary lesions, solid  pulmonary nodules, and ill-defined opacities in lungs b/l  - Unchanged few sub-cm hypodensities in liver     4/19/2024 -  Chemotherapy    Treatment Summary   Plan Name: OP DURVALUMAB 1500 MG Q4W  Treatment Goal: Curative  Status: Active  Start Date: 4/19/2024  End Date: 3/20/2025 (Planned)  Provider: Guanako Grove IV, MD  Chemotherapy: [No matching medication found in this treatment plan]     6/11/2024 Imaging Significant Findings    CT C/A/P  - 4.2 x 3.4 cm perihilar LLL mass, previously 4.7 x 4.4 cm.  Improvement in intralesional cavitation  - 1.6 x 1.3 cm lingular lesion, previously 2.7 x 1.7 cm  - New interstitial infiltrates in posterior aspect of ELDER and lingula  - Worsening of ill-defined opacities in LLL and bronchiectatic changes  - Improvement in previously seen ill-defined opacities in medial aspect of RLL  - Improvement other cavitary lesions and pulmonary nodules  - Postoperative upper lobectomy changes, no recurrent disease bronchial stump  - Small pericardial effusion  - Few subcentimeter hepatic hypodensities, grossly unchanged     7/30/2024 Imaging Significant Findings    CT C  - Interval resolution of lingular cavitary lesion   - 4.3 x 4.0 left hilar soft tissue mass, previously 4.2 x 3.4 cm  - Worsening opacity at left lung base with denser consolidation, concerning for possible superimposed infection, malignancy not excluded  - 0.9 cm more conspicuous nodular density along left greater fissure  - Trace pleural fluid on left  - 0.4 cm ELDER pulmonary nodule, unchanged  - 0.6 cm right paramediastinal nodule, unchanged  - No mediastinal or hilar lymphadenopathy     Adenocarcinoma of Lingula   10/6/2023 Imaging Significant Findings    CT C, Non-Con  - 6.6 x 5.9 cm LLL mass  - 2 x 1.2 cm cavitary lesion in lingula  - 0.9 x 0.8 cm pleural based nodule in R lung base  - Trace L pleural effusion  - Suspect hilar LAD  - Calcified hilar LN     11/6/2023 Imaging Significant Findings    PET CT  - 7.6 x 6.0  cm soft tissue mass in superior segment of LLL, SUV 7.2  - 2.5 x 1.6 cm cavitary lesion in ELDER (neoplastic vs infectious/inflammatory), SUV max 1.8  - No hypermetabolic LAD  - 0.9 cm L hypoattenuating L adrenal nodule, likely adenoma     11/29/2023 Tumor Conference    Tumor Board  - Recommend staging EBUS and L robotic bronchoscopy for sampling of ELDER cavitary nodule.  The nodule is highly suspicious for possible adenocarcinoma as it has evolved from a ground-glass opacity to a part solid and cavitary nodule.  Obtain brain MRI to complete staging.  The clinical picture suggests a possible T3 or T4 primary lung cancer or two separate primary lung cancers.        11/29/2023 Tumor Genotyping    Liquid Biopsy, [facility]  - CKDN2A, TP53 (three separate LOF mutations)     11/30/2023 Imaging Significant Findings    MRI Brain  - JAN     12/12/2023 Imaging Significant Findings    CT C, Non-Con  - 6.7 x 6.2 cm LLL mass (previously 6.6 x 5.9 cm), abutting and narrowing the adjacent LLL bronchus  - Enlarging lingular cavitary lesion, now 2.5 x 1.6 cm  - Slightly decreased size of RLL pleural based nodule     12/19/2023 Procedure    Bronch with EBUS  ELDER, FNA and TBBx  - Adenocarcinoma (TTF1 positive, p40 negative)    LN  - Negative: Station 7  - Report: No pathologic enlargement of 4R, 4L, or 11R. Unable to evaluated 11L due to obstructing LLL endobronchial mass    [facility] NGS  - KRAS G12C  - TP53  - Negative: EGFR, ALK, BRAF, ROS1, RET, MET, ERBB2  - PD-L1: 10%       1/3/2024 Tumor Conference    Tumor Board  - Agree with plan for SBRT of lingular lesion and chemoRT to LLL mass.      1/5/2024 Cancer Staged    Staging form: Lung, AJCC 8th Edition  - Clinical: Stage IA3 (cT1c, cN0, cM0)     3/13/2024 Imaging Significant Findings    CT C/A/P  - 4.7 x 4.4 cm perihilar LLL mass, previously 6.7 x 6.2 cm. Decreased mass effect on LLL airways  - 2.7 x 1.7 cm cavitary ligular lesion, unchanged  - New cavitary lesions, solid pulmonary  nodules, and ill-defined opacities in lungs b/l  - Unchanged few sub-cm hypodensities in liver     6/11/2024 Imaging Significant Findings    CT C/A/P  - 4.2 x 3.4 cm perihilar LLL mass, previously 4.7 x 4.4 cm.  Improvement in intralesional cavitation  - 1.6 x 1.3 cm lingular lesion, previously 2.7 x 1.7 cm  - New interstitial infiltrates in posterior aspect of ELDER and lingula  - Worsening of ill-defined opacities in LLL and bronchiectatic changes  - Improvement in previously seen ill-defined opacities in medial aspect of RLL  - Improvement other cavitary lesions and pulmonary nodules  - Postoperative upper lobectomy changes, no recurrent disease bronchial stump  - Small pericardial effusion  - Few subcentimeter hepatic hypodensities, grossly unchanged     7/30/2024 Imaging Significant Findings    CT C  - Interval resolution of lingular cavitary lesion   - 4.3 x 4.0 left hilar soft tissue mass, previously 4.2 x 3.4 cm  - Worsening opacity at left lung base with denser consolidation, concerning for possible superimposed infection, malignancy not excluded  - 0.9 cm more conspicuous nodular density along left greater fissure  - Trace pleural fluid on left  - 0.4 cm ELDER pulmonary nodule, unchanged  - 0.6 cm right paramediastinal nodule, unchanged  - No mediastinal or hilar lymphadenopathy        Oncology History    HPI:     Hernan Engel is a 68 y.o. male with pmh significant for COPD, T2DM, HLD, Stage IB ([id number]) moderately differentiated adenocarcinoma of RUL, initially dx'd 3/2014, s/p R upper lobectomy (3/2014, Dr. Mendez), and now two newly diagnosed concurrent lung cancers as below who presents for follow up:     1) Stage IIIA (T4N0M0) squamous cell carcinoma of the LLL (insufficient sample for PD-L1, no targetable mutations on liquid biopsy), initially dx'd 11/2/23, s/p CRT (1/23/24 - 3/05/24) w/ weekly carboplatin/paclitaxel (1/22/24 - 2/26/24), and currently on durvalumab (4/19/24-present)    2) Stage IA3  "([id number]) adenocarcinoma of the lingula (KRAS G12C, PD-L1 10%), initially dx'd 12/19/23, s/p CRT (1/23/24 - 3/05/24) w/ weekly carboplatin/paclitaxel (1/22/24 - 2/26/24), and currently on durvalumab (4/19/24-present)    *Notably, pt has a RLL pleural based nodule, reviewed at TB, appears linear and unlikely malignant    Last clinic 7/31/24, final read CT C pending.  Breathing almost back to baseline per patient.  Given grade 2 pneumonitis, could consider rechallenge with the patient was concerned about this prospect.  Patient would like to take at least 1 more month to consider restarting medication.  Follow up read of CT scan, discuss with radiation oncology, and tentatively reschedule next cycle of durvalumab 4 weeks.  Patient has yet to finish steroid taper.    Interval History:   Pt presents to the clinic alone. He didn't complete his steroid taper. He still has about 6 pills left. He denies any fatigue, shortness of breath, cough or fever. He noted he feels he is back to baseline. He complains of a 2 day history of hematuria, dysuria, urge incontinence and nocturia. He denies any n/v/d or abdominal pain.     ROS:   As per HPI.       Physical Exam:       /61 (BP Location: Left arm, Patient Position: Sitting, BP Method: Medium (Automatic))   Pulse 80   Temp 98.4 °F (36.9 °C) (Oral)   Resp 16   Ht 5' 7" (1.702 m)   Wt 74.2 kg (163 lb 9.3 oz)   SpO2 96%   BMI 25.62 kg/m²                Physical Exam  Constitutional:       Appearance: Normal appearance.   HENT:      Head: Normocephalic and atraumatic.   Eyes:      Extraocular Movements: Extraocular movements intact.      Conjunctiva/sclera: Conjunctivae normal.      Pupils: Pupils are equal, round, and reactive to light.   Cardiovascular:      Rate and Rhythm: Normal rate and regular rhythm.      Heart sounds: No murmur heard.     No friction rub. No gallop.   Pulmonary:      Effort: Pulmonary effort is normal.      Breath sounds: No wheezing (End " expiratory wheezes which clear with cough), rhonchi or rales.   Musculoskeletal:         General: Normal range of motion.      Right lower leg: No edema.      Left lower leg: No edema.   Skin:     General: Skin is warm and dry.   Neurological:      Mental Status: He is alert and oriented to person, place, and time.   Psychiatric:         Mood and Affect: Mood normal.         Thought Content: Thought content normal.         Judgment: Judgment normal.           Labs:   Recent Results (from the past 48 hour(s))   CBC w/ DIFF    Collection Time: 08/28/24  8:01 AM   Result Value Ref Range    WBC 9.12 3.90 - 12.70 K/uL    RBC 4.15 (L) 4.60 - 6.20 M/uL    Hemoglobin 11.4 (L) 14.0 - 18.0 g/dL    Hematocrit 35.8 (L) 40.0 - 54.0 %    MCV 86 82 - 98 fL    MCH 27.5 27.0 - 31.0 pg    MCHC 31.8 (L) 32.0 - 36.0 g/dL    RDW 14.6 (H) 11.5 - 14.5 %    Platelets 248 150 - 450 K/uL    MPV 9.4 9.2 - 12.9 fL    Immature Granulocytes 0.3 0.0 - 0.5 %    Gran # (ANC) 7.6 1.8 - 7.7 K/uL    Immature Grans (Abs) 0.03 0.00 - 0.04 K/uL    Lymph # 0.5 (L) 1.0 - 4.8 K/uL    Mono # 0.8 0.3 - 1.0 K/uL    Eos # 0.1 0.0 - 0.5 K/uL    Baso # 0.04 0.00 - 0.20 K/uL    nRBC 0 0 /100 WBC    Gran % 83.6 (H) 38.0 - 73.0 %    Lymph % 5.8 (L) 18.0 - 48.0 %    Mono % 8.4 4.0 - 15.0 %    Eosinophil % 1.5 0.0 - 8.0 %    Basophil % 0.4 0.0 - 1.9 %    Differential Method Automated    Comprehensive Metabolic Panel    Collection Time: 08/28/24  8:01 AM   Result Value Ref Range    Sodium 140 136 - 145 mmol/L    Potassium 3.7 3.5 - 5.1 mmol/L    Chloride 108 95 - 110 mmol/L    CO2 25 23 - 29 mmol/L    Glucose 257 (H) 70 - 110 mg/dL    BUN 14 8 - 23 mg/dL    Creatinine 1.0 0.5 - 1.4 mg/dL    Calcium 9.5 8.7 - 10.5 mg/dL    Total Protein 6.0 6.0 - 8.4 g/dL    Albumin 3.4 (L) 3.5 - 5.2 g/dL    Total Bilirubin 1.0 0.1 - 1.0 mg/dL    Alkaline Phosphatase 120 55 - 135 U/L    AST 15 10 - 40 U/L    ALT 18 10 - 44 U/L    eGFR >60.0 >60 mL/min/1.73 m^2    Anion Gap 7 (L) 8 - 16  mmol/L   TSH    Collection Time: 08/28/24  8:01 AM   Result Value Ref Range    TSH 0.747 0.400 - 4.000 uIU/mL   T4, Free    Collection Time: 08/28/24  8:01 AM   Result Value Ref Range    Free T4 0.92 0.71 - 1.51 ng/dL               Imaging:    See oncologic history as above.     Path:  See oncologic history above.      Assessment and Plan:     Hernan Engel is a 68 y.o. male with pmh significant for COPD, T2DM, HLD, Stage IB ([id number]) moderately differentiated adenocarcinoma of RUL, initially dx'd 3/2014, s/p R upper lobectomy (3/2014, Dr. Mendez), and now two newly diagnosed concurrent lung cancers as below who presents for follow up:     1) Stage IIIA (T4N0M0) squamous cell carcinoma of the LLL (insufficient sample for PD-L1, no targetable mutations on liquid biopsy), initially dx'd 11/2/23, s/p CRT (1/23/24 - 3/05/24) w/ weekly carboplatin/paclitaxel (1/22/24 - 2/26/24), and durvalumab consolidation (4/19/24-5/16/24; held for pneumonitis)    2) Stage IA3 (P4uW9B3) adenocarcinoma of the lingula (KRAS G12C, PD-L1 10%), initially dx'd 12/19/23, s/p CRT (1/23/24 - 3/05/24) w/ weekly carboplatin/paclitaxel (1/22/24 - 2/26/24), and durvalumab consolidation (4/19/24-5/16/24; held for pneumonitis)    *Notably, pt has a RLL pleural based nodule, reviewed at TB, appears linear and unlikely malignant    Stage IIIA Squamous Cell Carcinoma of the LLL, No PD-L1, No Targetable Mutations  ECOG PS 0.  Patient currently on durvalumab consolidation (currently held), possibly complicated by immunotherapy induced pneumonitis vs more likely radiation pneumonitis (see below).  CT C/A/P (6/11/24, on durvalumab) demonstrated ongoing decrement in both the LLL mass and the lingular lesion but new interstitial infiltrates in the posterior aspect of the ELDER and lingula as well as worsening ill-defined opacities in the LLL; repeat CT C (7/30/24) showed slight interval worsening in the appearance of the left lower lung zone with denser  airspace consolidation involving the posterior basal segment of the left lower lobe concerning for possible superimposed infection, malignancy not excluded, short-term follow-up advised.  Clinically, patient feels that his breathing is back to baseline he is able to complete the same work tasks is prior to starting the steroid taper.  Given this was a grade 2 pneumonitis, if this were immunotherapy related, it would be appropriate to consider immunotherapy rechallenge as patient has completed steroid taper and is asymptomatic. This was discussed with patient and he stated that he would like to repeat imaging in 4 weeks to confirm no evidence of inflammation before restarting durvalumab     PLAN:   - Studies   - CBC, CMP, TSH and FT4 reviewed  - Imaging   -Repeat CT chest in 4 weeks 09/24  - Treatment             --Continue to hold durvalumab   - Possibly restart after repeat imaging  - RTC    - In 4 weeks with imaging              -Patient prefers early morning appointments and infusions due to work schedule      Possible UTI  UA and C&S pending  Will order antibiotics if UA shows signs of infection  Referral to Urology    Stage IA3 Adenocarcinoma of the Lingula, NGS and PD-L1 Pending  Bronchoscopy demonstrates adenocarcinoma of the lingula, separate from the squamous cell carcinoma in the LLL.  This is a T1c lesion which, given lack of lymphadenopathy, makes this a Stage IA3 disease.  Plan for treatment w/ CRT followed by immunotherapy, as above.  -- Treatment as above      Dyspnea  Pneumonitis: Resplving   CT C/A/P from 6/11/24 demonstrates worsening ill-defined opacities in the LLL with bronchiectatic changes.  On my review, this appears consistent with a pneumonitis.  Based on distribution, may be related to radiation (which ended on 3/5/24), though must also consider role of immunotherapy even noting sparing of other regions of lung and immunotherapy start ~8 weeks prior to findings.  Discussed with Dr. Arora  (radiation oncology) who agrees with this assessment.  Repeat CT C on 7/30/24 slight interval worsening in the appearance of the left lower lung zone with denser airspace consolidation involving the posterior basal segment of the left lower lobe concerning for possible superimposed infection, malignancy not excluded, short-term follow-up advised. Patient notes significant improvement in his respiratory status as compared to prior to starting steroids. Therefore, favor that pneumonitis has improved to < G1. Continue to hold immunotherapy as above.  -- Hold durvalumab    Superficial Thrombophlebitis  Patient presented to urgent Care with swelling over his right forearm.  Ultrasound demonstrated a superficial venous thrombus.  He started on NSAIDs and warm compresses, now largely resolved (hyperpigmented vein still visible)  -- Previously discussed symptoms requiring re-evaluation    T2DM  Patient currently on metformin and prescribed Mounjaro, however states he has been taking Mounjaro as he was waiting to discuss with oncology.  No interactions with durvalumab, from an oncology standpoint, okay to start.    -- Message previously sent to PCP (Dr. Calvin) as above, including that pt is on high dose steroids        The above information has been reviewed with the patient and all questions have been answered to their apparent satisfaction.  They understand that they can call the clinic with any questions.    Judith Reed MD  Hematology/Oncology Fellow  [facility]        Med Onc Chart Routing      Follow up with physician 4 weeks. RTC 09/25/24 with CT chest a day prior. Durvalumab infusion 09/25/24   Follow up with SHARRI    Infusion scheduling note    Injection scheduling note    Labs    Imaging    Pharmacy appointment    Other referrals

## 2024-08-30 ENCOUNTER — TELEPHONE (OUTPATIENT)
Dept: HEMATOLOGY/ONCOLOGY | Facility: CLINIC | Age: 69
End: 2024-08-30
Payer: MEDICARE

## 2024-08-30 LAB — BACTERIA UR CULT: ABNORMAL

## 2024-08-30 NOTE — TELEPHONE ENCOUNTER
Spoke with the patient and confirmed that he has picked up his antibiotic today. I informed him that I will be following up in a couple days to see how his symptoms are. Patient verbalized understanding and thanked me for calling.

## 2024-08-30 NOTE — TELEPHONE ENCOUNTER
----- Message from Guanako Grove IV, MD sent at 8/28/2024  4:57 PM CDT -----  Judith: I put in the bactrim x7 days    Papi: Can you let the pt know that the antibiotic has been prescribed (Edin Duncan), and can we follow up with the patient in a few days to see how his symptoms are doing?     Naima: Labs with scans, please!    Thanks all!

## 2024-09-09 ENCOUNTER — TELEPHONE (OUTPATIENT)
Dept: HEMATOLOGY/ONCOLOGY | Facility: CLINIC | Age: 69
End: 2024-09-09
Payer: MEDICARE

## 2024-09-09 NOTE — TELEPHONE ENCOUNTER
Spoke with the patient to get an update on his symptoms following prescription of antibiotic for hematuria. Pt reports that they are no longer experiencing hematuria and denies any pain. Pt thanked me for calling.

## 2024-09-24 ENCOUNTER — HOSPITAL ENCOUNTER (OUTPATIENT)
Dept: RADIOLOGY | Facility: HOSPITAL | Age: 69
Discharge: HOME | End: 2024-09-24
Attending: STUDENT IN AN ORGANIZED HEALTH CARE EDUCATION/TRAINING PROGRAM
Payer: MEDICARE

## 2024-09-24 DIAGNOSIS — C34.32 SQUAMOUS CELL CARCINOMA OF BRONCHUS IN LEFT LOWER LOBE: ICD-10-CM

## 2024-09-24 PROCEDURE — 71260 CT THORAX DX C+: CPT | Mod: TC

## 2024-09-24 PROCEDURE — 71260 CT THORAX DX C+: CPT | Mod: 26,,, | Performed by: STUDENT IN AN ORGANIZED HEALTH CARE EDUCATION/TRAINING PROGRAM

## 2024-09-24 PROCEDURE — 25500020 PHARM REV CODE 255: Performed by: STUDENT IN AN ORGANIZED HEALTH CARE EDUCATION/TRAINING PROGRAM

## 2024-09-24 RX ADMIN — IOHEXOL 75 ML: 350 INJECTION, SOLUTION INTRAVENOUS at 09:09

## 2024-09-25 ENCOUNTER — INFUSION (OUTPATIENT)
Dept: INFUSION THERAPY | Facility: HOSPITAL | Age: 69
End: 2024-09-25
Payer: MEDICARE

## 2024-09-25 ENCOUNTER — OFFICE VISIT (OUTPATIENT)
Dept: HEMATOLOGY/ONCOLOGY | Facility: CLINIC | Age: 69
End: 2024-09-25
Payer: MEDICARE

## 2024-09-25 VITALS
RESPIRATION RATE: 16 BRPM | HEIGHT: 67 IN | DIASTOLIC BLOOD PRESSURE: 65 MMHG | OXYGEN SATURATION: 98 % | WEIGHT: 164 LBS | WEIGHT: 164 LBS | BODY MASS INDEX: 25.74 KG/M2 | HEART RATE: 86 BPM | BODY MASS INDEX: 25.74 KG/M2 | SYSTOLIC BLOOD PRESSURE: 108 MMHG | HEIGHT: 67 IN

## 2024-09-25 DIAGNOSIS — J98.4 PNEUMONITIS: ICD-10-CM

## 2024-09-25 DIAGNOSIS — M54.50 ACUTE LEFT-SIDED LOW BACK PAIN WITHOUT SCIATICA: ICD-10-CM

## 2024-09-25 DIAGNOSIS — C34.32 SQUAMOUS CELL CARCINOMA OF BRONCHUS IN LEFT LOWER LOBE: Primary | ICD-10-CM

## 2024-09-25 DIAGNOSIS — C34.12 ADENOCARCINOMA OF UPPER LOBE OF LEFT LUNG: ICD-10-CM

## 2024-09-25 PROCEDURE — 1159F MED LIST DOCD IN RCRD: CPT | Mod: CPTII,S$GLB,, | Performed by: HOSPITALIST

## 2024-09-25 PROCEDURE — 3288F FALL RISK ASSESSMENT DOCD: CPT | Mod: CPTII,S$GLB,, | Performed by: HOSPITALIST

## 2024-09-25 PROCEDURE — 3074F SYST BP LT 130 MM HG: CPT | Mod: CPTII,S$GLB,, | Performed by: HOSPITALIST

## 2024-09-25 PROCEDURE — 25000003 PHARM REV CODE 250: Performed by: HOSPITALIST

## 2024-09-25 PROCEDURE — 3044F HG A1C LEVEL LT 7.0%: CPT | Mod: CPTII,S$GLB,, | Performed by: HOSPITALIST

## 2024-09-25 PROCEDURE — 63600175 PHARM REV CODE 636 W HCPCS: Mod: JZ,JG | Performed by: HOSPITALIST

## 2024-09-25 PROCEDURE — 3072F LOW RISK FOR RETINOPATHY: CPT | Mod: CPTII,S$GLB,, | Performed by: HOSPITALIST

## 2024-09-25 PROCEDURE — 99999 PR PBB SHADOW E&M-EST. PATIENT-LVL III: CPT | Mod: PBBFAC,,, | Performed by: HOSPITALIST

## 2024-09-25 PROCEDURE — 1157F ADVNC CARE PLAN IN RCRD: CPT | Mod: CPTII,S$GLB,, | Performed by: HOSPITALIST

## 2024-09-25 PROCEDURE — 3078F DIAST BP <80 MM HG: CPT | Mod: CPTII,S$GLB,, | Performed by: HOSPITALIST

## 2024-09-25 PROCEDURE — G2211 COMPLEX E/M VISIT ADD ON: HCPCS | Mod: S$GLB,,, | Performed by: HOSPITALIST

## 2024-09-25 PROCEDURE — 1126F AMNT PAIN NOTED NONE PRSNT: CPT | Mod: CPTII,S$GLB,, | Performed by: HOSPITALIST

## 2024-09-25 PROCEDURE — 96413 CHEMO IV INFUSION 1 HR: CPT

## 2024-09-25 PROCEDURE — 3008F BODY MASS INDEX DOCD: CPT | Mod: CPTII,S$GLB,, | Performed by: HOSPITALIST

## 2024-09-25 PROCEDURE — 99215 OFFICE O/P EST HI 40 MIN: CPT | Mod: S$GLB,,, | Performed by: HOSPITALIST

## 2024-09-25 PROCEDURE — 1101F PT FALLS ASSESS-DOCD LE1/YR: CPT | Mod: CPTII,S$GLB,, | Performed by: HOSPITALIST

## 2024-09-25 RX ORDER — EPINEPHRINE 0.3 MG/.3ML
0.3 INJECTION SUBCUTANEOUS ONCE AS NEEDED
Status: DISCONTINUED | OUTPATIENT
Start: 2024-09-25 | End: 2024-09-25 | Stop reason: HOSPADM

## 2024-09-25 RX ORDER — SODIUM CHLORIDE 0.9 % (FLUSH) 0.9 %
10 SYRINGE (ML) INJECTION
Status: CANCELLED | OUTPATIENT
Start: 2024-09-25

## 2024-09-25 RX ORDER — SODIUM CHLORIDE 0.9 % (FLUSH) 0.9 %
10 SYRINGE (ML) INJECTION
Status: DISCONTINUED | OUTPATIENT
Start: 2024-09-25 | End: 2024-09-25 | Stop reason: HOSPADM

## 2024-09-25 RX ORDER — DIPHENHYDRAMINE HYDROCHLORIDE 50 MG/ML
50 INJECTION INTRAMUSCULAR; INTRAVENOUS ONCE AS NEEDED
Status: DISCONTINUED | OUTPATIENT
Start: 2024-09-25 | End: 2024-09-25 | Stop reason: HOSPADM

## 2024-09-25 RX ORDER — EPINEPHRINE 0.3 MG/.3ML
0.3 INJECTION SUBCUTANEOUS ONCE AS NEEDED
Status: CANCELLED | OUTPATIENT
Start: 2024-09-25

## 2024-09-25 RX ORDER — DIPHENHYDRAMINE HYDROCHLORIDE 50 MG/ML
50 INJECTION INTRAMUSCULAR; INTRAVENOUS ONCE AS NEEDED
Status: CANCELLED | OUTPATIENT
Start: 2024-09-25

## 2024-09-25 RX ORDER — HEPARIN 100 UNIT/ML
500 SYRINGE INTRAVENOUS
Status: CANCELLED | OUTPATIENT
Start: 2024-09-25

## 2024-09-25 RX ORDER — HEPARIN 100 UNIT/ML
500 SYRINGE INTRAVENOUS
Status: DISCONTINUED | OUTPATIENT
Start: 2024-09-25 | End: 2024-09-25 | Stop reason: HOSPADM

## 2024-09-25 RX ADMIN — SODIUM CHLORIDE 1500 MG: 9 INJECTION, SOLUTION INTRAVENOUS at 10:09

## 2024-09-25 NOTE — PROGRESS NOTES
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   9/15/2023 Imaging Significant Findings    CXR (URI)  - Masslike focus along L hilar region     10/6/2023 Imaging Significant Findings    CT C, Non-Con  - 6.6 x 5.9 cm LLL mass  - 2 x 1.2 cm cavitary lesion in lingula  - 0.9 x 0.8 cm pleural based nodule in R lung base  - Trace L pleural effusion  - Suspect hilar LAD  - Calcified hilar LN     11/2/2023 Procedure    Bronchoscopy  LLL, Endobronchial Biopsy  - Squamous cell carcinoma (CDK 5/6, p63 positive, CK7 and TTF negative)    [facility] N[facility]  - Insufficient tissue     11/6/2023 Imaging Significant Findings    PET CT  - 7.6 x 6.0 cm soft tissue mass in superior segment of LLL, SUV 7.2  - 2.5 x 1.6 cm cavitary lesion in ELDER (neoplastic vs infectious/inflammatory), SUV max 1.8  - No hypermetabolic LAD  - 0.9 cm L hypoattenuating L adrenal nodule, likely adenoma     11/29/2023 Tumor Conference    Tumor Board  - Recommend staging EBUS and L robotic bronchoscopy for sampling of ELDER cavitary nodule.  The nodule is highly suspicious for possible adenocarcinoma as it has evolved from a ground-glass opacity to a part solid and cavitary nodule.  Obtain brain MRI to complete staging.  The clinical picture suggests a possible T3 or T4 primary lung cancer or two separate primary lung cancers.        11/29/2023 Tumor Genotyping    Liquid Biopsy, [facility]  - CKDN2A, TP53 (three separate LOF mutations)     11/30/2023 Imaging Significant Findings    MRI Brain  - JAN     12/12/2023 Imaging Significant Findings    CT C, Non-Con  - 6.7 x 6.2 cm LLL mass (previously 6.6 x 5.9 cm), abutting and narrowing the adjacent LLL bronchus  - Enlarging lingular cavitary lesion, now 2.5 x 1.6 cm  - Slightly decreased size of RLL pleural based nodule     1/3/2024 Tumor  Conference    Tumor Board  - Agree with plan for SBRT of lingular lesion and chemoRT to LLL mass.      1/5/2024 Cancer Staged    Staging form: Lung, AJCC 8th Edition  - Clinical: Stage IIIA (cT4, cN0, cM0)     1/19/2024 - 2/26/2024 Chemotherapy    Treatment Summary   Plan Name: OP NSCLC PACLITAXEL + CARBOPLATIN (AUC) QW + RADIATION  Treatment Goal: Curative  Status: Inactive  Start Date: 1/19/2024  End Date: 2/26/2024  Provider: Guanako Grove IV, MD  Chemotherapy: CARBOplatin (PARAPLATIN) 210 mg in sodium chloride 0.9% 136 mL chemo infusion, 210 mg (100 % of original dose 208.2 mg), Intravenous, Clinic/HOD 1 time, 6 of 6 cycles  Dose modification:   (original dose 208.2 mg, Cycle 1)  Administration: 210 mg (1/22/2024), 210 mg (1/29/2024), 180 mg (2/5/2024), 225 mg (2/12/2024), 210 mg (2/19/2024), 210 mg (2/26/2024)  PACLitaxeL (TAXOL) 45 mg/m2 = 84 mg in sodium chloride 0.9% 250 mL chemo infusion, 45 mg/m2 = 84 mg, Intravenous, Clinic/HOD 1 time, 6 of 6 cycles  Administration: 84 mg (1/22/2024), 84 mg (1/29/2024), 84 mg (2/5/2024), 84 mg (2/12/2024), 84 mg (2/19/2024), 84 mg (2/26/2024)     1/23/2024 - 3/6/2024 Radiation Therapy    Treating physician: Santino Arora  Treatment Summary  Course: C1 Thorax 2024  Treatment Site Ref. ID Energy Dose/Fx (Gy) #Fx Dose Correction (Gy) Total Dose (Gy) Start Date End Date Elapsed Days   IM Lung_L PTV_High 6X 2.2 19 / 30 0 41.8 1/23/2024 2/19/2024 27   IMLungNew PTV_High_resim 6X 2.2 11 / 11 0 24.2 2/21/2024 3/6/2024 14   Multiple planns were attempted, including SBRT to the ELDER and CRT to the LLL. With dose overlap, it was ultimately decided to treat with concurrent CRT to both lesions.. He was resimulated for treatment response in the LLL mass.     3/13/2024 Imaging Significant Findings    CT C/A/P  - 4.7 x 4.4 cm perihilar LLL mass, previously 6.7 x 6.2 cm. Decreased mass effect on LLL airways  - 2.7 x 1.7 cm cavitary ligular lesion, unchanged  - New cavitary lesions, solid  pulmonary nodules, and ill-defined opacities in lungs b/l  - Unchanged few sub-cm hypodensities in liver     4/19/2024 -  Chemotherapy    Treatment Summary   Plan Name: OP DURVALUMAB 1500 MG Q4W  Treatment Goal: Curative  Status: Active  Start Date: 4/19/2024  End Date: 3/20/2025 (Planned)  Provider: Guanako Grove IV, MD  Chemotherapy: [No matching medication found in this treatment plan]     6/11/2024 Imaging Significant Findings    CT C/A/P  - 4.2 x 3.4 cm perihilar LLL mass, previously 4.7 x 4.4 cm.  Improvement in intralesional cavitation  - 1.6 x 1.3 cm lingular lesion, previously 2.7 x 1.7 cm  - New interstitial infiltrates in posterior aspect of ELDER and lingula  - Worsening of ill-defined opacities in LLL and bronchiectatic changes  - Improvement in previously seen ill-defined opacities in medial aspect of RLL  - Improvement other cavitary lesions and pulmonary nodules  - Postoperative upper lobectomy changes, no recurrent disease bronchial stump  - Small pericardial effusion  - Few subcentimeter hepatic hypodensities, grossly unchanged     7/30/2024 Imaging Significant Findings    CT C  - Interval resolution of lingular cavitary lesion   - 4.3 x 4.0 left hilar soft tissue mass, previously 4.2 x 3.4 cm  - Worsening opacity at left lung base with denser consolidation, concerning for possible superimposed infection, malignancy not excluded  - 0.9 cm more conspicuous nodular density along left greater fissure  - Trace pleural fluid on left  - 0.4 cm ELDER pulmonary nodule, unchanged  - 0.6 cm right paramediastinal nodule, unchanged  - No mediastinal or hilar lymphadenopathy     9/24/2024 Imaging Significant Findings    CT C/A/P  4.4 x 4.2 cm left hilar mass, stable compared to prior  Previously identified nodular density along fissure no longer visualized  Stable opacity in left lung base, possibly representing infiltrative versus inflammatory changes     Adenocarcinoma of Lingula   10/6/2023 Imaging Significant  Findings    CT C, Non-Con  - 6.6 x 5.9 cm LLL mass  - 2 x 1.2 cm cavitary lesion in lingula  - 0.9 x 0.8 cm pleural based nodule in R lung base  - Trace L pleural effusion  - Suspect hilar LAD  - Calcified hilar LN     11/6/2023 Imaging Significant Findings    PET CT  - 7.6 x 6.0 cm soft tissue mass in superior segment of LLL, SUV 7.2  - 2.5 x 1.6 cm cavitary lesion in ELDER (neoplastic vs infectious/inflammatory), SUV max 1.8  - No hypermetabolic LAD  - 0.9 cm L hypoattenuating L adrenal nodule, likely adenoma     11/29/2023 Tumor Conference    Tumor Board  - Recommend staging EBUS and L robotic bronchoscopy for sampling of ELDER cavitary nodule.  The nodule is highly suspicious for possible adenocarcinoma as it has evolved from a ground-glass opacity to a part solid and cavitary nodule.  Obtain brain MRI to complete staging.  The clinical picture suggests a possible T3 or T4 primary lung cancer or two separate primary lung cancers.        11/29/2023 Tumor Genotyping    Liquid Biopsy, [facility]  - CKDN2A, TP53 (three separate LOF mutations)     11/30/2023 Imaging Significant Findings    MRI Brain  - JAN     12/12/2023 Imaging Significant Findings    CT C, Non-Con  - 6.7 x 6.2 cm LLL mass (previously 6.6 x 5.9 cm), abutting and narrowing the adjacent LLL bronchus  - Enlarging lingular cavitary lesion, now 2.5 x 1.6 cm  - Slightly decreased size of RLL pleural based nodule     12/19/2023 Procedure    Bronch with EBUS  ELDER, FNA and TBBx  - Adenocarcinoma (TTF1 positive, p40 negative)    LN  - Negative: Station 7  - Report: No pathologic enlargement of 4R, 4L, or 11R. Unable to evaluated 11L due to obstructing LLL endobronchial mass    [facility] NGS  - KRAS G12C  - TP53  - Negative: EGFR, ALK, BRAF, ROS1, RET, MET, ERBB2  - PD-L1: 10%       1/3/2024 Tumor Conference    Tumor Board  - Agree with plan for SBRT of lingular lesion and chemoRT to LLL mass.      1/5/2024 Cancer Staged    Staging form: Lung, AJCC 8th Edition  -  Clinical: Stage IA3 (cT1c, cN0, cM0)     3/13/2024 Imaging Significant Findings    CT C/A/P  - 4.7 x 4.4 cm perihilar LLL mass, previously 6.7 x 6.2 cm. Decreased mass effect on LLL airways  - 2.7 x 1.7 cm cavitary ligular lesion, unchanged  - New cavitary lesions, solid pulmonary nodules, and ill-defined opacities in lungs b/l  - Unchanged few sub-cm hypodensities in liver     6/11/2024 Imaging Significant Findings    CT C/A/P  - 4.2 x 3.4 cm perihilar LLL mass, previously 4.7 x 4.4 cm.  Improvement in intralesional cavitation  - 1.6 x 1.3 cm lingular lesion, previously 2.7 x 1.7 cm  - New interstitial infiltrates in posterior aspect of ELDER and lingula  - Worsening of ill-defined opacities in LLL and bronchiectatic changes  - Improvement in previously seen ill-defined opacities in medial aspect of RLL  - Improvement other cavitary lesions and pulmonary nodules  - Postoperative upper lobectomy changes, no recurrent disease bronchial stump  - Small pericardial effusion  - Few subcentimeter hepatic hypodensities, grossly unchanged     7/30/2024 Imaging Significant Findings    CT C  - Interval resolution of lingular cavitary lesion   - 4.3 x 4.0 left hilar soft tissue mass, previously 4.2 x 3.4 cm  - Worsening opacity at left lung base with denser consolidation, concerning for possible superimposed infection, malignancy not excluded  - 0.9 cm more conspicuous nodular density along left greater fissure  - Trace pleural fluid on left  - 0.4 cm ELDER pulmonary nodule, unchanged  - 0.6 cm right paramediastinal nodule, unchanged  - No mediastinal or hilar lymphadenopathy     9/24/2024 Imaging Significant Findings    CT C/A/P  4.4 x 4.2 cm left hilar mass, stable compared to prior  Previously identified nodular density along fissure no longer visualized  Stable opacity in left lung base, possibly representing infiltrative versus inflammatory changes        Oncology History      HPI:     Hernan Engel is a 69 y.o. male with pmh  "significant for COPD, T2DM, HLD, Stage IB ([id number]) moderately differentiated adenocarcinoma of RUL, initially dx'd 3/2014, s/p R upper lobectomy (3/2014, Dr. 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  If imaging stable to improved, entertain resuming durvalumab.  Will treat UTI with the Bactrim.    Interval History:   - 8/28/24:  Urine culture with pansensitive E coli  - 9/25/24: CT C/A/P, stable, as above    The pt states that his breathing is "a bit better". He says that he is walking at work without issue. He feels that his breathing is actually better than prior to treatment. He denies a cough. He confirms that he completed his course of steroids.     He denies dysuria, polyuria, or malodorous urine. He started but did not complete his bactrim.     He describes some aches and pains in his low back which started ~4 days ago, which he feels may be related to lifting objects (he recently lifted a stove and an ice box). He says that tylenol didn't really help.     ROS:   As per HPI.       Physical Exam:       There were no vitals taken for this visit.               Physical Exam  Constitutional:       Appearance: Normal appearance.   HENT:      Head: Normocephalic and atraumatic. "   Eyes:      Extraocular Movements: Extraocular movements intact.      Conjunctiva/sclera: Conjunctivae normal.      Pupils: Pupils are equal, round, and reactive to light.   Cardiovascular:      Rate and Rhythm: Normal rate and regular rhythm.      Heart sounds: No murmur heard.     No friction rub. No gallop.   Pulmonary:      Effort: Pulmonary effort is normal.      Breath sounds: No wheezing, rhonchi or rales.   Musculoskeletal:         General: Normal range of motion.      Right lower leg: No edema.      Left lower leg: No edema.      Comments: No pain to palpation over spine, paraspinus muscles, or L flank   Skin:     General: Skin is warm and dry.   Neurological:      Mental Status: He is alert and oriented to person, place, and time.   Psychiatric:         Mood and Affect: Mood normal.         Thought Content: Thought content normal.         Judgment: Judgment normal.       Imaging:    See oncologic history as above.     Path:  See oncologic history above.      Assessment and Plan:     Hernan Engel is a 68 y.o. male with pmh significant for COPD, T2DM, HLD, Stage IB ([id number]) moderately differentiated adenocarcinoma of RUL, initially dx'd 3/2014, s/p R upper lobectomy (3/2014, Dr. Mendez), and now two newly diagnosed concurrent lung cancers as below who presents for follow up:     1) Stage IIIA (T4N0M0) squamous cell carcinoma of the LLL (insufficient sample for PD-L1, no targetable mutations on liquid biopsy), initially dx'd 11/2/23, s/p CRT (1/23/24 - 3/05/24) w/ weekly carboplatin/paclitaxel (1/22/24 - 2/26/24), and durvalumab consolidation (4/19/24-5/16/24; held for pneumonitis)    2) Stage IA3 (C3qI1T6) adenocarcinoma of the lingula (KRAS G12C, PD-L1 10%), initially dx'd 12/19/23, s/p CRT (1/23/24 - 3/05/24) w/ weekly carboplatin/paclitaxel (1/22/24 - 2/26/24), and durvalumab consolidation (4/19/24-5/16/24; held for pneumonitis)    *Notably, pt has a RLL pleural based nodule, reviewed at TB, appears  linear and unlikely malignant    Stage IIIA Squamous Cell Carcinoma of the LLL, No PD-L1, No Targetable Mutations  ECOG PS 0.  Patient currently on durvalumab consolidation (currently held, last dose 5/16/24), see previous notes for considerations.  At this time, all respiratory symptoms have improved back to baseline.  Repeat CT C/A/P (9/24/24) demonstrated ongoing inflammatory changes in the LLL which appears stable compared to prior, favor these represent persistent radiographic changes rather than active/worsening pneumonitis given complete symptomatic resolution.  Also favor that pnuemonitis was related more so to radiation therapy than immunotherapy given timing and location of changes. Now that he has improved to < G1 pneumonitis, it is appropriate to rechallenge with immunotherapy at this time.  Of note, per the PACIFIC trial, doses were only held for two cycles; as we had been holding treatment now for symptom burden but rather out of caution and while awaiting radiographic evidence of stability/improvement, it is appropriate to consider restarting treatment now.  This has been discussed at length with the patient who endorses understanding and agrees to restart immunotherapy.  We have a conversation regarding symptoms for which to contact the clinic, including worsening or new shortness of breath or cough. Ultimately, will proceed w/ durvalumab for 1 year total from end of CRT.     Of note, the patient missed his initial infusion of durvalumab due to concern that it was too close to radiation therapy and so had received only 2 doses prior to repeat imaging.    PLAN:   -- Resume durvalumab C3 today (9/25/24), labs acceptable and treatment signed  -- Labs, RTC, and C4 on 10/23/24  -- CT C due around 12/17/24      Stage IA3 Adenocarcinoma of the Lingula, NGS and PD-L1 Pending  Bronchoscopy demonstrates adenocarcinoma of the lingula, separate from the squamous cell carcinoma in the LLL.  This is a T1c lesion  which, given lack of lymphadenopathy, makes this a Stage IA3 disease.  Plan for treatment w/ CRT followed by immunotherapy, as above. Most recent imaging demonstrates resolution of this lesion.   -- Treatment as above      Dyspnea  Pneumonitis  See previous notes for considerations and treatment, now s/p high dose steroid taper.   -- Resume immunotherapy as above      Low Back Pain  See HPI for description.  Appears musculoskeletal in nature, noting that it does wrap somewhat around left flank.  Non reproducible to palpation.  -- Trial of ibuprofen  -- Pt to reach out if symptoms worsen      Superficial Thrombophlebitis  Patient presented to urgent Care with swelling over his right forearm.  Ultrasound demonstrated a superficial venous thrombus.  He started on NSAIDs and warm compresses, now largely resolved (hyperpigmented vein still visible)  -- Previously discussed symptoms requiring re-evaluation      T2DM  Patient currently on metformin and prescribed Mounjaro, however states he has been taking Mounjaro as he was waiting to discuss with oncology.  No interactions with durvalumab, from an oncology standpoint, okay to start.    -- Message previously sent to PCP (Dr. Calvin) as above, including that pt is on high dose steroids        The above information has been reviewed with the patient and all questions have been answered to their apparent satisfaction.  They understand that they can call the clinic with any questions.    Guanako Grove MD  Hematology/Oncology  [facility]        Med Onc Chart Routing      Follow up with physician . -- Labs, RTC, and C4 on 10/23/24 -- CT C due around 12/17/24   Follow up with SHARRI    Infusion scheduling note    Injection scheduling note    Labs    Imaging    Pharmacy appointment    Other referrals

## 2024-09-28 NOTE — PROGRESS NOTES
Subjective:       Hernan Engel is a 69 y.o. male who is a new patient who was referred by Dr. Judith Reed  for evaluation of hematuria.      Referred for gross hematuria. Occurred in August, lasted for 1 day. He was noted to have E coli UTI in 8/2024. +dysuria. He was given Bactrim that he did not complete, states he took a 1-2 days and stopped when feeling better. He feels he got UTI from toilets at work ([employer]). No prior UTI. No prior kidney stones. No flank pain.     Previously saw Dr Gordon for ED in 2019. Currently followed by oncology for SCC of lung and adenoca of lungula w recent chemo/rads, currently on immunotherapy.    He also again brings up ED. He was given Viagra 50mg but has not tried it due to concern for SE.     UA micro 8/24 - >100 RBCs (+E coli UTI)    CT 3/2024 c/a/p - no stones/hydro/mass of kidney, prostate calcifications (+lung mass).     The following portions of the patient's history were reviewed and updated as appropriate: allergies, current medications, past family history, past medical history, past social history, past surgical history and problem list.    Review of Systems  Twelve point review of systems completed. Pertinent positive and negatives listed in HPI.      Objective:    Vitals: Wt 74.8 kg (164 lb 14.5 oz)   BMI 25.83 kg/m²     Physical Exam   General: well developed, well nourished in no acute distress  Head: normocephalic, atraumatic  Neck: no obvious enlargement of thyroid  HEENT: EOMI, mucus membranes moist, sclera anicteric, no hearing impairment  Lungs: symmetric expansion, non-labored breathing  Neuro: alert and oriented x 3, no gross deficits  Psych: normal judgment and insight, normal mood/affect and non-anxious  Genitourinary: deferred   RITCHIE: deferred      Lab Review   Urine analysis today in clinic shows +trace protein. No blood.     Lab Results   Component Value Date    WBC 4.52 09/24/2024    HGB 11.9 (L) 09/24/2024    HCT 37.9 (L) 09/24/2024  "   HCT 37 03/27/2014    MCV 88 09/24/2024     09/24/2024     Lab Results   Component Value Date    CREATININE 1.0 09/24/2024    BUN 16 09/24/2024     Lab Results   Component Value Date    PSA 0.95 10/02/2023     No results found for: "PSADIAG"    Imaging  CT reviewed  Reports and images were personally reviewed by me and discussed with the patient today         Assessment/Plan:      1. Gross hematuria    - Discussed etiology and workup of hematuria   - UCx - recently treated for UTI, did not take full course. UA clear today.    - Cytology - not indicated   - CT urogram - will hold as recent upper tract imaging.    - Office cystoscopy     2. Erectile dysfunction due to arterial insufficiency    - Given Viagra 50mg by PCP. Has not tried. Discussed possible SE. Encouraged trial.       Follow up for cystoscopy         "

## 2024-10-01 ENCOUNTER — OFFICE VISIT (OUTPATIENT)
Dept: UROLOGY | Facility: CLINIC | Age: 69
End: 2024-10-01
Payer: MEDICARE

## 2024-10-01 VITALS — WEIGHT: 164.88 LBS | BODY MASS INDEX: 25.83 KG/M2

## 2024-10-01 DIAGNOSIS — R31.0 GROSS HEMATURIA: Primary | ICD-10-CM

## 2024-10-01 DIAGNOSIS — N52.01 ERECTILE DYSFUNCTION DUE TO ARTERIAL INSUFFICIENCY: ICD-10-CM

## 2024-10-01 PROCEDURE — 99999 PR PBB SHADOW E&M-EST. PATIENT-LVL III: CPT | Mod: PBBFAC,,, | Performed by: UROLOGY

## 2024-10-01 PROCEDURE — 3072F LOW RISK FOR RETINOPATHY: CPT | Mod: CPTII,S$GLB,, | Performed by: UROLOGY

## 2024-10-01 PROCEDURE — 3288F FALL RISK ASSESSMENT DOCD: CPT | Mod: CPTII,S$GLB,, | Performed by: UROLOGY

## 2024-10-01 PROCEDURE — 1126F AMNT PAIN NOTED NONE PRSNT: CPT | Mod: CPTII,S$GLB,, | Performed by: UROLOGY

## 2024-10-01 PROCEDURE — 1101F PT FALLS ASSESS-DOCD LE1/YR: CPT | Mod: CPTII,S$GLB,, | Performed by: UROLOGY

## 2024-10-01 PROCEDURE — 1159F MED LIST DOCD IN RCRD: CPT | Mod: CPTII,S$GLB,, | Performed by: UROLOGY

## 2024-10-01 PROCEDURE — 3008F BODY MASS INDEX DOCD: CPT | Mod: CPTII,S$GLB,, | Performed by: UROLOGY

## 2024-10-01 PROCEDURE — 99204 OFFICE O/P NEW MOD 45 MIN: CPT | Mod: S$GLB,,, | Performed by: UROLOGY

## 2024-10-01 PROCEDURE — 1160F RVW MEDS BY RX/DR IN RCRD: CPT | Mod: CPTII,S$GLB,, | Performed by: UROLOGY

## 2024-10-01 PROCEDURE — 3044F HG A1C LEVEL LT 7.0%: CPT | Mod: CPTII,S$GLB,, | Performed by: UROLOGY

## 2024-10-01 PROCEDURE — 1157F ADVNC CARE PLAN IN RCRD: CPT | Mod: CPTII,S$GLB,, | Performed by: UROLOGY

## 2024-10-03 DIAGNOSIS — E11.9 TYPE 2 DIABETES MELLITUS WITHOUT COMPLICATION: ICD-10-CM

## 2024-10-22 ENCOUNTER — PROCEDURE VISIT (OUTPATIENT)
Dept: UROLOGY | Facility: CLINIC | Age: 69
End: 2024-10-22
Payer: MEDICARE

## 2024-10-22 ENCOUNTER — TELEPHONE (OUTPATIENT)
Dept: ENDOSCOPY | Facility: HOSPITAL | Age: 69
End: 2024-10-22

## 2024-10-22 ENCOUNTER — CLINICAL SUPPORT (OUTPATIENT)
Dept: ENDOSCOPY | Facility: HOSPITAL | Age: 69
End: 2024-10-22
Attending: INTERNAL MEDICINE
Payer: MEDICARE

## 2024-10-22 VITALS — WEIGHT: 160.94 LBS | BODY MASS INDEX: 25.21 KG/M2

## 2024-10-22 VITALS — HEIGHT: 67 IN | WEIGHT: 163 LBS | BODY MASS INDEX: 25.58 KG/M2

## 2024-10-22 DIAGNOSIS — Z12.12 ENCOUNTER FOR COLORECTAL CANCER SCREENING: Primary | ICD-10-CM

## 2024-10-22 DIAGNOSIS — Z12.11 ENCOUNTER FOR COLORECTAL CANCER SCREENING: ICD-10-CM

## 2024-10-22 DIAGNOSIS — Z12.11 ENCOUNTER FOR COLORECTAL CANCER SCREENING: Primary | ICD-10-CM

## 2024-10-22 DIAGNOSIS — K63.5 POLYP OF COLON, UNSPECIFIED PART OF COLON, UNSPECIFIED TYPE: ICD-10-CM

## 2024-10-22 DIAGNOSIS — Z12.12 ENCOUNTER FOR COLORECTAL CANCER SCREENING: ICD-10-CM

## 2024-10-22 DIAGNOSIS — R31.0 GROSS HEMATURIA: ICD-10-CM

## 2024-10-22 PROCEDURE — 52000 CYSTOURETHROSCOPY: CPT | Mod: S$GLB,,, | Performed by: UROLOGY

## 2024-10-22 RX ORDER — POLYETHYLENE GLYCOL 3350, SODIUM SULFATE ANHYDROUS, SODIUM BICARBONATE, SODIUM CHLORIDE, POTASSIUM CHLORIDE 236; 22.74; 6.74; 5.86; 2.97 G/4L; G/4L; G/4L; G/4L; G/4L
4 POWDER, FOR SOLUTION ORAL ONCE
Qty: 4000 ML | Refills: 0 | Status: SHIPPED | OUTPATIENT
Start: 2024-10-22 | End: 2024-10-22

## 2024-10-22 NOTE — TELEPHONE ENCOUNTER
Referral for procedure from Telephone call - direct access patient - pt was told by wife to schedule his c-scope after he declined during PAT today.      Spoke to pt to schedule procedure(s) Colonoscopy       Physician to perform procedure(s) Dr. GILMAR Joe  Date of Procedure (s) 10/29/24  Arrival Time 10:15 AM  Time of Procedure(s) 11:15 AM   Location of Procedure(s) [address]  Type of Rx Prep sent to patient: PEG  Instructions provided to patient via Email and mail    Patient was informed on the following information and verbalized understanding. Screening questionnaire reviewed with patient and complete. If procedure requires anesthesia, a responsible adult needs to be present to accompany the patient home, patient cannot drive after receiving anesthesia. Appointment details are tentative, especially check-in time. Patient will receive a prep-op call 7 days prior to confirm check-in time for procedure. If applicable the patient should contact their pharmacy to verify Rx for procedure prep is ready for pick-up. Patient was advised to call the scheduling department at [phone] if pharmacy states no Rx is available. Patient was advised to call the endoscopy scheduling department if any questions or concerns arise.      [facility]oscopy Scheduling Department

## 2024-10-22 NOTE — PLAN OF CARE
Referral for procedure from Telephone call - direct access patient - pt was told by wife to schedule his c-scope after he declined during PAT today.      Spoke to pt to schedule procedure(s) Colonoscopy       Physician to perform procedure(s) Dr. GILMAR Joe  Date of Procedure (s) 10/29/24  Arrival Time 10:15 AM  Time of Procedure(s) 11:15 AM   Location of Procedure(s) [address]  Type of Rx Prep sent to patient: PEG  Instructions provided to patient via Email and mail    Patient was informed on the following information and verbalized understanding. Screening questionnaire reviewed with patient and complete. If procedure requires anesthesia, a responsible adult needs to be present to accompany the patient home, patient cannot drive after receiving anesthesia. Appointment details are tentative, especially check-in time. Patient will receive a prep-op call 7 days prior to confirm check-in time for procedure. If applicable the patient should contact their pharmacy to verify Rx for procedure prep is ready for pick-up. Patient was advised to call the scheduling department at [phone] if pharmacy states no Rx is available. Patient was advised to call the endoscopy scheduling department if any questions or concerns arise.      [facility]oscopy Scheduling Department

## 2024-10-22 NOTE — PROCEDURES
Cystoscopy    Date/Time: 10/22/2024 10:40 AM    Performed by: Svetlana Rodriges MD  Authorized by: Svetlana Rodriges MD    Consent Done?:  Yes (Written)  Timeout: prior to procedure the correct patient, procedure, and site was verified    Prep: patient was prepped and draped in usual sterile fashion    Anesthesia:  Lidocaine jelly  Indications: recurrent UTIs and hematuria    Position:  Supine  Anesthesia:  Lidocaine jelly  Patient sedated?: No    Preparation: Patient was prepped and draped in usual sterile fashion    Scope type:  Flexible cystoscope  Stent inserted: No    Stent removed: No    External exam normal: Yes    Digital exam performed: No    Urethra normal: Yes    Prostate normal: Yes (Mild BPH)    Bladder neck normal: Yes    Bladder normal: Yes     patient tolerated the procedure well with no immediate complications  Comments:        Normal cystoscopy    RTC 3-4 mths

## 2024-10-22 NOTE — TELEPHONE ENCOUNTER
Colonoscopy Procedure Prep Instructions    Date of procedure: 10/29/24 Arrive at: 10:15 AM    Location of Department:   [facility] [address] [place] [address]  Take the [facility] Elevators to 4th Floor Endoscopy Lab    As soon as possible:   your prep from pharmacy and over the counter DULCOLAX LAXATIVE TABLETS            On the day before your procedure   What You CAN do:   You may have clear liquids ONLY-see below for list.     Liquids That Are OK to Drink:   Water  Sports drinks (Gatorade, Power-Aid)  Coffee or tea (no cream or nondairy creamer)  Clear juices without pulp (apple, white grape)  Gelatin desserts (no fruit or toppings)  Clear soda (sprite, coke, ginger ale)  Chicken broth (until 12 midnight the night before procedure)    What You CANNOT do:   Do not EAT solid food, drink milk or anything   colored red.  Do not drink alcohol.  Do not take oral medications within 1 hour of starting   each dose of prep.  No gum chewing or candy morning of procedure                       Note:   (Please disregard the insert instructions from pharmacy).  PEG Bowel Prep is indicated for cleansing of the colon as a preparation for colonoscopy in adults.   Be sure to tell your doctor about all the medicines you take, including prescription and non-prescription medicines, vitamins, and herbal supplements. PEG Bowel Prep may affect how other medicines work.  Medication taken by mouth may not be absorbed properly when taken within 1 hour before the start of each dose of PEG Bowel Prep.    It is not uncommon to experience some abdominal cramping, nausea and/or vomiting when taking the prep. If you have nausea and/or vomiting while taking the prep, stop drinking for 20 to 30 minutes then continue.      How to take prep:    PEG Bowel Prep is a (2-day) prep.    One (1) bottle of prep are required for a complete preparation for colonoscopy. Dilute the solution concentrate as directed prior to  use. You must drink water with each dose of prep, and additional water after each dose.    DOSE 1--Day Before Colonoscopy 10/28/24     Drink at least 6 to 8 glasses of clear liquids from time you wake up until you begin your prep and then continue until bedtime to avoid dehydration.     12:00 pm (NOON) Mix your entire container of prep with lukewarm water and refrigerate. Take four (4) Dulcolax (Bisacodyl) tablets with at least 8 ounces or more of clear liquids.       6:00 pm:    You must complete Steps 1 and 2 below before going to bed:    Step 1-Drink half the liquid in the container within one (1) hour.   Step 2-Refrigerate the remaining half of the liquid for dose 2. See below when to begin this step.                       IMPORTANT: If you experience preparation-related symptoms (for example, nausea, bloating, or cramping), stop, or slow the rate of drinking the additional water until your symptoms decrease.    DOSE 2--Day of the Colonoscopy 10/29/24 at 2-3 AM.    For this dose, repeat Step 1 shown above using the remaining half of the liquid prep.   You may continue drinking water/clear liquids until   2 hours before your colonoscopy or as directed by the scheduling nurse  9:15 AM.      For information about your procedure, two (2) things to view prior to colonoscopy:  Please watch this informational video. It is important to watch this animated consent video prior to your arrival. If you haven't watched the video prior to arriving, you are required to watch it during admission which can causes delays.    Options for viewing:   Using a keyboard:  press and hold the control tab (Ctrl) and left mouse click to follow links.           Colonoscopy Instructional Video                                                                                   OR    Type link address into your web browser's address bar:  [email or web address]      Educational Booklet with pictures:      Colonoscopy Prep  - Liquid      Comments:         IMPORTANT INFORMATION TO KNOW BEFORE YOUR PROCEDURE    [facility] 4th Floor         If your procedure requires the administration of anesthesia, it is necessary for a responsible adult to drive you home. (Medical Transportation, Uber, Lyft, Taxi, etc. may ONLY be used if a responsible adult is present to accompany you home.  The responsible adult CAN'T be the  of the service).      person must be available to return to pick you up within 15 minutes of being notified of discharge.       Please bring a picture ID, insurance card, & copayment      Take Medications as directed below:      If you begin taking any blood thinning medications, injectable weight loss/diabetes medications (other than insulin) , or Adipex (Phentermine) please contact the endoscopy scheduling department listed below as soon as possible.    If you are diabetic see the attached instruction sheet regarding your medication.     If you take HEART, BLOOD PRESSURE, SEIZURE, PAIN, LUNG (including inhalers/nebulizers), ANTI-REJECTION (transplant patients), or PSYCHIATRIC medications, please take at your regular times with a sip of water or as directed by the scheduling nurse.     Important contact information:    Endoscopy [phone] Hours of operation Monday-Friday 8:00-4:30pm.    Questions about insurance or financial obligations call [phone] or [phone].    If you have questions regarding the prep or need to reschedule, please call [phone]. After hours questions requiring immediate assistance, contact [facility] nurse line at [phone] or [phone].   NOTE:     On occasion, unforeseen circumstances may cause a delay in your procedure start time. We respect your time and appreciate your patience during these circumstances.      Comments:        If you are unsure about any of these instructions, call [facility] at [phone].                           Oral Medicine  Day of Prep  Day of Procedure           Glyburide    Do NOT take morning of procedure. If you         Glucotrol (Glipizide)  Do NOT take. take twice daily, take with dinner.         Amaryl (Glimepiride)                                Glucophage    Do NOT take morning of procedure. Take         Glumetza  Take as  when you start eating again.          Fortamet (Metformin)  prescribed.                              Januvia (Sitaglipitin)    Do NOT take morning of procedure. Take         Nesina (Aloglipitin)    when you start eating again.          Onglyza (Saxaglipitin)  Take as              Tradjenta (Linaglipitin)  prescribed.                              Invokana (Canagliflozin)               Farxiga (Dapagliflozin)  Stop 2 days  Do NOT take morning of procedure. Take         Jardiance(Empagliflozin) before prep.  when you start eating again.                          Actos (Pioglitazone)  Take as  Do NOT take morning of procedure. Take           prescribed.  when you start eating again.                          Injectable & Combination Daily Dose  Weekly Dose           Medicine                Adlyxin (Lixisenatide)  If you take these For weekly dose, hold dose at least 8 days prior        Byetta (Exenatide)  medications daily to appointment. You may take the dose after your        Bydureon (Exenatide XL) on the day of your procedure.            Ozempic (Semaglutide)  appointment hold              Trulicity (Dulaglutide)  that dose until              Victoza (Liraglutide)  after your              Mounjaro (Tirzepatide)  procedure.              Raf Valadez                It is important to monitor your blood sugar while doing the bowel preparation. On the day of your bowel   prep, when you are on a clear liquid diet, you may drink beverages with sugar as your source of glucose.   Be sure to mix the prep with water or sugar free liquid only. Below are instructions  on how to adjust your   diabetic medications prior to your scheduled procedure. Call the healthcare provider who manages your   diabetes if you have questions.   Insulin for Type 1   Diabetes Mellitus Day of Prep                                         Day of procedure          Basaglar If you use in the morning, take as prescribed.  If you take in the morning,         Lantus If you use in the evening, inject 70% of dose. inject 80% of dose. If you take        Levemir      in the evenings, inject usual         Toujeo      dose.           Tresiba                Semglee                                Afrezza Count carbs and adjust dose   Do NOT take morning of procedure.         Apidra accordingly. If not carb counting,  Take when you start eating again.         Humalog 100 take 25% of usual meal dose.             Humalog 200 May use correction dose every              Novolog 4 hours. Do NOT use once sugar-free             liquid prep is started.                             Insulin Pump Count carbs and adjust your   May use temp basal rate at 70 % starting          dose as needed. May use temp basal at midnight until after procedure.          rate at 70 % after sugar-free clear liquid             prep is started until midnight.                              Insulin for Type 2   Diabetes Mellitus Day of Prep                                         Day of procedure          Basaglar If you use in the morning, take as prescribed.  If you take in the morning,         Lantus If you use in the evening, inject 70% of dose. inject 80% of dose. If you take        Levemir      in the evenings, inject usual         Toujeo      dose.           Tresiba                                                Afrezza Inject 50 % of dose with clear liquid diet.   Do NOT take morning of         Apidra Do NOT use once sugar-free clear liquid prep procedure. Take when you         Fiasp is started. If you are using a correction scale,  start  eating meals again.         Humalog 100 take dose every 4 hours as needed.             Humalog 200                Novolog                                NPH  Inject 50% of breakfast and dinner   Do NOT take morning of         doses with clear liquid diet.    procedure. Take usual dose              when you eat dinner.                         Regular  Inject 50% of breakfast dose and do NOT take Do NOT take morning of          dinner dose once sugar-free liquid prep has   procedure. Take usual dose          started. If using correction scale, make take dose when you eat dinner.          every 6 hours as needed.                              Novolog Mix 70/30 Inject 50% of breakfast dose. Inject 25%  Do NOT take breakfast dose.         Humolog Mix 75/25 of dinner dose.     Take usual dose when you eat         Humolog Mix 50/50      dinner.                           U500 Take 50% of AM or breakfast unit crissy dose.  Do NOT take mornining of           Take 25% of lunch or dinner or PM unit crissy dose.  procedure. Take when you               start eating again.                          V-Go  Continue to wear your V-Go device. Do NOT click  Coninue to wear your V-Go         once sugar-free clear liquid prep is started.   device. Resume clicks with               meals.

## 2024-10-23 ENCOUNTER — INFUSION (OUTPATIENT)
Dept: INFUSION THERAPY | Facility: HOSPITAL | Age: 69
End: 2024-10-23
Payer: MEDICARE

## 2024-10-23 ENCOUNTER — OFFICE VISIT (OUTPATIENT)
Dept: HEMATOLOGY/ONCOLOGY | Facility: CLINIC | Age: 69
End: 2024-10-23
Payer: MEDICARE

## 2024-10-23 VITALS
DIASTOLIC BLOOD PRESSURE: 61 MMHG | RESPIRATION RATE: 16 BRPM | BODY MASS INDEX: 26.99 KG/M2 | HEART RATE: 60 BPM | SYSTOLIC BLOOD PRESSURE: 133 MMHG | OXYGEN SATURATION: 99 % | HEIGHT: 67 IN | WEIGHT: 171.94 LBS

## 2024-10-23 VITALS
WEIGHT: 171.94 LBS | DIASTOLIC BLOOD PRESSURE: 72 MMHG | TEMPERATURE: 98 F | BODY MASS INDEX: 26.99 KG/M2 | SYSTOLIC BLOOD PRESSURE: 115 MMHG | RESPIRATION RATE: 17 BRPM | HEIGHT: 67 IN | HEART RATE: 76 BPM | OXYGEN SATURATION: 98 %

## 2024-10-23 DIAGNOSIS — M54.50 ACUTE LEFT-SIDED LOW BACK PAIN WITHOUT SCIATICA: ICD-10-CM

## 2024-10-23 DIAGNOSIS — C34.12 ADENOCARCINOMA OF UPPER LOBE OF LEFT LUNG: ICD-10-CM

## 2024-10-23 DIAGNOSIS — I80.9 SUPERFICIAL THROMBOPHLEBITIS, INVOLVING UNSPECIFIED SITE: ICD-10-CM

## 2024-10-23 DIAGNOSIS — J98.4 PNEUMONITIS: ICD-10-CM

## 2024-10-23 DIAGNOSIS — C34.32 SQUAMOUS CELL CARCINOMA OF BRONCHUS IN LEFT LOWER LOBE: Primary | ICD-10-CM

## 2024-10-23 PROCEDURE — 99215 OFFICE O/P EST HI 40 MIN: CPT | Mod: S$GLB,,, | Performed by: HOSPITALIST

## 2024-10-23 PROCEDURE — 3072F LOW RISK FOR RETINOPATHY: CPT | Mod: CPTII,S$GLB,, | Performed by: HOSPITALIST

## 2024-10-23 PROCEDURE — 25000003 PHARM REV CODE 250: Performed by: HOSPITALIST

## 2024-10-23 PROCEDURE — 3008F BODY MASS INDEX DOCD: CPT | Mod: CPTII,S$GLB,, | Performed by: HOSPITALIST

## 2024-10-23 PROCEDURE — 63600175 PHARM REV CODE 636 W HCPCS: Mod: JZ,JG | Performed by: HOSPITALIST

## 2024-10-23 PROCEDURE — 1159F MED LIST DOCD IN RCRD: CPT | Mod: CPTII,S$GLB,, | Performed by: HOSPITALIST

## 2024-10-23 PROCEDURE — 3044F HG A1C LEVEL LT 7.0%: CPT | Mod: CPTII,S$GLB,, | Performed by: HOSPITALIST

## 2024-10-23 PROCEDURE — 99999 PR PBB SHADOW E&M-EST. PATIENT-LVL IV: CPT | Mod: PBBFAC,,, | Performed by: HOSPITALIST

## 2024-10-23 PROCEDURE — 3074F SYST BP LT 130 MM HG: CPT | Mod: CPTII,S$GLB,, | Performed by: HOSPITALIST

## 2024-10-23 PROCEDURE — 96413 CHEMO IV INFUSION 1 HR: CPT

## 2024-10-23 PROCEDURE — 3078F DIAST BP <80 MM HG: CPT | Mod: CPTII,S$GLB,, | Performed by: HOSPITALIST

## 2024-10-23 PROCEDURE — 1101F PT FALLS ASSESS-DOCD LE1/YR: CPT | Mod: CPTII,S$GLB,, | Performed by: HOSPITALIST

## 2024-10-23 PROCEDURE — 3288F FALL RISK ASSESSMENT DOCD: CPT | Mod: CPTII,S$GLB,, | Performed by: HOSPITALIST

## 2024-10-23 PROCEDURE — G2211 COMPLEX E/M VISIT ADD ON: HCPCS | Mod: S$GLB,,, | Performed by: HOSPITALIST

## 2024-10-23 PROCEDURE — 1126F AMNT PAIN NOTED NONE PRSNT: CPT | Mod: CPTII,S$GLB,, | Performed by: HOSPITALIST

## 2024-10-23 PROCEDURE — 1157F ADVNC CARE PLAN IN RCRD: CPT | Mod: CPTII,S$GLB,, | Performed by: HOSPITALIST

## 2024-10-23 RX ORDER — HEPARIN 100 UNIT/ML
500 SYRINGE INTRAVENOUS
Status: CANCELLED | OUTPATIENT
Start: 2024-10-23

## 2024-10-23 RX ORDER — EPINEPHRINE 0.3 MG/.3ML
0.3 INJECTION SUBCUTANEOUS ONCE AS NEEDED
Status: CANCELLED | OUTPATIENT
Start: 2024-10-23

## 2024-10-23 RX ORDER — SODIUM CHLORIDE 0.9 % (FLUSH) 0.9 %
10 SYRINGE (ML) INJECTION
Status: CANCELLED | OUTPATIENT
Start: 2024-10-23

## 2024-10-23 RX ORDER — SODIUM CHLORIDE 0.9 % (FLUSH) 0.9 %
10 SYRINGE (ML) INJECTION
Status: DISCONTINUED | OUTPATIENT
Start: 2024-10-23 | End: 2024-10-23 | Stop reason: HOSPADM

## 2024-10-23 RX ORDER — EPINEPHRINE 0.3 MG/.3ML
0.3 INJECTION SUBCUTANEOUS ONCE AS NEEDED
Status: DISCONTINUED | OUTPATIENT
Start: 2024-10-23 | End: 2024-10-23 | Stop reason: HOSPADM

## 2024-10-23 RX ORDER — DIPHENHYDRAMINE HYDROCHLORIDE 50 MG/ML
50 INJECTION INTRAMUSCULAR; INTRAVENOUS ONCE AS NEEDED
Status: DISCONTINUED | OUTPATIENT
Start: 2024-10-23 | End: 2024-10-23 | Stop reason: HOSPADM

## 2024-10-23 RX ORDER — HEPARIN 100 UNIT/ML
500 SYRINGE INTRAVENOUS
Status: DISCONTINUED | OUTPATIENT
Start: 2024-10-23 | End: 2024-10-23 | Stop reason: HOSPADM

## 2024-10-23 RX ORDER — DIPHENHYDRAMINE HYDROCHLORIDE 50 MG/ML
50 INJECTION INTRAMUSCULAR; INTRAVENOUS ONCE AS NEEDED
Status: CANCELLED | OUTPATIENT
Start: 2024-10-23

## 2024-10-23 RX ADMIN — SODIUM CHLORIDE 1500 MG: 9 INJECTION, SOLUTION INTRAVENOUS at 11:10

## 2024-10-23 RX ADMIN — SODIUM CHLORIDE: 9 INJECTION, SOLUTION INTRAVENOUS at 10:10

## 2024-10-23 NOTE — PLAN OF CARE
Pt sitting in chair, VSS, assessment complete. PIV inserted flushed with blood return infusing NS @ 25 cc/hr while waiting for IMFINZI. WCTM for safety.

## 2024-10-23 NOTE — PLAN OF CARE
Pt tolerated IMFINZI infusion with no issues. PIV removed. Pt ambulated independently off the unit with no issues.

## 2024-10-23 NOTE — PROGRESS NOTES
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   9/15/2023 Imaging Significant Findings    CXR (URI)  - Masslike focus along L hilar region     10/6/2023 Imaging Significant Findings    CT C, Non-Con  - 6.6 x 5.9 cm LLL mass  - 2 x 1.2 cm cavitary lesion in lingula  - 0.9 x 0.8 cm pleural based nodule in R lung base  - Trace L pleural effusion  - Suspect hilar LAD  - Calcified hilar LN     11/2/2023 Procedure    Bronchoscopy  LLL, Endobronchial Biopsy  - Squamous cell carcinoma (CDK 5/6, p63 positive, CK7 and TTF negative)    [facility] NGS  - Insufficient tissue     11/6/2023 Imaging Significant Findings    PET CT  - 7.6 x 6.0 cm soft tissue mass in superior segment of LLL, SUV 7.2  - 2.5 x 1.6 cm cavitary lesion in ELDER (neoplastic vs infectious/inflammatory), SUV max 1.8  - No hypermetabolic LAD  - 0.9 cm L hypoattenuating L adrenal nodule, likely adenoma     11/29/2023 Tumor Conference    Tumor Board  - Recommend staging EBUS and L robotic bronchoscopy for sampling of ELDER cavitary nodule.  The nodule is highly suspicious for possible adenocarcinoma as it has evolved from a ground-glass opacity to a part solid and cavitary nodule.  Obtain brain MRI to complete staging.  The clinical picture suggests a possible T3 or T4 primary lung cancer or two separate primary lung cancers.        11/29/2023 Tumor Genotyping    Liquid Biopsy, [facility]  - CKDN2A, TP53 (three separate LOF mutations)     11/30/2023 Imaging Significant Findings    MRI Brain  - JAN     12/12/2023 Imaging Significant Findings    CT C, Non-Con  - 6.7 x 6.2 cm LLL mass (previously 6.6 x 5.9 cm), abutting and narrowing the adjacent LLL bronchus  - Enlarging lingular cavitary lesion, now 2.5 x 1.6 cm  - Slightly decreased size of RLL pleural based nodule     1/3/2024 Tumor  Conference    Tumor Board  - Agree with plan for SBRT of lingular lesion and chemoRT to LLL mass.      1/5/2024 Cancer Staged    Staging form: Lung, AJCC 8th Edition  - Clinical: Stage IIIA (cT4, cN0, cM0)     1/19/2024 - 2/26/2024 Chemotherapy    Treatment Summary   Plan Name: OP NSCLC PACLITAXEL + CARBOPLATIN (AUC) QW + RADIATION  Treatment Goal: Curative  Status: Inactive  Start Date: 1/19/2024  End Date: 2/26/2024  Provider: Guanako Grove IV, MD  Chemotherapy: CARBOplatin (PARAPLATIN) 210 mg in sodium chloride 0.9% 136 mL chemo infusion, 210 mg (100 % of original dose 208.2 mg), Intravenous, Clinic/HOD 1 time, 6 of 6 cycles  Dose modification:   (original dose 208.2 mg, Cycle 1)  Administration: 210 mg (1/22/2024), 210 mg (1/29/2024), 180 mg (2/5/2024), 225 mg (2/12/2024), 210 mg (2/19/2024), 210 mg (2/26/2024)  PACLitaxeL (TAXOL) 45 mg/m2 = 84 mg in sodium chloride 0.9% 250 mL chemo infusion, 45 mg/m2 = 84 mg, Intravenous, Clinic/HOD 1 time, 6 of 6 cycles  Administration: 84 mg (1/22/2024), 84 mg (1/29/2024), 84 mg (2/5/2024), 84 mg (2/12/2024), 84 mg (2/19/2024), 84 mg (2/26/2024)     1/23/2024 - 3/6/2024 Radiation Therapy    Treating physician: Santino Arora  Treatment Summary  Course: C1 Thorax 2024  Treatment Site Ref. ID Energy Dose/Fx (Gy) #Fx Dose Correction (Gy) Total Dose (Gy) Start Date End Date Elapsed Days   IM Lung_L PTV_High 6X 2.2 19 / 30 0 41.8 1/23/2024 2/19/2024 27   IMLungNew PTV_High_resim 6X 2.2 11 / 11 0 24.2 2/21/2024 3/6/2024 14   Multiple planns were attempted, including SBRT to the ELDER and CRT to the LLL. With dose overlap, it was ultimately decided to treat with concurrent CRT to both lesions.. He was resimulated for treatment response in the LLL mass.     3/13/2024 Imaging Significant Findings    CT C/A/P  - 4.7 x 4.4 cm perihilar LLL mass, previously 6.7 x 6.2 cm. Decreased mass effect on LLL airways  - 2.7 x 1.7 cm cavitary ligular lesion, unchanged  - New cavitary lesions, solid  pulmonary nodules, and ill-defined opacities in lungs b/l  - Unchanged few sub-cm hypodensities in liver     4/19/2024 -  Chemotherapy    Treatment Summary   Plan Name: OP DURVALUMAB 1500 MG Q4W  Treatment Goal: Curative  Status: Active  Start Date: 4/19/2024  End Date: 7/2/2025 (Planned)  Provider: Guanako Grove IV, MD  Chemotherapy: [No matching medication found in this treatment plan]     6/11/2024 Imaging Significant Findings    CT C/A/P  - 4.2 x 3.4 cm perihilar LLL mass, previously 4.7 x 4.4 cm.  Improvement in intralesional cavitation  - 1.6 x 1.3 cm lingular lesion, previously 2.7 x 1.7 cm  - New interstitial infiltrates in posterior aspect of ELDER and lingula  - Worsening of ill-defined opacities in LLL and bronchiectatic changes  - Improvement in previously seen ill-defined opacities in medial aspect of RLL  - Improvement other cavitary lesions and pulmonary nodules  - Postoperative upper lobectomy changes, no recurrent disease bronchial stump  - Small pericardial effusion  - Few subcentimeter hepatic hypodensities, grossly unchanged     7/30/2024 Imaging Significant Findings    CT C  - Interval resolution of lingular cavitary lesion   - 4.3 x 4.0 left hilar soft tissue mass, previously 4.2 x 3.4 cm  - Worsening opacity at left lung base with denser consolidation, concerning for possible superimposed infection, malignancy not excluded  - 0.9 cm more conspicuous nodular density along left greater fissure  - Trace pleural fluid on left  - 0.4 cm ELDER pulmonary nodule, unchanged  - 0.6 cm right paramediastinal nodule, unchanged  - No mediastinal or hilar lymphadenopathy     9/24/2024 Imaging Significant Findings    CT C/A/P  4.4 x 4.2 cm left hilar mass, stable compared to prior  Previously identified nodular density along fissure no longer visualized  Stable opacity in left lung base, possibly representing infiltrative versus inflammatory changes     Adenocarcinoma of Lingula   10/6/2023 Imaging Significant  Findings    CT C, Non-Con  - 6.6 x 5.9 cm LLL mass  - 2 x 1.2 cm cavitary lesion in lingula  - 0.9 x 0.8 cm pleural based nodule in R lung base  - Trace L pleural effusion  - Suspect hilar LAD  - Calcified hilar LN     11/6/2023 Imaging Significant Findings    PET CT  - 7.6 x 6.0 cm soft tissue mass in superior segment of LLL, SUV 7.2  - 2.5 x 1.6 cm cavitary lesion in ELDER (neoplastic vs infectious/inflammatory), SUV max 1.8  - No hypermetabolic LAD  - 0.9 cm L hypoattenuating L adrenal nodule, likely adenoma     11/29/2023 Tumor Conference    Tumor Board  - Recommend staging EBUS and L robotic bronchoscopy for sampling of ELDER cavitary nodule.  The nodule is highly suspicious for possible adenocarcinoma as it has evolved from a ground-glass opacity to a part solid and cavitary nodule.  Obtain brain MRI to complete staging.  The clinical picture suggests a possible T3 or T4 primary lung cancer or two separate primary lung cancers.        11/29/2023 Tumor Genotyping    Liquid Biopsy, [facility]  - CKDN2A, TP53 (three separate LOF mutations)     11/30/2023 Imaging Significant Findings    MRI Brain  - JAN     12/12/2023 Imaging Significant Findings    CT C, Non-Con  - 6.7 x 6.2 cm LLL mass (previously 6.6 x 5.9 cm), abutting and narrowing the adjacent LLL bronchus  - Enlarging lingular cavitary lesion, now 2.5 x 1.6 cm  - Slightly decreased size of RLL pleural based nodule     12/19/2023 Procedure    Bronch with EBUS  ELDER, FNA and TBBx  - Adenocarcinoma (TTF1 positive, p40 negative)    LN  - Negative: Station 7  - Report: No pathologic enlargement of 4R, 4L, or 11R. Unable to evaluated 11L due to obstructing LLL endobronchial mass    [facility] NGS  - KRAS G12C  - TP53  - Negative: EGFR, ALK, BRAF, ROS1, RET, MET, ERBB2  - PD-L1: 10%       1/3/2024 Tumor Conference    Tumor Board  - Agree with plan for SBRT of lingular lesion and chemoRT to LLL mass.      1/5/2024 Cancer Staged    Staging form: Lung, AJCC 8th Edition  -  Clinical: Stage IA3 (cT1c, cN0, cM0)     3/13/2024 Imaging Significant Findings    CT C/A/P  - 4.7 x 4.4 cm perihilar LLL mass, previously 6.7 x 6.2 cm. Decreased mass effect on LLL airways  - 2.7 x 1.7 cm cavitary ligular lesion, unchanged  - New cavitary lesions, solid pulmonary nodules, and ill-defined opacities in lungs b/l  - Unchanged few sub-cm hypodensities in liver     6/11/2024 Imaging Significant Findings    CT C/A/P  - 4.2 x 3.4 cm perihilar LLL mass, previously 4.7 x 4.4 cm.  Improvement in intralesional cavitation  - 1.6 x 1.3 cm lingular lesion, previously 2.7 x 1.7 cm  - New interstitial infiltrates in posterior aspect of ELDER and lingula  - Worsening of ill-defined opacities in LLL and bronchiectatic changes  - Improvement in previously seen ill-defined opacities in medial aspect of RLL  - Improvement other cavitary lesions and pulmonary nodules  - Postoperative upper lobectomy changes, no recurrent disease bronchial stump  - Small pericardial effusion  - Few subcentimeter hepatic hypodensities, grossly unchanged     7/30/2024 Imaging Significant Findings    CT C  - Interval resolution of lingular cavitary lesion   - 4.3 x 4.0 left hilar soft tissue mass, previously 4.2 x 3.4 cm  - Worsening opacity at left lung base with denser consolidation, concerning for possible superimposed infection, malignancy not excluded  - 0.9 cm more conspicuous nodular density along left greater fissure  - Trace pleural fluid on left  - 0.4 cm ELDER pulmonary nodule, unchanged  - 0.6 cm right paramediastinal nodule, unchanged  - No mediastinal or hilar lymphadenopathy     9/24/2024 Imaging Significant Findings    CT C/A/P  4.4 x 4.2 cm left hilar mass, stable compared to prior  Previously identified nodular density along fissure no longer visualized  Stable opacity in left lung base, possibly representing infiltrative versus inflammatory changes        Oncology History      HPI:     Hernan Engel is a 69 y.o. male with pmh  "significant for COPD, T2DM, HLD, Stage IB ([id number]) moderately differentiated adenocarcinoma of RUL, initially dx'd 3/2014, s/p R upper lobectomy (3/2014, Dr. 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 He overall feels well. He denies nausea, vomiting, diarrhea, SOB, rashes. He had cystoscopy yesterday that was normal. He has colonoscopy scheduled for next week.    ROS:   As per HPI.       Physical Exam:       /72 (BP Location: Left arm, Patient Position: Sitting)   Pulse 76   Temp 97.8 °F (36.6 °C) (Oral)   Resp 17   Ht 5' 7" (1.702 m)   Wt 78 kg (171 lb 15.3 oz)   SpO2 98%   BMI 26.93 kg/m²                Physical Exam  Constitutional:       Appearance: Normal appearance.   HENT:      Head: Normocephalic and atraumatic.   Eyes:      Extraocular Movements: Extraocular movements intact.      Conjunctiva/sclera: Conjunctivae normal.      Pupils: Pupils are equal, round, and reactive to light.   Cardiovascular:      Rate and Rhythm: Normal rate and regular rhythm.      Heart sounds: No murmur heard.     No friction rub. No gallop.   Pulmonary:      Effort: Pulmonary effort is normal.      Breath sounds: No wheezing, rhonchi or rales.   Musculoskeletal:    "      General: Normal range of motion.      Right lower leg: No edema.      Left lower leg: No edema.   Skin:     General: Skin is warm and dry.   Neurological:      Mental Status: He is alert and oriented to person, place, and time.   Psychiatric:         Mood and Affect: Mood normal.         Thought Content: Thought content normal.         Judgment: Judgment normal.       Imaging:    See oncologic history as above.     Path:  See oncologic history above.      Assessment and Plan:     Hernan Engel is a 68 y.o. male with pmh significant for COPD, T2DM, HLD, Stage IB ([id number]) moderately differentiated adenocarcinoma of RUL, initially dx'd 3/2014, s/p R upper lobectomy (3/2014, Dr. Mendez), and now two newly diagnosed concurrent lung cancers as below who presents for follow up:     1) Stage IIIA (T4N0M0) squamous cell carcinoma of the LLL (insufficient sample for PD-L1, no targetable mutations on liquid biopsy), initially dx'd 11/2/23, s/p CRT (1/23/24 - 3/05/24) w/ weekly carboplatin/paclitaxel (1/22/24 - 2/26/24), and durvalumab consolidation (4/19/24-5/16/24; held for pneumonitis)    2) Stage IA3 ([id number]) adenocarcinoma of the lingula (KRAS G12C, PD-L1 10%), initially dx'd 12/19/23, s/p CRT (1/23/24 - 3/05/24) w/ weekly carboplatin/paclitaxel (1/22/24 - 2/26/24), and durvalumab consolidation (4/19/24-5/16/24; held for pneumonitis)    *Notably, pt has a RLL pleural based nodule, reviewed at TB, appears linear and unlikely malignant    Stage IIIA Squamous Cell Carcinoma of the LLL, No PD-L1, No Targetable Mutations  ECOG PS 0.  Patient currently on durvalumab consolidation (currently held, last dose 5/16/24), see previous notes for considerations.  At this time, all respiratory symptoms have improved back to baseline.  Repeat CT C/A/P (9/24/24) demonstrated ongoing inflammatory changes in the LLL which appears stable compared to prior, favor these represent persistent radiographic changes rather than  active/worsening pneumonitis given complete symptomatic resolution.  Also favor that pnuemonitis was related more so to radiation therapy than immunotherapy given timing and location of changes. Now that he has improved to < G1 pneumonitis, it is appropriate to rechallenge with immunotherapy at this time.  Of note, per the PACIFIC trial, doses were only held for two cycles; as we had been holding treatment now for symptom burden but rather out of caution and while awaiting radiographic evidence of stability/improvement, it is appropriate to consider restarting treatment now.  This has been discussed at length with the patient who endorses understanding and agrees to restart immunotherapy.  We have a conversation regarding symptoms for which to contact the clinic, including worsening or new shortness of breath or cough. Ultimately, will proceed w/ durvalumab for 1 year total from end of CRT.     Of note, the patient missed his initial infusion of durvalumab due to concern that it was too close to radiation therapy and so had received only 2 doses prior to repeat imaging.    PLAN:   -- Resume durvalumab C4 today (10/23/24), labs acceptable and treatment signed  -- Labs, RTC, and C5 on 11/20/24  -- CT chest due around 12/17/24      Stage IA3 Adenocarcinoma of the Lingula, NGS and PD-L1 Pending  Bronchoscopy demonstrates adenocarcinoma of the lingula, separate from the squamous cell carcinoma in the LLL.  This is a T1c lesion which, given lack of lymphadenopathy, makes this a Stage IA3 disease.  Plan for treatment w/ CRT followed by immunotherapy, as above. Most recent imaging demonstrates resolution of this lesion.   -- Treatment as above      Dyspnea  Pneumonitis  See previous notes for considerations and treatment, now s/p high dose steroid taper.   -- Resume immunotherapy as above      Low Back Pain  See HPI for description.  Appears musculoskeletal in nature, noting that it does wrap somewhat around left flank.   Non reproducible to palpation.  -- Overall improving  -- Pt to reach out if symptoms worsen      Superficial Thrombophlebitis  Patient presented to urgent Care with swelling over his right forearm.  Ultrasound demonstrated a superficial venous thrombus.  He started on NSAIDs and warm compresses, now largely resolved (hyperpigmented vein still visible)  -- Previously discussed symptoms requiring re-evaluation      T2DM  Patient currently on metformin and prescribed Mounjaro, however states he has been taking Mounjaro as he was waiting to discuss with oncology.  No interactions with durvalumab, from an oncology standpoint, okay to start.    -- Anticipate improvement as patient no longer on steroids.        The above information has been reviewed with the patient and all questions have been answered to their apparent satisfaction.  They understand that they can call the clinic with any questions.    Discussed with Dr. Grove.    Tammi Weeks MD   Hematology and Oncology Fellow, PGY VI         Med Onc Chart Routing      Follow up with physician 4 weeks. on 11/20, patient prefers afternoon appt if possible   Follow up with [facility]    Infusion scheduling note   Durvalumab on 11/20   Injection scheduling note    Labs CMP, CBC and TSH   Scheduling:  Preferred lab:  Lab interval:  CBC, CMP, TSH, free T4 on 11/20 prior to appt   Imaging    Pharmacy appointment    Other referrals

## 2024-10-28 ENCOUNTER — TELEPHONE (OUTPATIENT)
Dept: ENDOSCOPY | Facility: HOSPITAL | Age: 69
End: 2024-10-28
Payer: MEDICARE

## 2024-11-04 ENCOUNTER — TELEPHONE (OUTPATIENT)
Dept: HEMATOLOGY/ONCOLOGY | Facility: CLINIC | Age: 69
End: 2024-11-04
Payer: MEDICARE

## 2024-11-15 ENCOUNTER — TELEPHONE (OUTPATIENT)
Dept: HEMATOLOGY/ONCOLOGY | Facility: CLINIC | Age: 69
End: 2024-11-15
Payer: MEDICARE

## 2024-11-18 ENCOUNTER — TELEPHONE (OUTPATIENT)
Dept: HEMATOLOGY/ONCOLOGY | Facility: CLINIC | Age: 69
End: 2024-11-18
Payer: MEDICARE

## 2024-11-18 NOTE — TELEPHONE ENCOUNTER
----- Message from Javi sent at 11/18/2024  2:37 PM CST -----  Regarding: Reschedule Existing Appointment  Contact: [phone]  Reschedule Existing Appointment     Current appt date:11/18/2024     Type of appt : Lab,Appt, and infusion     Physician: Dr. Blevins     Reason for rescheduling: Pt has a flat and can't make appt     Caller:  Hernan Engel     Contact Preference:  [phone]

## 2024-11-20 ENCOUNTER — TELEPHONE (OUTPATIENT)
Dept: HEMATOLOGY/ONCOLOGY | Facility: CLINIC | Age: 69
End: 2024-11-20
Payer: MEDICARE

## 2024-11-20 NOTE — TELEPHONE ENCOUNTER
----- Message from Michele Keyes sent at 11/20/2024 12:55 PM CST -----  Regarding: FW: Consult/Advisory  Contact: [phone]    ----- Message -----  From: Javi Fuentes  Sent: 11/20/2024  12:44 PM CST  To: [facility] Staff  Subject: Consult/Advisory                                 Consult/Advisory     Name Of Caller: Hernan Engel        Contact Preference:  [phone]     Nature of call: Pt is calling because he went to the  and they didn't have the medication list for him. He is asking for a call back once it is brought to the .

## 2024-11-20 NOTE — TELEPHONE ENCOUNTER
Spoke with patient regarding his questions about medications after being notified by the . Pt requesting a comprehensive medication list with each medication name and provider because he is trying to switch to all [facility]. Informed pt I would have the list available for him today. Pt reports that he will be coming today to pick it up from the .

## 2024-11-20 NOTE — TELEPHONE ENCOUNTER
Called pt to notify him that his medication list is at the  right outside of the elevators. Pt verbalized understanding.

## 2024-12-16 ENCOUNTER — TELEPHONE (OUTPATIENT)
Dept: FAMILY MEDICINE | Facility: CLINIC | Age: 69
End: 2024-12-16
Payer: MEDICARE

## 2024-12-16 NOTE — TELEPHONE ENCOUNTER
Spoke with patient regarding message below, patient states that they been cancelled [facility] and switched to  but not sure why [facility] is saying that something needs to be changed. Patient states that they will call [facility] to see what they are asking to be done      ----- Message from Braxton sent at 12/16/2024  1:02 PM CST -----  Regarding: [facility]  Type: Patient Call Back    Who called:[facility]    What is the request in detail:calling to see if this office could call the pt back to notify him to change his primary care provider on his card to this office    Can the clinic reply by [facility]?callback    Would the patient rather a call back or a response via [facility]? callback    Best call back number:[phone]    Additional Information:

## 2025-01-09 DIAGNOSIS — E11.9 TYPE 2 DIABETES MELLITUS WITHOUT COMPLICATION: ICD-10-CM

## 2025-01-24 ENCOUNTER — OFFICE VISIT (OUTPATIENT)
Dept: HEMATOLOGY/ONCOLOGY | Facility: CLINIC | Age: 70
End: 2025-01-24
Payer: MEDICARE

## 2025-01-24 VITALS
HEART RATE: 76 BPM | BODY MASS INDEX: 28.3 KG/M2 | OXYGEN SATURATION: 97 % | HEIGHT: 67 IN | DIASTOLIC BLOOD PRESSURE: 71 MMHG | WEIGHT: 180.31 LBS | TEMPERATURE: 98 F | RESPIRATION RATE: 18 BRPM | SYSTOLIC BLOOD PRESSURE: 123 MMHG

## 2025-01-24 DIAGNOSIS — J98.4 PNEUMONITIS: ICD-10-CM

## 2025-01-24 DIAGNOSIS — C34.32 SQUAMOUS CELL CARCINOMA OF BRONCHUS IN LEFT LOWER LOBE: Primary | ICD-10-CM

## 2025-01-24 DIAGNOSIS — C34.12 ADENOCARCINOMA OF UPPER LOBE OF LEFT LUNG: ICD-10-CM

## 2025-01-24 PROCEDURE — 1101F PT FALLS ASSESS-DOCD LE1/YR: CPT | Mod: CPTII,S$GLB,, | Performed by: HOSPITALIST

## 2025-01-24 PROCEDURE — 1159F MED LIST DOCD IN RCRD: CPT | Mod: CPTII,S$GLB,, | Performed by: HOSPITALIST

## 2025-01-24 PROCEDURE — G2211 COMPLEX E/M VISIT ADD ON: HCPCS | Mod: S$GLB,,, | Performed by: HOSPITALIST

## 2025-01-24 PROCEDURE — 99214 OFFICE O/P EST MOD 30 MIN: CPT | Mod: S$GLB,,, | Performed by: HOSPITALIST

## 2025-01-24 PROCEDURE — 1157F ADVNC CARE PLAN IN RCRD: CPT | Mod: CPTII,S$GLB,, | Performed by: HOSPITALIST

## 2025-01-24 PROCEDURE — 1126F AMNT PAIN NOTED NONE PRSNT: CPT | Mod: CPTII,S$GLB,, | Performed by: HOSPITALIST

## 2025-01-24 PROCEDURE — 3078F DIAST BP <80 MM HG: CPT | Mod: CPTII,S$GLB,, | Performed by: HOSPITALIST

## 2025-01-24 PROCEDURE — 3008F BODY MASS INDEX DOCD: CPT | Mod: CPTII,S$GLB,, | Performed by: HOSPITALIST

## 2025-01-24 PROCEDURE — 3288F FALL RISK ASSESSMENT DOCD: CPT | Mod: CPTII,S$GLB,, | Performed by: HOSPITALIST

## 2025-01-24 PROCEDURE — 99999 PR PBB SHADOW E&M-EST. PATIENT-LVL IV: CPT | Mod: PBBFAC,,, | Performed by: HOSPITALIST

## 2025-01-24 PROCEDURE — 3074F SYST BP LT 130 MM HG: CPT | Mod: CPTII,S$GLB,, | Performed by: HOSPITALIST

## 2025-01-24 NOTE — PROGRESS NOTES
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   9/15/2023 Imaging Significant Findings    CXR (URI)  - Masslike focus along L hilar region     10/6/2023 Imaging Significant Findings    CT C, Non-Con  - 6.6 x 5.9 cm LLL mass  - 2 x 1.2 cm cavitary lesion in lingula  - 0.9 x 0.8 cm pleural based nodule in R lung base  - Trace L pleural effusion  - Suspect hilar LAD  - Calcified hilar LN     11/2/2023 Procedure    Bronchoscopy  LLL, Endobronchial Biopsy  - Squamous cell carcinoma (CDK 5/6, p63 positive, CK7 and TTF negative)    [facility] NGS  - Insufficient tissue     11/6/2023 Imaging Significant Findings    PET CT  - 7.6 x 6.0 cm soft tissue mass in superior segment of LLL, SUV 7.2  - 2.5 x 1.6 cm cavitary lesion in ELDER (neoplastic vs infectious/inflammatory), SUV max 1.8  - No hypermetabolic LAD  - 0.9 cm L hypoattenuating L adrenal nodule, likely adenoma     11/29/2023 Tumor Conference    Tumor Board  - Recommend staging EBUS and L robotic bronchoscopy for sampling of ELDER cavitary nodule.  The nodule is highly suspicious for possible adenocarcinoma as it has evolved from a ground-glass opacity to a part solid and cavitary nodule.  Obtain brain MRI to complete staging.  The clinical picture suggests a possible T3 or T4 primary lung cancer or two separate primary lung cancers.        11/29/2023 Tumor Genotyping    Liquid Biopsy, [facility]  - CKDN2A, TP53 (three separate LOF mutations)     11/30/2023 Imaging Significant Findings    MRI Brain  - JAN     12/12/2023 Imaging Significant Findings    CT C, Non-Con  - 6.7 x 6.2 cm LLL mass (previously 6.6 x 5.9 cm), abutting and narrowing the adjacent LLL bronchus  - Enlarging lingular cavitary lesion, now 2.5 x 1.6 cm  - Slightly decreased size of RLL pleural based nodule     1/3/2024 Tumor  Conference    Tumor Board  - Agree with plan for SBRT of lingular lesion and chemoRT to LLL mass.      1/5/2024 Cancer Staged    Staging form: Lung, AJCC 8th Edition  - Clinical: Stage IIIA (cT4, cN0, cM0)     1/19/2024 - 2/26/2024 Chemotherapy    Treatment Summary   Plan Name: OP NSCLC PACLITAXEL + CARBOPLATIN (AUC) QW + RADIATION  Treatment Goal: Curative  Status: Inactive  Start Date: 1/19/2024  End Date: 2/26/2024  Provider: Guanako Grove IV, MD  Chemotherapy: CARBOplatin (PARAPLATIN) 210 mg in sodium chloride 0.9% 136 mL chemo infusion, 210 mg (100 % of original dose 208.2 mg), Intravenous, Clinic/HOD 1 time, 6 of 6 cycles  Dose modification:   (original dose 208.2 mg, Cycle 1)  Administration: 210 mg (1/22/2024), 210 mg (1/29/2024), 180 mg (2/5/2024), 225 mg (2/12/2024), 210 mg (2/19/2024), 210 mg (2/26/2024)  PACLitaxeL (TAXOL) 45 mg/m2 = 84 mg in sodium chloride 0.9% 250 mL chemo infusion, 45 mg/m2 = 84 mg, Intravenous, Clinic/HOD 1 time, 6 of 6 cycles  Administration: 84 mg (1/22/2024), 84 mg (1/29/2024), 84 mg (2/5/2024), 84 mg (2/12/2024), 84 mg (2/19/2024), 84 mg (2/26/2024)     1/23/2024 - 3/6/2024 Radiation Therapy    Treating physician: Santino Arora  Treatment Summary  Course: C1 Thorax 2024  Treatment Site Ref. ID Energy Dose/Fx (Gy) #Fx Dose Correction (Gy) Total Dose (Gy) Start Date End Date Elapsed Days   IM Lung_L PTV_High 6X 2.2 19 / 30 0 41.8 1/23/2024 2/19/2024 27   IMLungNew PTV_High_resim 6X 2.2 11 / 11 0 24.2 2/21/2024 3/6/2024 14   Multiple planns were attempted, including SBRT to the ELDER and CRT to the LLL. With dose overlap, it was ultimately decided to treat with concurrent CRT to both lesions.. He was resimulated for treatment response in the LLL mass.     3/13/2024 Imaging Significant Findings    CT C/A/P  - 4.7 x 4.4 cm perihilar LLL mass, previously 6.7 x 6.2 cm. Decreased mass effect on LLL airways  - 2.7 x 1.7 cm cavitary ligular lesion, unchanged  - New cavitary lesions, solid  pulmonary nodules, and ill-defined opacities in lungs b/l  - Unchanged few sub-cm hypodensities in liver     4/19/2024 -  Chemotherapy    Treatment Summary   Plan Name: OP DURVALUMAB 1500 MG Q4W  Treatment Goal: Curative  Status: Active  Start Date: 4/19/2024  End Date: 7/2/2025 (Planned)  Provider: Guanako Grove IV, MD  Chemotherapy: [No matching medication found in this treatment plan]     6/11/2024 Imaging Significant Findings    CT C/A/P  - 4.2 x 3.4 cm perihilar LLL mass, previously 4.7 x 4.4 cm.  Improvement in intralesional cavitation  - 1.6 x 1.3 cm lingular lesion, previously 2.7 x 1.7 cm  - New interstitial infiltrates in posterior aspect of ELDER and lingula  - Worsening of ill-defined opacities in LLL and bronchiectatic changes  - Improvement in previously seen ill-defined opacities in medial aspect of RLL  - Improvement other cavitary lesions and pulmonary nodules  - Postoperative upper lobectomy changes, no recurrent disease bronchial stump  - Small pericardial effusion  - Few subcentimeter hepatic hypodensities, grossly unchanged     7/30/2024 Imaging Significant Findings    CT C  - Interval resolution of lingular cavitary lesion   - 4.3 x 4.0 left hilar soft tissue mass, previously 4.2 x 3.4 cm  - Worsening opacity at left lung base with denser consolidation, concerning for possible superimposed infection, malignancy not excluded  - 0.9 cm more conspicuous nodular density along left greater fissure  - Trace pleural fluid on left  - 0.4 cm ELDER pulmonary nodule, unchanged  - 0.6 cm right paramediastinal nodule, unchanged  - No mediastinal or hilar lymphadenopathy     9/24/2024 Imaging Significant Findings    CT C/A/P  4.4 x 4.2 cm left hilar mass, stable compared to prior  Previously identified nodular density along fissure no longer visualized  Stable opacity in left lung base, possibly representing infiltrative versus inflammatory changes     Adenocarcinoma of Lingula   10/6/2023 Imaging Significant  Findings    CT C, Non-Con  - 6.6 x 5.9 cm LLL mass  - 2 x 1.2 cm cavitary lesion in lingula  - 0.9 x 0.8 cm pleural based nodule in R lung base  - Trace L pleural effusion  - Suspect hilar LAD  - Calcified hilar LN     11/6/2023 Imaging Significant Findings    PET CT  - 7.6 x 6.0 cm soft tissue mass in superior segment of LLL, SUV 7.2  - 2.5 x 1.6 cm cavitary lesion in ELDER (neoplastic vs infectious/inflammatory), SUV max 1.8  - No hypermetabolic LAD  - 0.9 cm L hypoattenuating L adrenal nodule, likely adenoma     11/29/2023 Tumor Conference    Tumor Board  - Recommend staging EBUS and L robotic bronchoscopy for sampling of ELDER cavitary nodule.  The nodule is highly suspicious for possible adenocarcinoma as it has evolved from a ground-glass opacity to a part solid and cavitary nodule.  Obtain brain MRI to complete staging.  The clinical picture suggests a possible T3 or T4 primary lung cancer or two separate primary lung cancers.        11/29/2023 Tumor Genotyping    Liquid Biopsy, [facility]  - CKDN2A, TP53 (three separate LOF mutations)     11/30/2023 Imaging Significant Findings    MRI Brain  - JAN     12/12/2023 Imaging Significant Findings    CT C, Non-Con  - 6.7 x 6.2 cm LLL mass (previously 6.6 x 5.9 cm), abutting and narrowing the adjacent LLL bronchus  - Enlarging lingular cavitary lesion, now 2.5 x 1.6 cm  - Slightly decreased size of RLL pleural based nodule     12/19/2023 Procedure    Bronch with EBUS  ELDER, FNA and TBBx  - Adenocarcinoma (TTF1 positive, p40 negative)    LN  - Negative: Station 7  - Report: No pathologic enlargement of 4R, 4L, or 11R. Unable to evaluated 11L due to obstructing LLL endobronchial mass    [facility] NGS  - KRAS G12C  - TP53  - Negative: EGFR, ALK, BRAF, ROS1, RET, MET, ERBB2  - PD-L1: 10%       1/3/2024 Tumor Conference    Tumor Board  - Agree with plan for SBRT of lingular lesion and chemoRT to LLL mass.      1/5/2024 Cancer Staged    Staging form: Lung, AJCC 8th Edition  -  Clinical: Stage IA3 (cT1c, cN0, cM0)     3/13/2024 Imaging Significant Findings    CT C/A/P  - 4.7 x 4.4 cm perihilar LLL mass, previously 6.7 x 6.2 cm. Decreased mass effect on LLL airways  - 2.7 x 1.7 cm cavitary ligular lesion, unchanged  - New cavitary lesions, solid pulmonary nodules, and ill-defined opacities in lungs b/l  - Unchanged few sub-cm hypodensities in liver     6/11/2024 Imaging Significant Findings    CT C/A/P  - 4.2 x 3.4 cm perihilar LLL mass, previously 4.7 x 4.4 cm.  Improvement in intralesional cavitation  - 1.6 x 1.3 cm lingular lesion, previously 2.7 x 1.7 cm  - New interstitial infiltrates in posterior aspect of ELDER and lingula  - Worsening of ill-defined opacities in LLL and bronchiectatic changes  - Improvement in previously seen ill-defined opacities in medial aspect of RLL  - Improvement other cavitary lesions and pulmonary nodules  - Postoperative upper lobectomy changes, no recurrent disease bronchial stump  - Small pericardial effusion  - Few subcentimeter hepatic hypodensities, grossly unchanged     7/30/2024 Imaging Significant Findings    CT C  - Interval resolution of lingular cavitary lesion   - 4.3 x 4.0 left hilar soft tissue mass, previously 4.2 x 3.4 cm  - Worsening opacity at left lung base with denser consolidation, concerning for possible superimposed infection, malignancy not excluded  - 0.9 cm more conspicuous nodular density along left greater fissure  - Trace pleural fluid on left  - 0.4 cm ELDER pulmonary nodule, unchanged  - 0.6 cm right paramediastinal nodule, unchanged  - No mediastinal or hilar lymphadenopathy     9/24/2024 Imaging Significant Findings    CT C/A/P  4.4 x 4.2 cm left hilar mass, stable compared to prior  Previously identified nodular density along fissure no longer visualized  Stable opacity in left lung base, possibly representing infiltrative versus inflammatory changes        Oncology History      HPI:     Hernan Engel is a 69 y.o. male with pmh  "significant for COPD, T2DM, HLD, Stage IB ([id number]) moderately differentiated adenocarcinoma of RUL, initially dx'd 3/2014, s/p R upper lobectomy (3/2014, Dr. 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  Labs, RTC, and C5 on , and repeat CT chest on .    Interval History:   24: Patient had flat tire, could not make follow up appointment  No follow up subsequent to this    The pt states that he has been coughing up green mucus for 2-3 weeks. He describes wheezing, and says that he sometimes wakes up with the wheezing and a little shortness of breath. He denies any fevers or chills. He says that his grandson, who has been staying with the pt, has been sick. He has been taking  amoxicillin for this, as well as "mucus pills"; he says that he has taken NyQuil and DayQuil. He says that these symptoms have been improving.     He states that his wife is still on the transplant list.     ROS:   As per HPI.       Physical Exam:       Ht 5' 7" (1.702 m)   BMI 26.93 kg/m²                Physical Exam  Constitutional:       Appearance: Normal appearance.   HENT:      Head: Normocephalic and atraumatic.   Eyes:      Extraocular Movements: Extraocular movements intact.      " Conjunctiva/sclera: Conjunctivae normal.      Pupils: Pupils are equal, round, and reactive to light.   Cardiovascular:      Rate and Rhythm: Normal rate and regular rhythm.      Heart sounds: No murmur heard.     No friction rub. No gallop.   Pulmonary:      Effort: Pulmonary effort is normal.      Breath sounds: No wheezing, rhonchi or rales.   Musculoskeletal:         General: Normal range of motion.      Right lower leg: No edema.      Left lower leg: No edema.   Skin:     General: Skin is warm and dry.   Neurological:      Mental Status: He is alert and oriented to person, place, and time.   Psychiatric:         Mood and Affect: Mood normal.         Thought Content: Thought content normal.         Judgment: Judgment normal.       Imaging:    See oncologic history as above.     Path:  See oncologic history above.      Assessment and Plan:     Hernan Engel is a 68 y.o. male with pmh significant for COPD, T2DM, HLD, Stage IB ([id number]) moderately differentiated adenocarcinoma of RUL, initially dx'd 3/2014, s/p R upper lobectomy (3/2014, Dr. Mendez), and now two newly diagnosed concurrent lung cancers as below who presents for follow up:     1) Stage IIIA (T4N0M0) squamous cell carcinoma of the LLL (insufficient sample for PD-L1, no targetable mutations on liquid biopsy), initially dx'd 11/2/23, s/p CRT (1/23/24 - 3/05/24) w/ weekly carboplatin/paclitaxel (1/22/24 - 2/26/24), and durvalumab consolidation (4/19/24-5/16/24; held for pneumonitis)    2) Stage IA3 ([id number]) adenocarcinoma of the lingula (KRAS G12C, PD-L1 10%), initially dx'd 12/19/23, s/p CRT (1/23/24 - 3/05/24) w/ weekly carboplatin/paclitaxel (1/22/24 - 2/26/24), and durvalumab consolidation x4 cycles total (4/19/24-5/16/24; held for pneumonitis, restarted 9/25/24-10/22/24)    *Notably, pt has a RLL pleural based nodule, reviewed at TB, appears linear and unlikely malignant    Stage IIIA Squamous Cell Carcinoma of the LLL, No PD-L1, No Targetable  Mutations  ECOG PS 0. Pt presents today for f/u. He was previously on durvalumab, noting a treatment break from 5/16/24 until 9/25/24 due to concern for pneumonitis (see previous note for considerations). He received 2 doses at restart, and then was LTFU (no-show to appointment on 11/18/24). He presents today to re-establish care.  At this time, all respiratory symptoms have improved back to baseline.  Most recent CT C/A/P (9/24/24) demonstrated ongoing inflammatory changes in the LLL which appears stable compared to prior, favor these represent persistent radiographic changes rather than active/worsening pneumonitis given complete symptomatic resolution.  Also favor that pnuemonitis was related more so to radiation therapy than immunotherapy given timing and location of changes. That said, given time since last dose of durvalumab (C4 on 10/23/24), that doses were only held for two cycles before therapy stop per [facility], that pt was delayed in starting initially (purposefully missed first dose due to concern for tolerability), and that pt already had a prolonged break between C2 and C3, it is unclear if any further immunotherapy would provide benefit  (though would still carry risk of adverse events). Therefore, favor transitioning to active surveillance at this time, with repeat imaging now.     PLAN:   Repeat CT C ordered  RTC after imaging to discuss results and plan to transition to active surveillance      Stage IA3 Adenocarcinoma of the Lingula, NGS and PD-L1 Pending  Bronchoscopy demonstrates adenocarcinoma of the lingula, separate from the squamous cell carcinoma in the LLL.  This is a T1c lesion which, given lack of lymphadenopathy, makes this a Stage IA3 disease.  Plan for treatment w/ CRT followed by immunotherapy, as above. Most recent imaging demonstrates resolution of this lesion.   Treatment as above      Dyspnea  Pneumonitis  See previous notes for considerations and treatment, now s/p high dose  steroid taper.   Resume immunotherapy as above      The above information has been reviewed with the patient and all questions have been answered to their apparent satisfaction.  They understand that they can call the clinic with any questions.    Discussed with Dr. Grove.    Tammi Weeks MD   Hematology and Oncology Fellow, PGY VI         Med Onc Chart Routing      Follow up with physician . RTC week of 1/27   Follow up with SHARRI    Infusion scheduling note    Injection scheduling note    Labs    Imaging    Pharmacy appointment    Other referrals

## 2025-01-24 NOTE — Clinical Note
Just an FYI that he got suboptimal dosing. Delayed start (skipped first infusion because he was concerned), pause between C2 and C3 (pnuemonitis), and LTFU after C4. Transitioning now to surveillance.

## 2025-01-25 ENCOUNTER — HOSPITAL ENCOUNTER (OUTPATIENT)
Dept: RADIOLOGY | Facility: HOSPITAL | Age: 70
Discharge: HOME | End: 2025-01-25
Attending: HOSPITALIST
Payer: MEDICARE

## 2025-01-25 DIAGNOSIS — C34.12 ADENOCARCINOMA OF UPPER LOBE OF LEFT LUNG: ICD-10-CM

## 2025-01-25 DIAGNOSIS — C34.32 SQUAMOUS CELL CARCINOMA OF BRONCHUS IN LEFT LOWER LOBE: ICD-10-CM

## 2025-01-25 PROCEDURE — 74177 CT ABD & PELVIS W/CONTRAST: CPT | Mod: 26,,, | Performed by: RADIOLOGY

## 2025-01-25 PROCEDURE — 71260 CT THORAX DX C+: CPT | Mod: 26,,, | Performed by: RADIOLOGY

## 2025-01-25 PROCEDURE — 25500020 PHARM REV CODE 255: Performed by: HOSPITALIST

## 2025-01-25 PROCEDURE — 71260 CT THORAX DX C+: CPT | Mod: TC

## 2025-01-25 RX ADMIN — IOHEXOL 100 ML: 350 INJECTION, SOLUTION INTRAVENOUS at 12:01

## 2025-01-28 ENCOUNTER — TELEPHONE (OUTPATIENT)
Dept: HEMATOLOGY/ONCOLOGY | Facility: CLINIC | Age: 70
End: 2025-01-28
Payer: MEDICARE

## 2025-01-29 ENCOUNTER — OFFICE VISIT (OUTPATIENT)
Dept: HEMATOLOGY/ONCOLOGY | Facility: CLINIC | Age: 70
End: 2025-01-29
Payer: MEDICARE

## 2025-01-29 VITALS
OXYGEN SATURATION: 98 % | WEIGHT: 176.13 LBS | HEART RATE: 83 BPM | BODY MASS INDEX: 27.64 KG/M2 | HEIGHT: 67 IN | DIASTOLIC BLOOD PRESSURE: 74 MMHG | TEMPERATURE: 98 F | SYSTOLIC BLOOD PRESSURE: 112 MMHG | RESPIRATION RATE: 17 BRPM

## 2025-01-29 DIAGNOSIS — J98.4 PNEUMONITIS: ICD-10-CM

## 2025-01-29 DIAGNOSIS — C34.32 SQUAMOUS CELL CARCINOMA OF BRONCHUS IN LEFT LOWER LOBE: Primary | ICD-10-CM

## 2025-01-29 DIAGNOSIS — C34.12 ADENOCARCINOMA OF UPPER LOBE OF LEFT LUNG: ICD-10-CM

## 2025-01-29 PROCEDURE — 99213 OFFICE O/P EST LOW 20 MIN: CPT | Mod: S$GLB,,, | Performed by: HOSPITALIST

## 2025-01-29 PROCEDURE — 3288F FALL RISK ASSESSMENT DOCD: CPT | Mod: CPTII,S$GLB,, | Performed by: HOSPITALIST

## 2025-01-29 PROCEDURE — 1101F PT FALLS ASSESS-DOCD LE1/YR: CPT | Mod: CPTII,S$GLB,, | Performed by: HOSPITALIST

## 2025-01-29 PROCEDURE — 3008F BODY MASS INDEX DOCD: CPT | Mod: CPTII,S$GLB,, | Performed by: HOSPITALIST

## 2025-01-29 PROCEDURE — 3078F DIAST BP <80 MM HG: CPT | Mod: CPTII,S$GLB,, | Performed by: HOSPITALIST

## 2025-01-29 PROCEDURE — 1125F AMNT PAIN NOTED PAIN PRSNT: CPT | Mod: CPTII,S$GLB,, | Performed by: HOSPITALIST

## 2025-01-29 PROCEDURE — 1157F ADVNC CARE PLAN IN RCRD: CPT | Mod: CPTII,S$GLB,, | Performed by: HOSPITALIST

## 2025-01-29 PROCEDURE — 3074F SYST BP LT 130 MM HG: CPT | Mod: CPTII,S$GLB,, | Performed by: HOSPITALIST

## 2025-01-29 PROCEDURE — 99999 PR PBB SHADOW E&M-EST. PATIENT-LVL III: CPT | Mod: PBBFAC,,, | Performed by: HOSPITALIST

## 2025-01-29 PROCEDURE — G2211 COMPLEX E/M VISIT ADD ON: HCPCS | Mod: S$GLB,,, | Performed by: HOSPITALIST

## 2025-01-29 NOTE — PROGRESS NOTES
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   9/15/2023 Imaging Significant Findings    CXR (URI)  - Masslike focus along L hilar region     10/6/2023 Imaging Significant Findings    CT C, Non-Con  - 6.6 x 5.9 cm LLL mass  - 2 x 1.2 cm cavitary lesion in lingula  - 0.9 x 0.8 cm pleural based nodule in R lung base  - Trace L pleural effusion  - Suspect hilar LAD  - Calcified hilar LN     11/2/2023 Procedure    Bronchoscopy  LLL, Endobronchial Biopsy  - Squamous cell carcinoma (CDK 5/6, p63 positive, CK7 and TTF negative)    [facility] NGS  - Insufficient tissue     11/6/2023 Imaging Significant Findings    PET CT  - 7.6 x 6.0 cm soft tissue mass in superior segment of LLL, SUV 7.2  - 2.5 x 1.6 cm cavitary lesion in ELDER (neoplastic vs infectious/inflammatory), SUV max 1.8  - No hypermetabolic LAD  - 0.9 cm L hypoattenuating L adrenal nodule, likely adenoma     11/29/2023 Tumor Conference    Tumor Board  - Recommend staging EBUS and L robotic bronchoscopy for sampling of ELDER cavitary nodule.  The nodule is highly suspicious for possible adenocarcinoma as it has evolved from a ground-glass opacity to a part solid and cavitary nodule.  Obtain brain MRI to complete staging.  The clinical picture suggests a possible T3 or T4 primary lung cancer or two separate primary lung cancers.        11/29/2023 Tumor Genotyping    Liquid Biopsy, [facility]  - CKDN2A, TP53 (three separate LOF mutations)     11/30/2023 Imaging Significant Findings    MRI Brain  - JAN     12/12/2023 Imaging Significant Findings    CT C, Non-Con  - 6.7 x 6.2 cm LLL mass (previously 6.6 x 5.9 cm), abutting and narrowing the adjacent LLL bronchus  - Enlarging lingular cavitary lesion, now 2.5 x 1.6 cm  - Slightly decreased size of RLL pleural based nodule     1/3/2024 Tumor  Conference    Tumor Board  - Agree with plan for SBRT of lingular lesion and chemoRT to LLL mass.      1/5/2024 Cancer Staged    Staging form: Lung, AJCC 8th Edition  - Clinical: Stage IIIA (cT4, cN0, cM0)     1/19/2024 - 2/26/2024 Chemotherapy    Treatment Summary   Plan Name: OP NSCLC PACLITAXEL + CARBOPLATIN (AUC) QW + RADIATION  Treatment Goal: Curative  Status: Inactive  Start Date: 1/19/2024  End Date: 2/26/2024  Provider: Guanako Grove IV, MD  Chemotherapy: CARBOplatin (PARAPLATIN) 210 mg in sodium chloride 0.9% 136 mL chemo infusion, 210 mg (100 % of original dose 208.2 mg), Intravenous, Clinic/HOD 1 time, 6 of 6 cycles  Dose modification:   (original dose 208.2 mg, Cycle 1)  Administration: 210 mg (1/22/2024), 210 mg (1/29/2024), 180 mg (2/5/2024), 225 mg (2/12/2024), 210 mg (2/19/2024), 210 mg (2/26/2024)  PACLitaxeL (TAXOL) 45 mg/m2 = 84 mg in sodium chloride 0.9% 250 mL chemo infusion, 45 mg/m2 = 84 mg, Intravenous, Clinic/HOD 1 time, 6 of 6 cycles  Administration: 84 mg (1/22/2024), 84 mg (1/29/2024), 84 mg (2/5/2024), 84 mg (2/12/2024), 84 mg (2/19/2024), 84 mg (2/26/2024)     1/23/2024 - 3/6/2024 Radiation Therapy    Treating physician: Santino Arora  Treatment Summary  Course: C1 Thorax 2024  Treatment Site Ref. ID Energy Dose/Fx (Gy) #Fx Dose Correction (Gy) Total Dose (Gy) Start Date End Date Elapsed Days   IM Lung_L PTV_High 6X 2.2 19 / 30 0 41.8 1/23/2024 2/19/2024 27   IMLungNew PTV_High_resim 6X 2.2 11 / 11 0 24.2 2/21/2024 3/6/2024 14   Multiple planns were attempted, including SBRT to the ELDER and CRT to the LLL. With dose overlap, it was ultimately decided to treat with concurrent CRT to both lesions.. He was resimulated for treatment response in the LLL mass.     3/13/2024 Imaging Significant Findings    CT C/A/P  - 4.7 x 4.4 cm perihilar LLL mass, previously 6.7 x 6.2 cm. Decreased mass effect on LLL airways  - 2.7 x 1.7 cm cavitary ligular lesion, unchanged  - New cavitary lesions, solid  pulmonary nodules, and ill-defined opacities in lungs b/l  - Unchanged few sub-cm hypodensities in liver     4/19/2024 -  Chemotherapy    Treatment Summary   Plan Name: OP DURVALUMAB 1500 MG Q4W  Treatment Goal: Curative  Status: Active  Start Date: 4/19/2024  End Date: 7/2/2025 (Planned)  Provider: Guanako Grove IV, MD  Chemotherapy: [No matching medication found in this treatment plan]     6/11/2024 Imaging Significant Findings    CT C/A/P  - 4.2 x 3.4 cm perihilar LLL mass, previously 4.7 x 4.4 cm.  Improvement in intralesional cavitation  - 1.6 x 1.3 cm lingular lesion, previously 2.7 x 1.7 cm  - New interstitial infiltrates in posterior aspect of ELDER and lingula  - Worsening of ill-defined opacities in LLL and bronchiectatic changes  - Improvement in previously seen ill-defined opacities in medial aspect of RLL  - Improvement other cavitary lesions and pulmonary nodules  - Postoperative upper lobectomy changes, no recurrent disease bronchial stump  - Small pericardial effusion  - Few subcentimeter hepatic hypodensities, grossly unchanged     7/30/2024 Imaging Significant Findings    CT C  - Interval resolution of lingular cavitary lesion   - 4.3 x 4.0 left hilar soft tissue mass, previously 4.2 x 3.4 cm  - Worsening opacity at left lung base with denser consolidation, concerning for possible superimposed infection, malignancy not excluded  - 0.9 cm more conspicuous nodular density along left greater fissure  - Trace pleural fluid on left  - 0.4 cm ELDER pulmonary nodule, unchanged  - 0.6 cm right paramediastinal nodule, unchanged  - No mediastinal or hilar lymphadenopathy     9/24/2024 Imaging Significant Findings    CT C/A/P  4.4 x 4.2 cm left hilar mass, stable compared to prior  Previously identified nodular density along fissure no longer visualized  Stable opacity in left lung base, possibly representing infiltrative versus inflammatory changes     Adenocarcinoma of Lingula   10/6/2023 Imaging Significant  Findings    CT C, Non-Con  - 6.6 x 5.9 cm LLL mass  - 2 x 1.2 cm cavitary lesion in lingula  - 0.9 x 0.8 cm pleural based nodule in R lung base  - Trace L pleural effusion  - Suspect hilar LAD  - Calcified hilar LN     11/6/2023 Imaging Significant Findings    PET CT  - 7.6 x 6.0 cm soft tissue mass in superior segment of LLL, SUV 7.2  - 2.5 x 1.6 cm cavitary lesion in ELDER (neoplastic vs infectious/inflammatory), SUV max 1.8  - No hypermetabolic LAD  - 0.9 cm L hypoattenuating L adrenal nodule, likely adenoma     11/29/2023 Tumor Conference    Tumor Board  - Recommend staging EBUS and L robotic bronchoscopy for sampling of EDLER cavitary nodule.  The nodule is highly suspicious for possible adenocarcinoma as it has evolved from a ground-glass opacity to a part solid and cavitary nodule.  Obtain brain MRI to complete staging.  The clinical picture suggests a possible T3 or T4 primary lung cancer or two separate primary lung cancers.        11/29/2023 Tumor Genotyping    Liquid Biopsy, [facility]  - CKDN2A, TP53 (three separate LOF mutations)     11/30/2023 Imaging Significant Findings    MRI Brain  - JAN     12/12/2023 Imaging Significant Findings    CT C, Non-Con  - 6.7 x 6.2 cm LLL mass (previously 6.6 x 5.9 cm), abutting and narrowing the adjacent LLL bronchus  - Enlarging lingular cavitary lesion, now 2.5 x 1.6 cm  - Slightly decreased size of RLL pleural based nodule     12/19/2023 Procedure    Bronch with EBUS  ELDER, FNA and TBBx  - Adenocarcinoma (TTF1 positive, p40 negative)    LN  - Negative: Station 7  - Report: No pathologic enlargement of 4R, 4L, or 11R. Unable to evaluated 11L due to obstructing LLL endobronchial mass    [facility] NGS  - KRAS G12C  - TP53  - Negative: EGFR, ALK, BRAF, ROS1, RET, MET, ERBB2  - PD-L1: 10%       1/3/2024 Tumor Conference    Tumor Board  - Agree with plan for SBRT of lingular lesion and chemoRT to LLL mass.      1/5/2024 Cancer Staged    Staging form: Lung, AJCC 8th Edition  -  Clinical: Stage IA3 (cT1c, cN0, cM0)     3/13/2024 Imaging Significant Findings    CT C/A/P  - 4.7 x 4.4 cm perihilar LLL mass, previously 6.7 x 6.2 cm. Decreased mass effect on LLL airways  - 2.7 x 1.7 cm cavitary ligular lesion, unchanged  - New cavitary lesions, solid pulmonary nodules, and ill-defined opacities in lungs b/l  - Unchanged few sub-cm hypodensities in liver     6/11/2024 Imaging Significant Findings    CT C/A/P  - 4.2 x 3.4 cm perihilar LLL mass, previously 4.7 x 4.4 cm.  Improvement in intralesional cavitation  - 1.6 x 1.3 cm lingular lesion, previously 2.7 x 1.7 cm  - New interstitial infiltrates in posterior aspect of ELDER and lingula  - Worsening of ill-defined opacities in LLL and bronchiectatic changes  - Improvement in previously seen ill-defined opacities in medial aspect of RLL  - Improvement other cavitary lesions and pulmonary nodules  - Postoperative upper lobectomy changes, no recurrent disease bronchial stump  - Small pericardial effusion  - Few subcentimeter hepatic hypodensities, grossly unchanged     7/30/2024 Imaging Significant Findings    CT C  - Interval resolution of lingular cavitary lesion   - 4.3 x 4.0 left hilar soft tissue mass, previously 4.2 x 3.4 cm  - Worsening opacity at left lung base with denser consolidation, concerning for possible superimposed infection, malignancy not excluded  - 0.9 cm more conspicuous nodular density along left greater fissure  - Trace pleural fluid on left  - 0.4 cm ELDER pulmonary nodule, unchanged  - 0.6 cm right paramediastinal nodule, unchanged  - No mediastinal or hilar lymphadenopathy     9/24/2024 Imaging Significant Findings    CT C/A/P  4.4 x 4.2 cm left hilar mass, stable compared to prior  Previously identified nodular density along fissure no longer visualized  Stable opacity in left lung base, possibly representing infiltrative versus inflammatory changes        Oncology History      HPI:     Hernan Engel is a 68 y.o. male with pmh  "significant for COPD, T2DM, HLD, Stage IB ([id number]) moderately differentiated adenocarcinoma of RUL, initially dx'd 3/2014, s/p R upper lobectomy (3/2014, Dr. Mendez), and now two newly diagnosed concurrent lung cancers as below who presents for follow up:     1) Stage IIIA (T4N0M0) squamous cell carcinoma of the LLL (insufficient sample for PD-L1, no targetable mutations on liquid biopsy), initially dx'd 11/2/23, s/p CRT (1/23/24 - 3/05/24) w/ weekly carboplatin/paclitaxel (1/22/24 - 2/26/24), and durvalumab consolidation (4/19/24-5/16/24; held for pneumonitis, restarted 9/25/24-10/22/24)    2) Stage IA3 ([id number]) adenocarcinoma of the lingula (KRAS G12C, PD-L1 10%), initially dx'd 12/19/23, s/p CRT (1/23/24 - 3/05/24) w/ weekly carboplatin/paclitaxel (1/22/24 - 2/26/24), and durvalumab consolidation x4 cycles total (4/19/24-5/16/24; held for pneumonitis, restarted 9/25/24-10/22/24)    *Notably, pt has a RLL pleural based nodule, reviewed at TB, appears linear and unlikely malignant    Last clinic 10/23/24, resume durvalumab with C4.  Labs, RTC, and C5 on 11/20, and repeat CT chest on 12/17.    Interval History:   1/25/25: CT C/A/P, stable compared to prior    Patient states that he is doing well today.  He denies any new or worsening symptoms since our last visit.  He is curious to hear the results of his CT scan.    ROS:   As per HPI.       Physical Exam:       /74 (BP Location: Right arm, Patient Position: Sitting)   Pulse 83   Temp 98 °F (36.7 °C) (Oral)   Resp 17   Ht 5' 7" (1.702 m)   Wt 79.9 kg (176 lb 2.4 oz)   SpO2 98%   BMI 27.59 kg/m²                Physical Exam  Constitutional:       Appearance: Normal appearance.   HENT:      Head: Normocephalic and atraumatic.   Eyes:      Extraocular Movements: Extraocular movements intact.      Conjunctiva/sclera: Conjunctivae normal.      Pupils: Pupils are equal, round, and reactive to light.   Cardiovascular:      Rate and Rhythm: Normal rate " and regular rhythm.      Heart sounds: No murmur heard.     No friction rub. No gallop.   Pulmonary:      Effort: Pulmonary effort is normal.      Breath sounds: No wheezing, rhonchi or rales.   Musculoskeletal:         General: Normal range of motion.      Right lower leg: No edema.      Left lower leg: No edema.   Skin:     General: Skin is warm and dry.   Neurological:      Mental Status: He is alert and oriented to person, place, and time.   Psychiatric:         Mood and Affect: Mood normal.         Thought Content: Thought content normal.         Judgment: Judgment normal.       Imaging:    See oncologic history as above.     Path:  See oncologic history above.      Assessment and Plan:     Hernan Engel is a 68 y.o. male with pmh significant for COPD, T2DM, HLD, Stage IB ([id number]) moderately differentiated adenocarcinoma of RUL, initially dx'd 3/2014, s/p R upper lobectomy (3/2014, Dr. Mendez), and now two newly diagnosed concurrent lung cancers as below who presents for follow up:     1) Stage IIIA (T4N0M0) squamous cell carcinoma of the LLL (insufficient sample for PD-L1, no targetable mutations on liquid biopsy), initially dx'd 11/2/23, s/p CRT (1/23/24 - 3/05/24) w/ weekly carboplatin/paclitaxel (1/22/24 - 2/26/24), and durvalumab consolidation (4/19/24-5/16/24; held for pneumonitis, restarted 9/25/24-10/22/24)    2) Stage IA3 ([id number]) adenocarcinoma of the lingula (KRAS G12C, PD-L1 10%), initially dx'd 12/19/23, s/p CRT (1/23/24 - 3/05/24) w/ weekly carboplatin/paclitaxel (1/22/24 - 2/26/24), and durvalumab consolidation x4 cycles total (4/19/24-5/16/24; held for pneumonitis, restarted 9/25/24-10/22/24)    *Notably, pt has a RLL pleural based nodule, reviewed at TB, appears linear and unlikely malignant    Stage IIIA Squamous Cell Carcinoma of the LLL, No PD-L1, No Targetable Mutations  ECOG PS 0. Pt presents today for f/u. He was previously on durvalumab, noting a treatment break from 5/16/24 until  9/25/24 due to concern for pneumonitis (see previous note for considerations). He received 2 doses at restart, and then was LTFU (no-show to appointment on 11/18/24). He presents today to re-establish care.  At this time, all respiratory symptoms have improved back to baseline.  Given hat pt was delayed in starting initially (purposefully missed first dose due to concern for tolerability), that pt already had a prolonged break between C2 and C3 (and noting doses were only held for two cycles before therapy stop per [facility] for pneumonitis), and time since last dose of durvalumab (C4 on 10/23/24), it is unclear if any further immunotherapy would provide benefit  (though would still carry risk of adverse events). Therefore, favor transitioning to active surveillance at this time.  CT C on 1/25/25 is stable compared to prior on 9/24/24.  Will continue with CT C q3-6m x 3y, then q6m x 2 years, then annually. This was discussed with the pt who endorsed his understanding of the plan.     PLAN:   CT C in 3 months  RTC at least 1 day after imaging      Stage IA3 Adenocarcinoma of the Lingula, NGS and PD-L1 Pending  Bronchoscopy demonstrates adenocarcinoma of the lingula, separate from the squamous cell carcinoma in the LLL.  This is a T1c lesion which, given lack of lymphadenopathy, makes this a Stage IA3 disease.  Plan for treatment w/ CRT followed by immunotherapy, as above. Most recent imaging demonstrates resolution of this lesion.   Treatment as above      Dyspnea  Pneumonitis  See previous notes for considerations and treatment, now s/p high dose steroid taper.   Resume immunotherapy as above      The above information has been reviewed with the patient and all questions have been answered to their apparent satisfaction.  They understand that they can call the clinic with any questions.    Discussed with Dr. Grove.    Tammi Weeks MD   Hematology and Oncology Fellow, PGY VI         Med Onc Chart Routing      Follow  up with physician . CT C in 3 months, creatinine ordered. RTC at least 1 day after imaging.   Follow up with SHAR[facility]    Infusion scheduling note    Injection scheduling note    Labs    Imaging    Pharmacy appointment    Other referrals

## 2025-01-30 DIAGNOSIS — Z00.00 ENCOUNTER FOR MEDICARE ANNUAL WELLNESS EXAM: ICD-10-CM

## 2025-02-13 ENCOUNTER — PATIENT OUTREACH (OUTPATIENT)
Dept: ADMINISTRATIVE | Facility: HOSPITAL | Age: 70
End: 2025-02-13
Payer: MEDICARE

## 2025-02-13 ENCOUNTER — OFFICE VISIT (OUTPATIENT)
Dept: FAMILY MEDICINE | Facility: CLINIC | Age: 70
End: 2025-02-13
Payer: MEDICARE

## 2025-02-13 VITALS
TEMPERATURE: 98 F | SYSTOLIC BLOOD PRESSURE: 108 MMHG | BODY MASS INDEX: 26.47 KG/M2 | HEART RATE: 68 BPM | WEIGHT: 168.63 LBS | DIASTOLIC BLOOD PRESSURE: 68 MMHG | OXYGEN SATURATION: 98 % | RESPIRATION RATE: 18 BRPM | HEIGHT: 67 IN

## 2025-02-13 DIAGNOSIS — C78.02 SECONDARY MALIGNANT NEOPLASM OF LEFT LUNG: ICD-10-CM

## 2025-02-13 DIAGNOSIS — J44.9 CHRONIC OBSTRUCTIVE PULMONARY DISEASE, UNSPECIFIED COPD TYPE: ICD-10-CM

## 2025-02-13 DIAGNOSIS — Z12.11 COLON CANCER SCREENING: ICD-10-CM

## 2025-02-13 DIAGNOSIS — E78.5 DYSLIPIDEMIA ASSOCIATED WITH TYPE 2 DIABETES MELLITUS: ICD-10-CM

## 2025-02-13 DIAGNOSIS — Z01.00 DIABETIC EYE EXAM: ICD-10-CM

## 2025-02-13 DIAGNOSIS — Z00.00 ENCOUNTER FOR MEDICARE ANNUAL WELLNESS EXAM: Primary | ICD-10-CM

## 2025-02-13 DIAGNOSIS — E11.8 TYPE 2 DIABETES MELLITUS WITH COMPLICATION, WITHOUT LONG-TERM CURRENT USE OF INSULIN: ICD-10-CM

## 2025-02-13 DIAGNOSIS — R79.9 ABNORMAL FINDING OF BLOOD CHEMISTRY, UNSPECIFIED: ICD-10-CM

## 2025-02-13 DIAGNOSIS — C34.32 SQUAMOUS CELL CARCINOMA OF BRONCHUS IN LEFT LOWER LOBE: ICD-10-CM

## 2025-02-13 DIAGNOSIS — E11.69 DYSLIPIDEMIA ASSOCIATED WITH TYPE 2 DIABETES MELLITUS: ICD-10-CM

## 2025-02-13 DIAGNOSIS — Z09 FOLLOW-UP EXAM: Primary | ICD-10-CM

## 2025-02-13 DIAGNOSIS — Z71.89 ADVANCED DIRECTIVES, COUNSELING/DISCUSSION: ICD-10-CM

## 2025-02-13 DIAGNOSIS — E11.9 DIABETIC EYE EXAM: ICD-10-CM

## 2025-02-13 DIAGNOSIS — Z12.5 SCREENING PSA (PROSTATE SPECIFIC ANTIGEN): ICD-10-CM

## 2025-02-13 PROCEDURE — 99999 PR PBB SHADOW E&M-EST. PATIENT-LVL V: CPT | Mod: PBBFAC,,,

## 2025-02-13 RX ORDER — FLUTICASONE FUROATE, UMECLIDINIUM BROMIDE AND VILANTEROL TRIFENATATE 100; 62.5; 25 UG/1; UG/1; UG/1
1 POWDER RESPIRATORY (INHALATION) DAILY
Qty: 60 EACH | Refills: 3 | Status: SHIPPED | OUTPATIENT
Start: 2025-02-13

## 2025-02-13 RX ORDER — POLYETHYLENE GLYCOL-3350 AND ELECTROLYTES 236; 6.74; 5.86; 2.97; 22.74 G/274.31G; G/274.31G; G/274.31G; G/274.31G; G/274.31G
POWDER, FOR SOLUTION ORAL
COMMUNITY
Start: 2024-10-23

## 2025-02-13 RX ORDER — ATORVASTATIN CALCIUM 40 MG/1
40 TABLET, FILM COATED ORAL DAILY
Qty: 90 TABLET | Refills: 3 | Status: SHIPPED | OUTPATIENT
Start: 2025-02-13

## 2025-02-13 NOTE — PROGRESS NOTES
Population Health Chart Review & Patient Outreach Details      Additional [facility] Health Notes:    DUE FOR COLONOSCOPY, EYE EXAM, A1C, AND URINE SCREENING. IF ALREADY DONE, NEED TO GET RECORDS INFORMATION.  Unable to schedule labs after visit due to labs close. Place in visit note to advise.            Updates Requested / Reviewed:      Updated Care Coordination Note, Care Everywhere, and Care Team Updated         Health Maintenance Topics Overdue:      VBHM Score: 5     Colon Cancer Screening  Urine Screening  Eye Exam  Hemoglobin A1c  Foot Exam    Shingles/Zoster Vaccine  RSV Vaccine                  Health Maintenance Topic(s) Outreach Outcomes & Actions Taken:    Colorectal Cancer Screening - Outreach Outcomes & Actions Taken  : Place in visit note to advise.     Eye Exam - Outreach Outcomes & Actions Taken  : Place in visit note to advise.     Lab(s) - Outreach Outcomes & Actions Taken  : Place in visit note to advise.

## 2025-02-13 NOTE — PROGRESS NOTES
Health Maintenance Due   Topic     RSV Vaccine (Age 60+ and Pregnant patients) (1 - Risk 60-74 years 1-dose series) Not offered at this Facility    Shingles Vaccine (2 of 2) Hx of chickenpox. Notified pt can get vaccine at pharmacy.    Colorectal Cancer Screening  Consult provider calixto shipped last year. Has one at home     COVID-19 Vaccine (5 - 2024-25 season) Pt decline    Diabetes Urine Screening  Consult pcp    PROSTATE-SPECIFIC ANTIGEN  Consult pcp    Foot Exam  Consult pcp    Diabetic Eye Exam  Consult pcp    Hemoglobin A1c  Consult pcp

## 2025-02-13 NOTE — PROGRESS NOTES
HPI     Chief Complaint:  AWV    Hernan Engel is a 69 y.o. male with multiple medical diagnoses as listed in the medical history and problem list that presented for a Medicare AWV and comprehensive Health Risk Assessment today.      The following components were reviewed and updated:    Medical history  Family History  Social history  Allergies and Current Medications  Health Risk Assessment  Health Maintenance  Care Team     HPI  History of Present Illness    CHIEF COMPLAINT:  Hernan presents today for annual wellness visit.    RESPIRATORY:  He reports cold symptoms with productive cough and mucus for approximately one month, which he attributes to transmission from his grandson.    DIABETIC NEUROPATHY:  He experiences random burning sensations in his hands and feet, which he manages with rico chews. He denies claudication symptoms and reports ability to walk without discomfort.    DIABETIC RETINOPATHY:  He has a history of diabetes-related eye problems but has not had a recent eye exam.    MEDICAL HISTORY:  History significant for lung cancer.    MEDICATIONS:  He currently takes Trelegy and cholesterol medication and requests refills of them.      ROS:  General: -fever, -chills, -fatigue, -weight gain, -weight loss  Eyes: -vision changes, -redness, -discharge  ENT: -ear pain, -nasal congestion, -sore throat  Cardiovascular: -chest pain, -palpitations, -lower extremity edema  Respiratory: +cough, -shortness of breath  Gastrointestinal: -abdominal pain, -nausea, -vomiting, -diarrhea, -constipation, -blood in stool  Genitourinary: -dysuria, -hematuria, -frequency  Musculoskeletal: -joint pain, -muscle pain  Skin: -rash, -lesion  Neurological: -headache, -dizziness, -numbness, -tingling  Psychiatric: -anxiety, -depression, -sleep difficulty             Assessment & Plan     Assessment & Plan    Assessed patient's annual wellness visit requirements  Evaluated need for colonoscopy given patient's age (69)  Reviewed  medication needs, including cholesterol medication and Trelegy for respiratory symptoms  Ordered labs prior to upcoming appointment with Dr. Calvin for comprehensive discussion          HYPERTENSION:  - Evaluated the patient's blood pressure, which appears to be within normal limits.            Diagnoses and health risks identified today and associated recommendations/orders:    Problem List Items Addressed This Visit       Dyslipidemia associated with type 2 diabetes mellitus  Relevant Medications       atorvastatin (LIPITOR) 40 MG tablet  HYPERLIPIDEMIA:  - Refilled the patient's cholesterol medication as requested.      Type 2 diabetes mellitus with complication, without long-term current use of insulin          DIABETES:  - Discussed the importance of regular eye exams for patients with diabetes.  - Referred the patient to ophthalmology for eye exam to monitor potential eye complications.  - Ordered additional labs before the patient's next appointment with Dr. Calvin to discuss diabetes management.    PERIPHERAL NEUROPATHY:  - Advised the patient to use rico chews for intermittent burning sensation in hands and feet.  - Recommend the patient to wear new shoes to address foot discomfort.  - Hernan reports occasional burning sensation in hands and feet.  - follow up during your appointment on March 12, 2025 with Dr. 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  - Hernan reports recent onset of wheezing due to a cold.  - Refilled Trelegy medication to help clear respiratory symptoms and alleviate wheezing.  - Hernan reports having a cough for approximately 1 month.  - Hernan inquired about medication for expectorating mucus.  - Refilled Trelegy medication to help manage the chronic cough and clear respiratory symptoms.     Secondary malignant neoplasm of left lung   Stable.  Still being follow up with Heme-Onc by Dr. Grove      Encounter for Medicare annual wellness exam - Primary  Counseled on age appropriate medical preventative services including age appropriate cancer screenings, age appropriate eye and dental exams, over all nutritional health, need for a consistent exercise regimen, and an over all push towards maintaining a vigorous and active lifestyle. Counseled on age appropriate vaccines and discussed upcoming health care needs based on age/gender. Discussed good sleep hygiene and stress management.     Advanced directives, counseling/discussion    I offered to discuss advanced care planning, including how to pick a person who would make decisions for you if you were unable to make them for yourself, called a health care power of , and what kind of decisions you might make such as use of life sustaining treatments such as ventilators and tube feeding when faced with a life limiting illness recorded on a living will that they will need to know. (How you want to be cared for as you near the end of your natural life)     X Patient is interested in learning more about how to make advanced directives.  I provided them paperwork and offered to discuss this with them.    Chronic obstructive pulmonary disease, unspecified COPD type    RESPIRATORY INFECTION:  - Detected a slight dry wheeze inhale during lung exam.  - Hernan reports recent onset of wheezing due to a cold.  - Refilled Trelegy medication to help clear respiratory symptoms and alleviate wheezing.  - Hernan reports having a cough for approximately 1 month.  - Hernan inquired about medication for expectorating mucus.  - Refilled Trelegy medication to help manage the chronic cough and clear respiratory symptoms.    Diabetic eye exam    Relevant Orders    Ambulatory referral/consult to Ophthalmology    Colon cancer screening    Relevant Orders    Ambulatory  "referral/consult to Endo Procedure          --------------------------------------------    ** See Completed Assessments for Annual Wellness Visit within the encounter summary.**    The following assessments were completed:  Living Situation  CAGE  Depression Screening  Timed Get Up and Go  Whisper Test  Cognitive Function Screening  Nutrition Screening  ADL Screening  PAQ Screening      Provided Hernan Engel  with a 5-10 year written screening schedule and personal prevention plan. Recommendations were developed using the USPSTF age appropriate recommendations. Education, counseling, and referrals were provided as needed. After Visit Summary printed and given to patient which includes a list of additional screenings\tests needed.      Opioid documentation:      Patient does not have a current opioid prescription.        Exam     Review of Systems:  (as noted above)  Review of Systems    Physical Exam:   Physical Exam  Vitals:    02/13/25 1444   BP: 108/68   BP Location: Right arm   Patient Position: Sitting   Pulse: 68   Resp: 18   Temp: 97.8 °F (36.6 °C)   TempSrc: Oral   SpO2: 98%   Weight: 76.5 kg (168 lb 10.4 oz)   Height: 5' 7" (1.702 m)      Body mass index is 26.41 kg/m².    Physical Exam    General: No acute distress. Well-developed. Well-nourished.  Eyes: EOMI. Sclerae anicteric.  HENT: Normocephalic. Atraumatic. Nares patent.   Cardiovascular: Regular rate. Regular rhythm. No murmurs. No rubs. No gallops. Normal S1, S2.  Respiratory: Normal respiratory effort. Clear to auscultation bilaterally. No rales. No rhonchi. Slight wheeze on inhalation.  Musculoskeletal: No  obvious deformity.  Extremities: No lower extremity edema.  Neurological: Alert & oriented x3. No slurred speech. Normal gait.  Psychiatric: Normal mood. Normal affect. Good insight. Good judgment.  Skin: Warm. Dry. No rash.           Clock:              Health Maintenance:  Health Maintenance         Date Due Completion Date    RSV " Vaccine (Age 60+ and Pregnant patients) (1 - Risk 60-74 years 1-dose series) Never done ---    Shingles Vaccine (2 of 2) 07/05/2018 5/10/2018    Colorectal Cancer Screening 02/05/2020 2/5/2019    COVID-19 Vaccine (5 - 2024-25 season) 09/01/2024 10/26/2022    Diabetes Urine Screening 09/28/2024 9/28/2023    PROSTATE-SPECIFIC ANTIGEN 10/02/2024 10/2/2023    Foot Exam 10/19/2024 10/19/2023    Override on 7/16/2020: Done (toenail fungus)    Override on 10/4/2019: Done    Override on 10/11/2018: Done    Override on 9/25/2017: Done    Diabetic Eye Exam 10/26/2024 10/26/2023    Override on 9/1/2021: Declined (syas eyes examined last month at [facility] - exact date unknown)    Override on 9/4/2020: Done    Override on 3/15/2019: Done    Override on 12/6/2017: Done    Hemoglobin A1c 12/21/2024 6/21/2024    Lipid Panel 06/21/2025 6/21/2024    Low Dose Statin 10/23/2025 10/23/2024    TETANUS VACCINE 04/01/2032 4/1/2022    Override on 12/1/2014: Done            Health maintenance reviewed, Colon cancer screening ordered, Lipid panel ordered, A1c ordered, and PSA screening ordered placed for future to discuss with his PCP on follow up March 12, 2025      Follow Up:  No follow-ups on file.    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    T2DM (type 2 diabetes mellitus) 2014    DKA 9/2014       Past Surgical History:  Past Surgical History:   Procedure Laterality Date     BRONCHOSCOPY WITH FLUOROSCOPY N/A 2023    Procedure: BRONCHOSCOPY, WITH FLUOROSCOPY;  Surgeon: Provider, [facility];  Location: [facility] OR 2ND FLR;  Service: Endoscopy;  Laterality: N/A;    CATARACT EXTRACTION Right     COLONOSCOPY      LUNG REMOVAL, PARTIAL      ROBOTIC BRONCHOSCOPY N/A 2023    Procedure: ROBOTIC BRONCHOSCOPY;  Surgeon: Hernandez Brewer MD;  Location: [facility] OR 2ND FLR;  Service: Pulmonary;  Laterality: N/A;       Social History:  Social History     Socioeconomic History    Marital status:    Occupational History     Employer: [employer]   Tobacco Use    Smoking status: Former     Current packs/day: 0.00     Average packs/day: 1 pack/day for 30.0 years (30.0 ttl pk-yrs)     Types: Cigarettes     Start date: 1/15/1984     Quit date: 1/15/2014     Years since quittin.0    Smokeless tobacco: Never   Substance and Sexual Activity    Alcohol use: Yes     Alcohol/week: 5.7 standard drinks of alcohol     Types: 4 Cans of beer, 2 Standard drinks or equivalent per week     Comment: every other day beer drinker    Drug use: No    Sexual activity: Not Currently     Partners: Female     Birth control/protection: None   Social History Narrative    ** Merged History Encounter **            Family History:  Family History   Problem Relation Name Age of Onset    No Known Problems Mother      Cancer Father          colon cancer    Colon cancer Father      Cataracts Father      Colon cancer Sister Kira     Cancer Sister Kira         colon or lung cancer     No Known Problems Sister Valentine     No Known Problems Sister Chantal     No Known Problems Brother Diogo     No Known Problems Maternal Aunt      No Known Problems Maternal Uncle      No Known Problems Paternal Aunt      No Known Problems Paternal Uncle      No Known Problems Maternal Grandmother      No Known Problems Maternal Grandfather      No Known Problems Paternal Grandmother      No Known Problems Paternal Grandfather       No Known Problems Other      Amblyopia Neg Hx      Blindness Neg Hx      Diabetes Neg Hx      Glaucoma Neg Hx      Hypertension Neg Hx      Macular degeneration Neg Hx      Retinal detachment Neg Hx      Strabismus Neg Hx      Stroke Neg Hx      Thyroid disease Neg Hx         Allergies and Medications: (updated and reviewed)  Review of patient's allergies indicates:   Allergen Reactions    Adhesive Itching, Rash, Other (See Comments) and Dermatitis     Blister     Current Outpatient Medications   Medication Sig Dispense Refill    GAVILYTE-G 236-22.74-6.74 -5.86 gram suspension MIX AND TAKE 4000 MLS BY MOUTH ONCE AS DIRECTED BY ENDOSCOPY NURSE       ibuprofen (ADVIL,MOTRIN) 600 MG tablet Take 1 tablet (600 mg total) by mouth every 8 (eight) hours as needed for Pain (with food). 30 tablet 0    metFORMIN (GLUCOPHAGE-XR) 500 MG ER 24hr tablet Take 1 tablet (500 mg total) by mouth 2 (two) times daily with meals.      atorvastatin (LIPITOR) 40 MG tablet Take 1 tablet (40 mg total) by mouth once daily. 90 tablet 3    blood-glucose meter kit Use as instructed (Patient not taking: Reported on 2/13/2025) 1 each 0    efinaconazole (JUBLIA) 10 % Crow Apply 1 Application topically once daily. (Patient not taking: Reported on 10/23/2024) 8 mL 0    fluticasone-umeclidin-vilanter (TRELEGY ELLIPTA) 100-62.5-25 mcg DsDv Inhale 1 puff into the lungs once daily. 60 each 3    MICRO THIN LANCETS 33 gauge Misc  (Patient not taking: Reported on 10/23/2024)      pantoprazole (PROTONIX) 40 MG tablet Take 1 tablet (40 mg total) by mouth once daily. (Patient not taking: Reported on 10/23/2024) 60 tablet 0    permethrin (ELIMITE) 5 % cream Apply to skin head to toe over night, for 8-10 hours, wash off in AM in shower or bath  Repeat in 7 days. (Patient not taking: Reported on 10/23/2024) 60 g 1    polyethylene glycol (GLYCOLAX) 17 gram/dose powder Mix 1 capful (17 g) and dissolve in glass of water to take by mouth once daily.  (Patient not taking: Reported on 10/23/2024) 510 g 0    sildenafiL (VIAGRA) 50 MG tablet Take 1 tablet (50 mg total) by mouth daily as needed for Erectile Dysfunction. (Patient not taking: Reported on 10/23/2024) 30 tablet 0    tirzepatide (MOUNJARO SUBQ) Inject into the skin. Pt is unsure of the dosage but states that he gets the injection once a week. (Patient not taking: Reported on 2/13/2025)      tirzepatide 7.5 mg/0.5 mL PnIj Inject 7.5 mg into the skin every 7 days. (Patient not taking: Reported on 2/13/2025) 4 Pen 4    triamcinolone acetonide 0.1% (KENALOG) 0.1 % cream Apply topically 3 (three) times daily. Apply to itching skin  as directed for 7 days (Patient not taking: Reported on 2/13/2025) 45 g 0     No current facility-administered medications for this visit.       This note was generated with the assistance of ambient listening technology. Verbal consent was obtained by the patient and accompanying visitor(s) for the recording of patient appointment to facilitate this note. I attest to having reviewed and edited the generated note for accuracy, though some syntax or spelling errors may persist. Please contact the author of this note for any clarification.        - The patient is given an After Visit Summary that lists all medications with directions, allergies, education, orders placed during this encounter and follow-up instructions.      - I have reviewed the patient's medical information including past medical, family, and social history sections including the medications and allergies.      - We discussed the patient's current medications.     This note was created by combination of typed  and MModal dictation.  Transcription errors may be present.  If there are any questions, please contact me.     Judie Diego PA-C

## 2025-02-26 ENCOUNTER — TELEPHONE (OUTPATIENT)
Dept: FAMILY MEDICINE | Facility: CLINIC | Age: 70
End: 2025-02-26
Payer: MEDICARE

## 2025-02-26 NOTE — TELEPHONE ENCOUNTER
----- Message from Med Assistant Holguin sent at 2/26/2025  4:04 PM CST -----  Type:  Patient Returning CallWho Called:Pt Who Left Message for Patient:Staff Does the patient know what this is regarding?:Would the patient rather a call back or a response via My [facility]?  Call backCallback Best Call Back Number:Telephone Information:Mobile          [phone] Additional Information:Thank you.

## 2025-02-26 NOTE — TELEPHONE ENCOUNTER
Advised pt I don't see any messages form anyone in his chart; reviewed his upcoming appt; pt verbalized understanding

## 2025-03-06 ENCOUNTER — PATIENT OUTREACH (OUTPATIENT)
Dept: ADMINISTRATIVE | Facility: HOSPITAL | Age: 70
End: 2025-03-06
Payer: MEDICARE

## 2025-03-06 NOTE — PROGRESS NOTES
Population Health Chart Review & Patient Outreach Details      Additional Pop Health Notes:      HM and immunization's reviewed and updated.  DUE FOR COLONOSCOPY, EYE EXAM, A1C, AND URINE SCREENING. IF ALREADY DONE, NEED TO GET RECORDS INFORMATION.  Schedule urine screening with upcoming lab appointment. A1c already scheduled.          Updates Requested / Reviewed:      Updated Care Coordination Note and Care Everywhere         Health Maintenance Topics Overdue:      VB Score: 2     Colon Cancer Screening  Foot Exam    Shingles/Zoster Vaccine  RSV Vaccine                  Health Maintenance Topic(s) Outreach Outcomes & Actions Taken:    Lab(s) - Outreach Outcomes & Actions Taken  : Overdue Lab(s) Scheduled

## 2025-03-07 ENCOUNTER — RESULTS FOLLOW-UP (OUTPATIENT)
Dept: FAMILY MEDICINE | Facility: CLINIC | Age: 70
End: 2025-03-07
Payer: MEDICARE

## 2025-03-07 ENCOUNTER — LAB VISIT (OUTPATIENT)
Dept: LAB | Facility: HOSPITAL | Age: 70
End: 2025-03-07
Payer: MEDICARE

## 2025-03-07 DIAGNOSIS — Z09 FOLLOW-UP EXAM: ICD-10-CM

## 2025-03-07 DIAGNOSIS — R79.9 ABNORMAL FINDING OF BLOOD CHEMISTRY, UNSPECIFIED: ICD-10-CM

## 2025-03-07 DIAGNOSIS — Z12.5 SCREENING PSA (PROSTATE SPECIFIC ANTIGEN): ICD-10-CM

## 2025-03-07 LAB
ALBUMIN SERPL BCP-MCNC: 4.3 G/DL (ref 3.5–5.2)
ALP SERPL-CCNC: 144 U/L (ref 40–150)
ALT SERPL W/O P-5'-P-CCNC: 26 U/L (ref 10–44)
ANION GAP SERPL CALC-SCNC: 9 MMOL/L (ref 8–16)
AST SERPL-CCNC: 24 U/L (ref 10–40)
BASOPHILS # BLD AUTO: 0.03 K/UL (ref 0–0.2)
BASOPHILS NFR BLD: 0.7 % (ref 0–1.9)
BILIRUB SERPL-MCNC: 1.1 MG/DL (ref 0.1–1)
BUN SERPL-MCNC: 14 MG/DL (ref 8–23)
CALCIUM SERPL-MCNC: 9.8 MG/DL (ref 8.7–10.5)
CHLORIDE SERPL-SCNC: 101 MMOL/L (ref 95–110)
CHOLEST SERPL-MCNC: 180 MG/DL (ref 120–199)
CHOLEST/HDLC SERPL: 3.4 {RATIO} (ref 2–5)
CO2 SERPL-SCNC: 27 MMOL/L (ref 23–29)
COMPLEXED PSA SERPL-MCNC: 1.8 NG/ML (ref 0–4)
CREAT SERPL-MCNC: 1.2 MG/DL (ref 0.5–1.4)
DIFFERENTIAL METHOD BLD: ABNORMAL
EOSINOPHIL # BLD AUTO: 0.1 K/UL (ref 0–0.5)
EOSINOPHIL NFR BLD: 3 % (ref 0–8)
ERYTHROCYTE [DISTWIDTH] IN BLOOD BY AUTOMATED COUNT: 12.7 % (ref 11.5–14.5)
EST. GFR  (NO RACE VARIABLE): >60 ML/MIN/1.73 M^2
ESTIMATED AVG GLUCOSE: 258 MG/DL (ref 68–131)
GLUCOSE SERPL-MCNC: 358 MG/DL (ref 70–110)
HBA1C MFR BLD: 10.6 % (ref 4–5.6)
HCT VFR BLD AUTO: 40.9 % (ref 40–54)
HDLC SERPL-MCNC: 53 MG/DL (ref 40–75)
HDLC SERPL: 29.4 % (ref 20–50)
HGB BLD-MCNC: 13.9 G/DL (ref 14–18)
IMM GRANULOCYTES # BLD AUTO: 0.01 K/UL (ref 0–0.04)
IMM GRANULOCYTES NFR BLD AUTO: 0.2 % (ref 0–0.5)
LDLC SERPL CALC-MCNC: 107.2 MG/DL (ref 63–159)
LYMPHOCYTES # BLD AUTO: 0.8 K/UL (ref 1–4.8)
LYMPHOCYTES NFR BLD: 18.8 % (ref 18–48)
MCH RBC QN AUTO: 28.7 PG (ref 27–31)
MCHC RBC AUTO-ENTMCNC: 34 G/DL (ref 32–36)
MCV RBC AUTO: 84 FL (ref 82–98)
MONOCYTES # BLD AUTO: 0.5 K/UL (ref 0.3–1)
MONOCYTES NFR BLD: 11.1 % (ref 4–15)
NEUTROPHILS # BLD AUTO: 2.9 K/UL (ref 1.8–7.7)
NEUTROPHILS NFR BLD: 66.2 % (ref 38–73)
NONHDLC SERPL-MCNC: 127 MG/DL
NRBC BLD-RTO: 0 /100 WBC
PLATELET # BLD AUTO: 252 K/UL (ref 150–450)
PMV BLD AUTO: 9.8 FL (ref 9.2–12.9)
POTASSIUM SERPL-SCNC: 4.5 MMOL/L (ref 3.5–5.1)
PROT SERPL-MCNC: 7.7 G/DL (ref 6–8.4)
RBC # BLD AUTO: 4.85 M/UL (ref 4.6–6.2)
SODIUM SERPL-SCNC: 137 MMOL/L (ref 136–145)
TRIGL SERPL-MCNC: 99 MG/DL (ref 30–150)
WBC # BLD AUTO: 4.31 K/UL (ref 3.9–12.7)

## 2025-03-07 PROCEDURE — 80061 LIPID PANEL: CPT

## 2025-03-07 PROCEDURE — 80053 COMPREHEN METABOLIC PANEL: CPT

## 2025-03-07 PROCEDURE — 85025 COMPLETE CBC W/AUTO DIFF WBC: CPT

## 2025-03-07 PROCEDURE — 84153 ASSAY OF PSA TOTAL: CPT

## 2025-03-07 PROCEDURE — 36415 COLL VENOUS BLD VENIPUNCTURE: CPT

## 2025-03-07 PROCEDURE — 83036 HEMOGLOBIN GLYCOSYLATED A1C: CPT

## 2025-03-07 NOTE — TELEPHONE ENCOUNTER
----- Message from Catherine Ferreira PA-C sent at 3/7/2025 11:16 AM CST -----  Please call patient to discuss lab results. A1C increased significantly. Is he taking Mounjaro? Please schedule appt for him to come in and discuss further diabetes management. All other labs normal   / stable.  ----- Message -----  From: [facility] Interface  Sent: 3/7/2025   8:14 AM CST  To: Judie Diego PA-C

## 2025-03-10 ENCOUNTER — TELEPHONE (OUTPATIENT)
Dept: FAMILY MEDICINE | Facility: CLINIC | Age: 70
End: 2025-03-10
Payer: MEDICARE

## 2025-03-10 NOTE — TELEPHONE ENCOUNTER
----- Message from Renate sent at 3/10/2025  1:24 PM CDT -----  CC Call Back Name Of Caller:Hernan ROSA   Provider Name: Dr Andrew   Does patient feel the need to be seen today? No   Relationship to the Pt?:Pt   Contact Preference?: Call back [phone] What is the nature of the call?: Pt states he is unable to make his appointment time on 03/12 and was calling to see if he can be pushed back to 3 o'clock due to him having work and gets off at 2:30. Pt states to please call back with further assistance in rescheduling.  ----- Message -----  From: Renate Michelle  Sent: 3/10/2025   1:28 PM CDT  To: Nikunj Andrew Staff    CC Call Back Name Of Caller:Hernan ROSA   Provider Name: Dr Andrew   Does patient feel the need to be seen today? No   Relationship to the Pt?:Pt   Contact Preference?: Call back [phone] What is the nature of the call?: Pt states he is unable to make his appointment jocelyn ciro 03/12 and was calling to see if he can be pushed back to 3 o'clock due to him having work and gets off at 2:30. Pt states to please call back with further assistance in rescheduling.

## 2025-03-11 ENCOUNTER — TELEPHONE (OUTPATIENT)
Dept: FAMILY MEDICINE | Facility: CLINIC | Age: 70
End: 2025-03-11
Payer: MEDICARE

## 2025-03-11 NOTE — TELEPHONE ENCOUNTER
Called pt regarding below; no answer; LM to call office; pt does have appt with Dr Calvin next week

## 2025-03-11 NOTE — TELEPHONE ENCOUNTER
----- Message from Catherine Ferreira PA-C sent at 3/7/2025 11:16 AM CST -----  Please call patient to discuss lab results. A1C increased significantly. Is he taking Mounjaro? Please schedule appt for him to come in and discuss further diabetes management. All other labs normal   / stable.  ----- Message -----  From: [facility] Interface  Sent: 3/7/2025   8:14 AM CST  To: Judie Diego PA-C

## 2025-03-18 ENCOUNTER — OFFICE VISIT (OUTPATIENT)
Dept: FAMILY MEDICINE | Facility: CLINIC | Age: 70
End: 2025-03-18
Payer: MEDICARE

## 2025-03-18 VITALS
TEMPERATURE: 98 F | HEART RATE: 88 BPM | RESPIRATION RATE: 18 BRPM | WEIGHT: 176.38 LBS | OXYGEN SATURATION: 95 % | BODY MASS INDEX: 27.68 KG/M2 | SYSTOLIC BLOOD PRESSURE: 100 MMHG | DIASTOLIC BLOOD PRESSURE: 64 MMHG | HEIGHT: 67 IN

## 2025-03-18 DIAGNOSIS — E11.8 TYPE 2 DIABETES MELLITUS WITH COMPLICATION, WITHOUT LONG-TERM CURRENT USE OF INSULIN: Primary | ICD-10-CM

## 2025-03-18 DIAGNOSIS — E78.5 DYSLIPIDEMIA ASSOCIATED WITH TYPE 2 DIABETES MELLITUS: ICD-10-CM

## 2025-03-18 DIAGNOSIS — E11.69 DYSLIPIDEMIA ASSOCIATED WITH TYPE 2 DIABETES MELLITUS: ICD-10-CM

## 2025-03-18 DIAGNOSIS — J44.9 CHRONIC OBSTRUCTIVE PULMONARY DISEASE, UNSPECIFIED COPD TYPE: ICD-10-CM

## 2025-03-18 DIAGNOSIS — C34.32 SQUAMOUS CELL CARCINOMA OF BRONCHUS IN LEFT LOWER LOBE: ICD-10-CM

## 2025-03-18 PROBLEM — Z01.00 DIABETIC EYE EXAM: Status: RESOLVED | Noted: 2025-02-13 | Resolved: 2025-03-18

## 2025-03-18 PROBLEM — E11.9 DIABETIC EYE EXAM: Status: RESOLVED | Noted: 2025-02-13 | Resolved: 2025-03-18

## 2025-03-18 PROCEDURE — 3066F NEPHROPATHY DOC TX: CPT | Mod: CPTII,S$GLB,, | Performed by: INTERNAL MEDICINE

## 2025-03-18 PROCEDURE — 1159F MED LIST DOCD IN RCRD: CPT | Mod: CPTII,S$GLB,, | Performed by: INTERNAL MEDICINE

## 2025-03-18 PROCEDURE — 99214 OFFICE O/P EST MOD 30 MIN: CPT | Mod: S$GLB,,, | Performed by: INTERNAL MEDICINE

## 2025-03-18 PROCEDURE — 1101F PT FALLS ASSESS-DOCD LE1/YR: CPT | Mod: CPTII,S$GLB,, | Performed by: INTERNAL MEDICINE

## 2025-03-18 PROCEDURE — 3074F SYST BP LT 130 MM HG: CPT | Mod: CPTII,S$GLB,, | Performed by: INTERNAL MEDICINE

## 2025-03-18 PROCEDURE — 3008F BODY MASS INDEX DOCD: CPT | Mod: CPTII,S$GLB,, | Performed by: INTERNAL MEDICINE

## 2025-03-18 PROCEDURE — 1157F ADVNC CARE PLAN IN RCRD: CPT | Mod: CPTII,S$GLB,, | Performed by: INTERNAL MEDICINE

## 2025-03-18 PROCEDURE — 3061F NEG MICROALBUMINURIA REV: CPT | Mod: CPTII,S$GLB,, | Performed by: INTERNAL MEDICINE

## 2025-03-18 PROCEDURE — 99999 PR PBB SHADOW E&M-EST. PATIENT-LVL III: CPT | Mod: PBBFAC,,, | Performed by: INTERNAL MEDICINE

## 2025-03-18 PROCEDURE — 1126F AMNT PAIN NOTED NONE PRSNT: CPT | Mod: CPTII,S$GLB,, | Performed by: INTERNAL MEDICINE

## 2025-03-18 PROCEDURE — 1160F RVW MEDS BY RX/DR IN RCRD: CPT | Mod: CPTII,S$GLB,, | Performed by: INTERNAL MEDICINE

## 2025-03-18 PROCEDURE — 3078F DIAST BP <80 MM HG: CPT | Mod: CPTII,S$GLB,, | Performed by: INTERNAL MEDICINE

## 2025-03-18 PROCEDURE — 3288F FALL RISK ASSESSMENT DOCD: CPT | Mod: CPTII,S$GLB,, | Performed by: INTERNAL MEDICINE

## 2025-03-18 PROCEDURE — 3046F HEMOGLOBIN A1C LEVEL >9.0%: CPT | Mod: CPTII,S$GLB,, | Performed by: INTERNAL MEDICINE

## 2025-03-18 RX ORDER — FLUTICASONE FUROATE, UMECLIDINIUM BROMIDE AND VILANTEROL TRIFENATATE 100; 62.5; 25 UG/1; UG/1; UG/1
1 POWDER RESPIRATORY (INHALATION) DAILY
Qty: 60 EACH | Refills: 3 | Status: SHIPPED | OUTPATIENT
Start: 2025-03-18

## 2025-03-18 RX ORDER — METFORMIN HYDROCHLORIDE 500 MG/1
500 TABLET, EXTENDED RELEASE ORAL 2 TIMES DAILY WITH MEALS
Qty: 180 TABLET | Refills: 0 | Status: SHIPPED | OUTPATIENT
Start: 2025-03-18 | End: 2025-06-16

## 2025-03-18 NOTE — ASSESSMENT & PLAN NOTE
Stable     - Assessed cholesterol levels, determining current management is effective.  - Decreased cholesterol medication to once weekly dosing due to good cholesterol levels.  - Noted patient is taking fiber pills which help with cholesterol management.

## 2025-03-18 NOTE — ASSESSMENT & PLAN NOTE
- Noted audible breathing difficulties during the consultation.  - Continued Trelegy inhaler for respiratory management.

## 2025-03-18 NOTE — ASSESSMENT & PLAN NOTE
Has completed chemo and radiation. was started immunotherapy now stopped?    - Reviewed recent CT lung results showing stable lung masses in the left hilum region and lower left side, indicating no progression of lung cancer.  - Reviewed recent CT lung results showing stable lung masses in the left lung, including one the patient thought was gone, indicating no progression of lung cancer.  - Decided against resuming immunotherapy due to stable disease state.  - Scheduled follow-up CTs every 3 months for continued monitoring.  - will confirm plan with Heme Onc

## 2025-03-18 NOTE — PROGRESS NOTES
Chief Complaint: Follow-up (6 month follow up ), Sore Throat, Sinus Problem, and Cough      Hernan Engel  is a 69 y.o. year old patient who presents today for     History of Present Illness    CHIEF COMPLAINT:  Hernan presents today for follow up of multiple medical conditions including lung cancer and diabetes.    LUNG CANCER:  CT shows stable masses in the left hilum region and left lower lung with no changes in size or appearance compared to previous imaging. He is no longer on chemotherapy and continues follow up with his oncologist.    DIABETES:  His A1c has increased from 6.6% to 10.6% with recent blood sugar of 358. He discontinued Mounjaro 2-3 months ago and reports previous use of metformin and insulin. He currently only takes glucose tablets and cinnamon supplements.    LABS:  Kidney function shows minimal proteinuria but is otherwise normal. Liver enzymes, PSA, cholesterol, and electrolytes including sodium and potassium are within normal limits.    RESPIRATORY:  He has not obtained or used prescribed Trelegy inhaler.      ROS:  General: -fever, -chills, -fatigue, -weight gain, -weight loss  Eyes: -vision changes, -redness, -discharge  ENT: -ear pain, -nasal congestion, -sore throat  Cardiovascular: -chest pain, -palpitations, -lower extremity edema  Respiratory: -cough, -shortness of breath, +difficulty breathing  Gastrointestinal: -abdominal pain, -nausea, -vomiting, -diarrhea, +constipation, -blood in stool  Genitourinary: -dysuria, -hematuria, -frequency  Musculoskeletal: -joint pain, -muscle pain  Skin: -rash, -lesion  Neurological: -headache, -dizziness, -numbness, -tingling  Psychiatric: -anxiety, -depression, -sleep difficulty         Past Medical History:   Diagnosis Date    Anxiety     Aortic atherosclerosis 12/6/2017    BPH with urinary obstruction 2/20/2019    Colon polyp 1/16/2014    COPD (chronic obstructive pulmonary disease)     Depression     Disorder of kidney and ureter     ED (erectile  dysfunction) 1/16/2014    Family history of colon cancer 1/16/2014    Hearing loss in left ear 34 years    Lung cancer     Normocytic anemia 9/3/2014    Nuclear sclerosis of both eyes 9/4/2020    Seizure 2005    single event - not controlled by medication    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    T2DM (type 2 diabetes mellitus) 2014    DKA 9/2014       Past Surgical History:   Procedure Laterality Date    BRONCHOSCOPY WITH FLUOROSCOPY N/A 11/2/2023    Procedure: BRONCHOSCOPY, WITH FLUOROSCOPY;  Surgeon: Provider, [facility];  Location: [facility] OR [facility];  Service: Endoscopy;  Laterality: N/A;    CATARACT EXTRACTION Right 2021    COLONOSCOPY      LUNG REMOVAL, PARTIAL      ROBOTIC BRONCHOSCOPY N/A 12/19/2023    Procedure: ROBOTIC BRONCHOSCOPY;  Surgeon: Hernandez Brewer MD;  [facility];  Service: Pulmonary;  Laterality: N/A;        Family History   Problem Relation Name Age of Onset    No Known Problems Mother      Cancer Father          colon cancer    Colon cancer Father      Cataracts Father      Colon cancer Sister Kira     Cancer Sister Kira         colon or lung cancer     No Known Problems Sister Valentine     No Known Problems Sister Chantal     No Known Problems Brother Diogo     No Known Problems Maternal Aunt      No Known Problems Maternal Uncle      No Known Problems Paternal Aunt      No Known Problems Paternal Uncle      No Known Problems Maternal Grandmother      No Known Problems Maternal Grandfather      No Known Problems Paternal Grandmother      No Known Problems Paternal Grandfather      No Known Problems Other      Amblyopia Neg Hx      Blindness Neg Hx      Diabetes Neg Hx      Glaucoma Neg Hx      Hypertension Neg Hx      Macular degeneration Neg Hx      Retinal detachment Neg Hx      Strabismus Neg Hx      Stroke Neg Hx      Thyroid disease Neg Hx          Social History     Socioeconomic History    Marital status:    Occupational History     Employer: [employer]   Tobacco Use    Smoking status: Former     Current packs/day: 0.00     Average packs/day: 1 pack/day for 30.0 years (30.0 ttl pk-yrs)     Types: Cigarettes     Start date: 1/15/1984     Quit date: 1/15/2014     Years since quittin.1    Smokeless tobacco: Never   Substance and Sexual Activity    Alcohol use: Yes     Alcohol/week: 5.7 standard drinks of alcohol     Types: 4 Cans of beer, 2 Standard drinks or equivalent per week     Comment: every other day beer drinker    Drug use: No    Sexual activity: Not Currently     Partners: Female     Birth control/protection: None   Social History Narrative    ** Merged History Encounter **            Current Medications[1]           Objective:      Vitals:    25 1455   BP: 100/64   Pulse: 88   Resp: 18   Temp: 98.4 °F (36.9 °C)       Physical Exam  Constitutional:       Appearance: Normal appearance.   HENT:      Head: Normocephalic and atraumatic.   Cardiovascular:      Rate and Rhythm: Normal rate.   Pulmonary:      Breath sounds: Wheezing present.   Musculoskeletal:         General: Normal range of motion.   Skin:     General: Skin is warm and dry.   Neurological:      General: No focal deficit present.      Mental Status: He is alert and oriented to person, place, and time.          Assessment:       1. Type 2 diabetes mellitus with complication, without long-term current use of insulin    2. 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 Chronic obstructive pulmonary disease, unspecified COPD type    4. Dyslipidemia associated with type 2 diabetes mellitus          Plan:   1. Type 2 diabetes mellitus with complication, without long-term current use of insulin  Assessment & Plan:  Chronic, uncontrolled. Patient's last A1c was   Lab Results   Component Value Date    HGBA1C 10.6 (H) 2025     - Evaluated diabetes management options, weighing risks and benefits of Mounjaro, metformin, and insulin.  - Noted recent blood glucose level  of 358 and A1c increase from 6.6% to 10.6% (normal A1c is 5.5%).  - Clarified misconceptions about metformin safety, emphasizing its efficacy and widespread use.  - Explained importance of diabetes control in preventing kidney failure and other complications.  - will not restart mounjaro due to weight loss  - Prescribed metformin for diabetes management: Take 1 tablet daily for 1 week; if tolerated well, increase to twice daily after the first week; take with meals if experiencing GI discomfort.  - Instructed the patient to monitor for any side effects, particularly constipation.  - Ordered A1C test to be completed prior to next appointment in 3 months.  - Advised to contact the office if experiencing any issues with metformin.      Orders:  -     metFORMIN (GLUCOPHAGE-XR) 500 MG ER 24hr tablet; Take 1 tablet (500 mg total) by mouth 2 (two) times daily with meals.  Dispense: 180 tablet; Refill: 0  -     Hemoglobin A1C; Future; Expected date: 03/18/2025    2. 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 was started immunotherapy now stopped?    - Reviewed recent CT lung results showing stable lung masses in the left hilum region and lower left side, indicating no progression of lung cancer.  - Reviewed recent CT lung results showing stable lung masses in the left lung, including one the patient thought was gone, indicating no progression of lung cancer.  - Decided against resuming immunotherapy due to stable disease state.  - Scheduled follow-up CTs every 3 months for continued monitoring.  - will confirm plan with Heme Onc    Orders:  -     fluticasone-umeclidin-vilanter (TRELEGY ELLIPTA) 100-62.5-25 mcg DsDv; Inhale 1 puff into the lungs once daily.  Dispense: 60 each; Refill: 3    3. Chronic obstructive pulmonary disease, unspecified COPD type  Assessment & Plan:  - Noted audible breathing difficulties during the consultation.  - Continued Trelegy  inhaler for respiratory management.      4. Dyslipidemia associated with type 2 diabetes mellitus  Assessment & Plan:  Stable     - Assessed cholesterol levels, determining current management is effective.  - Decreased cholesterol medication to once weekly dosing due to good cholesterol levels.  - Noted patient is taking fiber pills which help with cholesterol management.         Kidney function:  - Reviewed renal function, noting overall good kidney health despite increasing urine protein levels.  - Discussed relationship between diabetes, hypertension, and chronic kidney disease.  - Explained meaning of eGFR as a measure of renal function.  - Noted increase in urine protein levels from nearly zero 4 years ago to 11 a year ago, and now 21 two weeks ago.  - Emphasized management of diabetes to prevent kidney complications.    PROTEINURIA:  - Reviewed urine protein levels, noting slight increase in protein excretion over the years.  - Explained that urine protein test is performed annually to monitor for kidney issues.    FOLLOW-UP:  - Hernan to maintain current fiber intake to help with cholesterol and blood sugar control.  - Continued fiber supplement.  - Follow up in 3 months.         Follow up in about 3 months (around 6/18/2025) for a1c prior.    This note was generated with the assistance of ambient listening technology. Verbal consent was obtained by the patient and accompanying visitor(s) for the recording of patient appointment to facilitate this note. I attest to having reviewed and edited the generated note for accuracy, though some syntax or spelling errors may persist. Please contact the author of this note for any clarification.                [1]   Current Outpatient Medications:     atorvastatin (LIPITOR) 40 MG tablet, Take 1 tablet (40 mg total) by mouth once daily., Disp: 90 tablet, Rfl: 3    fluticasone-umeclidin-vilanter (TRELEGY ELLIPTA) 100-62.5-25 mcg DsDv, Inhale 1 puff into the lungs once  daily., Disp: 60 each, Rfl: 3    metFORMIN (GLUCOPHAGE-XR) 500 MG ER 24hr tablet, Take 1 tablet (500 mg total) by mouth 2 (two) times daily with meals., Disp: 180 tablet, Rfl: 0

## 2025-03-18 NOTE — ASSESSMENT & PLAN NOTE
Chronic, uncontrolled. Patient's last A1c was   Lab Results   Component Value Date    HGBA1C 10.6 (H) 03/07/2025     - Evaluated diabetes management options, weighing risks and benefits of Mounjaro, metformin, and insulin.  - Noted recent blood glucose level of 358 and A1c increase from 6.6% to 10.6% (normal A1c is 5.5%).  - Clarified misconceptions about metformin safety, emphasizing its efficacy and widespread use.  - Explained importance of diabetes control in preventing kidney failure and other complications.  - will not restart mounjaro due to weight loss  - Prescribed metformin for diabetes management: Take 1 tablet daily for 1 week; if tolerated well, increase to twice daily after the first week; take with meals if experiencing GI discomfort.  - Instructed the patient to monitor for any side effects, particularly constipation.  - Ordered A1C test to be completed prior to next appointment in 3 months.  - Advised to contact the office if experiencing any issues with metformin.

## 2025-03-18 NOTE — PROGRESS NOTES
Health Maintenance Due   Topic     RSV Vaccine (Age 60+ and Pregnant patients) (1 - Risk 60-74 years 1-dose series) Not offered at this facility    Shingles Vaccine (2 of 2) Hx of Chicken Pox. Patient advised to go to pharmacy    Colorectal Cancer Screening  Consult PCP    COVID-19 Vaccine (5 - 2024-25 season)     Foot Exam  Consult PCP    Diabetic Eye Exam

## 2025-03-31 ENCOUNTER — TELEPHONE (OUTPATIENT)
Dept: FAMILY MEDICINE | Facility: CLINIC | Age: 70
End: 2025-03-31
Payer: MEDICARE

## 2025-03-31 NOTE — TELEPHONE ENCOUNTER
----- Message from Vijay sent at 3/31/2025 12:11 PM CDT -----  Regarding: Hernan  Type: Patient Callback Who called: Hernan What is the request in detail: Patient stated that he would like for the doctor to give him a call. He is still waiting to hear back what was discussed with his Oncologist. Please reach out to the patient as soon as possible. Can the clinic reply by [facility]? Yes Would the patient rather a call back or a response via [facility]? Callback Best call back number: [phone] Information:

## 2025-03-31 NOTE — TELEPHONE ENCOUNTER
Was talking to pt regarding below message; he states you were supposed to talk to oncologist about Ct scan; I couldn't get anymore info due to phone going out

## 2025-04-25 ENCOUNTER — PATIENT OUTREACH (OUTPATIENT)
Dept: ADMINISTRATIVE | Facility: HOSPITAL | Age: 70
End: 2025-04-25
Payer: MEDICARE

## 2025-04-25 NOTE — PROGRESS NOTES
HM and immunization's reviewed and updated.  Health Maintenance Due   Topic Date Due    RSV Vaccine (Age 60+ and Pregnant patients) (1 - Risk 60-74 years 1-dose series) Never done    Shingles Vaccine (2 of 2) 07/05/2018    Colorectal Cancer Screening  02/05/2020    COVID-19 Vaccine (5 - 2024-25 season) 09/01/2024    Foot Exam  10/19/2024    Diabetic Eye Exam  10/26/2024     IF HEALTH MAINTENANCE ALREADY DONE, PLEASE RECORDS INFORMATION.  A1c scheduled 6/12/2025 with follow up 6/19/2025; eye visit scheduled 5/1/2025 with Dr. Tito Rodriguez to schedule PAT for cscope, endo department at [phone].Left message for patient to return call.Sent [facility] message.

## 2025-04-29 ENCOUNTER — TELEPHONE (OUTPATIENT)
Dept: HEMATOLOGY/ONCOLOGY | Facility: CLINIC | Age: 70
End: 2025-04-29
Payer: MEDICARE

## 2025-04-29 ENCOUNTER — HOSPITAL ENCOUNTER (OUTPATIENT)
Dept: RADIOLOGY | Facility: HOSPITAL | Age: 70
Discharge: HOME | End: 2025-04-29
Attending: HOSPITALIST
Payer: MEDICARE

## 2025-04-29 DIAGNOSIS — C34.12 ADENOCARCINOMA OF UPPER LOBE OF LEFT LUNG: ICD-10-CM

## 2025-04-29 DIAGNOSIS — C34.32 SQUAMOUS CELL CARCINOMA OF BRONCHUS IN LEFT LOWER LOBE: ICD-10-CM

## 2025-04-29 PROCEDURE — 25500020 PHARM REV CODE 255: Performed by: HOSPITALIST

## 2025-04-29 PROCEDURE — 71260 CT THORAX DX C+: CPT | Mod: 26,,, | Performed by: RADIOLOGY

## 2025-04-29 PROCEDURE — 71260 CT THORAX DX C+: CPT | Mod: TC

## 2025-04-29 RX ADMIN — IOHEXOL 75 ML: 350 INJECTION, SOLUTION INTRAVENOUS at 08:04

## 2025-04-30 ENCOUNTER — OFFICE VISIT (OUTPATIENT)
Dept: HEMATOLOGY/ONCOLOGY | Facility: CLINIC | Age: 70
End: 2025-04-30
Payer: MEDICARE

## 2025-04-30 VITALS
OXYGEN SATURATION: 98 % | BODY MASS INDEX: 27.41 KG/M2 | HEART RATE: 76 BPM | SYSTOLIC BLOOD PRESSURE: 109 MMHG | WEIGHT: 174.63 LBS | DIASTOLIC BLOOD PRESSURE: 67 MMHG | RESPIRATION RATE: 24 BRPM | TEMPERATURE: 98 F | HEIGHT: 67 IN

## 2025-04-30 DIAGNOSIS — C34.32 SQUAMOUS CELL CARCINOMA OF BRONCHUS IN LEFT LOWER LOBE: Primary | ICD-10-CM

## 2025-04-30 DIAGNOSIS — C34.12 ADENOCARCINOMA OF UPPER LOBE OF LEFT LUNG: ICD-10-CM

## 2025-04-30 PROCEDURE — 99999 PR PBB SHADOW E&M-EST. PATIENT-LVL III: CPT | Mod: PBBFAC,,, | Performed by: HOSPITALIST

## 2025-04-30 NOTE — PROGRESS NOTES
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   9/15/2023 Imaging Significant Findings    CXR (URI)  - Masslike focus along L hilar region     10/6/2023 Imaging Significant Findings    CT C, Non-Con  - 6.6 x 5.9 cm LLL mass  - 2 x 1.2 cm cavitary lesion in lingula  - 0.9 x 0.8 cm pleural based nodule in R lung base  - Trace L pleural effusion  - Suspect hilar LAD  - Calcified hilar LN     11/2/2023 Procedure    Bronchoscopy  LLL, Endobronchial Biopsy  - Squamous cell carcinoma (CDK 5/6, p63 positive, CK7 and TTF negative)    [facility] NGS  - Insufficient tissue     11/6/2023 Imaging Significant Findings    PET CT  - 7.6 x 6.0 cm soft tissue mass in superior segment of LLL, SUV 7.2  - 2.5 x 1.6 cm cavitary lesion in ELDER (neoplastic vs infectious/inflammatory), SUV max 1.8  - No hypermetabolic LAD  - 0.9 cm L hypoattenuating L adrenal nodule, likely adenoma     11/29/2023 Tumor Conference    Tumor Board  - Recommend staging EBUS and L robotic bronchoscopy for sampling of ELDER cavitary nodule.  The nodule is highly suspicious for possible adenocarcinoma as it has evolved from a ground-glass opacity to a part solid and cavitary nodule.  Obtain brain MRI to complete staging.  The clinical picture suggests a possible T3 or T4 primary lung cancer or two separate primary lung cancers.        11/29/2023 Tumor Genotyping    Liquid Biopsy, [facility]  - CKDN2A, TP53 (three separate LOF mutations)     11/30/2023 Imaging Significant Findings    MRI Brain  - JAN     12/12/2023 Imaging Significant Findings    CT C, Non-Con  - 6.7 x 6.2 cm LLL mass (previously 6.6 x 5.9 cm), abutting and narrowing the adjacent LLL bronchus  - Enlarging lingular cavitary lesion, now 2.5 x 1.6 cm  - Slightly decreased size of RLL pleural based nodule     1/3/2024 Tumor  Conference    Tumor Board  - Agree with plan for SBRT of lingular lesion and chemoRT to LLL mass.      1/5/2024 Cancer Staged    Staging form: Lung, AJCC 8th Edition  - Clinical: Stage IIIA (cT4, cN0, cM0)     1/19/2024 - 2/26/2024 Chemotherapy    Treatment Summary   Plan Name: OP NSCLC PACLITAXEL + CARBOPLATIN (AUC) QW + RADIATION  Treatment Goal: Curative  Status: Inactive  Start Date: 1/19/2024  End Date: 2/26/2024  Provider: Guanako Grove IV, MD  Chemotherapy: CARBOplatin (PARAPLATIN) 210 mg in sodium chloride 0.9% 136 mL chemo infusion, 210 mg (100 % of original dose 208.2 mg), Intravenous, Clinic/HOD 1 time, 6 of 6 cycles  Dose modification:   (original dose 208.2 mg, Cycle 1)  Administration: 210 mg (1/22/2024), 210 mg (1/29/2024), 180 mg (2/5/2024), 225 mg (2/12/2024), 210 mg (2/19/2024), 210 mg (2/26/2024)  PACLitaxeL (TAXOL) 45 mg/m2 = 84 mg in sodium chloride 0.9% 250 mL chemo infusion, 45 mg/m2 = 84 mg, Intravenous, Clinic/HOD 1 time, 6 of 6 cycles  Administration: 84 mg (1/22/2024), 84 mg (1/29/2024), 84 mg (2/5/2024), 84 mg (2/12/2024), 84 mg (2/19/2024), 84 mg (2/26/2024)     1/23/2024 - 3/6/2024 Radiation Therapy    Treating physician: Santino Arora  Treatment Summary  Course: C1 Thorax 2024  Treatment Site Ref. ID Energy Dose/Fx (Gy) #Fx Dose Correction (Gy) Total Dose (Gy) Start Date End Date Elapsed Days   IM Lung_L PTV_High 6X 2.2 19 / 30 0 41.8 1/23/2024 2/19/2024 27   IMLungNew PTV_High_resim 6X 2.2 11 / 11 0 24.2 2/21/2024 3/6/2024 14   Multiple planns were attempted, including SBRT to the ELDER and CRT to the LLL. With dose overlap, it was ultimately decided to treat with concurrent CRT to both lesions.. He was resimulated for treatment response in the LLL mass.     3/13/2024 Imaging Significant Findings    CT C/A/P  - 4.7 x 4.4 cm perihilar LLL mass, previously 6.7 x 6.2 cm. Decreased mass effect on LLL airways  - 2.7 x 1.7 cm cavitary ligular lesion, unchanged  - New cavitary lesions, solid  pulmonary nodules, and ill-defined opacities in lungs b/l  - Unchanged few sub-cm hypodensities in liver     4/19/2024 -  Chemotherapy    Treatment Summary   Plan Name: OP DURVALUMAB 1500 MG Q4W  Treatment Goal: Curative  Status: Active  Start Date: 4/19/2024  End Date: 7/2/2025 (Planned)  Provider: Guanako Grove IV, MD  Chemotherapy: [No matching medication found in this treatment plan]     6/11/2024 Imaging Significant Findings    CT C/A/P  - 4.2 x 3.4 cm perihilar LLL mass, previously 4.7 x 4.4 cm.  Improvement in intralesional cavitation  - 1.6 x 1.3 cm lingular lesion, previously 2.7 x 1.7 cm  - New interstitial infiltrates in posterior aspect of ELDER and lingula  - Worsening of ill-defined opacities in LLL and bronchiectatic changes  - Improvement in previously seen ill-defined opacities in medial aspect of RLL  - Improvement other cavitary lesions and pulmonary nodules  - Postoperative upper lobectomy changes, no recurrent disease bronchial stump  - Small pericardial effusion  - Few subcentimeter hepatic hypodensities, grossly unchanged     7/30/2024 Imaging Significant Findings    CT C  - Interval resolution of lingular cavitary lesion   - 4.3 x 4.0 left hilar soft tissue mass, previously 4.2 x 3.4 cm  - Worsening opacity at left lung base with denser consolidation, concerning for possible superimposed infection, malignancy not excluded  - 0.9 cm more conspicuous nodular density along left greater fissure  - Trace pleural fluid on left  - 0.4 cm ELDER pulmonary nodule, unchanged  - 0.6 cm right paramediastinal nodule, unchanged  - No mediastinal or hilar lymphadenopathy     9/24/2024 Imaging Significant Findings    CT C/A/P  4.4 x 4.2 cm left hilar mass, stable compared to prior  Previously identified nodular density along fissure no longer visualized  Stable opacity in left lung base, possibly representing infiltrative versus inflammatory changes     1/25/2025 Imaging Significant Findings    CT C/A/P  4.4 x  3.8 cm left hilar soft tissue mass, previously 4.4 x 4.0 cm  Stable opacity of left lung base  Trace left pleural effusion  0.6 cm RML paramediastinal nodule, previously 0.5 cm  Postsurgical changes s/p right upper lobectomy  No significant mediastinal, hilar, or axillary lymphadenopathy     Adenocarcinoma of Lingula   10/6/2023 Imaging Significant Findings    CT C, Non-Con  - 6.6 x 5.9 cm LLL mass  - 2 x 1.2 cm cavitary lesion in lingula  - 0.9 x 0.8 cm pleural based nodule in R lung base  - Trace L pleural effusion  - Suspect hilar LAD  - Calcified hilar LN     11/6/2023 Imaging Significant Findings    PET CT  - 7.6 x 6.0 cm soft tissue mass in superior segment of LLL, SUV 7.2  - 2.5 x 1.6 cm cavitary lesion in ELDER (neoplastic vs infectious/inflammatory), SUV max 1.8  - No hypermetabolic LAD  - 0.9 cm L hypoattenuating L adrenal nodule, likely adenoma     11/29/2023 Tumor Conference    Tumor Board  - Recommend staging EBUS and L robotic bronchoscopy for sampling of ELDER cavitary nodule.  The nodule is highly suspicious for possible adenocarcinoma as it has evolved from a ground-glass opacity to a part solid and cavitary nodule.  Obtain brain MRI to complete staging.  The clinical picture suggests a possible T3 or T4 primary lung cancer or two separate primary lung cancers.        11/29/2023 Tumor Genotyping    Liquid Biopsy, [facility]  - CKDN2A, TP53 (three separate LOF mutations)     11/30/2023 Imaging Significant Findings    MRI Brain  - JAN     12/12/2023 Imaging Significant Findings    CT C, Non-Con  - 6.7 x 6.2 cm LLL mass (previously 6.6 x 5.9 cm), abutting and narrowing the adjacent LLL bronchus  - Enlarging lingular cavitary lesion, now 2.5 x 1.6 cm  - Slightly decreased size of RLL pleural based nodule     12/19/2023 Procedure    Bronch with EBUS  ELDER, FNA and TBBx  - Adenocarcinoma (TTF1 positive, p40 negative)    LN  - Negative: Station 7  - Report: No pathologic enlargement of 4R, 4L, or 11R. Unable to  evaluated 11L due to obstructing LLL endobronchial mass    [facility] NGS  - KRAS G12C  - TP53  - Negative: EGFR, ALK, BRAF, ROS1, RET, MET, ERBB2  - PD-L1: 10%       1/3/2024 Tumor Conference    Tumor Board  - Agree with plan for SBRT of lingular lesion and chemoRT to LLL mass.      1/5/2024 Cancer Staged    Staging form: Lung, AJCC 8th Edition  - Clinical: Stage IA3 (cT1c, cN0, cM0)     3/13/2024 Imaging Significant Findings    CT C/A/P  - 4.7 x 4.4 cm perihilar LLL mass, previously 6.7 x 6.2 cm. Decreased mass effect on LLL airways  - 2.7 x 1.7 cm cavitary ligular lesion, unchanged  - New cavitary lesions, solid pulmonary nodules, and ill-defined opacities in lungs b/l  - Unchanged few sub-cm hypodensities in liver     6/11/2024 Imaging Significant Findings    CT C/A/P  - 4.2 x 3.4 cm perihilar LLL mass, previously 4.7 x 4.4 cm.  Improvement in intralesional cavitation  - 1.6 x 1.3 cm lingular lesion, previously 2.7 x 1.7 cm  - New interstitial infiltrates in posterior aspect of ELDER and lingula  - Worsening of ill-defined opacities in LLL and bronchiectatic changes  - Improvement in previously seen ill-defined opacities in medial aspect of RLL  - Improvement other cavitary lesions and pulmonary nodules  - Postoperative upper lobectomy changes, no recurrent disease bronchial stump  - Small pericardial effusion  - Few subcentimeter hepatic hypodensities, grossly unchanged     7/30/2024 Imaging Significant Findings    CT C  - Interval resolution of lingular cavitary lesion   - 4.3 x 4.0 left hilar soft tissue mass, previously 4.2 x 3.4 cm  - Worsening opacity at left lung base with denser consolidation, concerning for possible superimposed infection, malignancy not excluded  - 0.9 cm more conspicuous nodular density along left greater fissure  - Trace pleural fluid on left  - 0.4 cm ELDER pulmonary nodule, unchanged  - 0.6 cm right paramediastinal nodule, unchanged  - No mediastinal or hilar lymphadenopathy     9/24/2024  Imaging Significant Findings    CT C/A/P  4.4 x 4.2 cm left hilar mass, stable compared to prior  Previously identified nodular density along fissure no longer visualized  Stable opacity in left lung base, possibly representing infiltrative versus inflammatory changes     1/25/2025 Imaging Significant Findings    CT C/A/P  4.4 x 3.8 cm left hilar soft tissue mass, previously 4.4 x 4.0 cm  Stable opacity of left lung base  Trace left pleural effusion  0.6 cm RML paramediastinal nodule, previously 0.5 cm  Postsurgical changes s/p right upper lobectomy  No significant mediastinal, hilar, or axillary lymphadenopathy        Oncology History      HPI:     Hernan Engel is a 68 y.o. male with pmh significant for COPD, T2DM, HLD, Stage IB ([id number]) moderately differentiated adenocarcinoma of RUL, initially dx'd 3/2014, s/p R upper lobectomy (3/2014, Dr. Mendez), and now two newly diagnosed concurrent lung cancers as below who presents for follow up:     1) Stage IIIA (T4N0M0) squamous cell carcinoma of the LLL (insufficient sample for PD-L1, no targetable mutations on liquid biopsy), initially dx'd 11/2/23, s/p CRT (1/23/24 - 3/05/24) w/ weekly carboplatin/paclitaxel (1/22/24 - 2/26/24), and durvalumab consolidation (4/19/24-5/16/24; held for pneumonitis, restarted 9/25/24-10/22/24)    2) Stage IA3 (F1sQ4H3) adenocarcinoma of the lingula (KRAS G12C, PD-L1 10%), initially dx'd 12/19/23, s/p CRT (1/23/24 - 3/05/24) w/ weekly carboplatin/paclitaxel (1/22/24 - 2/26/24), and durvalumab consolidation x4 cycles total (4/19/24-5/16/24; held for pneumonitis, restarted 9/25/24-10/22/24)    *Notably, pt has a RLL pleural based nodule, reviewed at TB, appears linear and unlikely malignant    Last clinic 1/29/25, no further durvalumab, transition to surveillance, CT C in 3 months.    Interval History:   4/29/25: CT C, final read pending    The pt states that he has been feeling well since our last visit. He denies any new or  "worsening symptoms at this time.     He feels that his breathing is good, and he has a difficult coming up with something that makes him short of breath.     He notes that he had a "bad cold", but says this has resolved, and his coughing with it.     ROS:   As per HPI.       Physical Exam:       There were no vitals taken for this visit.               Physical Exam  Constitutional:       Appearance: Normal appearance.   HENT:      Head: Normocephalic and atraumatic.   Eyes:      Extraocular Movements: Extraocular movements intact.      Conjunctiva/sclera: Conjunctivae normal.      Pupils: Pupils are equal, round, and reactive to light.   Cardiovascular:      Rate and Rhythm: Normal rate and regular rhythm.      Heart sounds: No murmur heard.     No friction rub. No gallop.   Pulmonary:      Effort: Pulmonary effort is normal.      Breath sounds: Rales (Scant rales in posterior inferior left lung field) present. No wheezing or rhonchi.   Musculoskeletal:         General: Normal range of motion.      Right lower leg: No edema.      Left lower leg: No edema.   Skin:     General: Skin is warm and dry.   Neurological:      Mental Status: He is alert and oriented to person, place, and time.   Psychiatric:         Mood and Affect: Mood normal.         Thought Content: Thought content normal.         Judgment: Judgment normal.       Imaging:    See oncologic history as above.     Path:  See oncologic history above.      Assessment and Plan:     Hernan Engel is a 68 y.o. male with pmh significant for COPD, T2DM, HLD, Stage IB ([id number]) moderately differentiated adenocarcinoma of RUL, initially dx'd 3/2014, s/p R upper lobectomy (3/2014, Dr. Mendez), and now two newly diagnosed concurrent lung cancers as below who presents for follow up:     1) Stage IIIA (T4N0M0) squamous cell carcinoma of the LLL (insufficient sample for PD-L1, no targetable mutations on liquid biopsy), initially dx'd 11/2/23, s/p CRT (1/23/24 - " 3/05/24) w/ weekly carboplatin/paclitaxel (1/22/24 - 2/26/24), and durvalumab consolidation (4/19/24-5/16/24; held for pneumonitis, restarted 9/25/24-10/22/24)    2) Stage IA3 (X6bQ6B1) adenocarcinoma of the lingula (KRAS G12C, PD-L1 10%), initially dx'd 12/19/23, s/p CRT (1/23/24 - 3/05/24) w/ weekly carboplatin/paclitaxel (1/22/24 - 2/26/24), and durvalumab consolidation x4 cycles total (4/19/24-5/16/24; held for pneumonitis, restarted 9/25/24-10/22/24)    *Notably, pt has a RLL pleural based nodule, reviewed at TB, appears linear and unlikely malignant    Stage IIIA Squamous Cell Carcinoma of the LLL, No PD-L1, No Targetable Mutations  ECOG PS 0.  Patient is currently on surveillance (see previous note for consideration), no new or worsening respiratory symptoms.  Most recent imaging (4/29/25) is still pending a final read, however on my review, it appears that the left perihilum is less full than prior and that there maybe decreasing inflammatory changes in the LLL, without overt evidence of new lesions.  Will follow up final read, however pending this, anticipate continuing with CT C q3-6m x 3y, then q6m x 2 years, then annually.     PLAN  F/u final read of CT C, will contact patient with the results  CT C in 3 months  RTC at least 2 days after imaging      Stage IA3 Adenocarcinoma of the Lingula, NGS and PD-L1 Pending  Bronchoscopy demonstrates adenocarcinoma of the lingula, separate from the squamous cell carcinoma in the LLL.  This is a T1c lesion which, given lack of lymphadenopathy, makes this a Stage IA3 disease. Now s/p CRT as above. Prior imaging demonstrated resolution of this lesion, awaiting final read of most recent imaging (4/29/25).   Treatment as above        The above information has been reviewed with the patient and all questions have been answered to their apparent satisfaction.  They understand that they can call the clinic with any questions.        Med Onc Chart Routing      Follow up with  physician .  CT C in 3 months  RTC at least 2 days after imaging   Follow up with SHARRI    Infusion scheduling note    Injection scheduling note    Labs    Imaging    Pharmacy appointment    Other referrals

## 2025-05-01 ENCOUNTER — RESULTS FOLLOW-UP (OUTPATIENT)
Dept: HEMATOLOGY/ONCOLOGY | Facility: CLINIC | Age: 70
End: 2025-05-01

## 2025-05-01 NOTE — PROGRESS NOTES
CT scan with decreasing size of paramediastinal LLL soft tissue mass, otherwise stable, will proceed with plan for surveillance imaging in 3 months.  Attempted to contact patient with results by phone, unable to region.  Message sent to patient portal.

## 2025-05-13 ENCOUNTER — PATIENT OUTREACH (OUTPATIENT)
Dept: ADMINISTRATIVE | Facility: HOSPITAL | Age: 70
End: 2025-05-13
Payer: MEDICARE

## 2025-05-13 NOTE — PROGRESS NOTES
HM and immunization's reviewed and updated.  Health Maintenance Due   Topic Date Due    RSV Vaccine (Age 60+ and Pregnant patients) (1 - Risk 60-74 years 1-dose series) Never done    Shingles Vaccine (2 of 2) 07/05/2018    Colorectal Cancer Screening  02/05/2020    COVID-19 Vaccine (5 - 2024-25 season) 09/01/2024    Foot Exam  10/19/2024    Diabetic Eye Exam  10/26/2024     IF HEALTH MAINTENANCE ALREADY DONE, PLEASE RECORDS INFORMATION.  A1c scheduled 6/12/2025 with follow up 6/19/2025;   Need to schedule PAT for cscope, endo department at [phone] and reschedule eye doctor visit.  Unable to reach. Sent portal message.

## 2025-06-16 ENCOUNTER — LAB VISIT (OUTPATIENT)
Dept: LAB | Facility: HOSPITAL | Age: 70
End: 2025-06-16
Attending: INTERNAL MEDICINE
Payer: MEDICARE

## 2025-06-16 DIAGNOSIS — E11.8 TYPE 2 DIABETES MELLITUS WITH COMPLICATION, WITHOUT LONG-TERM CURRENT USE OF INSULIN: ICD-10-CM

## 2025-06-16 LAB
EAG (OHS): 154 MG/DL (ref 68–131)
HBA1C MFR BLD: 7 % (ref 4–5.6)

## 2025-06-16 PROCEDURE — 36415 COLL VENOUS BLD VENIPUNCTURE: CPT | Mod: PO

## 2025-06-16 PROCEDURE — 83036 HEMOGLOBIN GLYCOSYLATED A1C: CPT

## 2025-06-19 ENCOUNTER — OFFICE VISIT (OUTPATIENT)
Dept: FAMILY MEDICINE | Facility: CLINIC | Age: 70
End: 2025-06-19
Payer: MEDICARE

## 2025-06-19 VITALS
SYSTOLIC BLOOD PRESSURE: 136 MMHG | WEIGHT: 179.69 LBS | RESPIRATION RATE: 18 BRPM | BODY MASS INDEX: 28.2 KG/M2 | HEART RATE: 83 BPM | DIASTOLIC BLOOD PRESSURE: 62 MMHG | TEMPERATURE: 98 F | HEIGHT: 67 IN

## 2025-06-19 DIAGNOSIS — Z09 FOLLOW-UP EXAM: ICD-10-CM

## 2025-06-19 DIAGNOSIS — E11.8 TYPE 2 DIABETES MELLITUS WITH COMPLICATION, WITHOUT LONG-TERM CURRENT USE OF INSULIN: Primary | ICD-10-CM

## 2025-06-19 DIAGNOSIS — Z12.11 COLON CANCER SCREENING: ICD-10-CM

## 2025-06-19 DIAGNOSIS — R79.9 ABNORMAL FINDING OF BLOOD CHEMISTRY, UNSPECIFIED: ICD-10-CM

## 2025-06-19 DIAGNOSIS — C34.32 SQUAMOUS CELL CARCINOMA OF BRONCHUS IN LEFT LOWER LOBE: ICD-10-CM

## 2025-06-19 PROCEDURE — 3061F NEG MICROALBUMINURIA REV: CPT | Mod: CPTII,S$GLB,, | Performed by: INTERNAL MEDICINE

## 2025-06-19 PROCEDURE — 99999 PR PBB SHADOW E&M-EST. PATIENT-LVL III: CPT | Mod: PBBFAC,,, | Performed by: INTERNAL MEDICINE

## 2025-06-19 PROCEDURE — 3051F HG A1C>EQUAL 7.0%<8.0%: CPT | Mod: CPTII,S$GLB,, | Performed by: INTERNAL MEDICINE

## 2025-06-19 PROCEDURE — 99214 OFFICE O/P EST MOD 30 MIN: CPT | Mod: S$GLB,,, | Performed by: INTERNAL MEDICINE

## 2025-06-19 PROCEDURE — 3078F DIAST BP <80 MM HG: CPT | Mod: CPTII,S$GLB,, | Performed by: INTERNAL MEDICINE

## 2025-06-19 PROCEDURE — 1159F MED LIST DOCD IN RCRD: CPT | Mod: CPTII,S$GLB,, | Performed by: INTERNAL MEDICINE

## 2025-06-19 PROCEDURE — 3066F NEPHROPATHY DOC TX: CPT | Mod: CPTII,S$GLB,, | Performed by: INTERNAL MEDICINE

## 2025-06-19 PROCEDURE — 1157F ADVNC CARE PLAN IN RCRD: CPT | Mod: CPTII,S$GLB,, | Performed by: INTERNAL MEDICINE

## 2025-06-19 PROCEDURE — 3075F SYST BP GE 130 - 139MM HG: CPT | Mod: CPTII,S$GLB,, | Performed by: INTERNAL MEDICINE

## 2025-06-19 PROCEDURE — 1160F RVW MEDS BY RX/DR IN RCRD: CPT | Mod: CPTII,S$GLB,, | Performed by: INTERNAL MEDICINE

## 2025-06-19 PROCEDURE — 1126F AMNT PAIN NOTED NONE PRSNT: CPT | Mod: CPTII,S$GLB,, | Performed by: INTERNAL MEDICINE

## 2025-06-19 PROCEDURE — 3008F BODY MASS INDEX DOCD: CPT | Mod: CPTII,S$GLB,, | Performed by: INTERNAL MEDICINE

## 2025-06-19 NOTE — ASSESSMENT & PLAN NOTE
Lab Results   Component Value Date    HGBA1C 7.0 (H) 06/16/2025   No GLP1, trying to avoid weight loss    - Monitored the patient's A1C which has improved from 10.6 to 7.0, indicating better diabetes control.  - Current diabetes management plan is effective.  - Explained the relationship between uncontrolled diabetes and kidney damage, clarifying that metformin itself does not cause kidney failure.  - Discussed dietary choices, advising on complex vs. simple carbohydrates (e.g., potatoes being preferable to pasta) and foods to be cautious with due to high sugar content (rice, bread, pasta).  - Noted that liver and renal function from March labs were normal.  - Continued Metformin 500 mg twice daily.  - Instructed the patient to continue current diet and lifestyle modifications to maintain improved A1C levels.  - Ordered kidney and liver function tests for next visit.  - Hernan to maintain protein intake.

## 2025-06-19 NOTE — PROGRESS NOTES
Chief Complaint: Follow-up      Hernan Engel  is a 69 y.o. year old patient who presents today for     History of Present Illness    CHIEF COMPLAINT:  Hernan presents today for follow-up of mass on imaging    MASS ON IMAGING:  CT shows decreasing size of mass, which he perceives as a positive development. Next follow-up imaging is scheduled for July 28th. He appears reassured by the findings of stable condition with reduction in mass size, and understands the plan for continued surveillance imaging every three months.    DIABETES:  He reports improved diabetes management with A1C reduced from 10.6 to 7.0. He currently takes Metformin 500 mg twice daily. He verbalizes consuming a diet high in processed carbohydrates, including corn chips, Fritos, and crackers with peanut butter. He acknowledges dietary recommendations to limit high sugar foods such as rice, bread, and pasta. He denies complications from current diabetes management.    LABS / TEST RESULTS:  March labs showed normal liver enzymes and kidney function.    MEDICATIONS:  He reports using Trelegy as needed, indicating minimal requirement for the medication. He continues taking Metformin twice daily. He mentions cholesterol medication but appears inconsistent about adherence.    INTEGUMENTARY (SKIN):  He reports experiencing skin itching but is unable to clearly articulate specific details about the skin complaint. He appears to have some uncertainty about the nature of the skin issue.      ROS:  General: -fever, -chills, -fatigue, -weight gain, -weight loss  Eyes: -vision changes, -redness, -discharge  ENT: -ear pain, -nasal congestion, -sore throat  Cardiovascular: -chest pain, -palpitations, -lower extremity edema  Respiratory: -cough, -shortness of breath  Gastrointestinal: -abdominal pain, -nausea, -vomiting, -diarrhea, -constipation, -blood in stool  Genitourinary: -dysuria, -hematuria, -frequency  Musculoskeletal: -joint pain, -muscle pain  Skin: -rash,  -lesion, +itching  Neurological: -headache, -dizziness, -numbness, -tingling  Psychiatric: -anxiety, -depression, -sleep difficulty         Past Medical History:   Diagnosis Date    Anxiety     Aortic atherosclerosis 12/6/2017    BPH with urinary obstruction 2/20/2019    Colon polyp 1/16/2014    COPD (chronic obstructive pulmonary disease)     Depression     Disorder of kidney and ureter     ED (erectile dysfunction) 1/16/2014    Family history of colon cancer 1/16/2014    Hearing loss in left ear 34 years    Lung cancer     Normocytic anemia 9/3/2014    Nuclear sclerosis of both eyes 9/4/2020    Seizure 2005    single event - not controlled by medication    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    T2DM (type 2 diabetes mellitus) 2014    DKA 9/2014       Past Surgical History:   Procedure Laterality Date    BRONCHOSCOPY WITH FLUOROSCOPY N/A 11/2/2023    Procedure: BRONCHOSCOPY, WITH FLUOROSCOPY;  Surgeon: Provider, [facility];  Location: [facility] OR [address] [place] [address];  Service: Endoscopy;  Laterality: N/A;    CATARACT EXTRACTION Right 2021    COLONOSCOPY      LUNG REMOVAL, PARTIAL      ROBOTIC BRONCHOSCOPY N/A 12/19/2023    Procedure: ROBOTIC BRONCHOSCOPY;  Surgeon: Hernandez Brewer MD;  Location: [facility] OR [address] [place] [address];  Service: Pulmonary;  Laterality: N/A;        Family History   Problem Relation Name Age of Onset    No Known Problems Mother      Cancer Father          colon cancer    Colon cancer Father      Cataracts Father      Colon cancer Sister Kira     Cancer Sister Kira         colon or lung cancer     No Known Problems Sister Valentine     No Known Problems Sister Chantal     No Known Problems Brother Diogo     No Known Problems Maternal Aunt      No Known Problems Maternal Uncle      No Known Problems Paternal Aunt      No Known Problems Paternal Uncle      No Known Problems Maternal Grandmother      No Known Problems Maternal Grandfather      No Known Problems Paternal Grandmother      No Known Problems Paternal  Grandfather      No Known Problems Other      Amblyopia Neg Hx      Blindness Neg Hx      Diabetes Neg Hx      Glaucoma Neg Hx      Hypertension Neg Hx      Macular degeneration Neg Hx      Retinal detachment Neg Hx      Strabismus Neg Hx      Stroke Neg Hx      Thyroid disease Neg Hx          Social History     Socioeconomic History    Marital status:    Occupational History     Employer: [employer]   Tobacco Use    Smoking status: Former     Current packs/day: 0.00     Average packs/day: 1 pack/day for 30.0 years (30.0 ttl pk-yrs)     Types: Cigarettes     Start date: 1/15/1984     Quit date: 1/15/2014     Years since quittin.4    Smokeless tobacco: Never   Substance and Sexual Activity    Alcohol use: Yes     Alcohol/week: 5.7 standard drinks of alcohol     Types: 4 Cans of beer, 2 Standard drinks or equivalent per week     Comment: every other day beer drinker    Drug use: No    Sexual activity: Not Currently     Partners: Female     Birth control/protection: None   Social History Narrative    ** Merged History Encounter **            Current Medications[1]           Objective:      Vitals:    25 1529   BP: 136/62   Pulse: 83   Resp: 18   Temp: 98.1 °F (36.7 °C)       Physical Exam  Constitutional:       Appearance: Normal appearance.   HENT:      Head: Normocephalic and atraumatic.   Cardiovascular:      Rate and Rhythm: Normal rate.   Musculoskeletal:         General: Normal range of motion.   Skin:     General: Skin is warm and dry.   Neurological:      General: No focal deficit present.      Mental Status: He is alert and oriented to person, place, and time.          Assessment:       1. Type 2 diabetes mellitus with complication, without long-term current use of insulin    2. Follow-up exam    3. Abnormal finding of blood chemistry, unspecified    4. Colon cancer screening    5. 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 Type 2 diabetes mellitus with  complication, without long-term current use of insulin  Assessment & Plan:  Lab Results   Component Value Date    HGBA1C 7.0 (H) 06/16/2025   No GLP1, trying to avoid weight loss    - Monitored the patient's A1C which has improved from 10.6 to 7.0, indicating better diabetes control.  - Current diabetes management plan is effective.  - Explained the relationship between uncontrolled diabetes and kidney damage, clarifying that metformin itself does not cause kidney failure.  - Discussed dietary choices, advising on complex vs. simple carbohydrates (e.g., potatoes being preferable to pasta) and foods to be cautious with due to high sugar content (rice, bread, pasta).  - Noted that liver and renal function from March labs were normal.  - Continued Metformin 500 mg twice daily.  - Instructed the patient to continue current diet and lifestyle modifications to maintain improved A1C levels.  - Ordered kidney and liver function tests for next visit.  - Hernan to maintain protein intake.      Orders:  -     Hemoglobin A1C; Future; Expected date: 06/19/2026    2. Follow-up exam  -     Comprehensive Metabolic Panel; Future; Expected date: 06/19/2025  -     CBC Auto Differential; Future; Expected date: 06/19/2025  -     Lipid Panel; Future; Expected date: 06/19/2025    3. Abnormal finding of blood chemistry, unspecified  -     CBC Auto Differential; Future; Expected date: 06/19/2025  -     Lipid Panel; Future; Expected date: 06/19/2025    4. Colon cancer screening  -     Cologuard Screening (Multitarget Stool DNA); Future; Expected date: 06/19/2025    5. 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     - Reviewed CT results showing decreasing size of mass, indicating positive response to treatment.  - Recommend continuing surveillance imaging every 3 months as per oncologist's recommendation.  - Scheduled follow-up CT for next month.      Other orders  -      salicylic acid 2 % Crea; Apply 1 Application topically Daily.  Dispense: 59 g; Refill: 0      PRURITUS:  - Recommend OTC treatment for reported itching before considering prescription options.  - Prescribed OTC salicylic acid cream for skin irritation.    FOLLOW-UP:  - Ordered Cologuard test for colon cancer screening.         Follow up in about 3 months (around 9/19/2025).    This note was generated with the assistance of ambient listening technology. Verbal consent was obtained by the patient and accompanying visitor(s) for the recording of patient appointment to facilitate this note. I attest to having reviewed and edited the generated note for accuracy, though some syntax or spelling errors may persist. Please contact the author of this note for any clarification.                [1]   Current Outpatient Medications:     atorvastatin (LIPITOR) 40 MG tablet, Take 1 tablet (40 mg total) by mouth once daily., Disp: 90 tablet, Rfl: 3    fluticasone-umeclidin-vilanter (TRELEGY ELLIPTA) 100-62.5-25 mcg DsDv, Inhale 1 puff into the lungs once daily., Disp: 60 each, Rfl: 3    metFORMIN (GLUCOPHAGE-XR) 500 MG ER 24hr tablet, Take 1 tablet (500 mg total) by mouth 2 (two) times daily with meals., Disp: 180 tablet, Rfl: 0    salicylic acid 2 % Crea, Apply 1 Application topically Daily., Disp: 59 g, Rfl: 0

## 2025-06-19 NOTE — ASSESSMENT & PLAN NOTE
Has completed chemo and radiation.     - Reviewed CT results showing decreasing size of mass, indicating positive response to treatment.  - Recommend continuing surveillance imaging every 3 months as per oncologist's recommendation.  - Scheduled follow-up CT for next month.

## 2025-06-19 NOTE — PROGRESS NOTES
Health Maintenance Due   Topic     RSV Vaccine (Age 60+ and Pregnant patients) (1 - Risk 60-74 years 1-dose series) Not offered at this Facility    Shingles Vaccine (2 of 2) Hx of chickenpox. Notified pt can get vaccine at pharmacy.    Colorectal Cancer Screening  Consult pcp    COVID-19 Vaccine (5 - 2024-25 season) Consult pcp season over    Foot Exam  Consult pcp    Diabetic Eye Exam  Consult pcp

## 2025-06-23 ENCOUNTER — TELEPHONE (OUTPATIENT)
Dept: FAMILY MEDICINE | Facility: CLINIC | Age: 70
End: 2025-06-23
Payer: MEDICARE

## 2025-06-23 NOTE — TELEPHONE ENCOUNTER
Copied from CRM #[id number]. Topic: Medications - Medication Question  >> Jun 23, 2025  9:27 AM [place] wrote:  Name of Who is Calling: pharmacist from [facility]       What is the request in detail: pharmacist would like to info the doctor about salicylic acid 2 % Crea and it no longer selling at the pharmacy. The doctor may want to pick a different medication for the pt. Please contact to further discuss and advise.            Can the clinic reply by [facility]: call       What Number to Call Back if not in [facility]:.  [id number] - [place] [address] [place] [address] [place] [address]  Phone: [phone] Fax: [phone]

## 2025-07-14 ENCOUNTER — TELEPHONE (OUTPATIENT)
Dept: UROLOGY | Facility: CLINIC | Age: 70
End: 2025-07-14
Payer: MEDICARE

## 2025-07-17 ENCOUNTER — OFFICE VISIT (OUTPATIENT)
Dept: UROLOGY | Facility: CLINIC | Age: 70
End: 2025-07-17
Payer: MEDICARE

## 2025-07-17 VITALS — WEIGHT: 181.75 LBS | BODY MASS INDEX: 28.47 KG/M2

## 2025-07-17 DIAGNOSIS — R31.0 GROSS HEMATURIA: ICD-10-CM

## 2025-07-17 DIAGNOSIS — N52.01 ERECTILE DYSFUNCTION DUE TO ARTERIAL INSUFFICIENCY: Primary | ICD-10-CM

## 2025-07-17 PROCEDURE — 99999 PR PBB SHADOW E&M-EST. PATIENT-LVL II: CPT | Mod: PBBFAC,,, | Performed by: UROLOGY

## 2025-07-17 RX ORDER — SILDENAFIL 100 MG/1
100 TABLET, FILM COATED ORAL DAILY PRN
Qty: 30 TABLET | Refills: 11 | Status: SHIPPED | OUTPATIENT
Start: 2025-07-17

## 2025-07-17 NOTE — PROGRESS NOTES
Subjective:       Hernan Engel is a 69 y.o. male who is a new patient who was referred by Dr. Judith Reed  for evaluation of hematuria.      Referred for gross hematuria. Occurred in August, lasted for 1 day. He was noted to have E coli UTI in 8/2024. +dysuria. He was given Bactrim that he did not complete, states he took a 1-2 days and stopped when feeling better. He feels he got UTI from toilets at work ([employer]). No prior UTI. No prior kidney stones. No flank pain.     Previously saw Dr Gordon for ED in 2019. Currently followed by oncology for SCC of lung and adenoca of lungula w recent chemo/rads, currently on immunotherapy.    He also again brings up ED. He was given Viagra 50mg but has not tried it due to concern for SE.     UA micro 8/24 - >100 RBCs (+E coli UTI)    CT 3/2024 c/a/p - no stones/hydro/mass of kidney, prostate calcifications (+lung mass).     Cysto 10/24 - mild BPH, otherwise normal    7/17/2025  No UTIs or gross hematuria. Denies LUTS. Here for ED issues - problem achieving and maintaining. Tried Viagra in the past, nothing recently. He would like to retry. Notes Cialis did not work for him in the past.     The following portions of the patient's history were reviewed and updated as appropriate: allergies, current medications, past family history, past medical history, past social history, past surgical history and problem list.    Review of Systems  Twelve point review of systems completed. Pertinent positive and negatives listed in HPI.      Objective:    Vitals: Wt 82.5 kg (181 lb 12.3 oz)   BMI 28.47 kg/m²     Physical Exam   General: well developed, well nourished in no acute distress  Head: normocephalic, atraumatic  Neck: no obvious enlargement of thyroid  HEENT: EOMI, mucus membranes moist, sclera anicteric, +hearing impairment  Lungs: symmetric expansion, non-labored breathing  Neuro: alert and oriented x 3, no gross deficits  Psych: normal judgment and insight,  "normal mood/affect and non-anxious  Genitourinary: deferred   RITCHIE: deferred      Lab Review   Urine analysis today in clinic shows  - no urine    Lab Results   Component Value Date    WBC 4.31 03/07/2025    HGB 13.9 (L) 03/07/2025    HCT 40.9 03/07/2025    HCT 37 03/27/2014    MCV 84 03/07/2025     03/07/2025     Lab Results   Component Value Date    CREATININE 0.9 04/29/2025    BUN 14 03/07/2025     Lab Results   Component Value Date    PSA 1.8 03/07/2025     No results found for: "PSADIAG"    Imaging  CT reviewed  Reports and images were personally reviewed by me and discussed with the patient today         Assessment/Plan:      1. Gross hematuria    - Discussed etiology and workup of hematuria   - UCx - recently treated for UTI, did not take full course. UA clear today.    - Cytology - not indicated   - CT urogram - will hold as recent upper tract imaging.    - Office cystoscopy 10/2024 - neg     2. Erectile dysfunction due to arterial insufficiency    - Given Viagra 50mg by PCP previously - did not trial   - Trial Viagra 100mg PRN. Discussed SE.        Follow up for 3-4mths            "

## 2025-07-28 ENCOUNTER — HOSPITAL ENCOUNTER (OUTPATIENT)
Dept: RADIOLOGY | Facility: HOSPITAL | Age: 70
Discharge: HOME | End: 2025-07-28
Attending: HOSPITALIST
Payer: MEDICARE

## 2025-07-28 DIAGNOSIS — C34.32 SQUAMOUS CELL CARCINOMA OF BRONCHUS IN LEFT LOWER LOBE: ICD-10-CM

## 2025-07-28 LAB
CREAT SERPL-MCNC: 1 MG/DL (ref 0.5–1.4)
SAMPLE: NORMAL

## 2025-07-28 PROCEDURE — 71260 CT THORAX DX C+: CPT | Mod: 26,,, | Performed by: RADIOLOGY

## 2025-07-28 PROCEDURE — 71260 CT THORAX DX C+: CPT | Mod: TC

## 2025-07-28 PROCEDURE — 25500020 PHARM REV CODE 255: Performed by: HOSPITALIST

## 2025-07-28 RX ADMIN — IOHEXOL 75 ML: 350 INJECTION, SOLUTION INTRAVENOUS at 03:07

## 2025-07-30 ENCOUNTER — OFFICE VISIT (OUTPATIENT)
Dept: HEMATOLOGY/ONCOLOGY | Facility: CLINIC | Age: 70
End: 2025-07-30
Payer: MEDICARE

## 2025-07-30 VITALS
WEIGHT: 178.13 LBS | TEMPERATURE: 98 F | BODY MASS INDEX: 27.96 KG/M2 | OXYGEN SATURATION: 99 % | DIASTOLIC BLOOD PRESSURE: 81 MMHG | HEIGHT: 67 IN | HEART RATE: 67 BPM | RESPIRATION RATE: 24 BRPM | SYSTOLIC BLOOD PRESSURE: 134 MMHG

## 2025-07-30 DIAGNOSIS — C34.32 SQUAMOUS CELL CARCINOMA OF BRONCHUS IN LEFT LOWER LOBE: Primary | ICD-10-CM

## 2025-07-30 DIAGNOSIS — C34.12 ADENOCARCINOMA OF UPPER LOBE OF LEFT LUNG: ICD-10-CM

## 2025-07-30 PROCEDURE — 99999 PR PBB SHADOW E&M-EST. PATIENT-LVL III: CPT | Mod: PBBFAC,,, | Performed by: HOSPITALIST

## 2025-07-30 NOTE — PROGRESS NOTES
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   9/15/2023 Imaging Significant Findings    CXR (URI)  - Masslike focus along L hilar region     10/6/2023 Imaging Significant Findings    CT C, Non-Con  - 6.6 x 5.9 cm LLL mass  - 2 x 1.2 cm cavitary lesion in lingula  - 0.9 x 0.8 cm pleural based nodule in R lung base  - Trace L pleural effusion  - Suspect hilar LAD  - Calcified hilar LN     11/2/2023 Procedure    Bronchoscopy  LLL, Endobronchial Biopsy  - Squamous cell carcinoma (CDK 5/6, p63 positive, CK7 and TTF negative)    [facility] N[facility]  - Insufficient tissue     11/6/2023 Imaging Significant Findings    PET CT  - 7.6 x 6.0 cm soft tissue mass in superior segment of LLL, SUV 7.2  - 2.5 x 1.6 cm cavitary lesion in ELDER (neoplastic vs infectious/inflammatory), SUV max 1.8  - No hypermetabolic LAD  - 0.9 cm L hypoattenuating L adrenal nodule, likely adenoma     11/29/2023 Tumor Conference    Tumor Board  - Recommend staging EBUS and L robotic bronchoscopy for sampling of ELDER cavitary nodule.  The nodule is highly suspicious for possible adenocarcinoma as it has evolved from a ground-glass opacity to a part solid and cavitary nodule.  Obtain brain MRI to complete staging.  The clinical picture suggests a possible T3 or T4 primary lung cancer or two separate primary lung cancers.        11/29/2023 Tumor Genotyping    Liquid Biopsy, [facility]  - CKDN2A, TP53 (three separate LOF mutations)     11/30/2023 Imaging Significant Findings    MRI Brain  - JAN     12/12/2023 Imaging Significant Findings    CT C, Non-Con  - 6.7 x 6.2 cm LLL mass (previously 6.6 x 5.9 cm), abutting and narrowing the adjacent LLL bronchus  - Enlarging lingular cavitary lesion, now 2.5 x 1.6 cm  - Slightly decreased size of RLL pleural based nodule     1/3/2024 Tumor  Conference    Tumor Board  - Agree with plan for SBRT of lingular lesion and chemoRT to LLL mass.      1/5/2024 Cancer Staged    Staging form: Lung, AJCC 8th Edition  - Clinical: Stage IIIA (cT4, cN0, cM0)     1/19/2024 - 2/26/2024 Chemotherapy    Treatment Summary   Plan Name: OP NSCLC PACLITAXEL + CARBOPLATIN (AUC) QW + RADIATION  Treatment Goal: Curative  Status: Inactive  Start Date: 1/19/2024  End Date: 2/26/2024  Provider: Guanako Grove IV, MD  Chemotherapy: CARBOplatin (PARAPLATIN) 210 mg in sodium chloride 0.9% 136 mL chemo infusion, 210 mg (100 % of original dose 208.2 mg), Intravenous, Clinic/HOD 1 time, 6 of 6 cycles  Dose modification:   (original dose 208.2 mg, Cycle 1)  Administration: 210 mg (1/22/2024), 210 mg (1/29/2024), 180 mg (2/5/2024), 225 mg (2/12/2024), 210 mg (2/19/2024), 210 mg (2/26/2024)  PACLitaxeL (TAXOL) 45 mg/m2 = 84 mg in sodium chloride 0.9% 250 mL chemo infusion, 45 mg/m2 = 84 mg, Intravenous, Clinic/HOD 1 time, 6 of 6 cycles  Administration: 84 mg (1/22/2024), 84 mg (1/29/2024), 84 mg (2/5/2024), 84 mg (2/12/2024), 84 mg (2/19/2024), 84 mg (2/26/2024)     1/23/2024 - 3/6/2024 Radiation Therapy    Treating physician: Santino Arora  Treatment Summary  Course: C1 Thorax 2024  Treatment Site Ref. ID Energy Dose/Fx (Gy) #Fx Dose Correction (Gy) Total Dose (Gy) Start Date End Date Elapsed Days   IM Lung_L PTV_High 6X 2.2 19 / 30 0 41.8 1/23/2024 2/19/2024 27   IMLungNew PTV_High_resim 6X 2.2 11 / 11 0 24.2 2/21/2024 3/6/2024 14   Multiple planns were attempted, including SBRT to the ELDER and CRT to the LLL. With dose overlap, it was ultimately decided to treat with concurrent CRT to both lesions.. He was resimulated for treatment response in the LLL mass.     3/13/2024 Imaging Significant Findings    CT C/A/P  - 4.7 x 4.4 cm perihilar LLL mass, previously 6.7 x 6.2 cm. Decreased mass effect on LLL airways  - 2.7 x 1.7 cm cavitary ligular lesion, unchanged  - New cavitary lesions, solid  pulmonary nodules, and ill-defined opacities in lungs b/l  - Unchanged few sub-cm hypodensities in liver     4/19/2024 -  Chemotherapy    Treatment Summary   Plan Name: OP DURVALUMAB 1500 MG Q4W  Treatment Goal: Curative  Status: Active  Start Date: 4/19/2024  End Date: 7/2/2025 (Planned)  Provider: Guanako Grove IV, MD  Chemotherapy: [No matching medication found in this treatment plan]     6/11/2024 Imaging Significant Findings    CT C/A/P  - 4.2 x 3.4 cm perihilar LLL mass, previously 4.7 x 4.4 cm.  Improvement in intralesional cavitation  - 1.6 x 1.3 cm lingular lesion, previously 2.7 x 1.7 cm  - New interstitial infiltrates in posterior aspect of ELDER and lingula  - Worsening of ill-defined opacities in LLL and bronchiectatic changes  - Improvement in previously seen ill-defined opacities in medial aspect of RLL  - Improvement other cavitary lesions and pulmonary nodules  - Postoperative upper lobectomy changes, no recurrent disease bronchial stump  - Small pericardial effusion  - Few subcentimeter hepatic hypodensities, grossly unchanged     7/30/2024 Imaging Significant Findings    CT C  - Interval resolution of lingular cavitary lesion   - 4.3 x 4.0 left hilar soft tissue mass, previously 4.2 x 3.4 cm  - Worsening opacity at left lung base with denser consolidation, concerning for possible superimposed infection, malignancy not excluded  - 0.9 cm more conspicuous nodular density along left greater fissure  - Trace pleural fluid on left  - 0.4 cm ELDER pulmonary nodule, unchanged  - 0.6 cm right paramediastinal nodule, unchanged  - No mediastinal or hilar lymphadenopathy     9/24/2024 Imaging Significant Findings    CT C/A/P  4.4 x 4.2 cm left hilar mass, stable compared to prior  Previously identified nodular density along fissure no longer visualized  Stable opacity in left lung base, possibly representing infiltrative versus inflammatory changes     1/25/2025 Imaging Significant Findings    CT C/A/P  4.4 x  3.8 cm left hilar soft tissue mass, previously 4.4 x 4.0 cm  Stable opacity of left lung base  Trace left pleural effusion  0.6 cm RML paramediastinal nodule, previously 0.5 cm  Postsurgical changes s/p right upper lobectomy  No significant mediastinal, hilar, or axillary lymphadenopathy     4/29/2025 Imaging Significant Findings    CT C  Smaller paramediastinal LLL soft tissue density encasing letter vascular and bronchial structures  Stable right hemithorax pleural-parenchymal scar  No evidence of new or recurrent disease     7/28/2025 Imaging Significant Findings    CT C  Decreasing 3.7 x 3.6 cm left paramediastinal mass, previously 4.0 x 3.9 cm, with surrounding volume loss, architectural distortion with traction bronchiectasis and consolidative opacification in medial LLL and lingula  Stable (at least to 3/13/24) appearance of right hemithorax with areas of architectural distortion and volume loss  Stable right lung subpleural and paramediastinal nodules  No new or enlarging pulmonary nodules  No intrathoracic lymphadenopathy     Adenocarcinoma of Lingula   10/6/2023 Imaging Significant Findings    CT C, Non-Con  - 6.6 x 5.9 cm LLL mass  - 2 x 1.2 cm cavitary lesion in lingula  - 0.9 x 0.8 cm pleural based nodule in R lung base  - Trace L pleural effusion  - Suspect hilar LAD  - Calcified hilar LN     11/6/2023 Imaging Significant Findings    PET CT  - 7.6 x 6.0 cm soft tissue mass in superior segment of LLL, SUV 7.2  - 2.5 x 1.6 cm cavitary lesion in ELDER (neoplastic vs infectious/inflammatory), SUV max 1.8  - No hypermetabolic LAD  - 0.9 cm L hypoattenuating L adrenal nodule, likely adenoma     11/29/2023 Tumor Conference    Tumor Board  - Recommend staging EBUS and L robotic bronchoscopy for sampling of ELDER cavitary nodule.  The nodule is highly suspicious for possible adenocarcinoma as it has evolved from a ground-glass opacity to a part solid and cavitary nodule.  Obtain brain MRI to complete staging.  The  clinical picture suggests a possible T3 or T4 primary lung cancer or two separate primary lung cancers.        11/29/2023 Tumor Genotyping    Liquid Biopsy, [facility]  - CKDN2A, TP53 (three separate LOF mutations)     11/30/2023 Imaging Significant Findings    MRI Brain  - JAN     12/12/2023 Imaging Significant Findings    CT C, Non-Con  - 6.7 x 6.2 cm LLL mass (previously 6.6 x 5.9 cm), abutting and narrowing the adjacent LLL bronchus  - Enlarging lingular cavitary lesion, now 2.5 x 1.6 cm  - Slightly decreased size of RLL pleural based nodule     12/19/2023 Procedure    Bronch with EBUS  ELDER, FNA and TBBx  - Adenocarcinoma (TTF1 positive, p40 negative)    LN  - Negative: Station 7  - Report: No pathologic enlargement of 4R, 4L, or 11R. Unable to evaluated 11L due to obstructing LLL endobronchial mass    [facility] NGS  - KRAS G12C  - TP53  - Negative: EGFR, ALK, BRAF, ROS1, RET, MET, ERBB2  - PD-L1: 10%       1/3/2024 Tumor Conference    Tumor Board  - Agree with plan for SBRT of lingular lesion and chemoRT to LLL mass.      1/5/2024 Cancer Staged    Staging form: Lung, AJCC 8th Edition  - Clinical: Stage IA3 (cT1c, cN0, cM0)     3/13/2024 Imaging Significant Findings    CT C/A/P  - 4.7 x 4.4 cm perihilar LLL mass, previously 6.7 x 6.2 cm. Decreased mass effect on LLL airways  - 2.7 x 1.7 cm cavitary ligular lesion, unchanged  - New cavitary lesions, solid pulmonary nodules, and ill-defined opacities in lungs b/l  - Unchanged few sub-cm hypodensities in liver     6/11/2024 Imaging Significant Findings    CT C/A/P  - 4.2 x 3.4 cm perihilar LLL mass, previously 4.7 x 4.4 cm.  Improvement in intralesional cavitation  - 1.6 x 1.3 cm lingular lesion, previously 2.7 x 1.7 cm  - New interstitial infiltrates in posterior aspect of ELDER and lingula  - Worsening of ill-defined opacities in LLL and bronchiectatic changes  - Improvement in previously seen ill-defined opacities in medial aspect of RLL  - Improvement other cavitary  lesions and pulmonary nodules  - Postoperative upper lobectomy changes, no recurrent disease bronchial stump  - Small pericardial effusion  - Few subcentimeter hepatic hypodensities, grossly unchanged     7/30/2024 Imaging Significant Findings    CT C  - Interval resolution of lingular cavitary lesion   - 4.3 x 4.0 left hilar soft tissue mass, previously 4.2 x 3.4 cm  - Worsening opacity at left lung base with denser consolidation, concerning for possible superimposed infection, malignancy not excluded  - 0.9 cm more conspicuous nodular density along left greater fissure  - Trace pleural fluid on left  - 0.4 cm ELDER pulmonary nodule, unchanged  - 0.6 cm right paramediastinal nodule, unchanged  - No mediastinal or hilar lymphadenopathy     9/24/2024 Imaging Significant Findings    CT C/A/P  4.4 x 4.2 cm left hilar mass, stable compared to prior  Previously identified nodular density along fissure no longer visualized  Stable opacity in left lung base, possibly representing infiltrative versus inflammatory changes     1/25/2025 Imaging Significant Findings    CT C/A/P  4.4 x 3.8 cm left hilar soft tissue mass, previously 4.4 x 4.0 cm  Stable opacity of left lung base  Trace left pleural effusion  0.6 cm RML paramediastinal nodule, previously 0.5 cm  Postsurgical changes s/p right upper lobectomy  No significant mediastinal, hilar, or axillary lymphadenopathy     4/29/2025 Imaging Significant Findings    CT C  Smaller paramediastinal LLL soft tissue density encasing letter vascular and bronchial structures  Stable right hemithorax pleural-parenchymal scar  No evidence of new or recurrent disease     7/28/2025 Imaging Significant Findings    CT C  Decreasing 3.7 x 3.6 cm left paramediastinal mass, previously 4.0 x 3.9 cm, with surrounding volume loss, architectural distortion with traction bronchiectasis and consolidative opacification in medial LLL and lingula  Stable (at least to 3/13/24) appearance of right hemithorax  "with areas of architectural distortion and volume loss  Stable right lung subpleural and paramediastinal nodules  No new or enlarging pulmonary nodules  No intrathoracic lymphadenopathy        Oncology History      HPI:     Hernan Engel is a 68 y.o. male with pmh significant for COPD, T2DM, HLD, Stage IB ([id number]) moderately differentiated adenocarcinoma of RUL, initially dx'd 3/2014, s/p R upper lobectomy (3/2014, Dr. Mendez), and now two newly diagnosed concurrent lung cancers as below who presents for follow up:     1) Stage IIIA (T4N0M0) squamous cell carcinoma of the LLL (insufficient sample for PD-L1, no targetable mutations on liquid biopsy), initially dx'd 11/2/23, s/p CRT (1/23/24 - 3/05/24) w/ weekly carboplatin/paclitaxel (1/22/24 - 2/26/24), and durvalumab consolidation (4/19/24-5/16/24; held for pneumonitis, restarted 9/25/24-10/22/24)    2) Stage IA3 ([id number]) adenocarcinoma of the lingula (KRAS G12C, PD-L1 10%), initially dx'd 12/19/23, s/p CRT (1/23/24 - 3/05/24) w/ weekly carboplatin/paclitaxel (1/22/24 - 2/26/24), and durvalumab consolidation x4 cycles total (4/19/24-5/16/24; held for pneumonitis, restarted 9/25/24-10/22/24)    *Notably, pt has a RLL pleural based nodule, reviewed at TB, appears linear and unlikely malignant    Last clinic 4/30/25, final CT read pending however on my evaluation there may be some ongoing improvement.  Pending final read, repeat imaging in 3 months with return to clinic afterwards.    Interval History:   7/28/25: CT C, stable compared to prior    The pt says that he is doing well today.     He says that his breathing has been good. 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 I walk all day, showing people where to go". He denies any new/worsening cough.     He notes that he has been drinking "cancer tea", made of rico, lemon, cinnamon, tumeric, garlic, and onion.    ROS:   As per HPI.       Physical Exam:       /81   Pulse 67   Temp 97.5 °F " "(36.4 °C) (Oral)   Resp (!) 24   Ht 5' 7" (1.702 m)   Wt 80.8 kg (178 lb 2.1 oz)   SpO2 99%   BMI 27.90 kg/m²                Physical Exam  Constitutional:       Appearance: Normal appearance.   HENT:      Head: Normocephalic and atraumatic.   Eyes:      Extraocular Movements: Extraocular movements intact.      Conjunctiva/sclera: Conjunctivae normal.      Pupils: Pupils are equal, round, and reactive to light.   Cardiovascular:      Rate and Rhythm: Normal rate and regular rhythm.      Heart sounds: No murmur heard.     No friction rub. No gallop.   Pulmonary:      Effort: Pulmonary effort is normal.      Comments: Scant posterior wheezing  Musculoskeletal:         General: Normal range of motion.      Right lower leg: No edema.      Left lower leg: No edema.   Skin:     General: Skin is warm and dry.   Neurological:      Mental Status: He is alert and oriented to person, place, and time.   Psychiatric:         Mood and Affect: Mood normal.         Thought Content: Thought content normal.         Judgment: Judgment normal.       Imaging:    See oncologic history as above.     Path:  See oncologic history above.      Assessment and Plan:     Hernan Engel is a 68 y.o. male with pmh significant for COPD, T2DM, HLD, Stage IB ([id number]) moderately differentiated adenocarcinoma of RUL, initially dx'd 3/2014, s/p R upper lobectomy (3/2014, Dr. Mendez), and now two newly diagnosed concurrent lung cancers as below who presents for follow up:     1) Stage IIIA (T4N0M0) squamous cell carcinoma of the LLL (insufficient sample for PD-L1, no targetable mutations on liquid biopsy), initially dx'd 11/2/23, s/p CRT (1/23/24 - 3/05/24) w/ weekly carboplatin/paclitaxel (1/22/24 - 2/26/24), and durvalumab consolidation (4/19/24-5/16/24; held for pneumonitis, restarted 9/25/24-10/22/24)    2) Stage IA3 (O5hT0Q6) adenocarcinoma of the lingula (KRAS G12C, PD-L1 10%), initially dx'd 12/19/23, s/p CRT (1/23/24 - 3/05/24) w/ weekly " carboplatin/paclitaxel (1/22/24 - 2/26/24), and durvalumab consolidation x4 cycles total (4/19/24-5/16/24; held for pneumonitis, restarted 9/25/24-10/22/24)    *Notably, pt has a RLL pleural based nodule, reviewed at TB, appears linear and unlikely malignant    Stage IIIA Squamous Cell Carcinoma of the LLL, No PD-L1, No Targetable Mutations  ECOG PS 0.  Patient is currently on surveillance (see previous note for consideration), no new or worsening respiratory symptoms.  Most recent imaging (7/28/25) demonstrates slight decrement in size of the left paramediastinal mass with otherwise stable findings and no evidence of new or recurrent disease.  Plan to continue with CT C q3-6m x 3y (~2/2027), then q6m x 2 years, then annually.     PLAN  CT C in 3 months (around 10/20/25)  RTC at least 2 days after imaging      Stage IA3 Adenocarcinoma of the Lingula, NGS and PD-L1 Pending  Bronchoscopy demonstrates adenocarcinoma of the lingula, separate from the squamous cell carcinoma in the LLL.  This is a T1c lesion which, given lack of lymphadenopathy, makes this a Stage IA3 disease. Now s/p CRT as above. Prior imaging demonstrated resolution of this lesion.  Treatment as above        The above information has been reviewed with the patient and all questions have been answered to their apparent satisfaction.  They understand that they can call the clinic with any questions.        Med Onc Chart Routing      Follow up with physician . CT C in 3 months. RTC at least 2 days afterwards.   Follow up with SHARRI    Infusion scheduling note    Injection scheduling note    Labs    Imaging    Pharmacy appointment    Other referrals

## (undated) DEVICE — BOWL STERILE LARGE 32OZ

## (undated) DEVICE — CONTAINER SPECIMEN STRL 4OZ

## (undated) DEVICE — SYR 10CC LUER LOCK

## (undated) DEVICE — BAG ION VISION PROBE

## (undated) DEVICE — LUBRICANT SURGILUBE 2 OZ

## (undated) DEVICE — PENCIL GOLF STERILE

## (undated) DEVICE — PACK ECLIPSE SET-UP W/O DRAPE

## (undated) DEVICE — KIT ANTIFOG W/SPONG & FLUID

## (undated) DEVICE — DRESSING TRANS 6X8 TEGADERM

## (undated) DEVICE — ALCOHOL BLEND 95%

## (undated) DEVICE — ADAPTER SWIVEL

## (undated) DEVICE — ADAPTER VISION PROBE & SUCTION

## (undated) DEVICE — NDL ASPIRATING VIZISHOT 20-40M

## (undated) DEVICE — DRAPE THREE-QTR REINF 53X77IN

## (undated) DEVICE — CATH BRONCHOSCOPE F/BF

## (undated) DEVICE — SYR SLIP TIP 20CC

## (undated) DEVICE — SPONGE COTTON TRAY 4X4IN

## (undated) DEVICE — CONNECTOR SWIVEL

## (undated) DEVICE — NDL VIZISHOT 2 FLEX 22G

## (undated) DEVICE — SYS LABEL CORRECT MED

## (undated) DEVICE — GOWN POLY REINF BRTH SLV XL

## (undated) DEVICE — CYTOSPIN COLLECTION FLUID BLT